# Patient Record
Sex: MALE | Race: WHITE | RURAL
[De-identification: names, ages, dates, MRNs, and addresses within clinical notes are randomized per-mention and may not be internally consistent; named-entity substitution may affect disease eponyms.]

---

## 2017-04-04 ENCOUNTER — HOSPITAL ENCOUNTER (OUTPATIENT)
Dept: OTHER | Age: 67
Discharge: HOME | End: 2017-04-04
Attending: NURSE PRACTITIONER | Admitting: NURSE PRACTITIONER

## 2017-04-04 LAB
A/G RATIO: 1.5 (ref 0.8–2)
ALBUMIN SERPL-MCNC: 4 G/DL (ref 3.4–4.8)
ALP BLD-CCNC: 75 U/L (ref 25–100)
ALT SERPL-CCNC: 21 U/L (ref 4–36)
ANION GAP SERPL CALCULATED.3IONS-SCNC: 13 MMOL/L (ref 3–16)
AST SERPL-CCNC: 23 U/L (ref 8–33)
BASOPHILS ABSOLUTE: 0 K/UL (ref 0–0.1)
BASOPHILS RELATIVE PERCENT: 0.1 %
BILIRUB SERPL-MCNC: 0.5 MG/DL (ref 0.3–1.2)
BUN BLDV-MCNC: 19 MG/DL (ref 6–20)
CALCIUM SERPL-MCNC: 9.3 MG/DL (ref 8.5–10.5)
CHLORIDE BLD-SCNC: 102 MMOL/L (ref 98–107)
CHOLESTEROL, TOTAL: 124 MG/DL (ref 0–200)
CO2: 26 MMOL/L (ref 20–30)
CREAT SERPL-MCNC: 1 MG/DL (ref 0.4–1.2)
EOSINOPHILS ABSOLUTE: 0.1 K/UL (ref 0–0.4)
EOSINOPHILS RELATIVE PERCENT: 1 %
FOLATE: 16.2 NG/ML
GFR AFRICAN AMERICAN: >59
GFR NON-AFRICAN AMERICAN: >59
GLOBULIN: 2.7 G/DL
GLUCOSE BLD-MCNC: 114 MG/DL (ref 74–106)
HBA1C MFR BLD: 8.5 %
HCT VFR BLD CALC: 40.6 % (ref 40–54)
HDLC SERPL-MCNC: 42 MG/DL (ref 40–60)
HEMOGLOBIN: 14 G/DL (ref 13–18)
LDL CHOLESTEROL CALCULATED: 72 MG/DL
LYMPHOCYTES ABSOLUTE: 1.7 K/UL (ref 1.5–4)
LYMPHOCYTES RELATIVE PERCENT: 21 % (ref 20–40)
MCH RBC QN AUTO: 31.8 PG (ref 27–32)
MCHC RBC AUTO-ENTMCNC: 34.5 G/DL (ref 31–35)
MCV RBC AUTO: 92.3 FL (ref 80–100)
MONOCYTES ABSOLUTE: 0.6 K/UL (ref 0.2–0.8)
MONOCYTES RELATIVE PERCENT: 7.4 % (ref 3–10)
NEUTROPHILS ABSOLUTE: 5.7 K/UL (ref 2–7.5)
NEUTROPHILS RELATIVE PERCENT: 70.5 %
PDW BLD-RTO: 13 % (ref 11–16)
PLATELET # BLD: 210 K/UL (ref 150–400)
PMV BLD AUTO: 10.4 FL (ref 6–10)
POTASSIUM SERPL-SCNC: 4.5 MMOL/L (ref 3.4–5.1)
PROSTATE SPECIFIC ANTIGEN: 9.89 NG/ML (ref 0–4)
RBC # BLD: 4.4 M/UL (ref 4.5–6)
SODIUM BLD-SCNC: 141 MMOL/L (ref 136–145)
TOTAL PROTEIN: 6.7 G/DL (ref 6.4–8.3)
TRIGL SERPL-MCNC: 51 MG/DL (ref 0–249)
TSH SERPL DL<=0.05 MIU/L-ACNC: 1 UIU/ML (ref 0.35–5.5)
VITAMIN B-12: 370 PG/ML (ref 211–911)
VLDLC SERPL CALC-MCNC: 10 MG/DL
WBC # BLD: 8.1 K/UL (ref 4–11)

## 2018-10-11 ENCOUNTER — HOSPITAL ENCOUNTER (OUTPATIENT)
Facility: HOSPITAL | Age: 68
Discharge: HOME | End: 2018-10-11
Payer: MEDICARE

## 2018-10-11 PROCEDURE — G0103 PSA SCREENING: HCPCS

## 2018-10-11 PROCEDURE — 83036 HEMOGLOBIN GLYCOSYLATED A1C: CPT

## 2018-10-11 PROCEDURE — 80053 COMPREHEN METABOLIC PANEL: CPT

## 2018-10-11 PROCEDURE — 82043 UR ALBUMIN QUANTITATIVE: CPT

## 2018-10-11 PROCEDURE — 80061 LIPID PANEL: CPT

## 2018-10-11 PROCEDURE — 36415 COLL VENOUS BLD VENIPUNCTURE: CPT

## 2018-10-11 PROCEDURE — 84443 ASSAY THYROID STIM HORMONE: CPT

## 2018-10-11 PROCEDURE — 85025 COMPLETE CBC W/AUTO DIFF WBC: CPT

## 2018-10-11 PROCEDURE — 82746 ASSAY OF FOLIC ACID SERUM: CPT

## 2018-10-12 LAB
A/G RATIO: 1.9 (ref 0.8–2)
ALBUMIN SERPL-MCNC: 4.4 G/DL (ref 3.4–4.8)
ALP BLD-CCNC: 65 U/L (ref 25–100)
ALT SERPL-CCNC: 14 U/L (ref 4–36)
ANION GAP SERPL CALCULATED.3IONS-SCNC: 12 MMOL/L (ref 3–16)
AST SERPL-CCNC: 20 U/L (ref 8–33)
BASOPHILS ABSOLUTE: 0 K/UL (ref 0–0.1)
BASOPHILS RELATIVE PERCENT: 0.6 %
BILIRUB SERPL-MCNC: 0.5 MG/DL (ref 0.3–1.2)
BUN BLDV-MCNC: 15 MG/DL (ref 6–20)
CALCIUM SERPL-MCNC: 9.4 MG/DL (ref 8.5–10.5)
CHLORIDE BLD-SCNC: 104 MMOL/L (ref 98–107)
CHOLESTEROL, TOTAL: 153 MG/DL (ref 0–200)
CO2: 24 MMOL/L (ref 20–30)
CREAT SERPL-MCNC: 1 MG/DL (ref 0.4–1.2)
EOSINOPHILS ABSOLUTE: 0.2 K/UL (ref 0–0.4)
EOSINOPHILS RELATIVE PERCENT: 3 %
FOLATE: 14.07 NG/ML
GFR AFRICAN AMERICAN: >59
GFR NON-AFRICAN AMERICAN: >59
GLOBULIN: 2.3 G/DL
GLUCOSE BLD-MCNC: 121 MG/DL (ref 74–106)
HBA1C MFR BLD: 6.8 %
HCT VFR BLD CALC: 42.9 % (ref 40–54)
HDLC SERPL-MCNC: 50 MG/DL (ref 40–60)
HEMOGLOBIN: 14.1 G/DL (ref 13–18)
IMMATURE GRANULOCYTES #: 0 K/UL
IMMATURE GRANULOCYTES %: 0.2 % (ref 0–5)
LDL CHOLESTEROL CALCULATED: 93 MG/DL
LYMPHOCYTES ABSOLUTE: 1.8 K/UL (ref 1.5–4)
LYMPHOCYTES RELATIVE PERCENT: 27.9 %
MCH RBC QN AUTO: 31.6 PG (ref 27–32)
MCHC RBC AUTO-ENTMCNC: 32.9 G/DL (ref 31–35)
MCV RBC AUTO: 96.2 FL (ref 80–100)
MICROALBUMIN UR-MCNC: 16.4 MG/DL (ref 0–22)
MONOCYTES ABSOLUTE: 0.5 K/UL (ref 0.2–0.8)
MONOCYTES RELATIVE PERCENT: 8.6 %
NEUTROPHILS ABSOLUTE: 3.7 K/UL (ref 2–7.5)
NEUTROPHILS RELATIVE PERCENT: 59.7 %
PDW BLD-RTO: 12.5 % (ref 11–16)
PLATELET # BLD: 195 K/UL (ref 150–400)
PMV BLD AUTO: 11.3 FL (ref 6–10)
POTASSIUM SERPL-SCNC: 4.2 MMOL/L (ref 3.4–5.1)
PROSTATE SPECIFIC ANTIGEN: 1.02 NG/ML (ref 0–4)
RBC # BLD: 4.46 M/UL (ref 4.5–6)
SODIUM BLD-SCNC: 140 MMOL/L (ref 136–145)
TOTAL PROTEIN: 6.7 G/DL (ref 6.4–8.3)
TRIGL SERPL-MCNC: 50 MG/DL (ref 0–249)
TSH SERPL DL<=0.05 MIU/L-ACNC: 1.66 UIU/ML (ref 0.35–5.5)
VLDLC SERPL CALC-MCNC: 10 MG/DL
WBC # BLD: 6.3 K/UL (ref 4–11)

## 2019-06-28 ENCOUNTER — HOSPITAL ENCOUNTER (OUTPATIENT)
Facility: HOSPITAL | Age: 69
Discharge: HOME | End: 2019-06-28
Payer: MEDICARE

## 2019-06-28 LAB
A/G RATIO: 1.8 (ref 0.8–2)
ALBUMIN SERPL-MCNC: 4.8 G/DL (ref 3.4–4.8)
ALP BLD-CCNC: 67 U/L (ref 25–100)
ALT SERPL-CCNC: 15 U/L (ref 4–36)
ANION GAP SERPL CALCULATED.3IONS-SCNC: 14 MMOL/L (ref 3–16)
AST SERPL-CCNC: 18 U/L (ref 8–33)
BASOPHILS ABSOLUTE: 0 K/UL (ref 0–0.1)
BASOPHILS RELATIVE PERCENT: 0.6 %
BILIRUB SERPL-MCNC: 0.8 MG/DL (ref 0.3–1.2)
BUN BLDV-MCNC: 23 MG/DL (ref 6–20)
CALCIUM SERPL-MCNC: 9.7 MG/DL (ref 8.5–10.5)
CHLORIDE BLD-SCNC: 102 MMOL/L (ref 98–107)
CHOLESTEROL, TOTAL: 149 MG/DL (ref 0–200)
CO2: 23 MMOL/L (ref 20–30)
CREAT SERPL-MCNC: 1.1 MG/DL (ref 0.4–1.2)
EOSINOPHILS ABSOLUTE: 0.1 K/UL (ref 0–0.4)
EOSINOPHILS RELATIVE PERCENT: 1 %
FOLATE: 15.45 NG/ML
GFR AFRICAN AMERICAN: >59
GFR NON-AFRICAN AMERICAN: >59
GLOBULIN: 2.6 G/DL
GLUCOSE BLD-MCNC: 143 MG/DL (ref 74–106)
HBA1C MFR BLD: 9 %
HCT VFR BLD CALC: 45.5 % (ref 40–54)
HDLC SERPL-MCNC: 50 MG/DL (ref 40–60)
HEMOGLOBIN: 15.4 G/DL (ref 13–18)
IMMATURE GRANULOCYTES #: 0 K/UL
IMMATURE GRANULOCYTES %: 0.3 % (ref 0–5)
LDL CHOLESTEROL CALCULATED: 86 MG/DL
LYMPHOCYTES ABSOLUTE: 1.8 K/UL (ref 1.5–4)
LYMPHOCYTES RELATIVE PERCENT: 24.3 %
MCH RBC QN AUTO: 32.2 PG (ref 27–32)
MCHC RBC AUTO-ENTMCNC: 33.8 G/DL (ref 31–35)
MCV RBC AUTO: 95.2 FL (ref 80–100)
MONOCYTES ABSOLUTE: 0.6 K/UL (ref 0.2–0.8)
MONOCYTES RELATIVE PERCENT: 7.6 %
NEUTROPHILS ABSOLUTE: 4.8 K/UL (ref 2–7.5)
NEUTROPHILS RELATIVE PERCENT: 66.2 %
PDW BLD-RTO: 12.3 % (ref 11–16)
PLATELET # BLD: 190 K/UL (ref 150–400)
PMV BLD AUTO: 10.8 FL (ref 6–10)
POTASSIUM SERPL-SCNC: 4.1 MMOL/L (ref 3.4–5.1)
RBC # BLD: 4.78 M/UL (ref 4.5–6)
SODIUM BLD-SCNC: 139 MMOL/L (ref 136–145)
TOTAL PROTEIN: 7.4 G/DL (ref 6.4–8.3)
TRIGL SERPL-MCNC: 67 MG/DL (ref 0–249)
TSH SERPL DL<=0.05 MIU/L-ACNC: 2.78 UIU/ML (ref 0.35–5.5)
VITAMIN B-12: 306 PG/ML (ref 211–911)
VLDLC SERPL CALC-MCNC: 13 MG/DL
WBC # BLD: 7.2 K/UL (ref 4–11)

## 2019-06-28 PROCEDURE — 83036 HEMOGLOBIN GLYCOSYLATED A1C: CPT

## 2019-06-28 PROCEDURE — 36415 COLL VENOUS BLD VENIPUNCTURE: CPT

## 2019-06-28 PROCEDURE — 84443 ASSAY THYROID STIM HORMONE: CPT

## 2019-06-28 PROCEDURE — 80061 LIPID PANEL: CPT

## 2019-06-28 PROCEDURE — 82746 ASSAY OF FOLIC ACID SERUM: CPT

## 2019-06-28 PROCEDURE — 85025 COMPLETE CBC W/AUTO DIFF WBC: CPT

## 2019-06-28 PROCEDURE — 80053 COMPREHEN METABOLIC PANEL: CPT

## 2019-06-28 PROCEDURE — 82607 VITAMIN B-12: CPT

## 2020-06-03 ENCOUNTER — HOSPITAL ENCOUNTER (OUTPATIENT)
Facility: HOSPITAL | Age: 70
Discharge: HOME | End: 2020-06-03
Payer: MEDICARE

## 2020-06-03 LAB
A/G RATIO: 1.6 (ref 0.8–2)
ALBUMIN SERPL-MCNC: 4.1 G/DL (ref 3.4–4.8)
ALP BLD-CCNC: 65 U/L (ref 25–100)
ALT SERPL-CCNC: 20 U/L (ref 4–36)
ANION GAP SERPL CALCULATED.3IONS-SCNC: 11 MMOL/L (ref 3–16)
AST SERPL-CCNC: 22 U/L (ref 8–33)
BASOPHILS ABSOLUTE: 0 K/UL (ref 0–0.1)
BASOPHILS RELATIVE PERCENT: 0.6 %
BILIRUB SERPL-MCNC: 0.6 MG/DL (ref 0.3–1.2)
BUN BLDV-MCNC: 21 MG/DL (ref 6–20)
CALCIUM SERPL-MCNC: 9.6 MG/DL (ref 8.5–10.5)
CHLORIDE BLD-SCNC: 103 MMOL/L (ref 98–107)
CHOLESTEROL, TOTAL: 193 MG/DL (ref 0–200)
CO2: 24 MMOL/L (ref 20–30)
CREAT SERPL-MCNC: 1.1 MG/DL (ref 0.4–1.2)
EOSINOPHILS ABSOLUTE: 0.1 K/UL (ref 0–0.4)
EOSINOPHILS RELATIVE PERCENT: 1.7 %
FOLATE: 13.95 NG/ML
GFR AFRICAN AMERICAN: >59
GFR NON-AFRICAN AMERICAN: >59
GLOBULIN: 2.5 G/DL
GLUCOSE BLD-MCNC: 234 MG/DL (ref 74–106)
HBA1C MFR BLD: 11.6 %
HCT VFR BLD CALC: 43.6 % (ref 40–54)
HDLC SERPL-MCNC: 50 MG/DL (ref 40–60)
HEMOGLOBIN: 15.1 G/DL (ref 13–18)
IMMATURE GRANULOCYTES #: 0 K/UL
IMMATURE GRANULOCYTES %: 0.3 % (ref 0–5)
LDL CHOLESTEROL CALCULATED: 120 MG/DL
LYMPHOCYTES ABSOLUTE: 1.7 K/UL (ref 1.5–4)
LYMPHOCYTES RELATIVE PERCENT: 26 %
MCH RBC QN AUTO: 31.9 PG (ref 27–32)
MCHC RBC AUTO-ENTMCNC: 34.6 G/DL (ref 31–35)
MCV RBC AUTO: 92 FL (ref 80–100)
MONOCYTES ABSOLUTE: 0.4 K/UL (ref 0.2–0.8)
MONOCYTES RELATIVE PERCENT: 6.6 %
NEUTROPHILS ABSOLUTE: 4.2 K/UL (ref 2–7.5)
NEUTROPHILS RELATIVE PERCENT: 64.8 %
PDW BLD-RTO: 12.3 % (ref 11–16)
PLATELET # BLD: 163 K/UL (ref 150–400)
PMV BLD AUTO: 11.3 FL (ref 6–10)
POTASSIUM SERPL-SCNC: 4.8 MMOL/L (ref 3.4–5.1)
RBC # BLD: 4.74 M/UL (ref 4.5–6)
SODIUM BLD-SCNC: 138 MMOL/L (ref 136–145)
TOTAL PROTEIN: 6.6 G/DL (ref 6.4–8.3)
TRIGL SERPL-MCNC: 116 MG/DL (ref 0–249)
TSH SERPL DL<=0.05 MIU/L-ACNC: 2.24 UIU/ML (ref 0.35–5.5)
VITAMIN B-12: 358 PG/ML (ref 211–911)
VLDLC SERPL CALC-MCNC: 23 MG/DL
WBC # BLD: 6.5 K/UL (ref 4–11)

## 2020-06-03 PROCEDURE — 82746 ASSAY OF FOLIC ACID SERUM: CPT

## 2020-06-03 PROCEDURE — 84443 ASSAY THYROID STIM HORMONE: CPT

## 2020-06-03 PROCEDURE — 80061 LIPID PANEL: CPT

## 2020-06-03 PROCEDURE — 82607 VITAMIN B-12: CPT

## 2020-06-03 PROCEDURE — 85025 COMPLETE CBC W/AUTO DIFF WBC: CPT

## 2020-06-03 PROCEDURE — 83036 HEMOGLOBIN GLYCOSYLATED A1C: CPT

## 2020-06-03 PROCEDURE — 80053 COMPREHEN METABOLIC PANEL: CPT

## 2022-03-03 ENCOUNTER — HOSPITAL ENCOUNTER (OUTPATIENT)
Facility: HOSPITAL | Age: 72
Discharge: HOME | End: 2022-03-03
Payer: MEDICARE

## 2022-03-03 PROCEDURE — 80061 LIPID PANEL: CPT

## 2022-03-03 PROCEDURE — 84443 ASSAY THYROID STIM HORMONE: CPT

## 2022-03-03 PROCEDURE — 82746 ASSAY OF FOLIC ACID SERUM: CPT

## 2022-03-03 PROCEDURE — 80053 COMPREHEN METABOLIC PANEL: CPT

## 2022-03-03 PROCEDURE — 85025 COMPLETE CBC W/AUTO DIFF WBC: CPT

## 2022-03-03 PROCEDURE — 82607 VITAMIN B-12: CPT

## 2022-03-03 PROCEDURE — 83036 HEMOGLOBIN GLYCOSYLATED A1C: CPT

## 2022-03-03 PROCEDURE — 36415 COLL VENOUS BLD VENIPUNCTURE: CPT

## 2022-03-04 LAB
A/G RATIO: 1.7 (ref 0.8–2)
ALBUMIN SERPL-MCNC: 4.3 G/DL (ref 3.4–4.8)
ALP BLD-CCNC: 78 U/L (ref 25–100)
ALT SERPL-CCNC: 27 U/L (ref 4–36)
ANION GAP SERPL CALCULATED.3IONS-SCNC: 13 MMOL/L (ref 3–16)
AST SERPL-CCNC: 21 U/L (ref 8–33)
BASOPHILS ABSOLUTE: 0.1 K/UL (ref 0–0.1)
BASOPHILS RELATIVE PERCENT: 0.7 %
BILIRUB SERPL-MCNC: 0.7 MG/DL (ref 0.3–1.2)
BUN BLDV-MCNC: 25 MG/DL (ref 6–20)
CALCIUM SERPL-MCNC: 9.6 MG/DL (ref 8.5–10.5)
CHLORIDE BLD-SCNC: 101 MMOL/L (ref 98–107)
CHOLESTEROL, TOTAL: 187 MG/DL (ref 0–200)
CO2: 23 MMOL/L (ref 20–30)
CREAT SERPL-MCNC: 1 MG/DL (ref 0.4–1.2)
EOSINOPHILS ABSOLUTE: 0.2 K/UL (ref 0–0.4)
EOSINOPHILS RELATIVE PERCENT: 2.2 %
FOLATE: 12.83 NG/ML
GFR AFRICAN AMERICAN: >59
GFR NON-AFRICAN AMERICAN: >59
GLOBULIN: 2.6 G/DL
GLUCOSE BLD-MCNC: 364 MG/DL (ref 74–106)
HBA1C MFR BLD: 9.9 %
HCT VFR BLD CALC: 42.1 % (ref 40–54)
HDLC SERPL-MCNC: 42 MG/DL (ref 40–60)
HEMOGLOBIN: 13.9 G/DL (ref 13–18)
IMMATURE GRANULOCYTES #: 0 K/UL
IMMATURE GRANULOCYTES %: 0.4 % (ref 0–5)
LDL CHOLESTEROL CALCULATED: 111 MG/DL
LYMPHOCYTES ABSOLUTE: 1.5 K/UL (ref 1.5–4)
LYMPHOCYTES RELATIVE PERCENT: 19.9 %
MCH RBC QN AUTO: 31.6 PG (ref 27–32)
MCHC RBC AUTO-ENTMCNC: 33 G/DL (ref 31–35)
MCV RBC AUTO: 95.7 FL (ref 80–100)
MONOCYTES ABSOLUTE: 0.6 K/UL (ref 0.2–0.8)
MONOCYTES RELATIVE PERCENT: 7.8 %
NEUTROPHILS ABSOLUTE: 5.3 K/UL (ref 2–7.5)
NEUTROPHILS RELATIVE PERCENT: 69 %
PDW BLD-RTO: 13.1 % (ref 11–16)
PLATELET # BLD: 213 K/UL (ref 150–400)
PMV BLD AUTO: 12.3 FL (ref 6–10)
POTASSIUM SERPL-SCNC: 4.7 MMOL/L (ref 3.4–5.1)
RBC # BLD: 4.4 M/UL (ref 4.5–6)
SODIUM BLD-SCNC: 137 MMOL/L (ref 136–145)
TOTAL PROTEIN: 6.9 G/DL (ref 6.4–8.3)
TRIGL SERPL-MCNC: 168 MG/DL (ref 0–249)
TSH SERPL DL<=0.05 MIU/L-ACNC: 1.07 UIU/ML (ref 0.27–4.2)
VITAMIN B-12: 318 PG/ML (ref 211–911)
VLDLC SERPL CALC-MCNC: 34 MG/DL
WBC # BLD: 7.7 K/UL (ref 4–11)

## 2023-06-25 ENCOUNTER — APPOINTMENT (OUTPATIENT)
Dept: CT IMAGING | Facility: HOSPITAL | Age: 73
DRG: 065 | End: 2023-06-25
Payer: MEDICAID

## 2023-06-25 ENCOUNTER — APPOINTMENT (OUTPATIENT)
Dept: GENERAL RADIOLOGY | Facility: HOSPITAL | Age: 73
DRG: 065 | End: 2023-06-25
Payer: MEDICAID

## 2023-06-25 ENCOUNTER — HOSPITAL ENCOUNTER (INPATIENT)
Facility: HOSPITAL | Age: 73
LOS: 3 days | Discharge: ANOTHER HOSPITAL | DRG: 065 | End: 2023-06-28
Attending: HOSPITALIST | Admitting: INTERNAL MEDICINE
Payer: MEDICAID

## 2023-06-25 DIAGNOSIS — I10 ESSENTIAL HYPERTENSION: ICD-10-CM

## 2023-06-25 DIAGNOSIS — R42 DIZZINESS: ICD-10-CM

## 2023-06-25 DIAGNOSIS — N30.00 ACUTE CYSTITIS WITHOUT HEMATURIA: ICD-10-CM

## 2023-06-25 DIAGNOSIS — R55 VASOVAGAL EPISODE: Primary | ICD-10-CM

## 2023-06-25 DIAGNOSIS — Z91.199 MEDICALLY NONCOMPLIANT: ICD-10-CM

## 2023-06-25 PROBLEM — R53.1 GENERALIZED WEAKNESS: Status: ACTIVE | Noted: 2023-06-25

## 2023-06-25 LAB
ALBUMIN SERPL-MCNC: 4.5 G/DL (ref 3.4–4.8)
ALBUMIN/GLOB SERPL: 1.4 {RATIO} (ref 0.8–2)
ALP SERPL-CCNC: 79 U/L (ref 25–100)
ALT SERPL-CCNC: 15 U/L (ref 4–36)
AMPHET UR QL SCN: NORMAL
ANION GAP SERPL CALCULATED.3IONS-SCNC: 15 MMOL/L (ref 3–16)
AST SERPL-CCNC: 18 U/L (ref 8–33)
BACTERIA URNS QL MICRO: ABNORMAL /HPF
BARBITURATES UR QL SCN: NORMAL
BASE EXCESS BLDA CALC-SCNC: -2.5 MMOL/L (ref -3–3)
BASOPHILS # BLD: 0 K/UL (ref 0–0.1)
BASOPHILS NFR BLD: 0.3 %
BENZODIAZ UR QL SCN: NORMAL
BILIRUB SERPL-MCNC: 0.8 MG/DL (ref 0.3–1.2)
BILIRUB UR QL STRIP.AUTO: NEGATIVE
BUN SERPL-MCNC: 21 MG/DL (ref 6–20)
BUPRENORPHINE QUAL, URINE: NORMAL
CALCIUM SERPL-MCNC: 9.9 MG/DL (ref 8.5–10.5)
CANNABINOIDS UR QL SCN: NORMAL
CHLORIDE SERPL-SCNC: 98 MMOL/L (ref 98–107)
CHP ED QC CHECK: 235
CK SERPL-CCNC: 33 U/L (ref 26–174)
CLARITY UR: CLEAR
CO2 BLDA-SCNC: 22.2 MMOL/L (ref 24–30)
CO2 SERPL-SCNC: 22 MMOL/L (ref 20–30)
COCAINE UR QL SCN: NORMAL
COLOR UR: YELLOW
CREAT SERPL-MCNC: 1.1 MG/DL (ref 0.4–1.2)
DRUG SCREEN COMMENT UR-IMP: NORMAL
EOSINOPHIL # BLD: 0.1 K/UL (ref 0–0.4)
EOSINOPHIL NFR BLD: 0.4 %
ERYTHROCYTE [DISTWIDTH] IN BLOOD BY AUTOMATED COUNT: 12.6 % (ref 11–16)
GFR SERPLBLD CREATININE-BSD FMLA CKD-EPI: >60 ML/MIN/{1.73_M2}
GLOBULIN SER CALC-MCNC: 3.2 G/DL
GLUCOSE BLD-MCNC: 215 MG/DL (ref 74–106)
GLUCOSE SERPL-MCNC: 243 MG/DL (ref 74–106)
GLUCOSE UR STRIP.AUTO-MCNC: 500 MG/DL
HBA1C MFR BLD: 9.1 %
HCO3 BLDA-SCNC: 21.2 MMOL/L (ref 22–26)
HCT VFR BLD AUTO: 47.3 % (ref 40–54)
HGB BLD-MCNC: 16 G/DL (ref 13–18)
HGB UR QL STRIP.AUTO: ABNORMAL
HYALINE CASTS #/AREA URNS LPF: ABNORMAL /LPF (ref 0–2)
IMM GRANULOCYTES # BLD: 0.1 K/UL
IMM GRANULOCYTES NFR BLD: 0.5 % (ref 0–5)
INHALED O2 FLOW RATE: 0.21 %
KETONES UR STRIP.AUTO-MCNC: 40 MG/DL
LEUKOCYTE ESTERASE UR QL STRIP.AUTO: NEGATIVE
LYMPHOCYTES # BLD: 1 K/UL (ref 1.5–4)
LYMPHOCYTES NFR BLD: 8.8 %
MCH RBC QN AUTO: 31.6 PG (ref 27–32)
MCHC RBC AUTO-ENTMCNC: 33.8 G/DL (ref 31–35)
MCV RBC AUTO: 93.5 FL (ref 80–100)
METHADONE UR QL SCN: NORMAL
METHAMPHET UR QL SCN: NORMAL
MONOCYTES # BLD: 0.4 K/UL (ref 0.2–0.8)
MONOCYTES NFR BLD: 3 %
MUCOUS THREADS URNS QL MICRO: ABNORMAL /LPF
NEUTROPHILS # BLD: 10.1 K/UL (ref 2–7.5)
NEUTS SEG NFR BLD: 87 %
NITRITE UR QL STRIP.AUTO: NEGATIVE
O2 THERAPY: ABNORMAL
OPIATES UR QL SCN: NORMAL
OXYCODONE UR QL SCN: NORMAL
PCO2 BLDA: 33.5 MMHG (ref 35–45)
PCP UR QL SCN: NORMAL
PERFORMED ON: ABNORMAL
PH BLDA: 7.42 [PH] (ref 7.35–7.45)
PH UR STRIP.AUTO: 5 [PH] (ref 5–8)
PLATELET # BLD AUTO: 211 K/UL (ref 150–400)
PMV BLD AUTO: 10.7 FL (ref 6–10)
PO2 BLDA: 76.1 MMHG (ref 80–100)
POTASSIUM SERPL-SCNC: 4.8 MMOL/L (ref 3.4–5.1)
PROPOXYPH UR QL SCN: NORMAL
PROT SERPL-MCNC: 7.7 G/DL (ref 6.4–8.3)
PROT UR STRIP.AUTO-MCNC: >=300 MG/DL
RBC # BLD AUTO: 5.06 M/UL (ref 4.5–6)
RBC #/AREA URNS HPF: ABNORMAL /HPF (ref 0–4)
SAO2 % BLDA: 95.9 %
SARS-COV-2 RDRP RESP QL NAA+PROBE: NOT DETECTED
SODIUM SERPL-SCNC: 135 MMOL/L (ref 136–145)
SP GR UR STRIP.AUTO: >=1.03 (ref 1–1.03)
TRICYCLICS UR QL SCN: NORMAL
TSH SERPL-MCNC: 1.67 UIU/ML (ref 0.27–4.2)
UA COMPLETE W REFLEX CULTURE PNL UR: YES
UA DIPSTICK W REFLEX MICRO PNL UR: YES
URN SPEC COLLECT METH UR: ABNORMAL
UROBILINOGEN UR STRIP-ACNC: 0.2 E.U./DL
WBC # BLD AUTO: 11.6 K/UL (ref 4–11)
WBC #/AREA URNS HPF: ABNORMAL /HPF (ref 0–5)

## 2023-06-25 PROCEDURE — 80307 DRUG TEST PRSMV CHEM ANLYZR: CPT

## 2023-06-25 PROCEDURE — 84443 ASSAY THYROID STIM HORMONE: CPT

## 2023-06-25 PROCEDURE — 96360 HYDRATION IV INFUSION INIT: CPT

## 2023-06-25 PROCEDURE — 36600 WITHDRAWAL OF ARTERIAL BLOOD: CPT

## 2023-06-25 PROCEDURE — 81001 URINALYSIS AUTO W/SCOPE: CPT

## 2023-06-25 PROCEDURE — 70450 CT HEAD/BRAIN W/O DYE: CPT

## 2023-06-25 PROCEDURE — 36415 COLL VENOUS BLD VENIPUNCTURE: CPT

## 2023-06-25 PROCEDURE — 6360000004 HC RX CONTRAST MEDICATION: Performed by: INTERNAL MEDICINE

## 2023-06-25 PROCEDURE — 87635 SARS-COV-2 COVID-19 AMP PRB: CPT

## 2023-06-25 PROCEDURE — 71045 X-RAY EXAM CHEST 1 VIEW: CPT

## 2023-06-25 PROCEDURE — 70496 CT ANGIOGRAPHY HEAD: CPT

## 2023-06-25 PROCEDURE — 2580000003 HC RX 258: Performed by: PHYSICIAN ASSISTANT

## 2023-06-25 PROCEDURE — 6360000002 HC RX W HCPCS: Performed by: PHYSICIAN ASSISTANT

## 2023-06-25 PROCEDURE — 87086 URINE CULTURE/COLONY COUNT: CPT

## 2023-06-25 PROCEDURE — 83036 HEMOGLOBIN GLYCOSYLATED A1C: CPT

## 2023-06-25 PROCEDURE — 85025 COMPLETE CBC W/AUTO DIFF WBC: CPT

## 2023-06-25 PROCEDURE — 82550 ASSAY OF CK (CPK): CPT

## 2023-06-25 PROCEDURE — 99285 EMERGENCY DEPT VISIT HI MDM: CPT

## 2023-06-25 PROCEDURE — 1200000000 HC SEMI PRIVATE

## 2023-06-25 PROCEDURE — 6370000000 HC RX 637 (ALT 250 FOR IP): Performed by: HOSPITALIST

## 2023-06-25 PROCEDURE — 80053 COMPREHEN METABOLIC PANEL: CPT

## 2023-06-25 PROCEDURE — 2580000003 HC RX 258: Performed by: HOSPITALIST

## 2023-06-25 PROCEDURE — 6370000000 HC RX 637 (ALT 250 FOR IP): Performed by: PHYSICIAN ASSISTANT

## 2023-06-25 PROCEDURE — 70498 CT ANGIOGRAPHY NECK: CPT

## 2023-06-25 PROCEDURE — 82306 VITAMIN D 25 HYDROXY: CPT

## 2023-06-25 PROCEDURE — 82803 BLOOD GASES ANY COMBINATION: CPT

## 2023-06-25 PROCEDURE — 93005 ELECTROCARDIOGRAM TRACING: CPT

## 2023-06-25 RX ORDER — INSULIN LISPRO 100 [IU]/ML
0-4 INJECTION, SOLUTION INTRAVENOUS; SUBCUTANEOUS
Status: DISCONTINUED | OUTPATIENT
Start: 2023-06-25 | End: 2023-06-28 | Stop reason: HOSPADM

## 2023-06-25 RX ORDER — NITROFURANTOIN 25; 75 MG/1; MG/1
100 CAPSULE ORAL 2 TIMES DAILY
Qty: 20 CAPSULE | Refills: 0 | Status: SHIPPED | OUTPATIENT
Start: 2023-06-25 | End: 2023-07-05

## 2023-06-25 RX ORDER — SODIUM CHLORIDE 9 MG/ML
INJECTION, SOLUTION INTRAVENOUS CONTINUOUS
Status: DISCONTINUED | OUTPATIENT
Start: 2023-06-25 | End: 2023-06-26

## 2023-06-25 RX ORDER — 0.9 % SODIUM CHLORIDE 0.9 %
500 INTRAVENOUS SOLUTION INTRAVENOUS ONCE
Status: COMPLETED | OUTPATIENT
Start: 2023-06-25 | End: 2023-06-25

## 2023-06-25 RX ORDER — ACETAMINOPHEN 650 MG/1
650 SUPPOSITORY RECTAL EVERY 6 HOURS PRN
Status: DISCONTINUED | OUTPATIENT
Start: 2023-06-25 | End: 2023-06-28 | Stop reason: HOSPADM

## 2023-06-25 RX ORDER — ACETAMINOPHEN 325 MG/1
650 TABLET ORAL EVERY 6 HOURS PRN
Status: DISCONTINUED | OUTPATIENT
Start: 2023-06-25 | End: 2023-06-28 | Stop reason: HOSPADM

## 2023-06-25 RX ORDER — ONDANSETRON 2 MG/ML
4 INJECTION INTRAMUSCULAR; INTRAVENOUS EVERY 6 HOURS PRN
Status: DISCONTINUED | OUTPATIENT
Start: 2023-06-25 | End: 2023-06-28 | Stop reason: HOSPADM

## 2023-06-25 RX ORDER — ASPIRIN 81 MG/1
81 TABLET, CHEWABLE ORAL DAILY
Status: DISCONTINUED | OUTPATIENT
Start: 2023-06-25 | End: 2023-06-26

## 2023-06-25 RX ORDER — FLUTICASONE PROPIONATE 50 MCG
1 SPRAY, SUSPENSION (ML) NASAL DAILY
Status: DISCONTINUED | OUTPATIENT
Start: 2023-06-25 | End: 2023-06-28 | Stop reason: HOSPADM

## 2023-06-25 RX ORDER — ONDANSETRON 4 MG/1
4 TABLET, ORALLY DISINTEGRATING ORAL EVERY 8 HOURS PRN
Status: DISCONTINUED | OUTPATIENT
Start: 2023-06-25 | End: 2023-06-28 | Stop reason: HOSPADM

## 2023-06-25 RX ORDER — CLONIDINE HYDROCHLORIDE 0.1 MG/1
0.1 TABLET ORAL ONCE
Status: COMPLETED | OUTPATIENT
Start: 2023-06-25 | End: 2023-06-25

## 2023-06-25 RX ORDER — ENOXAPARIN SODIUM 100 MG/ML
30 INJECTION SUBCUTANEOUS 2 TIMES DAILY
Status: DISCONTINUED | OUTPATIENT
Start: 2023-06-25 | End: 2023-06-26

## 2023-06-25 RX ORDER — NITROFURANTOIN 25; 75 MG/1; MG/1
100 CAPSULE ORAL ONCE
Status: COMPLETED | OUTPATIENT
Start: 2023-06-25 | End: 2023-06-25

## 2023-06-25 RX ORDER — LISINOPRIL 10 MG/1
10 TABLET ORAL DAILY
COMMUNITY

## 2023-06-25 RX ORDER — DEXTROSE MONOHYDRATE 100 MG/ML
INJECTION, SOLUTION INTRAVENOUS CONTINUOUS PRN
Status: DISCONTINUED | OUTPATIENT
Start: 2023-06-25 | End: 2023-06-28 | Stop reason: HOSPADM

## 2023-06-25 RX ORDER — INSULIN LISPRO 100 [IU]/ML
0-4 INJECTION, SOLUTION INTRAVENOUS; SUBCUTANEOUS NIGHTLY
Status: DISCONTINUED | OUTPATIENT
Start: 2023-06-25 | End: 2023-06-28 | Stop reason: HOSPADM

## 2023-06-25 RX ORDER — ALOGLIPTIN 12.5 MG/1
12.5 TABLET, FILM COATED ORAL DAILY
Status: DISCONTINUED | OUTPATIENT
Start: 2023-06-25 | End: 2023-06-26

## 2023-06-25 RX ORDER — FAMOTIDINE 20 MG/1
20 TABLET, FILM COATED ORAL 2 TIMES DAILY
Status: DISCONTINUED | OUTPATIENT
Start: 2023-06-25 | End: 2023-06-28

## 2023-06-25 RX ORDER — HYDRALAZINE HYDROCHLORIDE 25 MG/1
25 TABLET, FILM COATED ORAL 3 TIMES DAILY PRN
Status: DISCONTINUED | OUTPATIENT
Start: 2023-06-25 | End: 2023-06-28 | Stop reason: HOSPADM

## 2023-06-25 RX ORDER — ATORVASTATIN CALCIUM 40 MG/1
40 TABLET, FILM COATED ORAL NIGHTLY
Status: DISCONTINUED | OUTPATIENT
Start: 2023-06-26 | End: 2023-06-28 | Stop reason: HOSPADM

## 2023-06-25 RX ORDER — POLYETHYLENE GLYCOL 3350 17 G/17G
17 POWDER, FOR SOLUTION ORAL DAILY PRN
Status: DISCONTINUED | OUTPATIENT
Start: 2023-06-25 | End: 2023-06-28 | Stop reason: HOSPADM

## 2023-06-25 RX ADMIN — ALOGLIPTIN 12.5 MG: 12.5 TABLET, FILM COATED ORAL at 22:21

## 2023-06-25 RX ADMIN — IOPAMIDOL 100 ML: 755 INJECTION, SOLUTION INTRAVENOUS at 19:30

## 2023-06-25 RX ADMIN — ENOXAPARIN SODIUM 30 MG: 100 INJECTION SUBCUTANEOUS at 21:56

## 2023-06-25 RX ADMIN — CEFTRIAXONE 1000 MG: 1 INJECTION, POWDER, FOR SOLUTION INTRAMUSCULAR; INTRAVENOUS at 21:55

## 2023-06-25 RX ADMIN — NITROFURANTOIN (MONOHYDRATE/MACROCRYSTALS) 100 MG: 75; 25 CAPSULE ORAL at 17:08

## 2023-06-25 RX ADMIN — ASPIRIN 81 MG 81 MG: 81 TABLET ORAL at 21:56

## 2023-06-25 RX ADMIN — SODIUM CHLORIDE 500 ML: 9 INJECTION, SOLUTION INTRAVENOUS at 17:38

## 2023-06-25 RX ADMIN — SODIUM CHLORIDE: 9 INJECTION, SOLUTION INTRAVENOUS at 21:52

## 2023-06-25 RX ADMIN — CLONIDINE HYDROCHLORIDE 0.1 MG: 0.1 TABLET ORAL at 15:45

## 2023-06-25 RX ADMIN — FAMOTIDINE 20 MG: 20 TABLET ORAL at 21:56

## 2023-06-25 ASSESSMENT — PAIN SCALES - GENERAL: PAINLEVEL_OUTOF10: 5

## 2023-06-25 ASSESSMENT — LIFESTYLE VARIABLES
HOW MANY STANDARD DRINKS CONTAINING ALCOHOL DO YOU HAVE ON A TYPICAL DAY: PATIENT DOES NOT DRINK
HOW OFTEN DO YOU HAVE A DRINK CONTAINING ALCOHOL: NEVER

## 2023-06-25 ASSESSMENT — PAIN DESCRIPTION - ORIENTATION: ORIENTATION: RIGHT;LEFT

## 2023-06-25 ASSESSMENT — PAIN DESCRIPTION - LOCATION: LOCATION: ARM;LEG

## 2023-06-26 ENCOUNTER — APPOINTMENT (OUTPATIENT)
Dept: MRI IMAGING | Facility: HOSPITAL | Age: 73
DRG: 065 | End: 2023-06-26
Payer: MEDICAID

## 2023-06-26 PROBLEM — E11.9 TYPE 2 DIABETES MELLITUS (HCC): Status: ACTIVE | Noted: 2023-06-26

## 2023-06-26 PROBLEM — N39.0 UTI (URINARY TRACT INFECTION): Status: ACTIVE | Noted: 2023-06-26

## 2023-06-26 PROBLEM — R29.898 RUE WEAKNESS: Status: ACTIVE | Noted: 2023-06-26

## 2023-06-26 PROBLEM — E55.9 VITAMIN D DEFICIENCY: Status: ACTIVE | Noted: 2023-06-26

## 2023-06-26 PROBLEM — I10 HYPERTENSION: Status: ACTIVE | Noted: 2023-06-26

## 2023-06-26 PROBLEM — E83.42 HYPOMAGNESEMIA: Status: ACTIVE | Noted: 2023-06-26

## 2023-06-26 LAB
25(OH)D3 SERPL-MCNC: 9.2 NG/ML (ref 32–100)
ALBUMIN SERPL-MCNC: 3.5 G/DL (ref 3.4–4.8)
ALBUMIN/GLOB SERPL: 1.3 {RATIO} (ref 0.8–2)
ALP SERPL-CCNC: 67 U/L (ref 25–100)
ALT SERPL-CCNC: 11 U/L (ref 4–36)
AMMONIA PLAS-SCNC: 21 MCG/DL (ref 27–102)
ANION GAP SERPL CALCULATED.3IONS-SCNC: 10 MMOL/L (ref 3–16)
AST SERPL-CCNC: 19 U/L (ref 8–33)
BASOPHILS # BLD: 0 K/UL (ref 0–0.1)
BASOPHILS NFR BLD: 0.5 %
BILIRUB SERPL-MCNC: 0.7 MG/DL (ref 0.3–1.2)
BUN SERPL-MCNC: 18 MG/DL (ref 6–20)
CALCIUM SERPL-MCNC: 9 MG/DL (ref 8.5–10.5)
CHLORIDE SERPL-SCNC: 100 MMOL/L (ref 98–107)
CHOLEST SERPL-MCNC: 159 MG/DL (ref 0–200)
CO2 SERPL-SCNC: 25 MMOL/L (ref 20–30)
CREAT SERPL-MCNC: 1 MG/DL (ref 0.4–1.2)
EOSINOPHIL # BLD: 0.1 K/UL (ref 0–0.4)
EOSINOPHIL NFR BLD: 1.6 %
ERYTHROCYTE [DISTWIDTH] IN BLOOD BY AUTOMATED COUNT: 12.4 % (ref 11–16)
FOLATE SERPL-MCNC: 12.1 NG/ML
GFR SERPLBLD CREATININE-BSD FMLA CKD-EPI: >60 ML/MIN/{1.73_M2}
GLOBULIN SER CALC-MCNC: 2.6 G/DL
GLUCOSE BLD-MCNC: 139 MG/DL (ref 74–106)
GLUCOSE BLD-MCNC: 196 MG/DL (ref 74–106)
GLUCOSE BLD-MCNC: 279 MG/DL (ref 74–106)
GLUCOSE BLD-MCNC: 293 MG/DL (ref 74–106)
GLUCOSE SERPL-MCNC: 126 MG/DL (ref 74–106)
HCT VFR BLD AUTO: 40.6 % (ref 40–54)
HDLC SERPL-MCNC: 36 MG/DL (ref 40–60)
HGB BLD-MCNC: 13.4 G/DL (ref 13–18)
IMM GRANULOCYTES # BLD: 0 K/UL
IMM GRANULOCYTES NFR BLD: 0.2 % (ref 0–5)
LDLC SERPL CALC-MCNC: 101 MG/DL
LYMPHOCYTES # BLD: 2.5 K/UL (ref 1.5–4)
LYMPHOCYTES NFR BLD: 28.1 %
MAGNESIUM SERPL-MCNC: 1.6 MG/DL (ref 1.7–2.4)
MCH RBC QN AUTO: 31.2 PG (ref 27–32)
MCHC RBC AUTO-ENTMCNC: 33 G/DL (ref 31–35)
MCV RBC AUTO: 94.6 FL (ref 80–100)
MONOCYTES # BLD: 0.8 K/UL (ref 0.2–0.8)
MONOCYTES NFR BLD: 8.7 %
NEUTROPHILS # BLD: 5.4 K/UL (ref 2–7.5)
NEUTS SEG NFR BLD: 60.9 %
PERFORMED ON: ABNORMAL
PLATELET # BLD AUTO: 170 K/UL (ref 150–400)
PMV BLD AUTO: 10.7 FL (ref 6–10)
POTASSIUM SERPL-SCNC: 4.3 MMOL/L (ref 3.4–5.1)
PROT SERPL-MCNC: 6.1 G/DL (ref 6.4–8.3)
RBC # BLD AUTO: 4.29 M/UL (ref 4.5–6)
SODIUM SERPL-SCNC: 135 MMOL/L (ref 136–145)
TRIGL SERPL-MCNC: 112 MG/DL (ref 0–249)
VIT B12 SERPL-MCNC: 447 PG/ML (ref 211–911)
VLDLC SERPL CALC-MCNC: 22 MG/DL
WBC # BLD AUTO: 8.8 K/UL (ref 4–11)

## 2023-06-26 PROCEDURE — 6370000000 HC RX 637 (ALT 250 FOR IP): Performed by: PHYSICIAN ASSISTANT

## 2023-06-26 PROCEDURE — 85025 COMPLETE CBC W/AUTO DIFF WBC: CPT

## 2023-06-26 PROCEDURE — 82607 VITAMIN B-12: CPT

## 2023-06-26 PROCEDURE — 82140 ASSAY OF AMMONIA: CPT

## 2023-06-26 PROCEDURE — 97166 OT EVAL MOD COMPLEX 45 MIN: CPT

## 2023-06-26 PROCEDURE — 6360000002 HC RX W HCPCS: Performed by: PHYSICIAN ASSISTANT

## 2023-06-26 PROCEDURE — 2580000003 HC RX 258: Performed by: PHYSICIAN ASSISTANT

## 2023-06-26 PROCEDURE — 72141 MRI NECK SPINE W/O DYE: CPT

## 2023-06-26 PROCEDURE — 6370000000 HC RX 637 (ALT 250 FOR IP): Performed by: INTERNAL MEDICINE

## 2023-06-26 PROCEDURE — 92610 EVALUATE SWALLOWING FUNCTION: CPT

## 2023-06-26 PROCEDURE — 93306 TTE W/DOPPLER COMPLETE: CPT

## 2023-06-26 PROCEDURE — 80053 COMPREHEN METABOLIC PANEL: CPT

## 2023-06-26 PROCEDURE — 70551 MRI BRAIN STEM W/O DYE: CPT

## 2023-06-26 PROCEDURE — 99223 1ST HOSP IP/OBS HIGH 75: CPT | Performed by: INTERNAL MEDICINE

## 2023-06-26 PROCEDURE — 80061 LIPID PANEL: CPT

## 2023-06-26 PROCEDURE — 92523 SPEECH SOUND LANG COMPREHEN: CPT

## 2023-06-26 PROCEDURE — 97535 SELF CARE MNGMENT TRAINING: CPT

## 2023-06-26 PROCEDURE — 36415 COLL VENOUS BLD VENIPUNCTURE: CPT

## 2023-06-26 PROCEDURE — 97530 THERAPEUTIC ACTIVITIES: CPT

## 2023-06-26 PROCEDURE — 83735 ASSAY OF MAGNESIUM: CPT

## 2023-06-26 PROCEDURE — 97162 PT EVAL MOD COMPLEX 30 MIN: CPT

## 2023-06-26 PROCEDURE — 1200000000 HC SEMI PRIVATE

## 2023-06-26 PROCEDURE — 82746 ASSAY OF FOLIC ACID SERUM: CPT

## 2023-06-26 RX ORDER — ALOGLIPTIN 12.5 MG/1
25 TABLET, FILM COATED ORAL DAILY
Status: DISCONTINUED | OUTPATIENT
Start: 2023-06-27 | End: 2023-06-28 | Stop reason: HOSPADM

## 2023-06-26 RX ORDER — ASPIRIN 325 MG
325 TABLET, DELAYED RELEASE (ENTERIC COATED) ORAL DAILY
Status: DISCONTINUED | OUTPATIENT
Start: 2023-06-26 | End: 2023-06-28 | Stop reason: HOSPADM

## 2023-06-26 RX ORDER — ERGOCALCIFEROL 1.25 MG/1
50000 CAPSULE ORAL WEEKLY
Status: DISCONTINUED | OUTPATIENT
Start: 2023-06-26 | End: 2023-06-28 | Stop reason: HOSPADM

## 2023-06-26 RX ORDER — MAGNESIUM SULFATE 1 G/100ML
1000 INJECTION INTRAVENOUS ONCE
Status: COMPLETED | OUTPATIENT
Start: 2023-06-26 | End: 2023-06-26

## 2023-06-26 RX ORDER — ENOXAPARIN SODIUM 100 MG/ML
40 INJECTION SUBCUTANEOUS DAILY
Status: DISCONTINUED | OUTPATIENT
Start: 2023-06-27 | End: 2023-06-28 | Stop reason: HOSPADM

## 2023-06-26 RX ADMIN — CEFTRIAXONE 1000 MG: 1 INJECTION, POWDER, FOR SOLUTION INTRAMUSCULAR; INTRAVENOUS at 18:45

## 2023-06-26 RX ADMIN — FAMOTIDINE 20 MG: 20 TABLET ORAL at 08:45

## 2023-06-26 RX ADMIN — INSULIN LISPRO 2 UNITS: 100 INJECTION, SOLUTION INTRAVENOUS; SUBCUTANEOUS at 17:28

## 2023-06-26 RX ADMIN — ASPIRIN 81 MG 81 MG: 81 TABLET ORAL at 08:46

## 2023-06-26 RX ADMIN — ALOGLIPTIN 12.5 MG: 12.5 TABLET, FILM COATED ORAL at 08:45

## 2023-06-26 RX ADMIN — FAMOTIDINE 20 MG: 20 TABLET ORAL at 20:54

## 2023-06-26 RX ADMIN — MAGNESIUM SULFATE HEPTAHYDRATE 1000 MG: 1 INJECTION, SOLUTION INTRAVENOUS at 14:07

## 2023-06-26 RX ADMIN — ENOXAPARIN SODIUM 30 MG: 100 INJECTION SUBCUTANEOUS at 08:45

## 2023-06-26 RX ADMIN — INSULIN LISPRO 2 UNITS: 100 INJECTION, SOLUTION INTRAVENOUS; SUBCUTANEOUS at 11:23

## 2023-06-26 RX ADMIN — ERGOCALCIFEROL 50000 UNITS: 1.25 CAPSULE ORAL at 17:32

## 2023-06-26 RX ADMIN — ASPIRIN 325 MG: 325 TABLET, COATED ORAL at 22:31

## 2023-06-26 RX ADMIN — ATORVASTATIN CALCIUM 40 MG: 40 TABLET, FILM COATED ORAL at 20:54

## 2023-06-27 PROBLEM — I63.9 CVA (CEREBRAL VASCULAR ACCIDENT) (HCC): Status: ACTIVE | Noted: 2023-06-27

## 2023-06-27 LAB
ALBUMIN SERPL-MCNC: 3.8 G/DL (ref 3.4–4.8)
ALBUMIN/GLOB SERPL: 1.3 {RATIO} (ref 0.8–2)
ALP SERPL-CCNC: 69 U/L (ref 25–100)
ALT SERPL-CCNC: 14 U/L (ref 4–36)
ANION GAP SERPL CALCULATED.3IONS-SCNC: 12 MMOL/L (ref 3–16)
AST SERPL-CCNC: 20 U/L (ref 8–33)
BACTERIA UR CULT: NORMAL
BILIRUB SERPL-MCNC: 0.7 MG/DL (ref 0.3–1.2)
BUN SERPL-MCNC: 18 MG/DL (ref 6–20)
CALCIUM SERPL-MCNC: 9.1 MG/DL (ref 8.5–10.5)
CHLORIDE SERPL-SCNC: 97 MMOL/L (ref 98–107)
CO2 SERPL-SCNC: 23 MMOL/L (ref 20–30)
CREAT SERPL-MCNC: 1.1 MG/DL (ref 0.4–1.2)
ERYTHROCYTE [DISTWIDTH] IN BLOOD BY AUTOMATED COUNT: 12.6 % (ref 11–16)
GFR SERPLBLD CREATININE-BSD FMLA CKD-EPI: >60 ML/MIN/{1.73_M2}
GLOBULIN SER CALC-MCNC: 2.9 G/DL
GLUCOSE BLD-MCNC: 160 MG/DL (ref 74–106)
GLUCOSE BLD-MCNC: 192 MG/DL (ref 74–106)
GLUCOSE BLD-MCNC: 202 MG/DL (ref 74–106)
GLUCOSE BLD-MCNC: 536 MG/DL (ref 74–106)
GLUCOSE SERPL-MCNC: 179 MG/DL (ref 74–106)
HCT VFR BLD AUTO: 44.2 % (ref 40–54)
HGB BLD-MCNC: 14.7 G/DL (ref 13–18)
MCH RBC QN AUTO: 31 PG (ref 27–32)
MCHC RBC AUTO-ENTMCNC: 33.3 G/DL (ref 31–35)
MCV RBC AUTO: 93.2 FL (ref 80–100)
PERFORMED ON: ABNORMAL
PLATELET # BLD AUTO: 187 K/UL (ref 150–400)
PMV BLD AUTO: 10.4 FL (ref 6–10)
POTASSIUM SERPL-SCNC: 3.8 MMOL/L (ref 3.4–5.1)
PROT SERPL-MCNC: 6.7 G/DL (ref 6.4–8.3)
RBC # BLD AUTO: 4.74 M/UL (ref 4.5–6)
SODIUM SERPL-SCNC: 132 MMOL/L (ref 136–145)
WBC # BLD AUTO: 9.1 K/UL (ref 4–11)

## 2023-06-27 PROCEDURE — 4A10X4Z MONITORING OF CENTRAL NERVOUS ELECTRICAL ACTIVITY, EXTERNAL APPROACH: ICD-10-PCS | Performed by: PSYCHIATRY & NEUROLOGY

## 2023-06-27 PROCEDURE — 6370000000 HC RX 637 (ALT 250 FOR IP): Performed by: PHYSICIAN ASSISTANT

## 2023-06-27 PROCEDURE — 85027 COMPLETE CBC AUTOMATED: CPT

## 2023-06-27 PROCEDURE — 95819 EEG AWAKE AND ASLEEP: CPT

## 2023-06-27 PROCEDURE — 95819 EEG AWAKE AND ASLEEP: CPT | Performed by: PSYCHIATRY & NEUROLOGY

## 2023-06-27 PROCEDURE — 97530 THERAPEUTIC ACTIVITIES: CPT

## 2023-06-27 PROCEDURE — 92507 TX SP LANG VOICE COMM INDIV: CPT

## 2023-06-27 PROCEDURE — 97535 SELF CARE MNGMENT TRAINING: CPT

## 2023-06-27 PROCEDURE — 6360000002 HC RX W HCPCS: Performed by: PHYSICIAN ASSISTANT

## 2023-06-27 PROCEDURE — 2580000003 HC RX 258: Performed by: PHYSICIAN ASSISTANT

## 2023-06-27 PROCEDURE — 1200000000 HC SEMI PRIVATE

## 2023-06-27 PROCEDURE — 36415 COLL VENOUS BLD VENIPUNCTURE: CPT

## 2023-06-27 PROCEDURE — 99232 SBSQ HOSP IP/OBS MODERATE 35: CPT | Performed by: INTERNAL MEDICINE

## 2023-06-27 PROCEDURE — 80053 COMPREHEN METABOLIC PANEL: CPT

## 2023-06-27 PROCEDURE — 6370000000 HC RX 637 (ALT 250 FOR IP): Performed by: INTERNAL MEDICINE

## 2023-06-27 RX ORDER — ASPIRIN 325 MG
325 TABLET, DELAYED RELEASE (ENTERIC COATED) ORAL DAILY
Qty: 30 TABLET | Refills: 3 | Status: SHIPPED | OUTPATIENT
Start: 2023-06-28

## 2023-06-27 RX ORDER — ATORVASTATIN CALCIUM 40 MG/1
40 TABLET, FILM COATED ORAL NIGHTLY
Qty: 30 TABLET | Refills: 3 | Status: SHIPPED | OUTPATIENT
Start: 2023-06-27

## 2023-06-27 RX ORDER — OXYCODONE HYDROCHLORIDE 5 MG/1
5 TABLET ORAL EVERY 6 HOURS PRN
Status: DISCONTINUED | OUTPATIENT
Start: 2023-06-27 | End: 2023-06-28 | Stop reason: HOSPADM

## 2023-06-27 RX ADMIN — FAMOTIDINE 20 MG: 20 TABLET ORAL at 08:07

## 2023-06-27 RX ADMIN — ACETAMINOPHEN 650 MG: 325 TABLET, FILM COATED ORAL at 08:07

## 2023-06-27 RX ADMIN — INSULIN LISPRO 1 UNITS: 100 INJECTION, SOLUTION INTRAVENOUS; SUBCUTANEOUS at 10:45

## 2023-06-27 RX ADMIN — ALOGLIPTIN 25 MG: 12.5 TABLET, FILM COATED ORAL at 08:07

## 2023-06-27 RX ADMIN — ATORVASTATIN CALCIUM 40 MG: 40 TABLET, FILM COATED ORAL at 21:07

## 2023-06-27 RX ADMIN — EMPAGLIFLOZIN 10 MG: 10 TABLET, FILM COATED ORAL at 08:10

## 2023-06-27 RX ADMIN — CEFTRIAXONE 1000 MG: 1 INJECTION, POWDER, FOR SOLUTION INTRAMUSCULAR; INTRAVENOUS at 18:12

## 2023-06-27 RX ADMIN — ENOXAPARIN SODIUM 40 MG: 100 INJECTION SUBCUTANEOUS at 08:07

## 2023-06-27 RX ADMIN — FAMOTIDINE 20 MG: 20 TABLET ORAL at 21:07

## 2023-06-27 RX ADMIN — ASPIRIN 325 MG: 325 TABLET, COATED ORAL at 08:07

## 2023-06-27 RX ADMIN — FLUTICASONE PROPIONATE 1 SPRAY: 50 SPRAY, METERED NASAL at 08:07

## 2023-06-27 RX ADMIN — INSULIN LISPRO 4 UNITS: 100 INJECTION, SOLUTION INTRAVENOUS; SUBCUTANEOUS at 21:08

## 2023-06-27 ASSESSMENT — PAIN DESCRIPTION - DESCRIPTORS: DESCRIPTORS: ACHING

## 2023-06-27 ASSESSMENT — PAIN DESCRIPTION - ORIENTATION: ORIENTATION: OTHER (COMMENT)

## 2023-06-27 ASSESSMENT — PAIN DESCRIPTION - LOCATION: LOCATION: HEAD;NECK

## 2023-06-28 VITALS
BODY MASS INDEX: 28.55 KG/M2 | WEIGHT: 161.13 LBS | DIASTOLIC BLOOD PRESSURE: 54 MMHG | HEIGHT: 63 IN | SYSTOLIC BLOOD PRESSURE: 124 MMHG | RESPIRATION RATE: 16 BRPM | OXYGEN SATURATION: 97 % | HEART RATE: 57 BPM | TEMPERATURE: 97.8 F

## 2023-06-28 PROBLEM — I63.9 ACUTE CVA (CEREBROVASCULAR ACCIDENT): Status: ACTIVE | Noted: 2023-06-28

## 2023-06-28 PROBLEM — E11.9 TYPE 2 DIABETES MELLITUS, WITHOUT LONG-TERM CURRENT USE OF INSULIN: Status: ACTIVE | Noted: 2023-06-28

## 2023-06-28 PROBLEM — I10 ESSENTIAL HYPERTENSION: Status: ACTIVE | Noted: 2023-06-28

## 2023-06-28 LAB
ALBUMIN SERPL-MCNC: 3.5 G/DL (ref 3.4–4.8)
ALBUMIN/GLOB SERPL: 1.2 {RATIO} (ref 0.8–2)
ALP SERPL-CCNC: 65 U/L (ref 25–100)
ALT SERPL-CCNC: 14 U/L (ref 4–36)
ANION GAP SERPL CALCULATED.3IONS-SCNC: 13 MMOL/L (ref 3–16)
AST SERPL-CCNC: 19 U/L (ref 8–33)
BILIRUB SERPL-MCNC: 0.6 MG/DL (ref 0.3–1.2)
BUN SERPL-MCNC: 20 MG/DL (ref 6–20)
CALCIUM SERPL-MCNC: 9.1 MG/DL (ref 8.5–10.5)
CHLORIDE SERPL-SCNC: 102 MMOL/L (ref 98–107)
CO2 SERPL-SCNC: 23 MMOL/L (ref 20–30)
CREAT SERPL-MCNC: 1.2 MG/DL (ref 0.4–1.2)
ERYTHROCYTE [DISTWIDTH] IN BLOOD BY AUTOMATED COUNT: 12.4 % (ref 11–16)
GFR SERPLBLD CREATININE-BSD FMLA CKD-EPI: >60 ML/MIN/{1.73_M2}
GLOBULIN SER CALC-MCNC: 3 G/DL
GLUCOSE BLD-MCNC: 115 MG/DL (ref 74–106)
GLUCOSE BLD-MCNC: 169 MG/DL (ref 74–106)
GLUCOSE BLD-MCNC: 197 MG/DL (ref 74–106)
GLUCOSE SERPL-MCNC: 136 MG/DL (ref 74–106)
HCT VFR BLD AUTO: 44 % (ref 40–54)
HGB BLD-MCNC: 14.8 G/DL (ref 13–18)
MCH RBC QN AUTO: 31.4 PG (ref 27–32)
MCHC RBC AUTO-ENTMCNC: 33.6 G/DL (ref 31–35)
MCV RBC AUTO: 93.4 FL (ref 80–100)
PERFORMED ON: ABNORMAL
PLATELET # BLD AUTO: 170 K/UL (ref 150–400)
PMV BLD AUTO: 10.6 FL (ref 6–10)
POTASSIUM SERPL-SCNC: 3.8 MMOL/L (ref 3.4–5.1)
PROT SERPL-MCNC: 6.5 G/DL (ref 6.4–8.3)
RBC # BLD AUTO: 4.71 M/UL (ref 4.5–6)
SODIUM SERPL-SCNC: 138 MMOL/L (ref 136–145)
WBC # BLD AUTO: 7.1 K/UL (ref 4–11)

## 2023-06-28 PROCEDURE — 97530 THERAPEUTIC ACTIVITIES: CPT

## 2023-06-28 PROCEDURE — 92507 TX SP LANG VOICE COMM INDIV: CPT

## 2023-06-28 PROCEDURE — 6370000000 HC RX 637 (ALT 250 FOR IP): Performed by: PHYSICIAN ASSISTANT

## 2023-06-28 PROCEDURE — 99238 HOSP IP/OBS DSCHRG MGMT 30/<: CPT | Performed by: INTERNAL MEDICINE

## 2023-06-28 PROCEDURE — 97535 SELF CARE MNGMENT TRAINING: CPT

## 2023-06-28 PROCEDURE — 6360000002 HC RX W HCPCS: Performed by: PHYSICIAN ASSISTANT

## 2023-06-28 PROCEDURE — 80053 COMPREHEN METABOLIC PANEL: CPT

## 2023-06-28 PROCEDURE — 97116 GAIT TRAINING THERAPY: CPT

## 2023-06-28 PROCEDURE — 85027 COMPLETE CBC AUTOMATED: CPT

## 2023-06-28 PROCEDURE — 36415 COLL VENOUS BLD VENIPUNCTURE: CPT

## 2023-06-28 PROCEDURE — 6370000000 HC RX 637 (ALT 250 FOR IP): Performed by: INTERNAL MEDICINE

## 2023-06-28 RX ORDER — FAMOTIDINE 20 MG/1
20 TABLET, FILM COATED ORAL DAILY
Status: DISCONTINUED | OUTPATIENT
Start: 2023-06-29 | End: 2023-06-28 | Stop reason: HOSPADM

## 2023-06-28 RX ADMIN — FAMOTIDINE 20 MG: 20 TABLET ORAL at 08:09

## 2023-06-28 RX ADMIN — ALOGLIPTIN 25 MG: 12.5 TABLET, FILM COATED ORAL at 08:09

## 2023-06-28 RX ADMIN — FLUTICASONE PROPIONATE 1 SPRAY: 50 SPRAY, METERED NASAL at 08:09

## 2023-06-28 RX ADMIN — ACETAMINOPHEN 650 MG: 325 TABLET, FILM COATED ORAL at 03:45

## 2023-06-28 RX ADMIN — EMPAGLIFLOZIN 10 MG: 10 TABLET, FILM COATED ORAL at 08:09

## 2023-06-28 RX ADMIN — ASPIRIN 325 MG: 325 TABLET, COATED ORAL at 08:09

## 2023-06-28 RX ADMIN — ENOXAPARIN SODIUM 40 MG: 100 INJECTION SUBCUTANEOUS at 08:09

## 2023-07-26 PROBLEM — N39.0 UTI (URINARY TRACT INFECTION): Status: RESOLVED | Noted: 2023-06-26 | Resolved: 2023-07-26

## 2023-07-31 ENCOUNTER — OFFICE VISIT (OUTPATIENT)
Dept: CARDIOLOGY | Facility: CLINIC | Age: 73
End: 2023-07-31
Payer: MEDICARE

## 2023-07-31 VITALS
SYSTOLIC BLOOD PRESSURE: 120 MMHG | DIASTOLIC BLOOD PRESSURE: 70 MMHG | OXYGEN SATURATION: 98 % | WEIGHT: 146 LBS | BODY MASS INDEX: 26.87 KG/M2 | HEIGHT: 62 IN | HEART RATE: 65 BPM

## 2023-07-31 DIAGNOSIS — E11.00 TYPE 2 DIABETES MELLITUS WITH HYPEROSMOLARITY WITHOUT COMA, WITHOUT LONG-TERM CURRENT USE OF INSULIN: ICD-10-CM

## 2023-07-31 DIAGNOSIS — I48.0 PAF (PAROXYSMAL ATRIAL FIBRILLATION): Primary | ICD-10-CM

## 2023-07-31 DIAGNOSIS — I10 ESSENTIAL HYPERTENSION: ICD-10-CM

## 2023-07-31 DIAGNOSIS — Z86.73 HISTORY OF CVA (CEREBROVASCULAR ACCIDENT): ICD-10-CM

## 2023-07-31 PROCEDURE — 3078F DIAST BP <80 MM HG: CPT | Performed by: INTERNAL MEDICINE

## 2023-07-31 PROCEDURE — 3074F SYST BP LT 130 MM HG: CPT | Performed by: INTERNAL MEDICINE

## 2023-07-31 PROCEDURE — 99204 OFFICE O/P NEW MOD 45 MIN: CPT | Performed by: INTERNAL MEDICINE

## 2023-08-28 ENCOUNTER — HOSPITAL ENCOUNTER (OUTPATIENT)
Facility: HOSPITAL | Age: 73
Discharge: HOME | End: 2023-08-28
Payer: MEDICARE

## 2023-08-28 LAB
ALBUMIN SERPL-MCNC: 5 G/DL (ref 3.4–4.8)
ALBUMIN/GLOB SERPL: 2.6 {RATIO} (ref 0.8–2)
ALP SERPL-CCNC: 92 U/L (ref 25–100)
ALT SERPL-CCNC: 46 U/L (ref 4–36)
ANION GAP SERPL CALCULATED.3IONS-SCNC: 14 MMOL/L (ref 3–16)
AST SERPL-CCNC: 37 U/L (ref 8–33)
BASOPHILS # BLD: 0.1 K/UL (ref 0–0.1)
BASOPHILS NFR BLD: 0.6 %
BILIRUB SERPL-MCNC: 0.9 MG/DL (ref 0.3–1.2)
BUN SERPL-MCNC: 21 MG/DL (ref 6–20)
CALCIUM SERPL-MCNC: 10.1 MG/DL (ref 8.5–10.5)
CHLORIDE SERPL-SCNC: 103 MMOL/L (ref 98–107)
CHOLEST SERPL-MCNC: 112 MG/DL (ref 0–200)
CO2 SERPL-SCNC: 25 MMOL/L (ref 20–30)
CREAT SERPL-MCNC: 1.1 MG/DL (ref 0.4–1.2)
EOSINOPHIL # BLD: 0.4 K/UL (ref 0–0.4)
EOSINOPHIL NFR BLD: 5.1 %
ERYTHROCYTE [DISTWIDTH] IN BLOOD BY AUTOMATED COUNT: 13.5 % (ref 11–16)
FOLATE SERPL-MCNC: 9.7 NG/ML
GFR SERPLBLD CREATININE-BSD FMLA CKD-EPI: >60 ML/MIN/{1.73_M2}
GLOBULIN SER CALC-MCNC: 1.9 G/DL
GLUCOSE SERPL-MCNC: 89 MG/DL (ref 74–106)
HBA1C MFR BLD: 7.9 %
HCT VFR BLD AUTO: 42.6 % (ref 40–54)
HDLC SERPL-MCNC: 38 MG/DL (ref 40–60)
HGB BLD-MCNC: 14.3 G/DL (ref 13–18)
IMM GRANULOCYTES # BLD: 0 K/UL
IMM GRANULOCYTES NFR BLD: 0.1 % (ref 0–5)
LDLC SERPL CALC-MCNC: 55 MG/DL
LYMPHOCYTES # BLD: 1.7 K/UL (ref 1.5–4)
LYMPHOCYTES NFR BLD: 22.3 %
MCH RBC QN AUTO: 31.4 PG (ref 27–32)
MCHC RBC AUTO-ENTMCNC: 33.6 G/DL (ref 31–35)
MCV RBC AUTO: 93.4 FL (ref 80–100)
MONOCYTES # BLD: 0.6 K/UL (ref 0.2–0.8)
MONOCYTES NFR BLD: 7.5 %
NEUTROPHILS # BLD: 5 K/UL (ref 2–7.5)
NEUTS SEG NFR BLD: 64.4 %
PLATELET # BLD AUTO: 180 K/UL (ref 150–400)
PMV BLD AUTO: 11.2 FL (ref 6–10)
POTASSIUM SERPL-SCNC: 5 MMOL/L (ref 3.4–5.1)
PROT SERPL-MCNC: 6.9 G/DL (ref 6.4–8.3)
RBC # BLD AUTO: 4.56 M/UL (ref 4.5–6)
SODIUM SERPL-SCNC: 142 MMOL/L (ref 136–145)
TRIGL SERPL-MCNC: 96 MG/DL (ref 0–249)
TSH SERPL DL<=0.005 MIU/L-ACNC: 1.37 UIU/ML (ref 0.27–4.2)
VIT B12 SERPL-MCNC: 651 PG/ML (ref 211–911)
VLDLC SERPL CALC-MCNC: 19 MG/DL
WBC # BLD AUTO: 7.7 K/UL (ref 4–11)

## 2023-08-28 PROCEDURE — 83036 HEMOGLOBIN GLYCOSYLATED A1C: CPT

## 2023-08-28 PROCEDURE — 36415 COLL VENOUS BLD VENIPUNCTURE: CPT

## 2023-08-28 PROCEDURE — 84443 ASSAY THYROID STIM HORMONE: CPT

## 2023-08-28 PROCEDURE — 80061 LIPID PANEL: CPT

## 2023-08-28 PROCEDURE — 80053 COMPREHEN METABOLIC PANEL: CPT

## 2023-08-28 PROCEDURE — 82607 VITAMIN B-12: CPT

## 2023-08-28 PROCEDURE — 82746 ASSAY OF FOLIC ACID SERUM: CPT

## 2023-08-28 PROCEDURE — 85025 COMPLETE CBC W/AUTO DIFF WBC: CPT

## 2023-09-18 RX ORDER — EMPAGLIFLOZIN 10 MG/1
10 TABLET, FILM COATED ORAL DAILY
Qty: 30 TABLET | Refills: 3 | OUTPATIENT
Start: 2023-09-18

## 2023-09-18 RX ORDER — ATORVASTATIN CALCIUM 40 MG/1
40 TABLET, FILM COATED ORAL NIGHTLY
Qty: 30 TABLET | Refills: 3 | OUTPATIENT
Start: 2023-09-18

## 2023-09-29 ENCOUNTER — HOSPITAL ENCOUNTER (EMERGENCY)
Facility: HOSPITAL | Age: 73
Discharge: ANOTHER HOSPITAL | End: 2023-09-30
Attending: STUDENT IN AN ORGANIZED HEALTH CARE EDUCATION/TRAINING PROGRAM
Payer: MEDICARE

## 2023-09-29 ENCOUNTER — APPOINTMENT (OUTPATIENT)
Dept: GENERAL RADIOLOGY | Facility: HOSPITAL | Age: 73
End: 2023-09-29
Payer: MEDICARE

## 2023-09-29 DIAGNOSIS — I21.4 NSTEMI (NON-ST ELEVATED MYOCARDIAL INFARCTION) (HCC): Primary | ICD-10-CM

## 2023-09-29 LAB
ALBUMIN SERPL-MCNC: 4.1 G/DL (ref 3.4–4.8)
ALBUMIN/GLOB SERPL: 1.5 {RATIO} (ref 0.8–2)
ALP SERPL-CCNC: 76 U/L (ref 25–100)
ALT SERPL-CCNC: 14 U/L (ref 4–36)
AMPHET UR QL SCN: NORMAL
ANION GAP SERPL CALCULATED.3IONS-SCNC: 17 MMOL/L (ref 3–16)
APTT BLD: 29.5 SEC (ref 22.1–34.9)
AST SERPL-CCNC: 18 U/L (ref 8–33)
BARBITURATES UR QL SCN: NORMAL
BASOPHILS # BLD: 0.1 K/UL (ref 0–0.1)
BASOPHILS NFR BLD: 0.5 %
BENZODIAZ UR QL SCN: NORMAL
BILIRUB SERPL-MCNC: 0.6 MG/DL (ref 0.3–1.2)
BUN SERPL-MCNC: 22 MG/DL (ref 6–20)
BUPRENORPHINE QUAL, URINE: NORMAL
CALCIUM SERPL-MCNC: 9.8 MG/DL (ref 8.5–10.5)
CANNABINOIDS UR QL SCN: NORMAL
CHLORIDE SERPL-SCNC: 104 MMOL/L (ref 98–107)
CO2 SERPL-SCNC: 21 MMOL/L (ref 20–30)
COCAINE UR QL SCN: NORMAL
CREAT SERPL-MCNC: 1.2 MG/DL (ref 0.4–1.2)
DRUG SCREEN COMMENT UR-IMP: NORMAL
EOSINOPHIL # BLD: 0.2 K/UL (ref 0–0.4)
EOSINOPHIL NFR BLD: 1.9 %
ERYTHROCYTE [DISTWIDTH] IN BLOOD BY AUTOMATED COUNT: 14.5 % (ref 11–16)
GFR SERPLBLD CREATININE-BSD FMLA CKD-EPI: >60 ML/MIN/{1.73_M2}
GLOBULIN SER CALC-MCNC: 2.8 G/DL
GLUCOSE SERPL-MCNC: 203 MG/DL (ref 74–106)
HCT VFR BLD AUTO: 43.4 % (ref 40–54)
HGB BLD-MCNC: 14.2 G/DL (ref 13–18)
IMM GRANULOCYTES # BLD: 0 K/UL
IMM GRANULOCYTES NFR BLD: 0.3 % (ref 0–5)
INR PPP: 1.08 (ref 0.87–1.11)
LACTATE BLDV-SCNC: 3.3 MMOL/L (ref 0.4–2)
LYMPHOCYTES # BLD: 1.4 K/UL (ref 1.5–4)
LYMPHOCYTES NFR BLD: 14.6 %
MAGNESIUM SERPL-MCNC: 1.5 MG/DL (ref 1.7–2.4)
MCH RBC QN AUTO: 31.2 PG (ref 27–32)
MCHC RBC AUTO-ENTMCNC: 32.7 G/DL (ref 31–35)
MCV RBC AUTO: 95.4 FL (ref 80–100)
METHADONE UR QL SCN: NORMAL
METHAMPHET UR QL SCN: NORMAL
MONOCYTES # BLD: 0.5 K/UL (ref 0.2–0.8)
MONOCYTES NFR BLD: 5.3 %
NEUTROPHILS # BLD: 7.5 K/UL (ref 2–7.5)
NEUTS SEG NFR BLD: 77.4 %
OPIATES UR QL SCN: NORMAL
OXYCODONE UR QL SCN: NORMAL
PCP UR QL SCN: NORMAL
PLATELET # BLD AUTO: 218 K/UL (ref 150–400)
PMV BLD AUTO: 10.1 FL (ref 6–10)
POTASSIUM SERPL-SCNC: 4.3 MMOL/L (ref 3.4–5.1)
PROPOXYPH UR QL SCN: NORMAL
PROT SERPL-MCNC: 6.9 G/DL (ref 6.4–8.3)
PROTHROMBIN TIME: 13.8 SEC (ref 11.8–14.2)
RBC # BLD AUTO: 4.55 M/UL (ref 4.5–6)
SARS-COV-2 RDRP RESP QL NAA+PROBE: NOT DETECTED
SODIUM SERPL-SCNC: 142 MMOL/L (ref 136–145)
TRICYCLICS UR QL SCN: NORMAL
TROPONIN, HIGH SENSITIVITY: 107 NG/L (ref 0–22)
TROPONIN, HIGH SENSITIVITY: 64 NG/L (ref 0–22)
WBC # BLD AUTO: 9.7 K/UL (ref 4–11)

## 2023-09-29 PROCEDURE — 36415 COLL VENOUS BLD VENIPUNCTURE: CPT

## 2023-09-29 PROCEDURE — 96365 THER/PROPH/DIAG IV INF INIT: CPT

## 2023-09-29 PROCEDURE — 83605 ASSAY OF LACTIC ACID: CPT

## 2023-09-29 PROCEDURE — 85730 THROMBOPLASTIN TIME PARTIAL: CPT

## 2023-09-29 PROCEDURE — 87635 SARS-COV-2 COVID-19 AMP PRB: CPT

## 2023-09-29 PROCEDURE — 80307 DRUG TEST PRSMV CHEM ANLYZR: CPT

## 2023-09-29 PROCEDURE — 99285 EMERGENCY DEPT VISIT HI MDM: CPT

## 2023-09-29 PROCEDURE — 85025 COMPLETE CBC W/AUTO DIFF WBC: CPT

## 2023-09-29 PROCEDURE — 6370000000 HC RX 637 (ALT 250 FOR IP)

## 2023-09-29 PROCEDURE — 71045 X-RAY EXAM CHEST 1 VIEW: CPT

## 2023-09-29 PROCEDURE — 6360000002 HC RX W HCPCS: Performed by: STUDENT IN AN ORGANIZED HEALTH CARE EDUCATION/TRAINING PROGRAM

## 2023-09-29 PROCEDURE — 85610 PROTHROMBIN TIME: CPT

## 2023-09-29 PROCEDURE — 80053 COMPREHEN METABOLIC PANEL: CPT

## 2023-09-29 PROCEDURE — 83735 ASSAY OF MAGNESIUM: CPT

## 2023-09-29 PROCEDURE — 6360000002 HC RX W HCPCS

## 2023-09-29 PROCEDURE — 96375 TX/PRO/DX INJ NEW DRUG ADDON: CPT

## 2023-09-29 PROCEDURE — 6370000000 HC RX 637 (ALT 250 FOR IP): Performed by: STUDENT IN AN ORGANIZED HEALTH CARE EDUCATION/TRAINING PROGRAM

## 2023-09-29 PROCEDURE — 84484 ASSAY OF TROPONIN QUANT: CPT

## 2023-09-29 RX ORDER — CLOPIDOGREL BISULFATE 75 MG/1
300 TABLET ORAL ONCE
Status: COMPLETED | OUTPATIENT
Start: 2023-09-29 | End: 2023-09-29

## 2023-09-29 RX ORDER — HEPARIN SODIUM 1000 [USP'U]/ML
2000 INJECTION, SOLUTION INTRAVENOUS; SUBCUTANEOUS PRN
Status: DISCONTINUED | OUTPATIENT
Start: 2023-09-29 | End: 2023-09-30 | Stop reason: HOSPADM

## 2023-09-29 RX ORDER — ASPIRIN 325 MG
325 TABLET ORAL ONCE
Status: COMPLETED | OUTPATIENT
Start: 2023-09-29 | End: 2023-09-29

## 2023-09-29 RX ORDER — EZETIMIBE 10 MG/1
10 TABLET ORAL DAILY
COMMUNITY

## 2023-09-29 RX ORDER — CLOPIDOGREL BISULFATE 75 MG/1
TABLET ORAL
Status: COMPLETED
Start: 2023-09-29 | End: 2023-09-29

## 2023-09-29 RX ORDER — LORAZEPAM 0.5 MG/1
TABLET ORAL
Status: COMPLETED
Start: 2023-09-29 | End: 2023-09-29

## 2023-09-29 RX ORDER — HEPARIN SODIUM 1000 [USP'U]/ML
4000 INJECTION, SOLUTION INTRAVENOUS; SUBCUTANEOUS PRN
Status: DISCONTINUED | OUTPATIENT
Start: 2023-09-29 | End: 2023-09-30 | Stop reason: HOSPADM

## 2023-09-29 RX ORDER — HEPARIN SODIUM 1000 [USP'U]/ML
60 INJECTION, SOLUTION INTRAVENOUS; SUBCUTANEOUS ONCE
Status: COMPLETED | OUTPATIENT
Start: 2023-09-29 | End: 2023-09-29

## 2023-09-29 RX ORDER — OMEPRAZOLE 20 MG/1
20 CAPSULE, DELAYED RELEASE ORAL DAILY
COMMUNITY

## 2023-09-29 RX ORDER — HEPARIN SODIUM 10000 [USP'U]/100ML
INJECTION, SOLUTION INTRAVENOUS
Status: COMPLETED
Start: 2023-09-29 | End: 2023-09-29

## 2023-09-29 RX ORDER — HEPARIN SODIUM 10000 [USP'U]/100ML
5-30 INJECTION, SOLUTION INTRAVENOUS CONTINUOUS
Status: DISCONTINUED | OUTPATIENT
Start: 2023-09-29 | End: 2023-09-30 | Stop reason: HOSPADM

## 2023-09-29 RX ORDER — LORAZEPAM 0.5 MG/1
0.5 TABLET ORAL ONCE
Status: COMPLETED | OUTPATIENT
Start: 2023-09-29 | End: 2023-09-29

## 2023-09-29 RX ADMIN — HEPARIN SODIUM 12 UNITS/KG/HR: 10000 INJECTION, SOLUTION INTRAVENOUS at 23:24

## 2023-09-29 RX ADMIN — ASPIRIN 325 MG: 325 TABLET ORAL at 22:55

## 2023-09-29 RX ADMIN — LORAZEPAM 0.5 MG: 0.5 TABLET ORAL at 23:42

## 2023-09-29 RX ADMIN — CLOPIDOGREL BISULFATE 300 MG: 75 TABLET ORAL at 23:36

## 2023-09-29 RX ADMIN — HEPARIN SODIUM 4440 UNITS: 1000 INJECTION, SOLUTION INTRAVENOUS; SUBCUTANEOUS at 22:55

## 2023-09-29 RX ADMIN — CLOPIDOGREL BISULFATE 300 MG: 75 TABLET ORAL at 23:35

## 2023-09-29 ASSESSMENT — PAIN DESCRIPTION - LOCATION: LOCATION: ABDOMEN;HEAD

## 2023-09-29 ASSESSMENT — PAIN DESCRIPTION - DESCRIPTORS: DESCRIPTORS: BURNING

## 2023-09-29 ASSESSMENT — PAIN - FUNCTIONAL ASSESSMENT: PAIN_FUNCTIONAL_ASSESSMENT: 0-10

## 2023-09-29 ASSESSMENT — PAIN SCALES - GENERAL: PAINLEVEL_OUTOF10: 7

## 2023-09-30 ENCOUNTER — HOSPITAL ENCOUNTER (INPATIENT)
Facility: HOSPITAL | Age: 73
LOS: 1 days | Discharge: ANOTHER HOSPITAL | DRG: 282 | End: 2023-10-01
Attending: FAMILY MEDICINE | Admitting: FAMILY MEDICINE
Payer: MEDICARE

## 2023-09-30 VITALS
RESPIRATION RATE: 16 BRPM | HEART RATE: 89 BPM | OXYGEN SATURATION: 98 % | TEMPERATURE: 97.9 F | HEIGHT: 63 IN | DIASTOLIC BLOOD PRESSURE: 74 MMHG | BODY MASS INDEX: 25.2 KG/M2 | WEIGHT: 142.2 LBS | SYSTOLIC BLOOD PRESSURE: 115 MMHG

## 2023-09-30 VITALS
SYSTOLIC BLOOD PRESSURE: 141 MMHG | RESPIRATION RATE: 24 BRPM | TEMPERATURE: 98.7 F | DIASTOLIC BLOOD PRESSURE: 98 MMHG | BODY MASS INDEX: 29.23 KG/M2 | HEART RATE: 103 BPM | WEIGHT: 165 LBS | OXYGEN SATURATION: 98 %

## 2023-09-30 DIAGNOSIS — I21.4 NSTEMI (NON-ST ELEVATED MYOCARDIAL INFARCTION): Primary | ICD-10-CM

## 2023-09-30 LAB
ANION GAP SERPL CALCULATED.3IONS-SCNC: 14.6 MMOL/L (ref 5–15)
APTT PPP: 152 SECONDS (ref 70–100)
APTT PPP: 50.2 SECONDS (ref 70–100)
APTT PPP: 70.3 SECONDS (ref 70–100)
BASOPHILS # BLD AUTO: 0.08 10*3/MM3 (ref 0–0.2)
BASOPHILS NFR BLD AUTO: 0.8 % (ref 0–1.5)
BUN SERPL-MCNC: 24 MG/DL (ref 8–23)
BUN/CREAT SERPL: 21.2 (ref 7–25)
CALCIUM SPEC-SCNC: 9.4 MG/DL (ref 8.6–10.5)
CHLORIDE SERPL-SCNC: 106 MMOL/L (ref 98–107)
CHOLEST SERPL-MCNC: 149 MG/DL (ref 0–200)
CO2 SERPL-SCNC: 22.4 MMOL/L (ref 22–29)
CREAT SERPL-MCNC: 1.13 MG/DL (ref 0.76–1.27)
DEPRECATED RDW RBC AUTO: 50 FL (ref 37–54)
EGFRCR SERPLBLD CKD-EPI 2021: 69.9 ML/MIN/1.73
EOSINOPHIL # BLD AUTO: 0.14 10*3/MM3 (ref 0–0.4)
EOSINOPHIL NFR BLD AUTO: 1.4 % (ref 0.3–6.2)
ERYTHROCYTE [DISTWIDTH] IN BLOOD BY AUTOMATED COUNT: 14.4 % (ref 12.3–15.4)
GEN 5 2HR TROPONIN T REFLEX: 785 NG/L
GLUCOSE BLDC GLUCOMTR-MCNC: 137 MG/DL (ref 70–130)
GLUCOSE BLDC GLUCOMTR-MCNC: 153 MG/DL (ref 70–130)
GLUCOSE BLDC GLUCOMTR-MCNC: 154 MG/DL (ref 70–130)
GLUCOSE BLDC GLUCOMTR-MCNC: 161 MG/DL (ref 70–130)
GLUCOSE BLDC GLUCOMTR-MCNC: 229 MG/DL (ref 70–130)
GLUCOSE SERPL-MCNC: 154 MG/DL (ref 65–99)
HBA1C MFR BLD: 7.1 % (ref 4.8–5.6)
HCT VFR BLD AUTO: 37.6 % (ref 37.5–51)
HDLC SERPL-MCNC: 39 MG/DL (ref 40–60)
HGB BLD-MCNC: 12.4 G/DL (ref 13–17.7)
IMM GRANULOCYTES # BLD AUTO: 0.02 10*3/MM3 (ref 0–0.05)
IMM GRANULOCYTES NFR BLD AUTO: 0.2 % (ref 0–0.5)
INR PPP: 1.18 (ref 0.9–1.1)
LDLC SERPL CALC-MCNC: 77 MG/DL (ref 0–100)
LDLC/HDLC SERPL: 1.83 {RATIO}
LYMPHOCYTES # BLD AUTO: 1.99 10*3/MM3 (ref 0.7–3.1)
LYMPHOCYTES NFR BLD AUTO: 19.6 % (ref 19.6–45.3)
MCH RBC QN AUTO: 31.4 PG (ref 26.6–33)
MCHC RBC AUTO-ENTMCNC: 33 G/DL (ref 31.5–35.7)
MCV RBC AUTO: 95.2 FL (ref 79–97)
MONOCYTES # BLD AUTO: 0.72 10*3/MM3 (ref 0.1–0.9)
MONOCYTES NFR BLD AUTO: 7.1 % (ref 5–12)
NEUTROPHILS NFR BLD AUTO: 7.2 10*3/MM3 (ref 1.7–7)
NEUTROPHILS NFR BLD AUTO: 70.9 % (ref 42.7–76)
NRBC BLD AUTO-RTO: 0 /100 WBC (ref 0–0.2)
PLATELET # BLD AUTO: 205 10*3/MM3 (ref 140–450)
PMV BLD AUTO: 10.3 FL (ref 6–12)
POTASSIUM SERPL-SCNC: 4.1 MMOL/L (ref 3.5–5.2)
PROTHROMBIN TIME: 15.6 SECONDS (ref 12.3–15.1)
RBC # BLD AUTO: 3.95 10*6/MM3 (ref 4.14–5.8)
SODIUM SERPL-SCNC: 143 MMOL/L (ref 136–145)
TRIGL SERPL-MCNC: 194 MG/DL (ref 0–150)
TROPONIN T DELTA: 143 NG/L
TROPONIN T SERPL HS-MCNC: 642 NG/L
TROPONIN, HIGH SENSITIVITY: 169 NG/L (ref 0–22)
TSH SERPL DL<=0.05 MIU/L-ACNC: 2.06 UIU/ML (ref 0.27–4.2)
UFH PPP CHRO-ACNC: >1.1 IU/ML (ref 0.3–0.7)
UFH PPP CHRO-ACNC: >1.1 IU/ML (ref 0.3–0.7)
VLDLC SERPL-MCNC: 33 MG/DL (ref 5–40)
WBC NRBC COR # BLD: 10.15 10*3/MM3 (ref 3.4–10.8)

## 2023-09-30 PROCEDURE — C1769 GUIDE WIRE: HCPCS | Performed by: INTERNAL MEDICINE

## 2023-09-30 PROCEDURE — 85520 HEPARIN ASSAY: CPT | Performed by: FAMILY MEDICINE

## 2023-09-30 PROCEDURE — 84443 ASSAY THYROID STIM HORMONE: CPT | Performed by: FAMILY MEDICINE

## 2023-09-30 PROCEDURE — 25010000002 HEPARIN (PORCINE) PER 1000 UNITS: Performed by: FAMILY MEDICINE

## 2023-09-30 PROCEDURE — 84484 ASSAY OF TROPONIN QUANT: CPT | Performed by: FAMILY MEDICINE

## 2023-09-30 PROCEDURE — 25510000001 IOPAMIDOL PER 1 ML: Performed by: INTERNAL MEDICINE

## 2023-09-30 PROCEDURE — 63710000001 INSULIN REGULAR HUMAN PER 5 UNITS: Performed by: INTERNAL MEDICINE

## 2023-09-30 PROCEDURE — 80048 BASIC METABOLIC PNL TOTAL CA: CPT | Performed by: FAMILY MEDICINE

## 2023-09-30 PROCEDURE — 85730 THROMBOPLASTIN TIME PARTIAL: CPT | Performed by: FAMILY MEDICINE

## 2023-09-30 PROCEDURE — B215YZZ FLUOROSCOPY OF LEFT HEART USING OTHER CONTRAST: ICD-10-PCS | Performed by: INTERNAL MEDICINE

## 2023-09-30 PROCEDURE — 25010000002 MIDAZOLAM PER 1MG: Performed by: INTERNAL MEDICINE

## 2023-09-30 PROCEDURE — 25010000002 HEPARIN (PORCINE) PER 1000 UNITS: Performed by: INTERNAL MEDICINE

## 2023-09-30 PROCEDURE — 99152 MOD SED SAME PHYS/QHP 5/>YRS: CPT | Performed by: INTERNAL MEDICINE

## 2023-09-30 PROCEDURE — 99222 1ST HOSP IP/OBS MODERATE 55: CPT | Performed by: INTERNAL MEDICINE

## 2023-09-30 PROCEDURE — C1894 INTRO/SHEATH, NON-LASER: HCPCS | Performed by: INTERNAL MEDICINE

## 2023-09-30 PROCEDURE — 83036 HEMOGLOBIN GLYCOSYLATED A1C: CPT | Performed by: FAMILY MEDICINE

## 2023-09-30 PROCEDURE — 82948 REAGENT STRIP/BLOOD GLUCOSE: CPT

## 2023-09-30 PROCEDURE — 4A023N7 MEASUREMENT OF CARDIAC SAMPLING AND PRESSURE, LEFT HEART, PERCUTANEOUS APPROACH: ICD-10-PCS | Performed by: INTERNAL MEDICINE

## 2023-09-30 PROCEDURE — 0 LIDOCAINE 1 % SOLUTION: Performed by: INTERNAL MEDICINE

## 2023-09-30 PROCEDURE — 84484 ASSAY OF TROPONIN QUANT: CPT

## 2023-09-30 PROCEDURE — 36415 COLL VENOUS BLD VENIPUNCTURE: CPT

## 2023-09-30 PROCEDURE — 93454 CORONARY ARTERY ANGIO S&I: CPT | Performed by: INTERNAL MEDICINE

## 2023-09-30 PROCEDURE — B2111ZZ FLUOROSCOPY OF MULTIPLE CORONARY ARTERIES USING LOW OSMOLAR CONTRAST: ICD-10-PCS | Performed by: INTERNAL MEDICINE

## 2023-09-30 PROCEDURE — B2131ZZ FLUOROSCOPY OF MULTIPLE CORONARY ARTERY BYPASS GRAFTS USING LOW OSMOLAR CONTRAST: ICD-10-PCS | Performed by: INTERNAL MEDICINE

## 2023-09-30 PROCEDURE — 80061 LIPID PANEL: CPT | Performed by: FAMILY MEDICINE

## 2023-09-30 PROCEDURE — 85025 COMPLETE CBC W/AUTO DIFF WBC: CPT | Performed by: FAMILY MEDICINE

## 2023-09-30 PROCEDURE — 25010000002 FENTANYL CITRATE (PF) 50 MCG/ML SOLUTION: Performed by: INTERNAL MEDICINE

## 2023-09-30 PROCEDURE — 85610 PROTHROMBIN TIME: CPT | Performed by: FAMILY MEDICINE

## 2023-09-30 PROCEDURE — 93005 ELECTROCARDIOGRAM TRACING: CPT | Performed by: FAMILY MEDICINE

## 2023-09-30 RX ORDER — FENTANYL CITRATE 50 UG/ML
INJECTION, SOLUTION INTRAMUSCULAR; INTRAVENOUS
Status: DISCONTINUED | OUTPATIENT
Start: 2023-09-30 | End: 2023-09-30 | Stop reason: HOSPADM

## 2023-09-30 RX ORDER — ALPRAZOLAM 0.25 MG/1
0.25 TABLET ORAL 3 TIMES DAILY PRN
Status: DISCONTINUED | OUTPATIENT
Start: 2023-09-30 | End: 2023-10-01 | Stop reason: HOSPADM

## 2023-09-30 RX ORDER — ACETAMINOPHEN 160 MG/5ML
650 SOLUTION ORAL EVERY 4 HOURS PRN
Status: DISCONTINUED | OUTPATIENT
Start: 2023-09-30 | End: 2023-10-01 | Stop reason: HOSPADM

## 2023-09-30 RX ORDER — HEPARIN SODIUM 10000 [USP'U]/100ML
12 INJECTION, SOLUTION INTRAVENOUS
Status: DISCONTINUED | OUTPATIENT
Start: 2023-09-30 | End: 2023-09-30

## 2023-09-30 RX ORDER — DIPHENHYDRAMINE HYDROCHLORIDE 50 MG/ML
25 INJECTION INTRAMUSCULAR; INTRAVENOUS EVERY 6 HOURS PRN
Status: DISCONTINUED | OUTPATIENT
Start: 2023-09-30 | End: 2023-10-01 | Stop reason: HOSPADM

## 2023-09-30 RX ORDER — IPRATROPIUM BROMIDE AND ALBUTEROL SULFATE 2.5; .5 MG/3ML; MG/3ML
3 SOLUTION RESPIRATORY (INHALATION) EVERY 4 HOURS PRN
Status: DISCONTINUED | OUTPATIENT
Start: 2023-09-30 | End: 2023-10-01 | Stop reason: HOSPADM

## 2023-09-30 RX ORDER — SODIUM CHLORIDE 9 MG/ML
40 INJECTION, SOLUTION INTRAVENOUS AS NEEDED
Status: DISCONTINUED | OUTPATIENT
Start: 2023-09-30 | End: 2023-10-01 | Stop reason: HOSPADM

## 2023-09-30 RX ORDER — TEMAZEPAM 15 MG/1
15 CAPSULE ORAL NIGHTLY PRN
Status: DISCONTINUED | OUTPATIENT
Start: 2023-09-30 | End: 2023-10-01 | Stop reason: HOSPADM

## 2023-09-30 RX ORDER — ONDANSETRON 2 MG/ML
4 INJECTION INTRAMUSCULAR; INTRAVENOUS EVERY 6 HOURS PRN
Status: DISCONTINUED | OUTPATIENT
Start: 2023-09-30 | End: 2023-10-01 | Stop reason: HOSPADM

## 2023-09-30 RX ORDER — ATORVASTATIN CALCIUM 80 MG/1
80 TABLET, FILM COATED ORAL NIGHTLY
Status: DISCONTINUED | OUTPATIENT
Start: 2023-09-30 | End: 2023-10-01 | Stop reason: HOSPADM

## 2023-09-30 RX ORDER — MIDAZOLAM HYDROCHLORIDE 2 MG/2ML
INJECTION, SOLUTION INTRAMUSCULAR; INTRAVENOUS
Status: DISCONTINUED | OUTPATIENT
Start: 2023-09-30 | End: 2023-09-30 | Stop reason: HOSPADM

## 2023-09-30 RX ORDER — ASPIRIN 81 MG/1
81 TABLET ORAL DAILY
Status: DISCONTINUED | OUTPATIENT
Start: 2023-09-30 | End: 2023-10-01 | Stop reason: HOSPADM

## 2023-09-30 RX ORDER — SODIUM CHLORIDE 0.9 % (FLUSH) 0.9 %
10 SYRINGE (ML) INJECTION EVERY 12 HOURS SCHEDULED
Status: DISCONTINUED | OUTPATIENT
Start: 2023-09-30 | End: 2023-10-01 | Stop reason: HOSPADM

## 2023-09-30 RX ORDER — ALUMINA, MAGNESIA, AND SIMETHICONE 2400; 2400; 240 MG/30ML; MG/30ML; MG/30ML
15 SUSPENSION ORAL EVERY 6 HOURS PRN
Status: DISCONTINUED | OUTPATIENT
Start: 2023-09-30 | End: 2023-10-01 | Stop reason: HOSPADM

## 2023-09-30 RX ORDER — NICOTINE POLACRILEX 4 MG
15 LOZENGE BUCCAL
Status: DISCONTINUED | OUTPATIENT
Start: 2023-09-30 | End: 2023-10-01 | Stop reason: HOSPADM

## 2023-09-30 RX ORDER — NALOXONE HCL 0.4 MG/ML
0.4 VIAL (ML) INJECTION
Status: DISCONTINUED | OUTPATIENT
Start: 2023-09-30 | End: 2023-10-01 | Stop reason: HOSPADM

## 2023-09-30 RX ORDER — HEPARIN SODIUM 1000 [USP'U]/ML
25 INJECTION, SOLUTION INTRAVENOUS; SUBCUTANEOUS AS NEEDED
Status: DISCONTINUED | OUTPATIENT
Start: 2023-09-30 | End: 2023-09-30 | Stop reason: ALTCHOICE

## 2023-09-30 RX ORDER — SODIUM CHLORIDE 9 MG/ML
100 INJECTION, SOLUTION INTRAVENOUS CONTINUOUS
Status: ACTIVE | OUTPATIENT
Start: 2023-09-30 | End: 2023-09-30

## 2023-09-30 RX ORDER — LISINOPRIL 10 MG/1
10 TABLET ORAL DAILY
Status: DISCONTINUED | OUTPATIENT
Start: 2023-09-30 | End: 2023-10-01 | Stop reason: HOSPADM

## 2023-09-30 RX ORDER — ACETAMINOPHEN 325 MG/1
650 TABLET ORAL EVERY 4 HOURS PRN
Status: DISCONTINUED | OUTPATIENT
Start: 2023-09-30 | End: 2023-10-01 | Stop reason: HOSPADM

## 2023-09-30 RX ORDER — NITROGLYCERIN 0.4 MG/1
0.4 TABLET SUBLINGUAL
Status: DISCONTINUED | OUTPATIENT
Start: 2023-09-30 | End: 2023-10-01 | Stop reason: HOSPADM

## 2023-09-30 RX ORDER — ONDANSETRON 4 MG/1
4 TABLET, FILM COATED ORAL EVERY 6 HOURS PRN
Status: DISCONTINUED | OUTPATIENT
Start: 2023-09-30 | End: 2023-10-01 | Stop reason: HOSPADM

## 2023-09-30 RX ORDER — LIDOCAINE HYDROCHLORIDE 10 MG/ML
INJECTION, SOLUTION INFILTRATION; PERINEURAL
Status: DISCONTINUED | OUTPATIENT
Start: 2023-09-30 | End: 2023-09-30 | Stop reason: HOSPADM

## 2023-09-30 RX ORDER — HYDROCODONE BITARTRATE AND ACETAMINOPHEN 5; 325 MG/1; MG/1
1 TABLET ORAL EVERY 4 HOURS PRN
Status: DISCONTINUED | OUTPATIENT
Start: 2023-09-30 | End: 2023-10-01 | Stop reason: HOSPADM

## 2023-09-30 RX ORDER — ATORVASTATIN CALCIUM 40 MG/1
40 TABLET, FILM COATED ORAL NIGHTLY
Status: DISCONTINUED | OUTPATIENT
Start: 2023-09-30 | End: 2023-09-30 | Stop reason: DRUGHIGH

## 2023-09-30 RX ORDER — METOPROLOL SUCCINATE 25 MG/1
25 TABLET, EXTENDED RELEASE ORAL
Status: DISCONTINUED | OUTPATIENT
Start: 2023-09-30 | End: 2023-10-01 | Stop reason: HOSPADM

## 2023-09-30 RX ORDER — HEPARIN SODIUM 1000 [USP'U]/ML
INJECTION, SOLUTION INTRAVENOUS; SUBCUTANEOUS
Status: DISCONTINUED | OUTPATIENT
Start: 2023-09-30 | End: 2023-09-30 | Stop reason: HOSPADM

## 2023-09-30 RX ORDER — HEPARIN SODIUM 1000 [USP'U]/ML
50 INJECTION, SOLUTION INTRAVENOUS; SUBCUTANEOUS AS NEEDED
Status: DISCONTINUED | OUTPATIENT
Start: 2023-09-30 | End: 2023-09-30 | Stop reason: ALTCHOICE

## 2023-09-30 RX ORDER — DEXTROSE MONOHYDRATE 25 G/50ML
25 INJECTION, SOLUTION INTRAVENOUS
Status: DISCONTINUED | OUTPATIENT
Start: 2023-09-30 | End: 2023-10-01 | Stop reason: HOSPADM

## 2023-09-30 RX ORDER — VERAPAMIL HYDROCHLORIDE 2.5 MG/ML
INJECTION, SOLUTION INTRAVENOUS
Status: DISCONTINUED | OUTPATIENT
Start: 2023-09-30 | End: 2023-09-30 | Stop reason: HOSPADM

## 2023-09-30 RX ORDER — ACETAMINOPHEN 650 MG/1
650 SUPPOSITORY RECTAL EVERY 4 HOURS PRN
Status: DISCONTINUED | OUTPATIENT
Start: 2023-09-30 | End: 2023-10-01 | Stop reason: HOSPADM

## 2023-09-30 RX ORDER — SODIUM CHLORIDE 0.9 % (FLUSH) 0.9 %
10 SYRINGE (ML) INJECTION AS NEEDED
Status: DISCONTINUED | OUTPATIENT
Start: 2023-09-30 | End: 2023-10-01 | Stop reason: HOSPADM

## 2023-09-30 RX ADMIN — Medication 10 ML: at 20:00

## 2023-09-30 RX ADMIN — SODIUM CHLORIDE 100 ML/HR: 9 INJECTION, SOLUTION INTRAVENOUS at 15:28

## 2023-09-30 RX ADMIN — LISINOPRIL 10 MG: 10 TABLET ORAL at 08:11

## 2023-09-30 RX ADMIN — HUMAN INSULIN 3 UNITS: 100 INJECTION, SOLUTION SUBCUTANEOUS at 17:17

## 2023-09-30 RX ADMIN — Medication 10 ML: at 08:13

## 2023-09-30 RX ADMIN — METOPROLOL SUCCINATE 25 MG: 25 TABLET, EXTENDED RELEASE ORAL at 08:11

## 2023-09-30 RX ADMIN — ASPIRIN 81 MG: 81 TABLET, COATED ORAL at 15:28

## 2023-09-30 RX ADMIN — ATORVASTATIN CALCIUM 80 MG: 80 TABLET, FILM COATED ORAL at 20:00

## 2023-09-30 RX ADMIN — HEPARIN SODIUM 12 UNITS/KG/HR: 10000 INJECTION, SOLUTION INTRAVENOUS at 03:01

## 2023-09-30 ASSESSMENT — PAIN - FUNCTIONAL ASSESSMENT: PAIN_FUNCTIONAL_ASSESSMENT: NONE - DENIES PAIN

## 2023-09-30 NOTE — ED TRIAGE NOTES
Pt has multiple complaints that have been ongoing for about a month, pt p/w/d alert and oriented.  Pt is poor medical historian and does not know medications or dx

## 2023-09-30 NOTE — Clinical Note
A 6 fr sheath was  inserted with ultrasound guidance into the right radial artery. Sheath insertion not delayed.
Allergies reviewed.  H&P note has been confirmed for the patient. Procedural consent has been signed.  Blood consent hs been signed. Staff has reviewed the patient's labs.  Labs have been reviewed and show abnormalities. Physician is aware of labs. The patient has denied she is pregnant.
Calculated contrast threshold is 166.48 mL.
Inserted under fluoro.
No family available at this time, Dr. Redding discussed findings with the patient.
No in lab complications
Patient was given Post Procedure instruction by the staff.
Physician notified by staff.
Prepped: right groin and Right Wrist. Prepped with: ChloraPrep. The site was clipped. The patient was draped in a sterile fashion.
The left coronary artery was selectively engaged, injected and visualized.
The right coronary artery was selectively engaged, injected and visualized.
catheter removed  over the wire.
catheter removed.
inserted over wire.
inserted over wire.
162.56

## 2023-09-30 NOTE — CASE MANAGEMENT/SOCIAL WORK
Case Management/Social Work    Patient Name:  Dann Metz  YOB: 1950  MRN: [id number]  Admit Date:  9/30/2023        SW attempted to meet with pt at bedside for discharge planning. Pt was being taken by staff down to Cath Lab. SW will attempt again at a later time.       Electronically signed by:  CARLOS Stanley  09/30/23 12:23 EDT

## 2023-09-30 NOTE — PLAN OF CARE
Problem: Adult Inpatient Plan of Care  Goal: Plan of Care Review  Outcome: Ongoing, Progressing  Goal: Patient-Specific Goal (Individualized)  Outcome: Ongoing, Progressing  Goal: Absence of Hospital-Acquired Illness or Injury  Outcome: Ongoing, Progressing  Intervention: Identify and Manage Fall Risk  Recent Flowsheet Documentation  Taken 9/30/2023 1610 by Naomy Coombs RN  Safety Promotion/Fall Prevention:   activity supervised   assistive device/personal items within reach   clutter free environment maintained   fall prevention program maintained   lighting adjusted   nonskid shoes/slippers when out of bed   safety round/check completed  Taken 9/30/2023 1429 by Naomy Coombs RN  Safety Promotion/Fall Prevention:   activity supervised   assistive device/personal items within reach   clutter free environment maintained   fall prevention program maintained   lighting adjusted   nonskid shoes/slippers when out of bed   room organization consistent   safety round/check completed  Taken 9/30/2023 1007 by Naomy Coombs RN  Safety Promotion/Fall Prevention:   activity supervised   assistive device/personal items within reach   clutter free environment maintained   fall prevention program maintained   lighting adjusted   nonskid shoes/slippers when out of bed   safety round/check completed   room organization consistent  Taken 9/30/2023 0857 by Naomy Coombs RN  Safety Promotion/Fall Prevention:   activity supervised   assistive device/personal items within reach   clutter free environment maintained   fall prevention program maintained   lighting adjusted   nonskid shoes/slippers when out of bed   room organization consistent   safety round/check completed  Intervention: Prevent Skin Injury  Recent Flowsheet Documentation  Taken 9/30/2023 1610 by Naomy Coombs RN  Body Position:   side-lying   sitting up in bed  Taken 9/30/2023 1429 by Naomy Coombs RN  Body Position: sitting up in bed  Taken 9/30/2023 1007 by  Naomy Coombs RN  Body Position: sitting up in bed  Taken 9/30/2023 0857 by Naomy Coombs RN  Body Position: sitting up in bed  Intervention: Prevent and Manage VTE (Venous Thromboembolism) Risk  Recent Flowsheet Documentation  Taken 9/30/2023 1610 by Naomy Coombs RN  Activity Management: activity encouraged  Taken 9/30/2023 1429 by Naomy Coombs RN  Activity Management: activity encouraged  Taken 9/30/2023 1007 by Naomy Coombs RN  Activity Management: activity encouraged  Taken 9/30/2023 0857 by Naomy Coombs RN  Activity Management: activity encouraged  VTE Prevention/Management: (see mar) other (see comments)  Intervention: Prevent Infection  Recent Flowsheet Documentation  Taken 9/30/2023 1610 by Naomy Coombs RN  Infection Prevention:   rest/sleep promoted   single patient room provided  Taken 9/30/2023 1429 by Naomy Coombs RN  Infection Prevention:   rest/sleep promoted   single patient room provided  Taken 9/30/2023 1007 by Naomy Coombs RN  Infection Prevention:   single patient room provided   rest/sleep promoted  Taken 9/30/2023 0857 by Naomy Coombs RN  Infection Prevention:   rest/sleep promoted   single patient room provided  Goal: Optimal Comfort and Wellbeing  Outcome: Ongoing, Progressing  Intervention: Provide Person-Centered Care  Recent Flowsheet Documentation  Taken 9/30/2023 0857 by Naomy Coombs RN  Trust Relationship/Rapport:   choices provided   care explained   emotional support provided   empathic listening provided   questions encouraged   reassurance provided   thoughts/feelings acknowledged   questions answered  Goal: Readiness for Transition of Care  Outcome: Ongoing, Progressing     Problem: Diabetes Comorbidity  Goal: Blood Glucose Level Within Targeted Range  Outcome: Ongoing, Progressing     Problem: Fall Injury Risk  Goal: Absence of Fall and Fall-Related Injury  Outcome: Ongoing, Progressing  Intervention: Identify and Manage Contributors  Recent Flowsheet  Documentation  Taken 9/30/2023 1610 by Naomy Coombs RN  Medication Review/Management: medications reviewed  Taken 9/30/2023 1429 by Naomy Coombs RN  Medication Review/Management: medications reviewed  Taken 9/30/2023 1007 by Naomy Coombs RN  Medication Review/Management: medications reviewed  Taken 9/30/2023 0857 by Naomy Coombs RN  Medication Review/Management: medications reviewed  Intervention: Promote Injury-Free Environment  Recent Flowsheet Documentation  Taken 9/30/2023 1610 by Naomy Coombs RN  Safety Promotion/Fall Prevention:   activity supervised   assistive device/personal items within reach   clutter free environment maintained   fall prevention program maintained   lighting adjusted   nonskid shoes/slippers when out of bed   safety round/check completed  Taken 9/30/2023 1429 by Naomy Coombs RN  Safety Promotion/Fall Prevention:   activity supervised   assistive device/personal items within reach   clutter free environment maintained   fall prevention program maintained   lighting adjusted   nonskid shoes/slippers when out of bed   room organization consistent   safety round/check completed  Taken 9/30/2023 1007 by Naomy Coombs RN  Safety Promotion/Fall Prevention:   activity supervised   assistive device/personal items within reach   clutter free environment maintained   fall prevention program maintained   lighting adjusted   nonskid shoes/slippers when out of bed   safety round/check completed   room organization consistent  Taken 9/30/2023 0857 by Naomy Coombs RN  Safety Promotion/Fall Prevention:   activity supervised   assistive device/personal items within reach   clutter free environment maintained   fall prevention program maintained   lighting adjusted   nonskid shoes/slippers when out of bed   room organization consistent   safety round/check completed   Goal Outcome Evaluation:         Tr band to be removed at 525 from right radial. Pt denies any pain at this time. VSS, room  air. Telemtry NSR

## 2023-09-30 NOTE — ED NOTES
on phone with Tamica Vinson at [facility]. She will get in touch with cardiology and call back.      Harley Sosa RN  09/29/23 [id number]

## 2023-09-30 NOTE — PAYOR COMM NOTE
"To:  [facility]  From: Lexi Norton RN  Phone: [phone]  Fax: [phone]  NPI: [id number]  TIN: [id number]  Member ID: [id number]   MRN: [id number]    Jc Streeter (72 y.o. Male)       Date of Birth   1950    Social Security Number       Address   [address] [place] [address] [address] [address]    Home Phone       MRN   [id number]       Yazidi   None    Marital Status                               Admission Date   9/30/23    Admission Type   Urgent    Admitting Provider   Sierra Pryor DO    Attending Provider   Sierra Pryor DO    [facility]       Discharge Date       Discharge Disposition       Discharge Destination                                 Attending Provider: Sierra Pryor DO    Allergies: Penicillins    Isolation: None   Infection: None   Code Status: CPR    Ht: 160 cm (63\")   Wt: 64.5 kg (142 lb 3.2 oz)    Admission Cmt: None   Principal Problem: NSTEMI (non-ST elevated myocardial infarction) [I21.4]                   Active Insurance as of 9/30/2023       Primary Coverage       Payor Plan Insurance Group Employer/Plan Group    MEDICARE MEDICARE B ONLY        Payor Plan Address Payor Plan Phone Number Payor Plan Fax Number Effective Dates    [address] [address] [phone]  11/1/2017 - None Entered    [facility] [id number]         Subscriber Name Subscriber Birth Date Member ID       JC STREETER 1950 [id number]               Secondary Coverage       Payor Plan Insurance Group Employer/Plan Group    [employer] [place] [employer] [place]        Payor Plan Address Payor Plan Phone Number Payor Plan Fax Number Effective Dates    [address] [address] [phone]  6/28/2023 - None Entered    [facility] [id number]         Subscriber Name Subscriber Birth Date Member ID       JC STREETER 1950 [id number]                     Emergency Contacts        (Rel.) Home Phone Work Phone Mobile Phone    " JC METZ (Son) [phone] -- --    SACHA METZ (Daughter) [phone] -- --                 History & Physical        Sierra Pryor DO at 23 0216            [facility]   HISTORY AND PHYSICAL      Name:  Jc Metz   Age:  72 y.o.  Sex:  male  :  1950  MRN:  [id number]   Visit Number:  [id number]  Admission Date:  2023  Date Of Service:  23  Primary Care Physician:  Ariana Lucas APRN    Chief Complaint:     Shortness of breath, weakness, fatigue    History Of Presenting Illness:      The patient is a 72-year-old gentleman with a history of type 2 diabetes, hypertension, recent CVA, atrial fibrillation, who had presented from outlying emergency room due to complaints of shortness of breath and fatigue.  History obtained from patient, ER record and son at bedside.  Patient states that he had been doing fairly well after his recent stroke, but had noted increasing shortness of breath.  His son noted patient appeared to be fairly fatigued on Monday when he was talking to him.  Patient notes over the last couple of days he was feeling very winded, kind of sweaty at times.  He thinks he may have felt palpitations but denies any overt chest pain.  He does take his medications as directed including anticoagulants.  He does not smoke or drink alcohol.  He is retired.  He denies any recent falls or trauma.    In the ER at [facility], his vital signs were stable other than mild hypertension.  He had elevated troponins that had trended up at the 2-hour tiny.  CMP and CBC were unremarkable.  Chest x-ray was showing interstitial changes.  He had multiple EKGs performed at that facility, with 1 showing apparent ST segment depression and A-fib.  Patient is currently in sinus rhythm at time of exam.  ER provider had sent images to cardiology, did not meet STEMI criteria, we were asked to accept in transfer thereafter.    Review Of Systems:    All  "systems were reviewed and negative except as mentioned in history of presenting illness, assessment and plan.    Past Medical History: Patient  has a past medical history of Diabetes mellitus, Hypertension, and Stroke.    Past Surgical History: Patient  has no past surgical history on file.    Social History: Patient  reports that he has never smoked. He has never used smokeless tobacco. He reports that he does not drink alcohol and does not use drugs.    Family History:  Patient's family history has been reviewed and found to be noncontributory.     Allergies:      Penicillins    Home Medications:    Prior to Admission Medications       Prescriptions Last Dose Informant Patient Reported? Taking?    aspirin (Aspirin Childrens) 81 MG chewable tablet 9/29/2023  No Yes    Chew 1 tablet Daily.    apixaban (ELIQUIS) 5 MG tablet tablet Unknown  No No    Take 1 tablet by mouth 2 (Two) Times a Day.    atorvastatin (LIPITOR) 40 MG tablet Unknown  Yes No    Take 1 tablet by mouth Every Night.    Blood Glucose Monitoring Suppl (True Metrix Meter) w/Device kit Unknown  Yes No    empagliflozin (JARDIANCE) 10 MG tablet tablet Unknown  Yes No    Take  by mouth Daily.    lisinopril (PRINIVIL,ZESTRIL) 10 MG tablet Unknown  Yes No    Take 1 tablet by mouth Daily.    metFORMIN (GLUCOPHAGE) 500 MG tablet Unknown  Yes No    Take 2 tablets by mouth 2 (Two) Times a Day With Meals.    True Metrix Blood Glucose Test test strip Unknown  Yes No    TRUEplus Lancets 33G misc Unknown  Yes No          ED Medications:    Medications - No data to display  Vital Signs:  Temp:  [98.3 °F (36.8 °C)] 98.3 °F (36.8 °C)  Heart Rate:  [109] 109  Resp:  [20] 20  BP: (139)/(99) 139/99        09/30/23  0100   Weight: 64.5 kg (142 lb 3.2 oz)     Body mass index is 25.19 kg/m².    Physical Exam:     Most recent vital Signs: /99 (BP Location: Left arm, Patient Position: Lying)   Pulse 109   Temp 98.3 °F (36.8 °C) (Oral)   Resp 20   Ht 160 cm (63\")   " Wt 64.5 kg (142 lb 3.2 oz)   SpO2 96%   BMI 25.19 kg/m²     Physical Exam  Constitutional:       General: He is not in acute distress.     Appearance: He is normal weight. He is not toxic-appearing.   HENT:      Ears:      Comments: Hearing loss  Eyes:      Extraocular Movements: Extraocular movements intact.      Pupils: Pupils are equal, round, and reactive to light.   Cardiovascular:      Rate and Rhythm: Normal rate and regular rhythm.      Pulses: Normal pulses.      Heart sounds: No murmur heard.  Pulmonary:      Effort: Pulmonary effort is normal. No respiratory distress.      Breath sounds: No wheezing.   Abdominal:      General: Abdomen is flat. Bowel sounds are normal. There is no distension.      Tenderness: There is no abdominal tenderness.   Musculoskeletal:         General: Normal range of motion.      Right lower leg: No edema.      Left lower leg: No edema.   Skin:     General: Skin is warm.      Capillary Refill: Capillary refill takes less than 2 seconds.      Findings: No bruising or lesion.   Neurological:      General: No focal deficit present.      Mental Status: He is alert and oriented to person, place, and time. Mental status is at baseline.      Motor: Weakness present.   Psychiatric:         Mood and Affect: Mood normal.         Thought Content: Thought content normal.       Laboratory data:    I have reviewed the labs done in the emergency room.          Invalid input(s): LABALBU, PROT  Results from last 7 days   Lab Units 09/29/23  2053   WBC K/uL 9.7   HEMOGLOBIN g/dL 14.2   HEMATOCRIT % 43.4   PLATELETS K/uL 218     Results from last 7 days   Lab Units 09/29/23  2205   INR  1.08                               Invalid input(s): USDES,  BLOODU, NITRITITE, BACT, EP    Pain Management Panel           No data to display                EKG:      Performed at outlying facility, reportedly 1 EKG showing A-fib and ST segment depression, other showing sinus rhythm.    Radiology:    XR CHEST  PORTABLE    Result Date: 9/29/2023  EXAM: PORTABLE AP CHEST X-RAY INDICATION: dyspnea, COMPARISON: 6/25/2023 FINDINGS: Bilateral interstitial opacities are unchanged. There is no focal consolidation, pleural effusion, or pneumothorax. The cardiomediastinal silhouette is normal. The visible bony thorax is intact.    Unchanged diffuse bilateral interstitial opacities representing pulmonary edema versus chronic interstitial fibrosis.     Assessment:    Concern for NSTEMI, POA  Atrial fibrillation, paroxysmal, POA  Dyspnea, POA  Recent CVA, POA  Hypertension  Diabetes    Plan:    Admit for observation    Questionable NSTEMI:  Patient with no overt chest pain at this time.  Perhaps fatigue and shortness of breath anginal equivalent, with elevated troponins at outlying facility and EKG changes we will have cardiology consultation here.  We will keep on heparin for now.  Already received aspirin.  We will continue with statin therapy and beta-blocker.  Will repeat EKG and troponin here.  Monitor on telemetry.    A-fib:  Had been in sinus rhythm per report.  Apparently had EKG at outlying facility where he was in A-fib.  Some of his symptoms may be related to paroxysmal A-fib and may need to consider rhythm control if recurrence.  He has been anticoagulated with Eliquis since stroke.  Follows with Dr. Rushing.    Dyspnea:  Possibly an anginal equivalent, however does appear he may have some interstitial lung issue based off recent chest x-rays.  He is a lifetime non-smoker, no obvious exposure risk from work.  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    Hypertension/diabetes:  Complicates all aspects of care.    The patient otherwise meets observation level of care and anticipate less than 2 midnight stay.  Further recommendations are predicated upon further testing and cardiology consultation.  Care discussed with the patient and his  son at bedside.    Risk Assessment: High  DVT Prophylaxis: Heparin drip  Code Status: Full  Diet: N.p.o.    Advance Care Planning   ACP discussion was held with the patient during this visit. Patient does not have an advance directive, declines further assistance.           Sierra Pryor DO  09/30/23  02:17 EDT    Dictated utilizing Dragon dictation.    Electronically signed by Sierra Pryor DO at 09/30/23 0230       Current Facility-Administered Medications   Medication Dose Route Frequency Provider Last Rate Last Admin    [MAR Hold] acetaminophen (TYLENOL) tablet 650 mg  650 mg Oral Q4H PRN Sierra Pryor DO        Or    [MAR Hold] acetaminophen (TYLENOL) 160 MG/5ML oral solution 650 mg  650 mg Oral Q4H PRN Sierra Pryor DO        Or    [MAR Hold] acetaminophen (TYLENOL) suppository 650 mg  650 mg Rectal Q4H PRN Sierra Pryor DO        [MAR Hold] aluminum-magnesium hydroxide-simethicone (MAALOX MAX) 400-400-40 MG/5ML suspension 15 mL  15 mL Oral Q6H PRN Sierra Pryor DO        [MAR Hold] atorvastatin (LIPITOR) tablet 40 mg  40 mg Oral Nightly Sierra Pryor DO        [MAR Hold] dextrose (D50W) (25 g/50 mL) IV injection 25 g  25 g Intravenous Q15 Min PRN Sierra Pryor DO        [MAR Hold] dextrose (GLUTOSE) oral gel 15 g  15 g Oral Q15 Min PRN Sierra Pryor DO        [MAR Hold] glucagon (GLUCAGEN) injection 1 mg  1 mg Intramuscular Q15 Min PRN Sierra Pryor DO        heparin 07021 units/250 ml (100 units/ml) in D5W  12 Units/kg/hr Intravenous Titrated Sierra Pryor DO   Stopped at 09/30/23 1320    [MAR Hold] insulin regular (humuLIN R,novoLIN R) injection 2-7 Units  2-7 Units Subcutaneous Q6H Sierra Pryor DO        [MAR Hold] ipratropium-albuterol (DUO-NEB) nebulizer solution 3 mL  3 mL Nebulization Q4H PRN Sierra Pryor DO        lisinopril (PRINIVIL,ZESTRIL) tablet 10 mg  10 mg Oral Daily  Sierra Pryor, DO   10 mg at 09/30/23 0811    metoprolol succinate XL (TOPROL-XL) 24 hr tablet 25 mg  25 mg Oral Q24H Sierra Pryor, DO   25 mg at 09/30/23 0811    [MAR Hold] nitroglycerin (NITROSTAT) SL tablet 0.4 mg  0.4 mg Sublingual Q5 Min PRN Sierra Pryor, DO        [MAR Hold] ondansetron (ZOFRAN) tablet 4 mg  4 mg Oral Q6H PRN Sierra Pryor, DO        Or    [MAR Hold] ondansetron (ZOFRAN) injection 4 mg  4 mg Intravenous Q6H PRN Sierra Pryor, DO        Pharmacy to Dose Heparin   Does not apply Continuous PRN Sierra Pryor, DO        [MAR Hold] sodium chloride 0.9 % flush 10 mL  10 mL Intravenous Q12H Sierra Pryor,    10 mL at 09/30/23 0813    [MAR Hold] sodium chloride 0.9 % flush 10 mL  10 mL Intravenous PRN Sierra Pryor, DO        [MAR Hold] sodium chloride 0.9 % infusion 40 mL  40 mL Intravenous PRN Sierra Pryor, DO         Lab Results (last 24 hours)       Procedure Component Value Units Date/Time    POC Glucose Once [[id number]]  (Abnormal) Collected: 09/30/23 1132    Specimen: Blood Updated: 09/30/23 1140     Glucose 153 mg/dL      Comment: Serial Number: [id number]:  [id number]       High Sensitivity Troponin T 2Hr [[id number]]  (Abnormal) Collected: 09/30/23 0929    Specimen: Blood Updated: 09/30/23 1043     HS Troponin T 785 ng/L      Troponin T Delta 143 ng/L     Narrative:      High Sensitive Troponin T Reference Range:  <10.0 ng/L- Negative Female for AMI  <15.0 ng/L- Negative Male for AMI  >=10 - Abnormal Female indicating possible myocardial injury.  >=15 - Abnormal Male indicating possible myocardial injury.   Clinicians would have to utilize clinical acumen, EKG, Troponin, and serial changes to determine if it is an Acute Myocardial Infarction or myocardial injury due to an underlying chronic condition.         POC Glucose Once [[id number]]  (Abnormal) Collected: 09/30/23 0758    Specimen: Blood Updated:  09/30/23 0829     Glucose 161 mg/dL      Comment: Serial Number: [id number]:  [id number]       High Sensitivity Troponin T [[id number]]  (Abnormal) Collected: 09/30/23 0745    Specimen: Blood Updated: 09/30/23 0818     HS Troponin T 642 ng/L     Narrative:      High Sensitive Troponin T Reference Range:  <10.0 ng/L- Negative Female for AMI  <15.0 ng/L- Negative Male for AMI  >=10 - Abnormal Female indicating possible myocardial injury.  >=15 - Abnormal Male indicating possible myocardial injury.   Clinicians would have to utilize clinical acumen, EKG, Troponin, and serial changes to determine if it is an Acute Myocardial Infarction or myocardial injury due to an underlying chronic condition.         Heparin Anti-Xa [[id number]]  (Abnormal) Collected: 09/30/23 0745    Specimen: Blood Updated: 09/30/23 0811     Heparin Anti-Xa (UFH) >1.10 IU/ml     aPTT [[id number]]  (Normal) Collected: 09/30/23 0745    Specimen: Blood Updated: 09/30/23 0801     PTT 70.3 seconds     TSH [[id number]]  (Normal) Collected: 09/30/23 0538    Specimen: Blood Updated: 09/30/23 0707     TSH 2.060 uIU/mL     Lipid Panel [[id number]]  (Abnormal) Collected: 09/30/23 0538    Specimen: Blood Updated: 09/30/23 0702     Total Cholesterol 149 mg/dL      Triglycerides 194 mg/dL      HDL Cholesterol 39 mg/dL      LDL Cholesterol  77 mg/dL      VLDL Cholesterol 33 mg/dL      LDL/HDL Ratio 1.83    Narrative:      Cholesterol Reference Ranges  (U.S. Department of Health and Human Services ATP III Classifications)    Desirable          <200 mg/dL  Borderline High    200-239 mg/dL  High Risk          >240 mg/dL      Triglyceride Reference Ranges  (U.S. Department of Health and Human Services ATP III Classifications)    Normal           <150 mg/dL  Borderline High  150-199 mg/dL  High             200-499 mg/dL  Very High        >500 mg/dL    HDL Reference Ranges  (U.S. Department of Health and Human Services ATP III Classifications)    Low     <40 mg/dl  (major risk factor for CHD)  High    >60 mg/dl ('negative' risk factor for CHD)        LDL Reference Ranges  (U.S. Department of Health and Human Services ATP III Classifications)    Optimal          <100 mg/dL  Near Optimal     100-129 mg/dL  Borderline High  130-159 mg/dL  High             160-189 mg/dL  Very High        >189 mg/dL    Basic Metabolic Panel [[id number]]  (Abnormal) Collected: 09/30/23 0538    Specimen: Blood Updated: 09/30/23 0702     Glucose 154 mg/dL      BUN 24 mg/dL      Creatinine 1.13 mg/dL      Sodium 143 mmol/L      Potassium 4.1 mmol/L      Chloride 106 mmol/L      CO2 22.4 mmol/L      Calcium 9.4 mg/dL      BUN/Creatinine Ratio 21.2     Anion Gap 14.6 mmol/L      eGFR 69.9 mL/min/1.73     Narrative:      GFR Normal >60  Chronic Kidney Disease <60  Kidney Failure <15      Hemoglobin A1c [[id number]]  (Abnormal) Collected: 09/30/23 0538    Specimen: Blood Updated: 09/30/23 0655     Hemoglobin A1C 7.10 %     Narrative:      Hemoglobin A1C Ranges:    Increased Risk for Diabetes  5.7% to 6.4%  Diabetes                     >= 6.5%  Diabetic Goal                < 7.0%    POC Glucose Once [[id number]]  (Abnormal) Collected: 09/30/23 0554    Specimen: Blood Updated: 09/30/23 0558     Glucose 137 mg/dL      Comment: Serial Number: [id number]:  [id number]       Heparin Anti-Xa [[id number]]  (Abnormal) Collected: 09/30/23 0257    Specimen: Blood Updated: 09/30/23 0332     Heparin Anti-Xa (UFH) >1.10 IU/ml     Protime-INR [[id number]]  (Abnormal) Collected: 09/30/23 0257    Specimen: Blood Updated: 09/30/23 0322     Protime 15.6 Seconds      INR 1.18    Narrative:      Suggested INR therapeutic range for stable oral anticoagulant therapy:    Low Intensity therapy:   1.5-2.0  Moderate Intensity therapy:   2.0-3.0  High Intensity therapy:   2.5-4.0    aPTT [[id number]]  (Abnormal) Collected: 09/30/23 0257    Specimen: Blood Updated: 09/30/23 0322     PTT 50.2 seconds     CBC & Differential [[id number]]   (Abnormal) Collected: 09/30/23 0257    Specimen: Blood Updated: 09/30/23 0306    Narrative:      The following orders were created for panel order CBC & Differential.  Procedure                               Abnormality         Status                     ---------                               -----------         ------                     CBC Auto Differential[[id number]]        Abnormal            Final result                 Please view results for these tests on the individual orders.    CBC Auto Differential [[id number]]  (Abnormal) Collected: 09/30/23 0257    Specimen: Blood Updated: 09/30/23 0306     WBC 10.15 10*3/mm3      RBC 3.95 10*6/mm3      Hemoglobin 12.4 g/dL      Hematocrit 37.6 %      MCV 95.2 fL      MCH 31.4 pg      MCHC 33.0 g/dL      RDW 14.4 %      RDW-SD 50.0 fl      MPV 10.3 fL      Platelets 205 10*3/mm3      Neutrophil % 70.9 %      Lymphocyte % 19.6 %      Monocyte % 7.1 %      Eosinophil % 1.4 %      Basophil % 0.8 %      Immature Grans % 0.2 %      Neutrophils, Absolute 7.20 10*3/mm3      Lymphocytes, Absolute 1.99 10*3/mm3      Monocytes, Absolute 0.72 10*3/mm3      Eosinophils, Absolute 0.14 10*3/mm3      Basophils, Absolute 0.08 10*3/mm3      Immature Grans, Absolute 0.02 10*3/mm3      nRBC 0.0 /100 WBC           Imaging Results (Last 24 Hours)       ** No results found for the last 24 hours. **          Orders (last 24 hrs)        Start     Ordered    10/01/23 0600  CBC & Differential  Every Third Day      Comments: Discontinue After Heparin Stopped      09/30/23 0221 09/30/23 2100  [MAR Hold]  atorvastatin (LIPITOR) tablet 40 mg  Nightly        (MAR Hold since Sat 9/30/2023 at 1201.Hold Reason: Unreviewed Transfer Orders)    09/30/23 0221 09/30/23 1336  aPTT  Once         09/30/23 1335    09/30/23 1335  Heparin Anti-Xa  Once,   Status:  Canceled         09/30/23 1334    09/30/23 1247  iopamidol (ISOVUE-370) 76 % injection  Code / Trauma / Sedation Medication,   Status:   Discontinued         09/30/23 1248    09/30/23 1237  nitroglycerin 100 mcg/mL in D5W syringe  Code / Trauma / Sedation Medication,   Status:  Discontinued         09/30/23 1237    09/30/23 1237  heparin (porcine) injection  Code / Trauma / Sedation Medication,   Status:  Discontinued         09/30/23 1237    09/30/23 1237  verapamil (ISOPTIN) injection  Code / Trauma / Sedation Medication,   Status:  Discontinued         09/30/23 1237    09/30/23 1235  lidocaine (XYLOCAINE) 1 % injection  Code / Trauma / Sedation Medication,   Status:  Discontinued         09/30/23 1235    09/30/23 1235  Midazolam HCl (PF) (VERSED) injection  Code / Trauma / Sedation Medication,   Status:  Discontinued         09/30/23 1235    09/30/23 1234  fentaNYL citrate (PF) (SUBLIMAZE) injection  Code / Trauma / Sedation Medication,   Status:  Discontinued         09/30/23 1234    09/30/23 1141  POC Glucose Once  PROCEDURE ONCE        Comments: Complete no more than 45 minutes prior to patient eating      09/30/23 1132    09/30/23 1129  Cardiac Catheterization/Vascular Study  Once         09/30/23 1128    09/30/23 1043  Obtain Informed Consent  Once         09/30/23 1042    09/30/23 1042  Case Request Cath Lab: Coronary angiography  Once         09/30/23 1042    09/30/23 0945  High Sensitivity Troponin T 2Hr  PROCEDURE ONCE         09/30/23 0818    09/30/23 0900  lisinopril (PRINIVIL,ZESTRIL) tablet 10 mg  Daily         09/30/23 0221 09/30/23 0900  [MAR Hold]  sodium chloride 0.9 % flush 10 mL  Every 12 Hours Scheduled        (MAR Hold since Sat 9/30/2023 at 1201.Hold Reason: Unreviewed Transfer Orders)    09/30/23 0221 09/30/23 0900  metoprolol succinate XL (TOPROL-XL) 24 hr tablet 25 mg  Every 24 Hours Scheduled         09/30/23 0221 09/30/23 0900  aPTT  Timed         09/30/23 0336    09/30/23 0900  Heparin Anti-Xa  Timed         09/30/23 0336    09/30/23 0830  POC Glucose Once  PROCEDURE ONCE        Comments: Complete no more than  45 minutes prior to patient eating      09/30/23 0758    09/30/23 0800  High Sensitivity Troponin T  Once         09/30/23 0221    09/30/23 0702  Inpatient Cardiology Consult  IN AM,   Status:  Canceled        Specialty:  Cardiology  Provider:  Vince Redding MD    09/30/23 0221 09/30/23 0702  Inpatient Cardiology Consult  IN AM        Comments: House talked to dr. Redding   Specialty:  Cardiology  Provider:  Vince Redding MD    09/30/23 0244    09/30/23 0700  POC Glucose 4x Daily Before Meals & at Bedtime  4 Times Daily Before Meals & at Bedtime      Comments: Complete no more than 45 minutes prior to patient eating      09/30/23 0221    09/30/23 0600  ECG 12 Lead Chest Pain  Tomorrow AM         09/30/23 0221 09/30/23 0600  [MAR Hold]  insulin regular (humuLIN R,novoLIN R) injection 2-7 Units  Every 6 Hours Scheduled        (MAR Hold since Sat 9/30/2023 at 1201.Hold Reason: Unreviewed Transfer Orders)    09/30/23 0221 09/30/23 0600  Basic Metabolic Panel  Morning Draw         09/30/23 0221 09/30/23 0600  CBC (No Diff)  Morning Draw,   Status:  Canceled         09/30/23 0221 09/30/23 0600  TSH  Morning Draw         09/30/23 0221 09/30/23 0600  Hemoglobin A1c  Morning Draw         09/30/23 0221    09/30/23 0600  Lipid Panel  Morning Draw         09/30/23 0221 09/30/23 0559  POC Glucose Once  PROCEDURE ONCE        Comments: Complete no more than 45 minutes prior to patient eating      09/30/23 0554    09/30/23 0315  heparin 60803 units/250 ml (100 units/ml) in D5W  Titrated         09/30/23 0221 09/30/23 0222  [MAR Hold]  ipratropium-albuterol (DUO-NEB) nebulizer solution 3 mL  Every 4 Hours PRN        (MAR Hold since Sat 9/30/2023 at 1201.Hold Reason: Unreviewed Transfer Orders)    09/30/23 0222 09/30/23 0222  Initiate & Follow Heparin Protocol  Continuous         09/30/23 0221 09/30/23 0222  Notify Provider Platelet Count Less Than 75382  Until Discontinued         09/30/23  0221 09/30/23 0222  Stop Infusion & Notify Provider if Bleeding Occurs  Until Discontinued         09/30/23 0221 09/30/23 0222  CBC & Differential  STAT        Comments: Prior to Initial Heparin Bolus      09/30/23 0221 09/30/23 0222  Heparin Anti-Xa  STAT        Comments: Prior to Initial Heparin Bolus      09/30/23 0221 09/30/23 0222  Protime-INR  STAT        Comments: Prior to Initial Heparin Bolus      09/30/23 0221 09/30/23 0222  aPTT  STAT        Comments: Prior to Initial Heparin Bolus      09/30/23 0221 09/30/23 0222  CBC Auto Differential  PROCEDURE ONCE        Comments: Prior to Initial Heparin Bolus      09/30/23 0221 09/30/23 0221  heparin (porcine) injection 3,230 Units  As Needed,   Status:  Discontinued         09/30/23 0221 09/30/23 0221  heparin (porcine) injection 1,610 Units  As Needed,   Status:  Discontinued         09/30/23 0221 09/30/23 0221  Pharmacy to Dose Heparin  Continuous PRN         09/30/23 0221 09/30/23 0221  [MAR Hold]  aluminum-magnesium hydroxide-simethicone (MAALOX MAX) 400-400-40 MG/5ML suspension 15 mL  Every 6 Hours PRN        (MAR Hold since Sat 9/30/2023 at 1201.Hold Reason: Unreviewed Transfer Orders)    09/30/23 0221 09/30/23 0221  [MAR Hold]  ondansetron (ZOFRAN) tablet 4 mg  Every 6 Hours PRN        (MAR Hold since Sat 9/30/2023 at 1201.Hold Reason: Unreviewed Transfer Orders)   See Hyperspace for full Linked Orders Report.    09/30/23 0221 09/30/23 0221  [MAR Hold]  ondansetron (ZOFRAN) injection 4 mg  Every 6 Hours PRN        (MAR Hold since Sat 9/30/2023 at 1201.Hold Reason: Unreviewed Transfer Orders)   See Hyperspace for full Linked Orders Report.    09/30/23 0221 09/30/23 0221  Reason For Not Ordering ACEI or ARB On Admission  Once         09/30/23 0221 09/30/23 0221  Reason For Not Ordering Statin on Admission  Once         09/30/23 0221 09/30/23 0221  Bowel Regimen Not Indicated  Once         09/30/23 0221    09/30/23  0220  [MAR Hold]  acetaminophen (TYLENOL) tablet 650 mg  Every 4 Hours PRN        (MAR Hold since Sat 9/30/2023 at 1201.Hold Reason: Unreviewed Transfer Orders)   See Hyperspace for full Linked Orders Report.    09/30/23 0221 09/30/23 0220  [MAR Hold]  acetaminophen (TYLENOL) 160 MG/5ML oral solution 650 mg  Every 4 Hours PRN        (MAR Hold since Sat 9/30/2023 at 1201.Hold Reason: Unreviewed Transfer Orders)   See Hyperspace for full Linked Orders Report.    09/30/23 0221 09/30/23 0220  [MAR Hold]  acetaminophen (TYLENOL) suppository 650 mg  Every 4 Hours PRN        (MAR Hold since Sat 9/30/2023 at 1201.Hold Reason: Unreviewed Transfer Orders)   See Hyperspace for full Linked Orders Report.    09/30/23 0221 09/30/23 0220  Reason For Not Ordering Aspirin on Admission  Once         09/30/23 0221 09/30/23 0220  Reason For Not Ordering Antithrombotic On Admission  Once         09/30/23 0221 09/30/23 0219  Code Status and Medical Interventions:  Continuous         09/30/23 0221 09/30/23 0219  Place Sequential Compression Device  Once         09/30/23 0221 09/30/23 0219  Maintain Sequential Compression Device  Continuous         09/30/23 0221 09/30/23 0219  NPO Diet NPO Type: Sips with Meds  Diet Effective Now         09/30/23 0221 09/30/23 0218  Vital Signs Every 15 Minutes Until Stable, Then Every 4 Hours  Continuous         09/30/23 0221 09/30/23 0218  Continuous Cardiac Monitoring  Continuous        Comments: Follow Standing Orders As Outlined in Process Instructions (Open Order Report to View Full Instructions)    09/30/23 0221 09/30/23 0218  Telemetry - Maintain IV Access  Continuous,   Status:  Canceled         09/30/23 0221 09/30/23 0218  Telemetry - Place Orders & Notify Provider of Results When Patient Experiences Acute Chest Pain, Dysrhythmia or Respiratory Distress  Until Discontinued         09/30/23 0221 09/30/23 0218  May Be Off Telemetry for Tests  Continuous          09/30/23 0221 09/30/23 0218  Notify Physician For Unrelieved Chest Pain  Until Discontinued         09/30/23 0221 09/30/23 0218  Intake & Output  Every Shift       09/30/23 0221 09/30/23 0218  Weigh Patient  Once         09/30/23 0221 09/30/23 0218  Insert Peripheral IV  Once         09/30/23 0221 09/30/23 0218  Saline Lock & Maintain IV Access  Continuous         09/30/23 0221 09/30/23 0218  Initiate Observation Status  Once         09/30/23 0221 09/30/23 0217  [MAR Hold]  dextrose (GLUTOSE) oral gel 15 g  Every 15 Minutes PRN        (MAR Hold since Sat 9/30/2023 at 1201.Hold Reason: Unreviewed Transfer Orders)    09/30/23 0221 09/30/23 0217  [MAR Hold]  dextrose (D50W) (25 g/50 mL) IV injection 25 g  Every 15 Minutes PRN        (MAR Hold since Sat 9/30/2023 at 1201.Hold Reason: Unreviewed Transfer Orders)    09/30/23 0221 09/30/23 0217  [MAR Hold]  glucagon (GLUCAGEN) injection 1 mg  Every 15 Minutes PRN        (MAR Hold since Sat 9/30/2023 at 1201.Hold Reason: Unreviewed Transfer Orders)    09/30/23 0221 09/30/23 0217  [MAR Hold]  sodium chloride 0.9 % flush 10 mL  As Needed        (MAR Hold since Sat 9/30/2023 at 1201.Hold Reason: Unreviewed Transfer Orders)    09/30/23 0221 09/30/23 0217  [MAR Hold]  sodium chloride 0.9 % infusion 40 mL  As Needed        (MAR Hold since Sat 9/30/2023 at 1201.Hold Reason: Unreviewed Transfer Orders)    09/30/23 0221 09/30/23 0217  [MAR Hold]  nitroglycerin (NITROSTAT) SL tablet 0.4 mg  Every 5 Minutes PRN        (MAR Hold since Sat 9/30/2023 at 1201.Hold Reason: Unreviewed Transfer Orders)    09/30/23 0221 09/30/23 0121  Inpatient Diabetes Educator Consult  Once        Provider:  (Not yet assigned)    09/30/23 0121    Unscheduled  ECG 12 Lead Chest Pain  As Needed      Comments: Nurse to Release For Unrelieved or New Chest Pain    09/30/23 0221    Unscheduled  Follow Hypoglycemia Standing Orders For Blood Glucose <70 & Notify Provider of  Treatment  As Needed      Comments: Follow Hypoglycemia Orders As Outlined in Process Instructions (Open Order Report to View Full Instructions)  Notify Provider Any Time Hypoglycemia Treatment is Administered    09/30/23 0221    Signed and Held  Vital Signs  As Needed         Signed and Held    Signed and Held  Continuous Cardiac Monitoring  Continuous        Comments: Follow Standing Orders As Outlined in Process Instructions (Open Order Report to View Full Instructions)    Signed and Held    Signed and Held  Telemetry - Maintain IV Access  Continuous         Signed and Held    Signed and Held  Telemetry - Place Orders & Notify Provider of Results When Patient Experiences Acute Chest Pain, Dysrhythmia or Respiratory Distress  Until Discontinued         Signed and Held    Signed and Held  May Be Off Telemetry for Tests  Continuous         Signed and Held    Signed and Held  Change site dressing  As Needed         Signed and Held    Signed and Held  Encourage fluids  Until Discontinued         Signed and Held    Signed and Held  Strict intake and output  Every 4 Hours       Signed and Held    Signed and Held  Oxygen Therapy- Nasal Cannula; Titrate 1-6 LPM Per SpO2; 90 - 95%  Continuous         Signed and Held    Signed and Held  Continuous Pulse Oximetry  Continuous         Signed and Held    Signed and Held  Incentive Spirometry  Every 2 Hours While Awake       Signed and Held    Signed and Held  Advance Diet As Tolerated -  Until Discontinued         Signed and Held    Signed and Held  Notify MD if platelet count is less than 100,000, is less than 1/2 baseline, or if Hgb drops by more than 3mg/dl.  Until Discontinued         Signed and Held    Signed and Held  Notify MD of hypotension (SBP less than 95), bleeding, or dysrythmia and follow Sheath Removal Policy if needed.  Until Discontinued         Signed and Held    Signed and Held  Cardiac Rehab Evaluation and Enrollment  Once        Provider:  (Not yet  assigned)    Signed and Held    Signed and Held  sodium chloride 0.9 % infusion  Continuous         Signed and Held    Signed and Held  atorvastatin (LIPITOR) tablet 80 mg  Nightly         Signed and Held    Signed and Held  Verify Discontinuation of enoxaparin (LOVENOX) and / or heparin  Once         Signed and Held    Signed and Held  acetaminophen (TYLENOL) tablet 650 mg  Every 4 Hours PRN         Signed and Held    Signed and Held  HYDROcodone-acetaminophen (NORCO) 5-325 MG per tablet 1 tablet  Every 4 Hours PRN         Signed and Held    Signed and Held  Morphine sulfate (PF) injection 4 mg  Every 1 Hour PRN        See Hyperspace for full Linked Orders Report.    Signed and Held    Signed and Held  naloxone (NARCAN) injection 0.4 mg  Every 5 Minutes PRN        See Hyperspace for full Linked Orders Report.    Signed and Held    Signed and Held  ALPRAZolam (XANAX) tablet 0.25 mg  3 Times Daily PRN         Signed and Held    Signed and Held  temazepam (RESTORIL) capsule 15 mg  Nightly PRN         Signed and Held    Signed and Held  ondansetron (ZOFRAN) tablet 4 mg  Every 6 Hours PRN        See Hyperspace for full Linked Orders Report.    Signed and Held    Signed and Held  ondansetron (ZOFRAN) injection 4 mg  Every 6 Hours PRN        See Hyperspace for full Linked Orders Report.    Signed and Held    Signed and Held  diphenhydrAMINE (BENADRYL) injection 25 mg  Every 6 Hours PRN         Signed and Held    Signed and Held  Discontinue Radial TR Band Per Removal Protocol  Per Order Details        Comments: If Bleeding Occurs Re-Inflate 2 mL & Then Continue With 2 mL Every 15 Minute Deflations. Gently Roll Band Off Insertion Site After Completely Deflated.    Signed and Held    Signed and Held  Vital Signs & Reverse James's Test (While Radial Compression Device in Place)  Per Order Details        Comments: Every 15 Minutes x4, Every 30 Minutes x4, & Every 1 Hour x2    Signed and Held    Signed and Held  Keep Affected  "Arm Straight & Elevated  Continuous         Signed and Held    Signed and Held  Activity As Tolerated  Until Discontinued         Signed and Held    Signed and Held  Diet: Cardiac Diets, Diabetic Diets; Healthy Heart (2-3 Na+); Consistent Carbohydrate; Texture: Regular Texture (IDDSI 7); Fluid Consistency: Thin (IDDSI 0)  Diet Effective Now         Signed and Held    Signed and Held  aspirin EC tablet 81 mg  Daily         Signed and Held                  Physician Progress Notes (last 24 hours)  Notes from 09/29/23 1350 through 09/30/23 1350   No notes of this type exist for this encounter.          Consult Notes (last 24 hours)        Vince Redding MD at 09/30/23 1112        Consult Orders    1. Inpatient Cardiology Consult [[id number]] ordered by Vince Redding MD at 09/30/23 0244                 [facility]-Cardiology Consult Note    Referring Provider: Konstantin  Reason for Consultation: NSTEMI    Patient Care Team:  Ariana Lucas APRN as PCP - General (Family Medicine)    Chief complaint : Malaise    Subjective:    History of present illness: This is a 72-year-old male patient who presents to outside hospital emergency room with a 3-month history of fatigue malaise and dyspnea.  He indicates his symptoms began after having a stroke.  In the emergency room his twelve-lead electrocardiogram showed extensive inferior and precardial ST segment depression with a \"Hand-to God\" sign.  These changes were intermittent.  He denied having chest discomfort.  Cardiac troponins were elevated and trended upward.  He has no prior history of myocardial infarction or coronary revascularization.  He has a history of hypertension and diabetes.  He is a non-smoker.    Review of Systems   Review of Systems   Constitutional: Positive for malaise/fatigue. Negative for chills, diaphoresis, fever, weight gain and weight loss.   HENT:  Negative for ear discharge, hearing loss, hoarse voice and nosebleeds.    Eyes:  Negative for " discharge, double vision, pain and photophobia.   Cardiovascular:  Positive for dyspnea on exertion. Negative for chest pain, claudication, cyanosis, irregular heartbeat, leg swelling, near-syncope, orthopnea, palpitations, paroxysmal nocturnal dyspnea and syncope.   Respiratory:  Positive for shortness of breath. Negative for cough, hemoptysis, sputum production and wheezing.    Endocrine: Negative for cold intolerance, heat intolerance, polydipsia, polyphagia and polyuria.   Hematologic/Lymphatic: Negative for adenopathy and bleeding problem. Does not bruise/bleed easily.   Skin:  Negative for color change, flushing, itching and rash.   Musculoskeletal:  Negative for muscle cramps, muscle weakness, myalgias and stiffness.   Gastrointestinal:  Negative for abdominal pain, diarrhea, hematemesis, hematochezia, nausea and vomiting.   Genitourinary:  Negative for dysuria, frequency and nocturia.   Neurological:  Negative for focal weakness, loss of balance, numbness, paresthesias and seizures.   Psychiatric/Behavioral:  Negative for altered mental status, hallucinations and suicidal ideas.    Allergic/Immunologic: Negative for HIV exposure, hives and persistent infections.     History  Past Medical History:   Diagnosis Date    Diabetes mellitus     Hypertension     Stroke    , History reviewed. No pertinent surgical history.,   Family History   Problem Relation Age of Onset    Heart disease Mother     Stroke Father     Stroke Sister     Stroke Sister     Stroke Brother     Stroke Brother     Stroke Brother     Stroke Brother     Stroke Brother    ,   Social History     Tobacco Use    Smoking status: Never    Smokeless tobacco: Never   Vaping Use    Vaping Use: Never used   Substance Use Topics    Alcohol use: Never    Drug use: Never   ,   Medications Prior to Admission   Medication Sig Dispense Refill Last Dose    aspirin (Aspirin Childrens) 81 MG chewable tablet Chew 1 tablet Daily. 30 tablet 11 9/29/2023    apixaban  "(ELIQUIS) 5 MG tablet tablet Take 1 tablet by mouth 2 (Two) Times a Day. 60 tablet 11 Unknown    atorvastatin (LIPITOR) 40 MG tablet Take 1 tablet by mouth Every Night.   Unknown    Blood Glucose Monitoring Suppl (True Metrix Meter) w/Device kit    Unknown    empagliflozin (JARDIANCE) 10 MG tablet tablet Take  by mouth Daily.   Unknown    lisinopril (PRINIVIL,ZESTRIL) 10 MG tablet Take 1 tablet by mouth Daily.   Unknown    metFORMIN (GLUCOPHAGE) 500 MG tablet Take 2 tablets by mouth 2 (Two) Times a Day With Meals.   Unknown    True Metrix Blood Glucose Test test strip    Unknown    TRUEplus Lancets 33G misc    Unknown    and Allergies:  Penicillins    Objective:    Vital Sign Min/Max for last 24 hours  Temp  Min: 97.6 °F (36.4 °C)  Max: 98.3 °F (36.8 °C)   BP  Min: 126/87  Max: 139/99   Pulse  Min: 91  Max: 109   Resp  Min: 20  Max: 20   SpO2  Min: 91 %  Max: 96 %   Flow (L/min)  Min: 2  Max: 2   Weight  Min: 64.5 kg (142 lb 3.2 oz)  Max: 64.5 kg (142 lb 3.2 oz)     Flowsheet Rows      Flowsheet Row First Filed Value   Admission Height 160 cm (63\") Documented at 09/30/2023 0100   Admission Weight 64.5 kg (142 lb 3.2 oz) Documented at 09/30/2023 0100                 Physical Exam:   Vitals and nursing note reviewed.   Constitutional:       Appearance: Healthy appearance. Not in distress.   Neck:      Vascular: No JVR. JVD normal.   Pulmonary:      Effort: Pulmonary effort is normal.      Breath sounds: Normal breath sounds. No wheezing. No rhonchi. No rales.   Chest:      Chest wall: Not tender to palpatation.   Cardiovascular:      PMI at left midclavicular line. Normal rate. Regular rhythm. Normal S1. Normal S2.       Murmurs: There is no murmur.      No gallop.  No click. No rub.   Pulses:     Intact distal pulses.   Edema:     Peripheral edema absent.   Abdominal:      General: Bowel sounds are normal.      Palpations: Abdomen is soft.      Tenderness: There is no abdominal tenderness.   Musculoskeletal: Normal " range of motion.         General: No tenderness. Skin:     General: Skin is warm and dry.   Neurological:      General: No focal deficit present.      Mental Status: Alert and oriented to person, place and time.       Results Review:   I reviewed the patient's new clinical results.  Results from last 7 days   Lab Units 09/30/23  0257 09/29/23  2053   WBC 10*3/mm3 10.15 9.7   HEMOGLOBIN g/dL 12.4* 14.2   HEMATOCRIT % 37.6 43.4   PLATELETS 10*3/mm3 205 218     Results from last 7 days   Lab Units 09/30/23  0538   SODIUM mmol/L 143   POTASSIUM mmol/L 4.1   CHLORIDE mmol/L 106   CO2 mmol/L 22.4   BUN mg/dL 24*   CREATININE mg/dL 1.13   GLUCOSE mg/dL 154*   CALCIUM mg/dL 9.4     Lab Results   Lab Value Date/Time    TROPONINT 785 (C) 09/30/2023 0929    TROPONINT 642 (C) 09/30/2023 0745     Results from last 7 days   Lab Units 09/30/23  0538   CHOLESTEROL mg/dL 149   TRIGLYCERIDES mg/dL 194*   HDL CHOL mg/dL 39*   LDL CHOL mg/dL 77         Assessment/Plan:      NSTEMI (non-ST elevated myocardial infarction)    Essential hypertension    Type 2 diabetes mellitus, without long-term current use of insulin      I have recommended coronary angiography with interventional standby.  Mr. Metz has been engaged in a patient level discussion explaining the rationale for proceeding with invasive testing.  The procedure of coronary angiography with catheter-based coronary revascularization has been explained in detail using layman's terminology, including risks, benefits and alternatives.  He expresses understanding and desire to proceed.  Further recommendations will be predicated on the results of his catheterization findings.    I discussed the patient's findings and my recommendations with patient    Vince Redding MD  09/30/23  11:12 EDT            Electronically signed by Vince Redding MD at 09/30/23 3050       [facility] CATH LAB  [address] [id number] [phone]            Dann  [facility]  Cardiac Catheterization/Vascular Study  Order# [id number]  Reading physician: Vince Redding MD Ordering physician: Vince Redding MD Study date: 23      Procedures    Coronary angiography        Patient Information    Patient Name  Dann Metz MRN  [id number] Legal Sex  Male  (Age)  1950 (72 y.o.)     Race Ethnicity Encounter Category   White or  Not  or  Urgent        [facility] PACS Images     Show images for Cardiac Catheterization/Vascular Study       Printable Vessel Diagram    Printable Vessel Diagram       Physicians    Panel Physicians Referring Physician Case Authorizing Physician   Vince Redding MD (Primary) Karthikeyan Jimenez MD Breeding, Larry T, MD       Indications    NSTEMI (non-ST elevated myocardial infarction) [I21.4 (ICD-10-CM)]             Conclusion    Severe three-vessel coronary artery disease  CABG           Recommendations         Optimize medical therapy.        High intensity statin therapy.        Aspirin therapy indefinitely.        Surgical consult for revascularization.       Procedure Narrative    Recommendations:  CABG    Impression:  Severe three-vessel coronary artery disease    Indication:  NSTEMI    ----------------------------------------------------------------------------------------------------------------------    Clinical History: This is a 72-year-old male patient who presents to outside hospital emergency room with nonspecific complaints and ischemic EKG changes.  Cardiac troponins were elevated and trended upward.    Procedures performed:  Bilateral selective coronary angiography     Access:   5\6 Irish Slender arterial hemostasis sheath placed via right radial artery; arterial sheath removed in the cardiac catheterization laboratory with application of a transradial band for patent hemostasis.      Procedure narrative:  The procedure was explained in detail to the patient, including risks benefits and  alternatives.  The patient expressed understanding and a desire to proceed. James's testing demonstrated excellent collateral circulation to both hands.  The patient was brought to the cardiac catheterization laboratory where the right wrist was prepped and draped in usual sterile fashion.  One percent lidocaine was infiltrated into the skin overlying the right radial artery.  Access was obtained from the right radial artery utilizing the micropuncture technique and a 5\6 [place] Island[place] Slender hemostasis sheath was placed without difficulty.  The standard antispasm cocktail as well as 4000 units of unfractionated heparin was administered.  Retrograde catheterization was performed using a 6 [place] Island[place] JR4 diagnostic catheter to engage  the right coronary artery and a 6 [place] Island[place] Tiger diagnostic catheter to engage the left main coronary artery.  Hand injected angiograms was performed.  Contrast ventriculogram was not performed.     Estimated Blood Loss:  Negligible  Total Contrast:  50 mL  Fluoro Time:  1.9-minute  Radiation Dose:  256 mGy (air kerma)    Angiographic Findings:  Coronary circulation is left dominant  Left main: The left main coronary artery divides into the left anterior descending and circumflex coronary arteries.  The left main coronary artery has no significant disease.  LAD: The left anterior descending gives rise to the diagonal branches as well as multiple septal perforators before terminating as an apical recurrent branch.  The proximal LAD has an 80-90 % stenosis.  The distal LAD has an 80-90% stenosis.  The first diagonal branch of the left anterior descending is subtotally occluded.  LCX: The circumflex coronary artery is a dominant vessel giving rise to the obtuse marginal branches, posterolateral left ventricular branches and posterior descending artery.  The mid circumflex is subtotally occluded.  The largest obtuse marginal branch is subtotally occluded.  The distal AV groove vessel is occluded.  RCA:  The right coronary artery is a small caliber nondominant vessel.  The proximal RCA is chronically occluded.    Complications: None apparent    Risks, benefits and alternatives were discussed with the patient and/or family. Plan is for minimal sedation. Under my direct supervision, intravenous minimal sedation sedation was administered during the course of this procedure with continuous monitoring of hemodynamic parameters and level of consciousness by an independent trained observer. Less than 20 mL of estimated blood loss during the case. No specimen was collected during the case.       Time Under Physician Observation    Name Panel Role Time Period   Vince Redding MD Panel 1 Primary 9/30/2023 1231      Total Sedation Time    Total estimated time of moderate sedation was 13 minutes 28 seconds                     Vessel Access    A 6 fr sheath was  inserted with ultrasound guidance into the right radial artery. Sheath insertion not delayed.           Sheath Disposition    Events were not documented in the procedure log.

## 2023-09-30 NOTE — CONSULTS
"[facility]iology Consult Note    Referring Provider: Konstantin  Reason for Consultation: NSTEMI    Patient Care Team:  Ariana Lucas APRN as PCP - General (Family Medicine)    Chief complaint : Malaise    Subjective:    History of present illness: This is a 72-year-old male patient who presents to outside hospital emergency room with a 3-month history of fatigue malaise and dyspnea.  He indicates his symptoms began after having a stroke.  In the emergency room his twelve-lead electrocardiogram showed extensive inferior and precardial ST segment depression with a \"Hand-to God\" sign.  These changes were intermittent.  He denied having chest discomfort.  Cardiac troponins were elevated and trended upward.  He has no prior history of myocardial infarction or coronary revascularization.  He has a history of hypertension and diabetes.  He is a non-smoker.    Review of Systems   Review of Systems   Constitutional: Positive for malaise/fatigue. Negative for chills, diaphoresis, fever, weight gain and weight loss.   HENT:  Negative for ear discharge, hearing loss, hoarse voice and nosebleeds.    Eyes:  Negative for discharge, double vision, pain and photophobia.   Cardiovascular:  Positive for dyspnea on exertion. Negative for chest pain, claudication, cyanosis, irregular heartbeat, leg swelling, near-syncope, orthopnea, palpitations, paroxysmal nocturnal dyspnea and syncope.   Respiratory:  Positive for shortness of breath. Negative for cough, hemoptysis, sputum production and wheezing.    Endocrine: Negative for cold intolerance, heat intolerance, polydipsia, polyphagia and polyuria.   Hematologic/Lymphatic: Negative for adenopathy and bleeding problem. Does not bruise/bleed easily.   Skin:  Negative for color change, flushing, itching and rash.   Musculoskeletal:  Negative for muscle cramps, muscle weakness, myalgias and stiffness.   Gastrointestinal:  Negative for abdominal pain, diarrhea, hematemesis, hematochezia, " nausea and vomiting.   Genitourinary:  Negative for dysuria, frequency and nocturia.   Neurological:  Negative for focal weakness, loss of balance, numbness, paresthesias and seizures.   Psychiatric/Behavioral:  Negative for altered mental status, hallucinations and suicidal ideas.    Allergic/Immunologic: Negative for HIV exposure, hives and persistent infections.     History  Past Medical History:   Diagnosis Date    Diabetes mellitus     Hypertension     Stroke    , History reviewed. No pertinent surgical history.,   Family History   Problem Relation Age of Onset    Heart disease Mother     Stroke Father     Stroke Sister     Stroke Sister     Stroke Brother     Stroke Brother     Stroke Brother     Stroke Brother     Stroke Brother    ,   Social History     Tobacco Use    Smoking status: Never    Smokeless tobacco: Never   Vaping Use    Vaping Use: Never used   Substance Use Topics    Alcohol use: Never    Drug use: Never   ,   Medications Prior to Admission   Medication Sig Dispense Refill Last Dose    aspirin (Aspirin Childrens) 81 MG chewable tablet Chew 1 tablet Daily. 30 tablet 11 9/29/2023    apixaban (ELIQUIS) 5 MG tablet tablet Take 1 tablet by mouth 2 (Two) Times a Day. 60 tablet 11 Unknown    atorvastatin (LIPITOR) 40 MG tablet Take 1 tablet by mouth Every Night.   Unknown    Blood Glucose Monitoring Suppl (True Metrix Meter) w/Device kit    Unknown    empagliflozin (JARDIANCE) 10 MG tablet tablet Take  by mouth Daily.   Unknown    lisinopril (PRINIVIL,ZESTRIL) 10 MG tablet Take 1 tablet by mouth Daily.   Unknown    metFORMIN (GLUCOPHAGE) 500 MG tablet Take 2 tablets by mouth 2 (Two) Times a Day With Meals.   Unknown    True Metrix Blood Glucose Test test strip    Unknown    TRUEplus Lancets 33G misc    Unknown    and Allergies:  Penicillins    Objective:    Vital Sign Min/Max for last 24 hours  Temp  Min: 97.6 °F (36.4 °C)  Max: 98.3 °F (36.8 °C)   BP  Min: 126/87  Max: 139/99   Pulse  Min: 91  Max:  "109   Resp  Min: 20  Max: 20   SpO2  Min: 91 %  Max: 96 %   Flow (L/min)  Min: 2  Max: 2   Weight  Min: 64.5 kg (142 lb 3.2 oz)  Max: 64.5 kg (142 lb 3.2 oz)     Flowsheet Rows      Flowsheet Row First Filed Value   Admission Height 160 cm (63\") Documented at 09/30/2023 0100   Admission Weight 64.5 kg (142 lb 3.2 oz) Documented at 09/30/2023 0100                 Physical Exam:   Vitals and nursing note reviewed.   Constitutional:       Appearance: Healthy appearance. Not in distress.   Neck:      Vascular: No JVR. JVD normal.   Pulmonary:      Effort: Pulmonary effort is normal.      Breath sounds: Normal breath sounds. No wheezing. No rhonchi. No rales.   Chest:      Chest wall: Not tender to palpatation.   Cardiovascular:      PMI at left midclavicular line. Normal rate. Regular rhythm. Normal S1. Normal S2.       Murmurs: There is no murmur.      No gallop.  No click. No rub.   Pulses:     Intact distal pulses.   Edema:     Peripheral edema absent.   Abdominal:      General: Bowel sounds are normal.      Palpations: Abdomen is soft.      Tenderness: There is no abdominal tenderness.   Musculoskeletal: Normal range of motion.         General: No tenderness. Skin:     General: Skin is warm and dry.   Neurological:      General: No focal deficit present.      Mental Status: Alert and oriented to person, place and time.       Results Review:   I reviewed the patient's new clinical results.  Results from last 7 days   Lab Units 09/30/23  0257 09/29/23 2053   WBC 10*3/mm3 10.15 9.7   HEMOGLOBIN g/dL 12.4* 14.2   HEMATOCRIT % 37.6 43.4   PLATELETS 10*3/mm3 205 218     Results from last 7 days   Lab Units 09/30/23  0538   SODIUM mmol/L 143   POTASSIUM mmol/L 4.1   CHLORIDE mmol/L 106   CO2 mmol/L 22.4   BUN mg/dL 24*   CREATININE mg/dL 1.13   GLUCOSE mg/dL 154*   CALCIUM mg/dL 9.4     Lab Results   Lab Value Date/Time    TROPONINT 785 (C) 09/30/2023 0929    TROPONINT 642 (C) 09/30/2023 0745     Results from last 7 days "   Lab Units 09/30/23  0538   CHOLESTEROL mg/dL 149   TRIGLYCERIDES mg/dL 194*   HDL CHOL mg/dL 39*   LDL CHOL mg/dL 77         Assessment/Plan:      NSTEMI (non-ST elevated myocardial infarction)    Essential hypertension    Type 2 diabetes mellitus, without long-term current use of insulin      I have recommended coronary angiography with interventional standby.  Mr. Metz has been engaged in a patient level discussion explaining the rationale for proceeding with invasive testing.  The procedure of coronary angiography with catheter-based coronary revascularization has been explained in detail using layman's terminology, including risks, benefits and alternatives.  He expresses understanding and desire to proceed.  Further recommendations will be predicated on the results of his catheterization findings.    I discussed the patient's findings and my recommendations with patient    Vince Redding MD  09/30/23  11:12 EDT

## 2023-09-30 NOTE — ED NOTES
Dr Juan J Oliveira on phone talking with dr blackmon r/t stemi alert      Jessica Dave, RN  09/29/23 2047

## 2023-09-30 NOTE — PROGRESS NOTES
[facility]    Name:  Dann Metz   Age:  72 y.o.  Sex:  male  :  1950  MRN:  [id number]   Visit Number:  [id number]  Admission Date:  2023  Date Of Service:  23  Primary Care Physician:  Ariana Lucas APRN    Patient was seen and examined. Pertinent laboratory and radiology data were reviewed.  Patient seen and examined with nursing students at bedside.  Denies any current chest pain or prior pain.  States shortness of air with slight cough for the past couple of days.  Did recently have a CVA with residual slight right-sided weakness.  Currently lives alone with family nearby.  Updated cardiac markers uptrending and would likely have heart cath today per cardiologist Dr. Redding.    Vital signs:    Vital Signs (last 24 hours)          0700   0659  0700   1433   Most Recent      Temp (°F) 97.6 -  98.3      98.2     98.2 (36.8)  1115    Heart Rate 100 -  109    75 -  91     75  1330    Resp   20    8 -  20     8  1248    /87 -  139/99    112/80 -  136/91     112/80  1330    SpO2 (%) 94 -  96    85 -  94     93  1330            Physical Examination:     General Appearance:  Alert and cooperative. Pleasant.    Head:  Atraumatic and normocephalic.   Eyes: Conjunctivae and sclerae normal, no icterus. No pallor.   Throat: No oral lesions, no thrush, oral mucosa moist.   Neck: Supple, trachea midline, no thyromegaly.   Lungs:   Breath sounds heard bilaterally equally.  No wheezing or crackles. No Pleural rub or bronchial breathing.  On room air unlabored.   Heart:  Normal S1 and S2, no murmur, no gallop, no rub. No JVD.   Abdomen:   Normal bowel sounds, no masses, no organomegaly. Soft, nontender, nondistended, no rebound tenderness.   Extremities: Supple, no edema, no cyanosis, no clubbing.    Skin: No bleeding or rash.   Neurologic: Alert and oriented x 3. No facial asymmetry. Moves all four limbs.  No tremors.          Assessment    Concern for NSTEMI, POA  Atrial fibrillation, paroxysmal, POA  Dyspnea, POA  Recent CVA with right-sided residual, POA  Essential hypertension  Diabetes mellitus type II    Plan    Concern for STEMI/dyspnea  -Uptrending troponin noted.  Dr. Redding cardiologist consulted with planned heart cath today.  -No current chest pain.  -Patient placed on heparin drip.  -Patient did receive aspirin/statin/beta-blocker.    Atrial fibrillation on Eliquis  Diabetes mellitus type 2  Essential hypertension  -Continue home medications as appropriate.  -ACHS blood sugars with sliding scale    -Addendum 1430-patient taken for coronary angiography and noted to have severe three-vessel coronary artery disease with recommended CABG.  Patient continued on aspirin and high intensity statin therapy. Planned transfer to [facility] with [facility] accepted pending bed availability.      Yamel Hernandez, APRN  09/30/23  14:33 EDT    Dictated utilizing Dragon dictation.

## 2023-09-30 NOTE — PROGRESS NOTES
Report called to [facility], going to room 308. Third trop resulted 169, MD informed.   EMS here to transport ALS to [facility]

## 2023-09-30 NOTE — H&P
[facility]   HISTORY AND PHYSICAL      Name:  Dann Metz   Age:  72 y.o.  Sex:  male  :  1950  MRN:  [id number]   Visit Number:  [id number]  Admission Date:  2023  Date Of Service:  23  Primary Care Physician:  Ariana Lucas APRN    Chief Complaint:     Shortness of breath, weakness, fatigue    History Of Presenting Illness:      The patient is a 72-year-old gentleman with a history of type 2 diabetes, hypertension, recent CVA, atrial fibrillation, who had presented from outlying emergency room due to complaints of shortness of breath and fatigue.  History obtained from patient, ER record and son at bedside.  Patient states that he had been doing fairly well after his recent stroke, but had noted increasing shortness of breath.  His son noted patient appeared to be fairly fatigued on Monday when he was talking to him.  Patient notes over the last couple of days he was feeling very winded, kind of sweaty at times.  He thinks he may have felt palpitations but denies any overt chest pain.  He does take his medications as directed including anticoagulants.  He does not smoke or drink alcohol.  He is retired.  He denies any recent falls or trauma.    In the ER at [facility], his vital signs were stable other than mild hypertension.  He had elevated troponins that had trended up at the 2-hour tiny.  CMP and CBC were unremarkable.  Chest x-ray was showing interstitial changes.  He had multiple EKGs performed at that facility, with 1 showing apparent ST segment depression and A-fib.  Patient is currently in sinus rhythm at time of exam.  ER provider had sent images to cardiology, did not meet STEMI criteria, we were asked to accept in transfer thereafter.    Review Of Systems:    All systems were reviewed and negative except as mentioned in history of presenting illness, assessment and plan.    Past Medical History: Patient  has a past medical history of  "Diabetes mellitus, Hypertension, and Stroke.    Past Surgical History: Patient  has no past surgical history on file.    Social History: Patient  reports that he has never smoked. He has never used smokeless tobacco. He reports that he does not drink alcohol and does not use drugs.    Family History:  Patient's family history has been reviewed and found to be noncontributory.     Allergies:      Penicillins    Home Medications:    Prior to Admission Medications       Prescriptions Last Dose Informant Patient Reported? Taking?    aspirin (Aspirin Childrens) 81 MG chewable tablet 9/29/2023  No Yes    Chew 1 tablet Daily.    apixaban (ELIQUIS) 5 MG tablet tablet Unknown  No No    Take 1 tablet by mouth 2 (Two) Times a Day.    atorvastatin (LIPITOR) 40 MG tablet Unknown  Yes No    Take 1 tablet by mouth Every Night.    Blood Glucose Monitoring Suppl (True Metrix Meter) w/Device kit Unknown  Yes No    empagliflozin (JARDIANCE) 10 MG tablet tablet Unknown  Yes No    Take  by mouth Daily.    lisinopril (PRINIVIL,ZESTRIL) 10 MG tablet Unknown  Yes No    Take 1 tablet by mouth Daily.    metFORMIN (GLUCOPHAGE) 500 MG tablet Unknown  Yes No    Take 2 tablets by mouth 2 (Two) Times a Day With Meals.    True Metrix Blood Glucose Test test strip Unknown  Yes No    TRUEplus Lancets 33G misc Unknown  Yes No          ED Medications:    Medications - No data to display  Vital Signs:  Temp:  [98.3 °F (36.8 °C)] 98.3 °F (36.8 °C)  Heart Rate:  [109] 109  Resp:  [20] 20  BP: (139)/(99) 139/99        09/30/23  0100   Weight: 64.5 kg (142 lb 3.2 oz)     Body mass index is 25.19 kg/m².    Physical Exam:     Most recent vital Signs: /99 (BP Location: Left arm, Patient Position: Lying)   Pulse 109   Temp 98.3 °F (36.8 °C) (Oral)   Resp 20   Ht 160 cm (63\")   Wt 64.5 kg (142 lb 3.2 oz)   SpO2 96%   BMI 25.19 kg/m²     Physical Exam  Constitutional:       General: He is not in acute distress.     Appearance: He is normal weight. " He is not toxic-appearing.   HENT:      Ears:      Comments: Hearing loss  Eyes:      Extraocular Movements: Extraocular movements intact.      Pupils: Pupils are equal, round, and reactive to light.   Cardiovascular:      Rate and Rhythm: Normal rate and regular rhythm.      Pulses: Normal pulses.      Heart sounds: No murmur heard.  Pulmonary:      Effort: Pulmonary effort is normal. No respiratory distress.      Breath sounds: No wheezing.   Abdominal:      General: Abdomen is flat. Bowel sounds are normal. There is no distension.      Tenderness: There is no abdominal tenderness.   Musculoskeletal:         General: Normal range of motion.      Right lower leg: No edema.      Left lower leg: No edema.   Skin:     General: Skin is warm.      Capillary Refill: Capillary refill takes less than 2 seconds.      Findings: No bruising or lesion.   Neurological:      General: No focal deficit present.      Mental Status: He is alert and oriented to person, place, and time. Mental status is at baseline.      Motor: Weakness present.   Psychiatric:         Mood and Affect: Mood normal.         Thought Content: Thought content normal.       Laboratory data:    I have reviewed the labs done in the emergency room.          Invalid input(s): LABALBU, PROT  Results from last 7 days   Lab Units 09/29/23 2053   WBC K/uL 9.7   HEMOGLOBIN g/dL 14.2   HEMATOCRIT % 43.4   PLATELETS K/uL 218     Results from last 7 days   Lab Units 09/29/23  2205   INR  1.08                               Invalid input(s): USDES,  BLOODU, NITRITITE, BACT, EP    Pain Management Panel           No data to display                EKG:      Performed at outlying facility, reportedly 1 EKG showing A-fib and ST segment depression, other showing sinus rhythm.    Radiology:    XR CHEST PORTABLE    Result Date: 9/29/2023  EXAM: PORTABLE AP CHEST X-RAY INDICATION: dyspnea, COMPARISON: 6/25/2023 FINDINGS: Bilateral interstitial opacities are unchanged. There is  no focal consolidation, pleural effusion, or pneumothorax. The cardiomediastinal silhouette is normal. The visible bony thorax is intact.    Unchanged diffuse bilateral interstitial opacities representing pulmonary edema versus chronic interstitial fibrosis.     Assessment:    Concern for NSTEMI, POA  Atrial fibrillation, paroxysmal, POA  Dyspnea, POA  Recent CVA, POA  Hypertension  Diabetes    Plan:    Admit for observation    Questionable NSTEMI:  Patient with no overt chest pain at this time.  Perhaps fatigue and shortness of breath anginal equivalent, with elevated troponins at outlying facility and EKG changes we will have cardiology consultation here.  We will keep on heparin for now.  Already received aspirin.  We will continue with statin therapy and beta-blocker.  Will repeat EKG and troponin here.  Monitor on telemetry.    A-fib:  Had been in sinus rhythm per report.  Apparently had EKG at outlying facility where he was in A-fib.  Some of his symptoms may be related to paroxysmal A-fib and may need to consider rhythm control if recurrence.  He has been anticoagulated with Eliquis since stroke.  Follows with Dr. Rushing.    Dyspnea:  Possibly an anginal equivalent, however does appear he may have some interstitial lung issue based off recent chest x-rays.  He is a lifetime non-smoker, no obvious exposure risk from work.  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    Hypertension/diabetes:  Complicates all aspects of care.    The patient otherwise meets observation level of care and anticipate less than 2 midnight stay.  Further recommendations are predicated upon further testing and cardiology consultation.  Care discussed with the patient and his son at bedside.    Risk Assessment: High  DVT Prophylaxis: Heparin drip  Code Status: Full  Diet: N.p.o.    Advance Care Planning   ACP discussion was held with the patient  during this visit. Patient does not have an advance directive, declines further assistance.           Sierra Pryor DO  09/30/23  02:17 EDT    Dictated utilizing Dragon dictation.

## 2023-09-30 NOTE — PROGRESS NOTES
Called [facility] for transfer. Patient has been medicated with heparin and Asprin.

## 2023-10-01 ENCOUNTER — HOSPITAL ENCOUNTER (INPATIENT)
Facility: HOSPITAL | Age: 73
LOS: 10 days | Discharge: HOME | DRG: 233 | End: 2023-10-11
Attending: PEDIATRICS
Payer: MEDICARE

## 2023-10-01 DIAGNOSIS — R29.898 RUE WEAKNESS: ICD-10-CM

## 2023-10-01 DIAGNOSIS — I21.9 TYPE 1 MYOCARDIAL INFARCTION: ICD-10-CM

## 2023-10-01 DIAGNOSIS — Z95.1 S/P CABG (CORONARY ARTERY BYPASS GRAFT): ICD-10-CM

## 2023-10-01 DIAGNOSIS — I21.4 NSTEMI (NON-ST ELEVATED MYOCARDIAL INFARCTION): ICD-10-CM

## 2023-10-01 DIAGNOSIS — I25.110 CORONARY ARTERY DISEASE INVOLVING NATIVE CORONARY ARTERY OF NATIVE HEART WITH UNSTABLE ANGINA PECTORIS: Primary | ICD-10-CM

## 2023-10-01 PROBLEM — E55.9 VITAMIN D DEFICIENCY: Status: ACTIVE | Noted: 2023-06-26

## 2023-10-01 PROBLEM — I25.10 CORONARY ARTERY DISEASE: Status: ACTIVE | Noted: 2023-10-01

## 2023-10-01 LAB
ANION GAP SERPL CALCULATED.3IONS-SCNC: 14 MMOL/L (ref 5–15)
APTT PPP: 29 SECONDS (ref 60–90)
BASOPHILS # BLD AUTO: 0.07 10*3/MM3 (ref 0–0.2)
BASOPHILS NFR BLD AUTO: 0.7 % (ref 0–1.5)
BUN SERPL-MCNC: 17 MG/DL (ref 8–23)
BUN/CREAT SERPL: 15.3 (ref 7–25)
CALCIUM SPEC-SCNC: 8.9 MG/DL (ref 8.6–10.5)
CHLORIDE SERPL-SCNC: 103 MMOL/L (ref 98–107)
CO2 SERPL-SCNC: 24 MMOL/L (ref 22–29)
CREAT SERPL-MCNC: 1.11 MG/DL (ref 0.76–1.27)
DEPRECATED RDW RBC AUTO: 51.1 FL (ref 37–54)
EGFRCR SERPLBLD CKD-EPI 2021: 71.4 ML/MIN/1.73
EOSINOPHIL # BLD AUTO: 0.33 10*3/MM3 (ref 0–0.4)
EOSINOPHIL NFR BLD AUTO: 3.1 % (ref 0.3–6.2)
ERYTHROCYTE [DISTWIDTH] IN BLOOD BY AUTOMATED COUNT: 14.5 % (ref 12.3–15.4)
GLUCOSE BLDC GLUCOMTR-MCNC: 109 MG/DL (ref 70–130)
GLUCOSE BLDC GLUCOMTR-MCNC: 117 MG/DL (ref 70–130)
GLUCOSE BLDC GLUCOMTR-MCNC: 205 MG/DL (ref 70–130)
GLUCOSE BLDC GLUCOMTR-MCNC: 208 MG/DL (ref 70–130)
GLUCOSE SERPL-MCNC: 125 MG/DL (ref 65–99)
HCT VFR BLD AUTO: 40.1 % (ref 37.5–51)
HGB BLD-MCNC: 13.2 G/DL (ref 13–17.7)
IMM GRANULOCYTES # BLD AUTO: 0.04 10*3/MM3 (ref 0–0.05)
IMM GRANULOCYTES NFR BLD AUTO: 0.4 % (ref 0–0.5)
INR PPP: 1.1 (ref 0.89–1.12)
LYMPHOCYTES # BLD AUTO: 1.88 10*3/MM3 (ref 0.7–3.1)
LYMPHOCYTES NFR BLD AUTO: 17.9 % (ref 19.6–45.3)
MAGNESIUM SERPL-MCNC: 1.5 MG/DL (ref 1.6–2.4)
MAGNESIUM SERPL-MCNC: 2.8 MG/DL (ref 1.6–2.4)
MCH RBC QN AUTO: 31.9 PG (ref 26.6–33)
MCHC RBC AUTO-ENTMCNC: 32.9 G/DL (ref 31.5–35.7)
MCV RBC AUTO: 96.9 FL (ref 79–97)
MONOCYTES # BLD AUTO: 0.9 10*3/MM3 (ref 0.1–0.9)
MONOCYTES NFR BLD AUTO: 8.6 % (ref 5–12)
NEUTROPHILS NFR BLD AUTO: 69.3 % (ref 42.7–76)
NEUTROPHILS NFR BLD AUTO: 7.3 10*3/MM3 (ref 1.7–7)
NRBC BLD AUTO-RTO: 0 /100 WBC (ref 0–0.2)
PA ADP PRP-ACNC: 169 PRU
PLATELET # BLD AUTO: 220 10*3/MM3 (ref 140–450)
PMV BLD AUTO: 10.3 FL (ref 6–12)
POTASSIUM SERPL-SCNC: 4.2 MMOL/L (ref 3.5–5.2)
PROTHROMBIN TIME: 14.4 SECONDS (ref 12.2–14.5)
RBC # BLD AUTO: 4.14 10*6/MM3 (ref 4.14–5.8)
SODIUM SERPL-SCNC: 141 MMOL/L (ref 136–145)
UFH PPP CHRO-ACNC: 0.92 IU/ML (ref 0.3–0.7)
UFH PPP CHRO-ACNC: 0.98 IU/ML (ref 0.3–0.7)
UFH PPP CHRO-ACNC: 1 IU/ML (ref 0.3–0.7)
WBC NRBC COR # BLD: 10.52 10*3/MM3 (ref 3.4–10.8)

## 2023-10-01 PROCEDURE — 25010000002 HEPARIN (PORCINE) 25000-0.45 UT/250ML-% SOLUTION

## 2023-10-01 PROCEDURE — 63710000001 INSULIN REGULAR HUMAN PER 5 UNITS: Performed by: STUDENT IN AN ORGANIZED HEALTH CARE EDUCATION/TRAINING PROGRAM

## 2023-10-01 PROCEDURE — 85520 HEPARIN ASSAY: CPT

## 2023-10-01 PROCEDURE — 99223 1ST HOSP IP/OBS HIGH 75: CPT | Performed by: THORACIC SURGERY (CARDIOTHORACIC VASCULAR SURGERY)

## 2023-10-01 PROCEDURE — 85730 THROMBOPLASTIN TIME PARTIAL: CPT | Performed by: STUDENT IN AN ORGANIZED HEALTH CARE EDUCATION/TRAINING PROGRAM

## 2023-10-01 PROCEDURE — 85610 PROTHROMBIN TIME: CPT | Performed by: STUDENT IN AN ORGANIZED HEALTH CARE EDUCATION/TRAINING PROGRAM

## 2023-10-01 PROCEDURE — 80048 BASIC METABOLIC PNL TOTAL CA: CPT | Performed by: STUDENT IN AN ORGANIZED HEALTH CARE EDUCATION/TRAINING PROGRAM

## 2023-10-01 PROCEDURE — 99222 1ST HOSP IP/OBS MODERATE 55: CPT | Performed by: INTERNAL MEDICINE

## 2023-10-01 PROCEDURE — 85520 HEPARIN ASSAY: CPT | Performed by: STUDENT IN AN ORGANIZED HEALTH CARE EDUCATION/TRAINING PROGRAM

## 2023-10-01 PROCEDURE — 85025 COMPLETE CBC W/AUTO DIFF WBC: CPT | Performed by: STUDENT IN AN ORGANIZED HEALTH CARE EDUCATION/TRAINING PROGRAM

## 2023-10-01 PROCEDURE — 82948 REAGENT STRIP/BLOOD GLUCOSE: CPT

## 2023-10-01 PROCEDURE — 25010000002 HEPARIN (PORCINE) 25000-0.45 UT/250ML-% SOLUTION: Performed by: STUDENT IN AN ORGANIZED HEALTH CARE EDUCATION/TRAINING PROGRAM

## 2023-10-01 PROCEDURE — 25010000002 HEPARIN (PORCINE) 25000-0.45 UT/250ML-% SOLUTION: Performed by: INTERNAL MEDICINE

## 2023-10-01 PROCEDURE — 25010000002 MAGNESIUM SULFATE 2 GM/50ML SOLUTION: Performed by: STUDENT IN AN ORGANIZED HEALTH CARE EDUCATION/TRAINING PROGRAM

## 2023-10-01 PROCEDURE — 83735 ASSAY OF MAGNESIUM: CPT | Performed by: STUDENT IN AN ORGANIZED HEALTH CARE EDUCATION/TRAINING PROGRAM

## 2023-10-01 PROCEDURE — 85576 BLOOD PLATELET AGGREGATION: CPT | Performed by: PHYSICIAN ASSISTANT

## 2023-10-01 RX ORDER — MAGNESIUM SULFATE HEPTAHYDRATE 40 MG/ML
2 INJECTION, SOLUTION INTRAVENOUS
Status: COMPLETED | OUTPATIENT
Start: 2023-10-01 | End: 2023-10-01

## 2023-10-01 RX ORDER — NITROGLYCERIN 0.4 MG/1
0.4 TABLET SUBLINGUAL
Status: DISCONTINUED | OUTPATIENT
Start: 2023-10-01 | End: 2023-10-06 | Stop reason: SDUPTHER

## 2023-10-01 RX ORDER — SODIUM CHLORIDE 0.9 % (FLUSH) 0.9 %
10 SYRINGE (ML) INJECTION AS NEEDED
Status: DISCONTINUED | OUTPATIENT
Start: 2023-10-01 | End: 2023-10-11 | Stop reason: HOSPADM

## 2023-10-01 RX ORDER — NALOXONE HCL 0.4 MG/ML
0.4 VIAL (ML) INJECTION
Status: DISCONTINUED | OUTPATIENT
Start: 2023-10-01 | End: 2023-10-11 | Stop reason: HOSPADM

## 2023-10-01 RX ORDER — ACETAMINOPHEN 325 MG/1
650 TABLET ORAL EVERY 4 HOURS PRN
Status: DISCONTINUED | OUTPATIENT
Start: 2023-10-01 | End: 2023-10-11 | Stop reason: HOSPADM

## 2023-10-01 RX ORDER — HEPARIN SODIUM 1000 [USP'U]/ML
25 INJECTION, SOLUTION INTRAVENOUS; SUBCUTANEOUS AS NEEDED
Status: DISCONTINUED | OUTPATIENT
Start: 2023-10-01 | End: 2023-10-01 | Stop reason: SDUPTHER

## 2023-10-01 RX ORDER — ATORVASTATIN CALCIUM 40 MG/1
80 TABLET, FILM COATED ORAL NIGHTLY
Status: DISCONTINUED | OUTPATIENT
Start: 2023-10-01 | End: 2023-10-11 | Stop reason: HOSPADM

## 2023-10-01 RX ORDER — HEPARIN SODIUM 10000 [USP'U]/100ML
9 INJECTION, SOLUTION INTRAVENOUS
Status: DISCONTINUED | OUTPATIENT
Start: 2023-10-01 | End: 2023-10-01

## 2023-10-01 RX ORDER — HEPARIN SODIUM 1000 [USP'U]/ML
50 INJECTION, SOLUTION INTRAVENOUS; SUBCUTANEOUS AS NEEDED
Status: DISCONTINUED | OUTPATIENT
Start: 2023-10-01 | End: 2023-10-01 | Stop reason: SDUPTHER

## 2023-10-01 RX ORDER — POLYETHYLENE GLYCOL 3350 17 G/17G
17 POWDER, FOR SOLUTION ORAL DAILY PRN
Status: DISCONTINUED | OUTPATIENT
Start: 2023-10-01 | End: 2023-10-11 | Stop reason: HOSPADM

## 2023-10-01 RX ORDER — ONDANSETRON 4 MG/1
4 TABLET, FILM COATED ORAL EVERY 6 HOURS PRN
Status: DISCONTINUED | OUTPATIENT
Start: 2023-10-01 | End: 2023-10-11 | Stop reason: HOSPADM

## 2023-10-01 RX ORDER — OMEPRAZOLE 20 MG/1
1 CAPSULE, DELAYED RELEASE ORAL DAILY PRN
COMMUNITY

## 2023-10-01 RX ORDER — BISACODYL 10 MG
10 SUPPOSITORY, RECTAL RECTAL DAILY PRN
Status: DISCONTINUED | OUTPATIENT
Start: 2023-10-01 | End: 2023-10-11 | Stop reason: HOSPADM

## 2023-10-01 RX ORDER — MORPHINE SULFATE 4 MG/ML
4 INJECTION, SOLUTION INTRAMUSCULAR; INTRAVENOUS
Status: DISCONTINUED | OUTPATIENT
Start: 2023-10-01 | End: 2023-10-11 | Stop reason: HOSPADM

## 2023-10-01 RX ORDER — NICOTINE POLACRILEX 4 MG
15 LOZENGE BUCCAL
Status: DISCONTINUED | OUTPATIENT
Start: 2023-10-01 | End: 2023-10-02

## 2023-10-01 RX ORDER — HEPARIN SODIUM 10000 [USP'U]/100ML
12 INJECTION, SOLUTION INTRAVENOUS
Status: DISCONTINUED | OUTPATIENT
Start: 2023-10-01 | End: 2023-10-01

## 2023-10-01 RX ORDER — HEPARIN SODIUM 10000 [USP'U]/100ML
13 INJECTION, SOLUTION INTRAVENOUS
Status: DISCONTINUED | OUTPATIENT
Start: 2023-10-01 | End: 2023-10-06

## 2023-10-01 RX ORDER — IBUPROFEN 600 MG/1
1 TABLET ORAL
Status: DISCONTINUED | OUTPATIENT
Start: 2023-10-01 | End: 2023-10-02

## 2023-10-01 RX ORDER — ACETAMINOPHEN 160 MG/5ML
650 SOLUTION ORAL EVERY 4 HOURS PRN
Status: DISCONTINUED | OUTPATIENT
Start: 2023-10-01 | End: 2023-10-11 | Stop reason: HOSPADM

## 2023-10-01 RX ORDER — EZETIMIBE 10 MG/1
1 TABLET ORAL DAILY
COMMUNITY

## 2023-10-01 RX ORDER — ACETAMINOPHEN 650 MG/1
650 SUPPOSITORY RECTAL EVERY 4 HOURS PRN
Status: DISCONTINUED | OUTPATIENT
Start: 2023-10-01 | End: 2023-10-11 | Stop reason: HOSPADM

## 2023-10-01 RX ORDER — ONDANSETRON 2 MG/ML
4 INJECTION INTRAMUSCULAR; INTRAVENOUS EVERY 6 HOURS PRN
Status: DISCONTINUED | OUTPATIENT
Start: 2023-10-01 | End: 2023-10-06 | Stop reason: SDUPTHER

## 2023-10-01 RX ORDER — AMOXICILLIN 250 MG
2 CAPSULE ORAL 2 TIMES DAILY
Status: DISCONTINUED | OUTPATIENT
Start: 2023-10-01 | End: 2023-10-06 | Stop reason: SDUPTHER

## 2023-10-01 RX ORDER — SODIUM CHLORIDE 0.9 % (FLUSH) 0.9 %
10 SYRINGE (ML) INJECTION EVERY 12 HOURS SCHEDULED
Status: DISCONTINUED | OUTPATIENT
Start: 2023-10-01 | End: 2023-10-11 | Stop reason: HOSPADM

## 2023-10-01 RX ORDER — ASPIRIN 81 MG/1
81 TABLET ORAL DAILY
Status: DISCONTINUED | OUTPATIENT
Start: 2023-10-01 | End: 2023-10-07

## 2023-10-01 RX ORDER — ALUMINA, MAGNESIA, AND SIMETHICONE 2400; 2400; 240 MG/30ML; MG/30ML; MG/30ML
15 SUSPENSION ORAL EVERY 6 HOURS PRN
Status: DISCONTINUED | OUTPATIENT
Start: 2023-10-01 | End: 2023-10-11 | Stop reason: HOSPADM

## 2023-10-01 RX ORDER — DEXTROSE MONOHYDRATE 25 G/50ML
25 INJECTION, SOLUTION INTRAVENOUS
Status: DISCONTINUED | OUTPATIENT
Start: 2023-10-01 | End: 2023-10-02

## 2023-10-01 RX ORDER — IPRATROPIUM BROMIDE AND ALBUTEROL SULFATE 2.5; .5 MG/3ML; MG/3ML
3 SOLUTION RESPIRATORY (INHALATION) EVERY 4 HOURS PRN
Status: DISCONTINUED | OUTPATIENT
Start: 2023-10-01 | End: 2023-10-05 | Stop reason: SDUPTHER

## 2023-10-01 RX ORDER — LISINOPRIL 10 MG/1
10 TABLET ORAL DAILY
Status: DISCONTINUED | OUTPATIENT
Start: 2023-10-01 | End: 2023-10-03

## 2023-10-01 RX ORDER — HYDROCODONE BITARTRATE AND ACETAMINOPHEN 5; 325 MG/1; MG/1
1 TABLET ORAL EVERY 4 HOURS PRN
Status: DISPENSED | OUTPATIENT
Start: 2023-10-01 | End: 2023-10-10

## 2023-10-01 RX ORDER — BISACODYL 5 MG/1
5 TABLET, DELAYED RELEASE ORAL DAILY PRN
Status: DISCONTINUED | OUTPATIENT
Start: 2023-10-01 | End: 2023-10-11 | Stop reason: HOSPADM

## 2023-10-01 RX ORDER — SODIUM CHLORIDE 9 MG/ML
40 INJECTION, SOLUTION INTRAVENOUS AS NEEDED
Status: DISCONTINUED | OUTPATIENT
Start: 2023-10-01 | End: 2023-10-11 | Stop reason: HOSPADM

## 2023-10-01 RX ORDER — METOPROLOL SUCCINATE 25 MG/1
25 TABLET, EXTENDED RELEASE ORAL
Status: DISCONTINUED | OUTPATIENT
Start: 2023-10-01 | End: 2023-10-07

## 2023-10-01 RX ORDER — ALPRAZOLAM 0.25 MG/1
0.25 TABLET ORAL 3 TIMES DAILY PRN
Status: DISPENSED | OUTPATIENT
Start: 2023-10-01 | End: 2023-10-10

## 2023-10-01 RX ADMIN — Medication 10 ML: at 21:37

## 2023-10-01 RX ADMIN — HEPARIN SODIUM 6 UNITS/KG/HR: 10000 INJECTION, SOLUTION INTRAVENOUS at 21:37

## 2023-10-01 RX ADMIN — LISINOPRIL 10 MG: 10 TABLET ORAL at 08:55

## 2023-10-01 RX ADMIN — SENNOSIDES AND DOCUSATE SODIUM 2 TABLET: 50; 8.6 TABLET ORAL at 21:36

## 2023-10-01 RX ADMIN — ALPRAZOLAM 0.25 MG: 0.25 TABLET ORAL at 21:36

## 2023-10-01 RX ADMIN — MAGNESIUM SULFATE HEPTAHYDRATE 2 G: 40 INJECTION, SOLUTION INTRAVENOUS at 08:54

## 2023-10-01 RX ADMIN — METOPROLOL SUCCINATE 25 MG: 25 TABLET, EXTENDED RELEASE ORAL at 08:55

## 2023-10-01 RX ADMIN — HYDROCODONE BITARTRATE AND ACETAMINOPHEN 1 TABLET: 5; 325 TABLET ORAL at 21:36

## 2023-10-01 RX ADMIN — Medication 10 ML: at 08:56

## 2023-10-01 RX ADMIN — ASPIRIN 81 MG: 81 TABLET, COATED ORAL at 08:55

## 2023-10-01 RX ADMIN — SENNOSIDES AND DOCUSATE SODIUM 2 TABLET: 50; 8.6 TABLET ORAL at 08:55

## 2023-10-01 RX ADMIN — HEPARIN SODIUM 9 UNITS/KG/HR: 10000 INJECTION, SOLUTION INTRAVENOUS at 13:08

## 2023-10-01 RX ADMIN — ATORVASTATIN CALCIUM 80 MG: 40 TABLET, FILM COATED ORAL at 21:36

## 2023-10-01 RX ADMIN — INSULIN HUMAN 3 UNITS: 100 INJECTION, SOLUTION PARENTERAL at 18:47

## 2023-10-01 RX ADMIN — MAGNESIUM SULFATE HEPTAHYDRATE 2 G: 40 INJECTION, SOLUTION INTRAVENOUS at 11:08

## 2023-10-01 RX ADMIN — MAGNESIUM SULFATE HEPTAHYDRATE 2 G: 40 INJECTION, SOLUTION INTRAVENOUS at 06:33

## 2023-10-01 RX ADMIN — HEPARIN SODIUM 12 UNITS/KG/HR: 10000 INJECTION, SOLUTION INTRAVENOUS at 04:26

## 2023-10-01 NOTE — CONSULTS
Dann Metz  [id number]  1950   LOS: 0 days   Patient Care Team:  Ariana Lucas APRN as PCP - General (Family Medicine)  CARDIOLOGIST: Ravin Rushing MD    Mr. 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    Chief Complaint: CAD, PAF    Problem List:  Coronary artery disease  Echocardiogram 6/29/2023: LVEF 50-55%, mild concentric LVH, no significant valvular abnormalities, negative bubble study  [facility] 25-hour admission for NSTEMI 9/30/2023  Left heart catheterization 9/30/2023 showing severe three-vessel CAD (90% LAD stenosis, occlusion of circumflex, occlusion of RCA), recommendations for CABG, transferred to [facility]  Anticipated CABG with timing per CTA  Paroxysmal atrial fibrillation  Diagnosed in setting of CVA June 2023, asymptomatic  CHADsVasc 6, on Eliquis  Hypertension  Hyperlipidemia; on statin therapy  Type 2 diabetes mellitus; hemoglobin A1c 9.3% June 2023, 7.1% September 2023  Carotid artery disease with carotid duplex June 2023 showing elevated velocities in the bilateral carotid systems with abnormal waveforms. Findings are concerning for cardiac pathology/aortoiliac pathology.   CVA June 2023  GERD    Allergies   Allergen Reactions    Penicillins Rash     Medications Prior to Admission   Medication Sig Dispense Refill Last Dose    apixaban (ELIQUIS) 5 MG tablet tablet Take 1 tablet by mouth 2 (Two) Times a Day.       Blood Glucose Monitoring Suppl (True Metrix Meter) w/Device kit        ezetimibe (ZETIA) 10 MG tablet Take 1 tablet by mouth Daily.       omeprazole (priLOSEC) 20 MG capsule Take 1 capsule by mouth Daily.       TRUEplus Lancets 33G misc         Scheduled Meds:aspirin, 81 mg, Oral, Daily  atorvastatin, 80 mg, Oral, Nightly  insulin regular, 2-7 Units, Subcutaneous, Q6H  lisinopril, 10 mg, Oral, Daily  magnesium sulfate, 2 g, Intravenous, Q2H  metoprolol succinate XL, 25 mg, Oral, Q24H  [START ON 10/2/2023] pharmacy  consult - MTM, , Does not apply, Daily  senna-docusate sodium, 2 tablet, Oral, BID  sodium chloride, 10 mL, Intravenous, Q12H      Continuous Infusions:heparin, 12 Units/kg/hr, Last Rate: 12 Units/kg/hr (10/01/23 0426)  Pharmacy to Dose Heparin,            History of Present Illness:   Patient presented to [facility] 9/30/2023 for shortness of breath and weakness and had NSTEMI and heart catheterization showing severe three-vessel CAD, recommendations for CABG.  He was transferred to [facility] 10/1/2023.  The patient had noticed more shortness of breath and fatigue/weakness over the past few days, occasionally he would have palpitations and occasional diaphoresis.  He is not a smoker.  He had a stroke in June 2023 and was diagnosed with PAF at that time and started on Eliquis.  He has family history of CAD with his mother, CVA with his brothers x5, father, sister x2.  Troponins were 642, 785.  Patient was started on a heparin GTT.  Eliquis held on admission.  The patient is resting comfortably in bed without oxygen therapy.  He denies any chest pain, shortness of breath, palpitations, dizziness, or edema.  Occasionally he will have some right arm pain and says that this was the side that was affected by his prior stroke.  He says he is a little nervous for his upcoming surgery.    Cardiac risk factors: advanced age (older than 55 for men, 65 for women), diabetes mellitus, dyslipidemia, hypertension, male gender, and sedentary lifestyle.    Social History     Socioeconomic History    Marital status:    Tobacco Use    Smoking status: Never    Smokeless tobacco: Never   Vaping Use    Vaping Use: Never used   Substance and Sexual Activity    Alcohol use: Never    Drug use: Never    Sexual activity: Defer     Family History   Problem Relation Age of Onset    Heart disease Mother     Stroke Father     Stroke Sister     Stroke Sister     Stroke Brother     Stroke Brother     Stroke Brother     Stroke Brother     Stroke Brother         Review of Systems  10 point review of systems was completed, positives outlined in the HPI, and otherwise all other systems are negative.      Objective:       Physical Exam  /79 (BP Location: Right arm, Patient Position: Lying)   Pulse 89   Temp 99 øF (37.2 øC) (Oral)   Resp 18   Wt 63.3 kg (139 lb 9.6 oz)   SpO2 97%   BMI 24.73 kg/mý       10/01/23  0405   Weight: 63.3 kg (139 lb 9.6 oz)     Body mass index is 24.73 kg/mý.    Intake/Output Summary (Last 24 hours) at 10/1/2023 1012  Last data filed at 10/1/2023 0854  Gross per 24 hour   Intake 20 ml   Output 200 ml   Net -180 ml       General Appearance:  Alert, cooperative, no distress, appears stated age   Head:  Normocephalic, without obvious abnormality, atraumatic   Neck: Supple, symmetrical, trachea midline, no adenopathy, thyroid: not enlarged, symmetric, no tenderness/mass/nodules, no carotid bruit or JVD   Lungs:   Clear to auscultation bilaterally, respirations unlabored   Heart:  Regular rate and rhythm, S1, S2 normal, no murmur, rub or gallop   Abdomen:   Soft, nontender, no masses, no organomegaly, bowel sounds audible x4   Extremities: No edema, normal range of motion   Pulses: 2+ and symmetric   Skin: Skin color, texture, turgor normal, no rashes or lesions   Neurologic: Normal       Cardiographics  EKG 9/30/2023:  Normal sinus rhythm  Anteroseptal infarct (cited on or before 28-JUN-2023)  ST & T wave abnormality, consider inferolateral ischemia  Abnormal ECG  When compared with ECG of 28-JUN-2023 16:48,  Questionable change in initial forces of Anteroseptal leads  Non-specific change in ST segment in Anterior leads  Inverted T waves have replaced nonspecific T wave abnormality in Inferior leads    6/29/23 - Transthoracic Echo Complete W/ Cont if Necessary Per Protocol  1.  Normal left ventricular size with low normal LV systolic function, LVEF 50-55%.  2.  Mild concentric LVH.  3.  Indeterminate LV diastolic filling pattern.  4.   Normal right ventricular size and systolic function.  5.  Normal left atrial index.  6.  No significant valvular abnormalities.  7.  Negative bubble study with no evidence of intracardiac or intrapulmonary shunt.     Imaging    6/29/23 - US carotid  Right carotid system:  CCA: 160 cm/s  ICA: -128 cm/s  ECA: 98.5 cm/s  Vertebral artery: Antegrade  ICA/CCA ratio: 1.02  Mild plaque is identified at the bifurcation.     Left carotid system:  CCA: 151 cm/s  ICA: -252 cm/s  ECA: -110 cm/s  Vertebral artery: Antegrade  ICA/CCA ratio: 1.67  Mild plaque is identified at the bifurcation.     IMPRESSION:  Elevated velocities in the bilateral carotid systems with abnormal  waveforms. Findings are concerning for cardiac pathology/aortoiliac  pathology. Recommend correlation clinically. Consider echocardiography.      6/25/23 - CTA Head and Neck  ANTERIOR CIRCULATION: There is some mild calcific plaque in the right cavernous   carotid artery. No flow significant stenosis seen. The intracranial internal   carotid arteries, anterior cerebral arteries, and middle cerebral arteries   demonstrate no occlusion or stenosis. No evidence for aneurysm or arteriovenous   malformation.     POSTERIOR CIRCULATION: The bilateral vertebral arteries, basilar artery and   posterior cerebral arteries demonstrate no occlusion or stenosis. No evidence   for aneurysm or arteriovenous malformation.     AORTIC ARCH: Standard three-vessel aortic arch anatomy is present. Origins of   the great vessels are patent. There is some minor plaque in the aortic arch.     INNOMINATE ARTERY: Patent, without focal stenosis.     RIGHT SUBCLAVIAN ARTERY: Patent, without focal stenosis.     RIGHT VERTEBRAL ARTERY: Patent in the neck.     RIGHT CAROTID ARTERY: Common carotid artery and carotid bifurcation are widely   patent. ICAs patent in the neck.     LEFT SUBCLAVIAN ARTERY: Patent, without significant focal stenosis.     LEFT VERTEBRAL ARTERY: Patent in the neck.      LEFT CAROTID ARTERY: Common carotid artery and carotid bifurcation are patent.   There is some minimal plaque posteriorly at the bifurcation, without flow   significant stenosis.     Lab Review   Results from last 7 days   Lab Units 10/01/23  0412 09/30/23  0538   SODIUM mmol/L 141 143   POTASSIUM mmol/L 4.2 4.1   CHLORIDE mmol/L 103 106   CO2 mmol/L 24.0 22.4   BUN mg/dL 17 24*   CREATININE mg/dL 1.11 1.13   GLUCOSE mg/dL 125* 154*   CALCIUM mg/dL 8.9 9.4     Results from last 7 days   Lab Units 10/01/23  0412 09/30/23  0257 09/29/23  2053   WBC 10*3/mm3 10.52 10.15 9.7   HEMOGLOBIN g/dL 13.2 12.4* 14.2   HEMATOCRIT % 40.1 37.6 43.4   PLATELETS 10*3/mm3 220 205 218     Results from last 7 days   Lab Units 09/30/23  0538   CHOLESTEROL mg/dL 149   TRIGLYCERIDES mg/dL 194*   HDL CHOL mg/dL 39*   LDL CHOL mg/dL 77     Results from last 7 days   Lab Units 09/30/23  0538   HEMOGLOBIN A1C % 7.10*     Results from last 7 days   Lab Units 09/30/23  0929 09/30/23  0745   HSTROP T ng/L 785* 642*   Heparin GTT at 12 units/kg/h      Assessment:   Patient with multivessel CAD and PAF.  Eliquis was held, continue heparin GTT.  Plans for upcoming CABG per CTS schedule.          Plan:   Continue heparin GTT  CABG per CTS schedule  Continue atorvastatin 80 mg nightly, aspirin 81 mg daily, lisinopril 10 mg daily, metoprolol succinate 25 mg daily.  Cardiac diet today     Scribed for NIKKO Del Rio MD by Ruthann Swartz, APRN. 10/1/2023  10:42 EDT     I personally performed the services described in this documentation as scribed by the above named individual in my presence, and it is both accurate and complete.    PRANEETH Del Rio MD  10/01/23  12:25 EDT

## 2023-10-01 NOTE — PLAN OF CARE
Goal Outcome Evaluation:  Plan of Care Reviewed With: patient        Progress: improving  Outcome Evaluation: pleasant patient with no complaint of pain at this time-medications given per hospitalist's and Dr. Redding's orders-right radial cath site-clean, dry, intact-gauze with site-right intact-minitor labs and continue to monitor patient-waiting on transfer to [facility] for higher level of care per Dr. Paiz ([facility] cardiologist per Dr. Miladis SOLIMAN saw patient today

## 2023-10-01 NOTE — DISCHARGE SUMMARY
[facility] HOSPITALIST   DISCHARGE SUMMARY      Name:  Dann Metz   Age:  72 y.o.  Sex:  male  :  1950  MRN:  [id number]   Visit Number:  [id number]    Admission Date:  2023  Date of Discharge:  10/1/2023  Primary Care Physician:  Ariana Lucas APRN    Important issues to note:    Transfer to [facility] for CT surgery evaluation  Needs follow-up with primary care provider after completion of hospitalization    Discharge Diagnoses:     Type I NSTEMI  Severe coronary artery disease  Atrial fibrillation  Type 2 diabetes  Hypertension    Problem List:     Active Hospital Problems    Diagnosis  POA    **NSTEMI (non-ST elevated myocardial infarction) [I21.4]  Yes    Type 1 myocardial infarction [I21.9]  Yes    Essential hypertension [I10]  Yes    Type 2 diabetes mellitus, without long-term current use of insulin [E11.9]  Yes      Resolved Hospital Problems   No resolved problems to display.     Presenting Problem:    No chief complaint on file.     Consults:     Consulting Physician(s)       Provider Relationship Vince Gutierrez MD Consulting Physician Cardiology          Procedures Performed:    Procedure(s):  Coronary angiography    History of presenting illness/Hospital Course:    The patient is a 72-year-old gentleman with a history of type 2 diabetes, hypertension, recent CVA, atrial fibrillation, who had presented from outlying emergency room due to complaints of shortness of breath and fatigue.  History obtained from patient, ER record and son at bedside.  Patient stated that he had been doing fairly well after his recent stroke, but had noted increasing shortness of breath.  His son noted patient appeared to be fairly fatigued on Monday when he was talking to him.  Patient noted over the last couple of days he was feeling very winded, kind of sweaty at times.  He thinks he may have felt palpitations but denies any overt chest pain.  He does take  his medications as directed including anticoagulants.  He does not smoke or drink alcohol.  He is retired.  He denied any recent falls or trauma.     In the ER at [facility], his vital signs were stable other than mild hypertension.  He had elevated troponins that had trended up at the 2-hour tiny.  CMP and CBC were unremarkable.  Chest x-ray was showing interstitial changes.  He had multiple EKGs performed at that facility, with 1 showing apparent ST segment depression and A-fib.  Patient is currently in sinus rhythm at time of exam.  ER provider had sent images to cardiology, did not meet STEMI criteria, we were asked to accept in transfer thereafter.    The patient was admitted to telemetry and kept n.p.o. for cardiology evaluation and started on a heparin drip per ACS protocol.  He was seen by Dr. Redding and underwent coronary angiography on 9/30/2023.  He had evidence of severe three-vessel coronary artery disease.  Dr. Redding discussed the case with CT surgery at [facility], patient was accepted for transfer for evaluation for bypass surgery pending bed availability.    This morning, was notified by RN that [facility] has arranged bed availability.  The patient remains hemodynamically stable and currently chest pain-free.  He is agreeable to the transfer and his son has been notified as well.  Will otherwise be transferred in stable condition for CT surgery evaluation.  He needs follow-up with primary care provider after completion of hospitalization and likely referral to cardiac rehab.    Vital Signs:    Temp:  [97.6 °F (36.4 °C)-98.2 °F (36.8 °C)] 97.9 °F (36.6 °C)  Heart Rate:  [] 89  Resp:  [8-20] 16  BP: (100-136)/(71-96) 115/74    Physical Exam:    General Appearance:  Alert and cooperative.  NAD   Head:  Atraumatic and normocephalic.   Eyes: Conjunctivae and sclerae normal, no icterus. No pallor.   Ears:  Ears with no abnormalities noted.   Throat: No oral lesions, no  thrush, oral mucosa moist.   Neck: Supple, trachea midline, no thyromegaly.   Back:   No kyphoscoliosis present. No tenderness to palpation.   Lungs:   Breath sounds heard bilaterally equally.  No crackles or wheezing. No Pleural rub or bronchial breathing.   Heart:  Normal S1 and S2, no murmur, no gallop, no rub. No JVD.   Abdomen:   Normal bowel sounds, no masses, no organomegaly. Soft, nontender, nondistended, no rebound tenderness.   Extremities: Supple, no edema, no cyanosis, no clubbing.   Pulses: Pulses palpable bilaterally.   Skin: No bleeding or rash.   Neurologic: Alert and oriented x 3. No facial asymmetry. Moves all four limbs. No tremors.     Pertinent Lab Results:     Results from last 7 days   Lab Units 09/30/23  0538   SODIUM mmol/L 143   POTASSIUM mmol/L 4.1   CHLORIDE mmol/L 106   CO2 mmol/L 22.4   BUN mg/dL 24*   CREATININE mg/dL 1.13   CALCIUM mg/dL 9.4   GLUCOSE mg/dL 154*     Results from last 7 days   Lab Units 09/30/23  0257 09/29/23  2053   WBC 10*3/mm3 10.15 9.7   HEMOGLOBIN g/dL 12.4* 14.2   HEMATOCRIT % 37.6 43.4   PLATELETS 10*3/mm3 205 218     Results from last 7 days   Lab Units 09/30/23  0257 09/29/23  2205   INR  1.18* 1.08     Results from last 7 days   Lab Units 09/30/23  0929 09/30/23  0745   HSTROP T ng/L 785* 642*                           Pertinent Radiology Results:    Imaging Results (All)       None            Echo:    Results for orders placed during the hospital encounter of 06/28/23    Adult Transthoracic Echo Complete W/ Cont if Necessary Per Protocol    Interpretation Summary  1.  Normal left ventricular size with low normal LV systolic function, LVEF 50-55%.  2.  Mild concentric LVH.  3.  Indeterminate LV diastolic filling pattern.  4.  Normal right ventricular size and systolic function.  5.  Normal left atrial index.  6.  No significant valvular abnormalities.  7.  Negative bubble study with no evidence of intracardiac or intrapulmonary shunt.    Condition on  Discharge:      Stable.    Code status during the hospital stay:    Code Status and Medical Interventions:   Ordered at: 09/30/23 0221     Code Status (Patient has no pulse and is not breathing):    CPR (Attempt to Resuscitate)     Medical Interventions (Patient has pulse or is breathing):    Full Support     Discharge Disposition:    Short Term Hospital (DC - External)    Discharge Medications:       Discharge Medications        Continue These Medications        Instructions Start Date   True Metrix Meter w/Device kit       TRUEplus Lancets 33G misc              Stop These Medications      apixaban 5 MG tablet tablet  Commonly known as: ELIQUIS     aspirin 81 MG chewable tablet  Commonly known as: Aspirin Childrens     atorvastatin 40 MG tablet  Commonly known as: LIPITOR     empagliflozin 10 MG tablet tablet  Commonly known as: JARDIANCE     lisinopril 10 MG tablet  Commonly known as: PRINIVIL,ZESTRIL     metFORMIN 500 MG tablet  Commonly known as: GLUCOPHAGE     True Metrix Blood Glucose Test test strip  Generic drug: glucose blood            Discharge Diet:   Cardiac/diabetic    Activity at Discharge:   As tolerated    Follow-up Appointments:     Follow-up Information       Ariana Lucas APRN .    Specialty: Family Medicine  Contact information:  [address] [place] [id number] [phone]                           Future Appointments   Date Time Provider Department Center   1/18/2024  2:30 PM Maricel Washington APRN [facility]   7/31/2024  1:00 PM Kwesi Rushing MD [facility]     Test Results Pending at Discharge:           Sierra Pryor DO  10/01/23  02:00 EDT    Time: I spent 24 minutes on this discharge activity which included: face-to-face encounter with the patient, reviewing the data in the system, coordination of the care with the nursing staff as well as consultants, documentation, and entering orders.     Dictated utilizing Dragon dictation.

## 2023-10-01 NOTE — H&P
[facility] Medicine Services  HISTORY AND PHYSICAL    Patient Name: Dann Metz  : 1950  MRN: [id number]  Primary Care Physician: Ariana Lucas APRN  Date of admission: 10/1/2023      Subjective   Subjective     Chief Complaint:  Shortness of breath and weakness    HPI:  Dann Metz is a 72 y.o. male history of atrial fibrillation on Eliquis, type 2 diabetes, hypertension, CVA in  of this year who presented to [facility] emergency department with shortness of breath fatigue and weakness over several days.  Associated with palpitations and occasional diaphoresis.  He was found to have elevated troponin and inferiolateral T wave changes on EKG therefore was transferred to [facility].  There he underwent left heart cath which showed severe three-vessel disease, therefore he was transferred to [facility].  Currently patient reports feeling well without chest pain or shortness of breath, he does have some anxiety about the upcoming procedure.  He does not smoke.      Personal History     Past Medical History:   Diagnosis Date    Coronary artery disease     Diabetes mellitus     Hypertension     Stroke              Past Surgical History:   Procedure Laterality Date    CARDIAC CATHETERIZATION         Family History: family history includes Heart disease in his mother; Stroke in his brother, brother, brother, brother, brother, father, sister, and sister.     Social History:  reports that he has never smoked. He has never used smokeless tobacco. He reports that he does not drink alcohol and does not use drugs.  Social History     Social History Narrative    Not on file       Medications:  Available home medication information reviewed.  Medications Prior to Admission   Medication Sig Dispense Refill Last Dose    apixaban (ELIQUIS) 5 MG tablet tablet Take 1 tablet by mouth 2 (Two) Times a Day.       Blood Glucose Monitoring Suppl (True Metrix Meter) w/Device  kit        ezetimibe (ZETIA) 10 MG tablet Take 1 tablet by mouth Daily.       omeprazole (priLOSEC) 20 MG capsule Take 1 capsule by mouth Daily.       TRUEplus Lancets 33G misc           Allergies   Allergen Reactions    Penicillins Rash       Objective   Objective     Vital Signs:   Temp:  [97.9 øF (36.6 øC)-98.2 øF (36.8 øC)] 97.9 øF (36.6 øC)  Heart Rate:  [70-96] 89  Resp:  [8-20] 16  BP: (100-136)/(71-96) 115/74  Flow (L/min):  [2] 2       Physical Exam   Awake alert and oriented  Comfortable in bed  No acute distress  Heart regular rate and rhythm  Lungs clear to auscultation bilaterally  No abdominal tenderness on palpation  No lower extremity edema  Capillary refill less than 2 seconds    Result Review:  I have personally reviewed the results from the time of this admission to 10/1/2023 05:01 EDT and agree with these findings:  [x]  Laboratory list / accordion  []  Microbiology  [x]  Radiology  [x]  EKG/Telemetry   [x]  Cardiology/Vascular   []  Pathology  [x]  Old records  []  Other:  Most notable findings include: See assessment and plan        LAB RESULTS:      Lab 10/01/23  0412 09/30/23  1346 09/30/23  0745 09/30/23  0257 09/29/23 2205 09/29/23 2053   WBC 10.52  --   --  10.15  --  9.7   HEMOGLOBIN 13.2  --   --  12.4*  --  14.2   HEMATOCRIT 40.1  --   --  37.6  --  43.4   PLATELETS 220  --   --  205  --  218   NEUTROS ABS 7.30*  --   --  7.20*  --  7.5   IMMATURE GRANS (ABS) 0.04  --   --  0.02  --  0.0   LYMPHS ABS 1.88  --   --  1.99  --  1.4*   MONOS ABS 0.90  --   --  0.72  --  0.5   EOS ABS 0.33  --   --  0.14  --  0.2   MCV 96.9  --   --  95.2  --  95.4   PROTIME  --   --   --  15.6* 13.8  --    INR  --   --   --  1.18* 1.08  --    APTT  --  152.0* 70.3 50.2*  --   --    HEPARIN ANTI-XA 1.00*  --  >1.10* >1.10*  --   --          Lab 09/30/23  0538   SODIUM 143   POTASSIUM 4.1   CHLORIDE 106   CO2 22.4   ANION GAP 14.6   BUN 24*   CREATININE 1.13   EGFR 69.9   GLUCOSE 154*   CALCIUM 9.4    HEMOGLOBIN A1C 7.10*   TSH 2.060             Lab 09/30/23  0929 09/30/23  0745   HSTROP T 785* 642*         Lab 09/30/23  0538   CHOLESTEROL 149   LDL CHOL 77   HDL CHOL 39*   TRIGLYCERIDES 194*             UA          6/25/2023    16:03   Urinalysis   Specific Gravity, UA >=1.030       Blood, UA SMALL       Leukocytes, UA Negative       Nitrite, UA Negative          Details          This result is from an external source.               Microbiology Results (last 10 days)       ** No results found for the last 240 hours. **            Cardiac Catheterization/Vascular Study    Result Date: 9/30/2023  Severe three-vessel coronary artery disease CABG     XR CHEST PORTABLE    Result Date: 9/29/2023  EXAM: PORTABLE AP CHEST X-RAY INDICATION: dyspnea, COMPARISON: 6/25/2023 FINDINGS: Bilateral interstitial opacities are unchanged. There is no focal consolidation, pleural effusion, or pneumothorax. The cardiomediastinal silhouette is normal. The visible bony thorax is intact.    Impression: Unchanged diffuse bilateral interstitial opacities representing pulmonary edema versus chronic interstitial fibrosis.     Results for orders placed during the hospital encounter of 06/28/23    Adult Transthoracic Echo Complete W/ Cont if Necessary Per Protocol    Interpretation Summary  1.  Normal left ventricular size with low normal LV systolic function, LVEF 50-55%.  2.  Mild concentric LVH.  3.  Indeterminate LV diastolic filling pattern.  4.  Normal right ventricular size and systolic function.  5.  Normal left atrial index.  6.  No significant valvular abnormalities.  7.  Negative bubble study with no evidence of intracardiac or intrapulmonary shunt.      Assessment & Plan   Assessment & Plan     Active Hospital Problems    Diagnosis  POA    **Coronary artery disease [I25.10]  Yes    NSTEMI (non-ST elevated myocardial infarction) [I21.4]  Yes    Essential hypertension [I10]  Yes    Type 2 diabetes mellitus, without long-term  current use of insulin [E11.9]  Yes       NSTEMI  -At [facility] troponins were 642 then 785.  EKG with inverted T waves anteriorly and nonspecific inferolateral ST changes.  Left heart cath showed severe three-vessel disease.  Cardiology recommended transfer to [facility].    -Patient currently without chest pain  -Continue heparin drip  -Continue aspirin and high intensity statin  -Continue telemetry  -Cardiology and cardiothoracic surgery consult in the morning    Paroxysmal atrial fibrillation  -Diagnosed in June of this year during stroke admission.  He was started on Eliquis at that time  -Currently rate controlled  -Currently on heparin drip  -Continue metoprolol    Type 2 diabetes  -A1c 7.1  -On metformin and Jardiance at home  -Corrective insulin here  -Consider addition of GLP-1 antagonist upon discharge    Hypertension  -Continue lisinopril 10 mg    History of ischemic stroke with residual right upper extremity weakness  -ASA and statin    DVT prophylaxis: On heparin drip      CODE STATUS:    Code Status and Medical Interventions:   Ordered at: 10/01/23 0404     Code Status (Patient has no pulse and is not breathing):    CPR (Attempt to Resuscitate)     Medical Interventions (Patient has pulse or is breathing):    Full Support       Expected Discharge (click hyperlink to enter date then refresh the note)  Expected Discharge Date: 10/5/2023; Expected Discharge Time:      Ameya Zamarripa MD  10/01/23

## 2023-10-01 NOTE — CONSULTS
Consult Note  Dann Metz  [id number]  1950    Referring Provider:  Reason for Consultation: Coronary artery disease    Patient Care Team:  Ariana Lucas APRN as PCP - General (Family Medicine)    Chief complaint: Non-ST elevated MI      History of present illness: 72-year-old male transferred from [facility] left undergoing cardiac catheterization found to have multivessel coronary artery disease transferred here for further treatment of care with coronary artery bypass grafting and coronary vascularization.  He has recent history of a CVA, atrial fibs, diabetes type 2, and hypertension.  He was found to have elevated at bedtime troponin levels EKG shows inferolateral ischemia with some nonspecific ST-T wave abnormalities and T waves changes inferiorly  Does not smoke and does not drink alcohol.  No previous surgical history  Review of Systems    The following systems were reviewed and negative;  Neuro: No CVA or TIA symptoms or seizure disorder.  Constitutional: Weakness hematologic:  No bleeding disorders or DVT.  Endocrine:   No tremor, fatigue, weight loss or weight gain.  GI: No indigestion or difficulty swallowing; normal bowel movements.  : No hematuria or frequent urination.  Pulmonary:No shortness of breath, hemoptysis or chronic cough.  Cardiovascular: See HPI  HEENT: No blurred vision, glaucoma, hearing loss or nosebleed.  Musculoskeletal: No joint pain or back pain.    All other review of systems is negative    History  Past Medical History:   Diagnosis Date    Coronary artery disease     Diabetes mellitus     Hypertension     Stroke    ,   Past Surgical History:   Procedure Laterality Date    CARDIAC CATHETERIZATION     ,   Family History   Problem Relation Age of Onset    Heart disease Mother     Stroke Father     Stroke Sister     Stroke Sister     Stroke Brother     Stroke Brother     Stroke Brother     Stroke Brother     Stroke Brother    ,   Social History     Tobacco Use     Smoking status: Never    Smokeless tobacco: Never   Vaping Use    Vaping Use: Never used   Substance Use Topics    Alcohol use: Never    Drug use: Never   ,   Medications Prior to Admission   Medication Sig Dispense Refill Last Dose    apixaban (ELIQUIS) 5 MG tablet tablet Take 1 tablet by mouth 2 (Two) Times a Day.       Blood Glucose Monitoring Suppl (True Metrix Meter) w/Device kit        ezetimibe (ZETIA) 10 MG tablet Take 1 tablet by mouth Daily.       omeprazole (priLOSEC) 20 MG capsule Take 1 capsule by mouth Daily.       TRUEplus Lancets 33G misc          Scheduled Meds:  aspirin, 81 mg, Oral, Daily  atorvastatin, 80 mg, Oral, Nightly  insulin regular, 2-7 Units, Subcutaneous, Q6H  lisinopril, 10 mg, Oral, Daily  magnesium sulfate, 2 g, Intravenous, Q2H  metoprolol succinate XL, 25 mg, Oral, Q24H  [START ON 10/2/2023] pharmacy consult - [facility], , Does not apply, Daily  senna-docusate sodium, 2 tablet, Oral, BID  sodium chloride, 10 mL, Intravenous, Q12H       Continuous Infusions:  heparin, 12 Units/kg/hr, Last Rate: 12 Units/kg/hr (10/01/23 0426)  Pharmacy to Dose Heparin,        PRN Meds:    acetaminophen **OR** acetaminophen **OR** acetaminophen    ALPRAZolam    aluminum-magnesium hydroxide-simethicone    senna-docusate sodium **AND** polyethylene glycol **AND** bisacodyl **AND** bisacodyl    Calcium Replacement - Follow Nurse / BPA Driven Protocol    dextrose    dextrose    glucagon (human recombinant)    HYDROcodone-acetaminophen    ipratropium-albuterol    Magnesium Standard Dose Replacement - Follow Nurse / BPA Driven Protocol    Morphine **AND** naloxone    nitroglycerin    ondansetron **OR** ondansetron    Pharmacy to Dose Heparin    Phosphorus Replacement - Follow Nurse / BPA Driven Protocol    Potassium Replacement - Follow Nurse / BPA Driven Protocol    sodium chloride    sodium chloride and Allergies:  Penicillins    Objective     Vital Sign Min/Max for last 24 hours  Temp  Min: 97.8 øF (36.6 øC)   Max: 98.2 øF (36.8 øC)   BP  Min: 100/71  Max: 136/91   Pulse  Min: 70  Max: 97   Resp  Min: 8  Max: 20   SpO2  Min: 85 %  Max: 98 %   No data recorded   Weight  Min: 63.3 kg (139 lb 9.6 oz)  Max: 63.3 kg (139 lb 9.6 oz)     Flowsheet Rows      Flowsheet Row First Filed Value   Admission Height --   Admission Weight 63.3 kg (139 lb 9.6 oz) Documented at 10/01/2023 0405            Physical Exam:     General Appearance:    Alert, cooperative, in no acute distress   Head:    Normocephalic, without obvious abnormality, atraumatic   Eyes:            Lids and lashes normal, conjunctivae and sclerae normal,   no icterus, no pallor, corneas clear, PERRLA   Ears:    Ears appear intact with no abnormalities noted   Throat:   No oral lesions, no thrush, oral mucosa moist   Neck:   No adenopathy, supple, trachea midline, no thyromegaly, no carotid bruit, no JVD   Back:   No kyphosis present, no scoliosis present, no skin lesions, erythema or scars, no tenderness to percussion or                palpation, range of motion normal   Lungs:     Clear to auscultation,respirations regular, even and                unlabored    Heart:    Regular rhythm and normal rate, normal S1 and S2, no         murmur, no gallop, no rub, no click   Chest Wall:    No abnormalities observed   Abdomen:     Normal bowel sounds, no masses, no organomegaly, soft     nontender, nondistended, no guarding, no rebound               tenderness   Rectal:     Deferred   Extremities:   Moves all extremities well, no edema, no cyanosis, no           redness   Pulses:   Pulses palpable and equal bilaterally   Skin:   No bleeding, bruising or rash   Lymph nodes:   No palpable adenopathy   Neurologic:   Cranial nerves 2 - 12 grossly intact, sensation intact, DTR     present and equal bilaterally             Assessment & Plan       Coronary artery disease    Essential hypertension    Type 2 diabetes mellitus, without long-term current use of insulin    NSTEMI (non-ST  elevated myocardial infarction)      P2 Y12 pending  Carotid duplex complete  Narrative & Impression   PROCEDURE: US CAROTID BILATERAL-     HISTORY: stroke protocol, diabetic, hypertensive, dizziness     Technique: Real-time imaging was performed of the extracranial carotid  arteries in transverse and longitudinal planes, with color duplex  evaluation of blood flow velocity. Spectral analysis was performed. The  cervicovertebral arteries were also examined. Stenosis evaluation is  based on validated velocity criteria.     Right carotid system:  CCA: 160 cm/s  ICA: -128 cm/s  ECA: 98.5 cm/s  Vertebral artery: Antegrade  ICA/CCA ratio: 1.02  Mild plaque is identified at the bifurcation.     Left carotid system:  CCA: 151 cm/s  ICA: -252 cm/s  ECA: -110 cm/s  Vertebral artery: Antegrade  ICA/CCA ratio: 1.67  Mild plaque is identified at the bifurcation.        IMPRESSION:  Elevated velocities in the bilateral carotid systems with abnormal  waveforms. Findings are concerning for cardiac pathology/aortoiliac  pathology. Recommend correlation clinically. Consider echocardiography.  Dr. Paiz assessment.  I have examined the patient.  I have taken a history from the patient.  I concur with the above documentation.  I discussed the case in detail with Dr. Clarke breeding cardiologist at [facility].  He performed the cardiac catheterization.  I have obtained and reviewed the cardiac catheterization films.  He has a 90% LAD stenosis.  He has occlusion of his circumflex.  He has an occlusion of his right coronary artery.  I do believe that coronary bypass surgery is his best option.  I discussed that in detail with him.  I discussed the operation, risk, and alternatives.  He appears to be fully informed desires to proceed.  Like thank you for this consultation.       I discussed the patient's findings and my recommendations with patient  Coronary artery disease of the native vessels with a history of a CVA and ongoing diabetes  mellitus type 2  May need further work-up with arteriogram on the carotids    Please note that portions of this note were completed with a voice recognition program. Efforts were made to edit the dictations, but words may be mistranscribed    Raheem Jones PA-C  10/01/23  07:28 EDT

## 2023-10-01 NOTE — PAYOR COMM NOTE
"To:  [employer]  From: Lexi Norton RN  Phone: [phone]  Fax: [phone]  NPI: [id number]  TIN: [id number]  Member ID: [id number]   MRN: [id number]    Jc Streeter (72 y.o. Male)       Date of Birth   1950    Social Security Number       Address   [address] [place] [address] [address] [address]    Home Phone       MRN   [id number]       Muslim   None    Marital Status                               Admission Date   9/30/23    Admission Type   Urgent    Admitting Provider   Sierra Pryor DO    Attending Provider       [facility]       Discharge Date   10/1/2023    Discharge Disposition   Short Term Hospital (DC - External)    Discharge Destination                                 Attending Provider: (none)   Allergies: Penicillins    Isolation: None   Infection: None   Code Status: CPR    Ht: 160 cm (63\")   Wt: 64.5 kg (142 lb 3.2 oz)    Admission Cmt: None   Principal Problem: NSTEMI (non-ST elevated myocardial infarction) [I21.4]                   Active Insurance as of 9/30/2023       Primary Coverage       Payor Plan Insurance Group Employer/Plan Group    MEDICARE MEDICARE B ONLY        Payor Plan Address Payor Plan Phone Number Payor Plan Fax Number Effective Dates    [address] [address] [phone]  11/1/2017 - None Entered    [facility] [id number]         Subscriber Name Subscriber Birth Date Member ID       JC STRETEER 1950 [id number]               Secondary Coverage       Payor Plan Insurance Group Employer/Plan Group    [employer] [place] [employer] [place]        Payor Plan Address Payor Plan Phone Number Payor Plan Fax Number Effective Dates    [address] [address] [phone]  6/28/2023 - None Entered    [facility] [id number]         Subscriber Name Subscriber Birth Date Member ID       JC STREETER 1950 [id number]                     Emergency Contacts        (Rel.) Home Phone Work Phone Mobile Phone    " JC STREETER (Son) [phone] -- --    SACHA STREETER (Daughter) [phone] -- --                 Discharge Summary        Sierra Pryor DO at 10/01/23 0200              [facility]      Name:  Jc Streeter   Age:  72 y.o.  Sex:  male  :  1950  MRN:  [id number]   Visit Number:  [id number]    Admission Date:  2023  Date of Discharge:  10/1/2023  Primary Care Physician:  Ariana Lucas APRN    Important issues to note:    Transfer to [facility] for CT surgery evaluation  Needs follow-up with primary care provider after completion of hospitalization    Discharge Diagnoses:     Type I NSTEMI  Severe coronary artery disease  Atrial fibrillation  Type 2 diabetes  Hypertension    Problem List:     Active Hospital Problems    Diagnosis  POA    **NSTEMI (non-ST elevated myocardial infarction) [I21.4]  Yes    Type 1 myocardial infarction [I21.9]  Yes    Essential hypertension [I10]  Yes    Type 2 diabetes mellitus, without long-term current use of insulin [E11.9]  Yes      Resolved Hospital Problems   No resolved problems to display.     Presenting Problem:    No chief complaint on file.     Consults:     Consulting Physician(s)       Provider Relationship Specialty    Vince Redding MD Consulting Physician Cardiology          Procedures Performed:    Procedure(s):  Coronary angiography    History of presenting illness/Hospital Course:    The patient is a 72-year-old gentleman with a history of type 2 diabetes, hypertension, recent CVA, atrial fibrillation, who had presented from outlying emergency room due to complaints of shortness of breath and fatigue.  History obtained from patient, ER record and son at bedside.  Patient stated that he had been doing fairly well after his recent stroke, but had noted increasing shortness of breath.  His son noted patient appeared to be fairly fatigued on Monday when he was talking to him.   Patient noted over the last couple of days he was feeling very winded, kind of sweaty at times.  He thinks he may have felt palpitations but denies any overt chest pain.  He does take his medications as directed including anticoagulants.  He does not smoke or drink alcohol.  He is retired.  He denied any recent falls or trauma.     In the ER at [facility], his vital signs were stable other than mild hypertension.  He had elevated troponins that had trended up at the 2-hour tiny.  CMP and CBC were unremarkable.  Chest x-ray was showing interstitial changes.  He had multiple EKGs performed at that facility, with 1 showing apparent ST segment depression and A-fib.  Patient is currently in sinus rhythm at time of exam.  ER provider had sent images to cardiology, did not meet STEMI criteria, we were asked to accept in transfer thereafter.    The patient was admitted to telemetry and kept n.p.o. for cardiology evaluation and started on a heparin drip per ACS protocol.  He was seen by Dr. Redding and underwent coronary angiography on 9/30/2023.  He had evidence of severe three-vessel coronary artery disease.  Dr. Redding discussed the case with CT surgery at [facility] in [place], patient was accepted for transfer for evaluation for bypass surgery pending bed availability.    This morning, was notified by RN that [facility] has arranged bed availability.  The patient remains hemodynamically stable and currently chest pain-free.  He is agreeable to the transfer and his son has been notified as well.  Will otherwise be transferred in stable condition for CT surgery evaluation.  He needs follow-up with primary care provider after completion of hospitalization and likely referral to cardiac rehab.    Vital Signs:    Temp:  [97.6 °F (36.4 °C)-98.2 °F (36.8 °C)] 97.9 °F (36.6 °C)  Heart Rate:  [] 89  Resp:  [8-20] 16  BP: (100-136)/(71-96) 115/74    Physical Exam:    General Appearance:  Alert and cooperative.   NAD   Head:  Atraumatic and normocephalic.   Eyes: Conjunctivae and sclerae normal, no icterus. No pallor.   Ears:  Ears with no abnormalities noted.   Throat: No oral lesions, no thrush, oral mucosa moist.   Neck: Supple, trachea midline, no thyromegaly.   Back:   No kyphoscoliosis present. No tenderness to palpation.   Lungs:   Breath sounds heard bilaterally equally.  No crackles or wheezing. No Pleural rub or bronchial breathing.   Heart:  Normal S1 and S2, no murmur, no gallop, no rub. No JVD.   Abdomen:   Normal bowel sounds, no masses, no organomegaly. Soft, nontender, nondistended, no rebound tenderness.   Extremities: Supple, no edema, no cyanosis, no clubbing.   Pulses: Pulses palpable bilaterally.   Skin: No bleeding or rash.   Neurologic: Alert and oriented x 3. No facial asymmetry. Moves all four limbs. No tremors.     Pertinent Lab Results:     Results from last 7 days   Lab Units 09/30/23  0538   SODIUM mmol/L 143   POTASSIUM mmol/L 4.1   CHLORIDE mmol/L 106   CO2 mmol/L 22.4   BUN mg/dL 24*   CREATININE mg/dL 1.13   CALCIUM mg/dL 9.4   GLUCOSE mg/dL 154*     Results from last 7 days   Lab Units 09/30/23  0257 09/29/23  2053   WBC 10*3/mm3 10.15 9.7   HEMOGLOBIN g/dL 12.4* 14.2   HEMATOCRIT % 37.6 43.4   PLATELETS 10*3/mm3 205 218     Results from last 7 days   Lab Units 09/30/23  0257 09/29/23  2205   INR  1.18* 1.08     Results from last 7 days   Lab Units 09/30/23  0929 09/30/23  0745   HSTROP T ng/L 785* 642*                           Pertinent Radiology Results:    Imaging Results (All)       None            Echo:    Results for orders placed during the hospital encounter of 06/28/23    Adult Transthoracic Echo Complete W/ Cont if Necessary Per Protocol    Interpretation Summary  1.  Normal left ventricular size with low normal LV systolic function, LVEF 50-55%.  2.  Mild concentric LVH.  3.  Indeterminate LV diastolic filling pattern.  4.  Normal right ventricular size and systolic function.  5.   Normal left atrial index.  6.  No significant valvular abnormalities.  7.  Negative bubble study with no evidence of intracardiac or intrapulmonary shunt.    Condition on Discharge:      Stable.    Code status during the hospital stay:    Code Status and Medical Interventions:   Ordered at: 09/30/23 0221     Code Status (Patient has no pulse and is not breathing):    CPR (Attempt to Resuscitate)     Medical Interventions (Patient has pulse or is breathing):    Full Support     Discharge Disposition:    Short Term Hospital (DC - External)    Discharge Medications:       Discharge Medications        Continue These Medications        Instructions Start Date   True Metrix Meter w/Device kit       TRUEplus Lancets 33G misc              Stop These Medications      apixaban 5 MG tablet tablet  Commonly known as: ELIQUIS     aspirin 81 MG chewable tablet  Commonly known as: Aspirin Childrens     atorvastatin 40 MG tablet  Commonly known as: LIPITOR     empagliflozin 10 MG tablet tablet  Commonly known as: JARDIANCE     lisinopril 10 MG tablet  Commonly known as: PRINIVIL,ZESTRIL     metFORMIN 500 MG tablet  Commonly known as: GLUCOPHAGE     True Metrix Blood Glucose Test test strip  Generic drug: glucose blood            Discharge Diet:   Cardiac/diabetic    Activity at Discharge:   As tolerated    Follow-up Appointments:     Follow-up Information       Ariana Lucas APRN .    Specialty: Family Medicine  Contact information:  [address] [place] [id number] [phone]                           Future Appointments   Date Time Provider Department Center   1/18/2024  2:30 PM Maricel Washington APRN [facility]   7/31/2024  1:00 PM Kwesi Rushing MD [facility]     Test Results Pending at Discharge:           Sierra Pryor DO  10/01/23  02:00 EDT    Time: I spent 24 minutes on this discharge activity which included: face-to-face encounter with the patient, reviewing the data in the system,  coordination of the care with the nursing staff as well as consultants, documentation, and entering orders.     Dictated utilizing Dragon dictation.      Electronically signed by Sierra Pryor DO at 10/01/23 0206

## 2023-10-01 NOTE — CASE MANAGEMENT/SOCIAL WORK
Case Management Discharge Note                Selected Continued Care - Discharged on 10/1/2023 Admission date: 9/30/2023 - Discharge disposition: Short Term Hospital (DC - External)      Destination    No services have been selected for the patient.                Durable Medical Equipment    No services have been selected for the patient.                Dialysis/Infusion    No services have been selected for the patient.                Home Medical Care    No services have been selected for the patient.                Therapy    No services have been selected for the patient.                Community Resources    No services have been selected for the patient.                Community & DME    No services have been selected for the patient.                    Transportation Services  Ambulance: [facility]    Final Discharge Disposition Code: 02 - [facility]

## 2023-10-01 NOTE — PROGRESS NOTES
HEPARIN INFUSION  Dann Metz is a  72 y.o. male receiving heparin infusion.     Therapy for (VTE/Cardiac):   cardiac  Patient Weight: 64.5 kg  Initial Bolus (Y/N):   n   Any Bolus (Y/N):   y        Signs or Symptoms of Bleeding: none noted    Cardiac or Other (Not VTE)   Initial Bolus: 60 units/kg (Max 4,000 units)  Initial rate: 12 units/kg/hr (Max 1,000 units/hr)   Anti Xa Rebolus Infusion Hold time Change infusion Dose (Units/kg/hr) Next Anti Xa or aPTT Level Due   < 0.11 50 Units/kg  (4000 Units Max) None Increase by  3 Units/kg/hr 6 hours   0.11- 0.19 25 Units/kg  (2000 Units Max) None Increase by  2 Units/kg/hr 6 hours   0.2 - 0.29 0 None Increase by  1 Units/kg/hr 6 hours   0.3 - 0.5 0 None No Change 6 hours (after 2 consecutive levels in range check qAM)   0.51 - 0.6 0 None Decrease by  1 Units/kg/hr 6 hours   0.61 - 0.8 0 30 Minutes Decrease by  2 Units/kg/hr 6 hours   0.81 - 1 0 60 Minutes Decrease by  3 Units/kg/hr 6 hours   >1 0 Hold  After Anti Xa less than 0.5 decrease previous rate by  4 Units/kg/hr  Every 2 hours until Anti Xa  less than 0.5 then when infusion restarts in 6 hours     Recommend Xa every 6 hours.     Results from last 7 days   Lab Units 10/01/23  0412 09/30/23  0257 09/29/23  2205 09/29/23 2053   INR  1.10 1.18* 1.08  --    HEMOGLOBIN g/dL 13.2 12.4*  --  14.2   HEMATOCRIT % 40.1 37.6  --  43.4   PLATELETS 10*3/mm3 220 205  --  218          Date   Time   Anti-Xa Current Rate (Unit/kg/hr) Bolus   (Units) Rate Change   (Unit/kg/hr) New Rate (Unit/kg/hr) Next   Anti-Xa Comments  Pump Check Daily   10/1 0400 1.0 new -- 12 12 1000 D/w RN, was on heparin drip at [facility] but has been off since 9/30 afternoon   10/1 1048 0.98 12 -- -3 9 1800 Hold 1 hr. DW RN. Pump verified.                                                                                                                                                                                                                      Ricky  JACKI Warner, PharmD  10/1/2023  11:09 EDT

## 2023-10-01 NOTE — PROGRESS NOTES
[facility] Medicine Services  PROGRESS NOTE    Patient Name: Dann Metz  : 1950  MRN: [id number]    Date of Admission: 10/1/2023  Primary Care Physician: Ariana Lucas APRN    Subjective   Subjective     CC:  CAD, transfer for CABG eval    HPI:  States that chest still feels tight.  Otherwise doing ok.       Objective   Objective     Vital Signs:   Temp:  [97.8 øF (36.6 øC)-99 øF (37.2 øC)] 99 øF (37.2 øC)  Heart Rate:  [70-97] 89  Resp:  [16-18] 18  BP: (100-134)/(71-96) 120/79     Physical Exam:  Constitutional: No acute distress, awake, alert; on heparin drip  HENT: NCAT, mucous membranes moist  Respiratory: Clear to auscultation bilaterally, respiratory effort normal   Cardiovascular: RRR, no murmurs, rubs, or gallops  Gastrointestinal: Positive bowel sounds, soft, nontender, nondistended  Musculoskeletal: No bilateral ankle edema  Psychiatric: Appropriate affect, cooperative  Neurologic: Oriented x 3, strength symmetric in all extremities, Cranial Nerves grossly intact to confrontation, speech clear  Skin: No rashes      Results Reviewed:  LAB RESULTS:      Lab 10/01/23  1048 10/01/23  0412 23  1346 23  0745 23  0257 23   WBC  --  10.52  --   --  10.15  --  9.7   HEMOGLOBIN  --  13.2  --   --  12.4*  --  14.2   HEMATOCRIT  --  40.1  --   --  37.6  --  43.4   PLATELETS  --  220  --   --  205  --  218   NEUTROS ABS  --  7.30*  --   --  7.20*  --  7.5   IMMATURE GRANS (ABS)  --  0.04  --   --  0.02  --  0.0   LYMPHS ABS  --  1.88  --   --  1.99  --  1.4*   MONOS ABS  --  0.90  --   --  0.72  --  0.5   EOS ABS  --  0.33  --   --  0.14  --  0.2   MCV  --  96.9  --   --  95.2  --  95.4   PROTIME  --  14.4  --   --  15.6* 13.8  --    APTT  --  29.0* 152.0* 70.3 50.2*  --   --    HEPARIN ANTI-XA 0.98* 1.00*  --  >1.10* >1.10*  --   --          Lab 10/01/23  0412 23  0538   SODIUM 141 143   POTASSIUM 4.2 4.1   CHLORIDE 103 106    CO2 24.0 22.4   ANION GAP 14.0 14.6   BUN 17 24*   CREATININE 1.11 1.13   EGFR 71.4 69.9   GLUCOSE 125* 154*   CALCIUM 8.9 9.4   MAGNESIUM 1.5*  --    HEMOGLOBIN A1C  --  7.10*   TSH  --  2.060             Lab 10/01/23  0412 09/30/23  0929 09/30/23  0745 09/30/23  0257 09/29/23  2205   HSTROP T  --  785* 642*  --   --    PROTIME 14.4  --   --  15.6* 13.8   INR 1.10  --   --  1.18* 1.08         Lab 09/30/23  0538   CHOLESTEROL 149   LDL CHOL 77   HDL CHOL 39*   TRIGLYCERIDES 194*             Brief Urine Lab Results  (Last result in the past 365 days)        Color   Clarity   Blood   Leuk Est   Nitrite   Protein   CREAT   Urine HCG        06/25/23 1603 Yellow   Clear   SMALL   Negative   Negative                     Microbiology Results Abnormal       None            Cardiac Catheterization/Vascular Study    Result Date: 9/30/2023  Severe three-vessel coronary artery disease CABG     XR CHEST PORTABLE    Result Date: 9/29/2023  EXAM: PORTABLE AP CHEST X-RAY INDICATION: dyspnea, COMPARISON: 6/25/2023 FINDINGS: Bilateral interstitial opacities are unchanged. There is no focal consolidation, pleural effusion, or pneumothorax. The cardiomediastinal silhouette is normal. The visible bony thorax is intact.    Impression: Unchanged diffuse bilateral interstitial opacities representing pulmonary edema versus chronic interstitial fibrosis.     Results for orders placed during the hospital encounter of 06/28/23    Adult Transthoracic Echo Complete W/ Cont if Necessary Per Protocol    Interpretation Summary  1.  Normal left ventricular size with low normal LV systolic function, LVEF 50-55%.  2.  Mild concentric LVH.  3.  Indeterminate LV diastolic filling pattern.  4.  Normal right ventricular size and systolic function.  5.  Normal left atrial index.  6.  No significant valvular abnormalities.  7.  Negative bubble study with no evidence of intracardiac or intrapulmonary shunt.      Current medications:  Scheduled  Meds:aspirin, 81 mg, Oral, Daily  atorvastatin, 80 mg, Oral, Nightly  insulin regular, 2-7 Units, Subcutaneous, Q6H  lisinopril, 10 mg, Oral, Daily  magnesium sulfate, 2 g, Intravenous, Q2H  metoprolol succinate XL, 25 mg, Oral, Q24H  [START ON 10/2/2023] pharmacy consult - [facility], , Does not apply, Daily  senna-docusate sodium, 2 tablet, Oral, BID  sodium chloride, 10 mL, Intravenous, Q12H      Continuous Infusions:heparin, 9 Units/kg/hr, Last Rate: Stopped (10/01/23 1149)  Pharmacy to Dose Heparin,       PRN Meds:.  acetaminophen **OR** acetaminophen **OR** acetaminophen    ALPRAZolam    aluminum-magnesium hydroxide-simethicone    senna-docusate sodium **AND** polyethylene glycol **AND** bisacodyl **AND** bisacodyl    Calcium Replacement - Follow Nurse / BPA Driven Protocol    dextrose    dextrose    glucagon (human recombinant)    HYDROcodone-acetaminophen    ipratropium-albuterol    Magnesium Standard Dose Replacement - Follow Nurse / BPA Driven Protocol    Morphine **AND** naloxone    nitroglycerin    ondansetron **OR** ondansetron    Pharmacy to Dose Heparin    Phosphorus Replacement - Follow Nurse / BPA Driven Protocol    Potassium Replacement - Follow Nurse / BPA Driven Protocol    sodium chloride    sodium chloride    Assessment & Plan   Assessment & Plan     Active Hospital Problems    Diagnosis  POA    **Coronary artery disease [I25.10]  Yes    NSTEMI (non-ST elevated myocardial infarction) [I21.4]  Yes    Essential hypertension [I10]  Yes    Type 2 diabetes mellitus, without long-term current use of insulin [E11.9]  Yes      Resolved Hospital Problems   No resolved problems to display.        Brief Hospital Course to date:  Dann Metz is a 72 y.o. male     NSTEMI  -At [facility] troponins were 642 then 785.  EKG with inverted T waves anteriorly and nonspecific inferolateral ST changes.  Left heart cath showed severe three-vessel disease.  Cardiology recommended transfer to [facility].     -Patient currently without chest pain  -Continue heparin drip  -Continue aspirin and high intensity statin  -Continue telemetry  -Cardiology and cardiothoracic surgery following for ?CABG     Paroxysmal atrial fibrillation  -Diagnosed in June of this year during stroke admission.  He was started on Eliquis at that time  -Currently rate controlled  -Currently on heparin drip  -Continue metoprolol     Type 2 diabetes  -A1c 7.1  -On metformin and Jardiance at home  -Corrective insulin here  -Consider addition of GLP-1 antagonist upon discharge     Hypertension  -Continue lisinopril 10 mg     History of ischemic stroke with residual right upper extremity weakness  -ASA and statin     DVT prophylaxis: On heparin drip    Expected Discharge Location and Transportation:   Expected Discharge   Expected Discharge Date: 10/5/2023; Expected Discharge Time:      DVT prophylaxis:  Medical DVT prophylaxis orders are present.          CODE STATUS:   Code Status and Medical Interventions:   Ordered at: 10/01/23 0404     Code Status (Patient has no pulse and is not breathing):    CPR (Attempt to Resuscitate)     Medical Interventions (Patient has pulse or is breathing):    Full Support       Franklin Quesada MD  10/01/23

## 2023-10-02 ENCOUNTER — APPOINTMENT (OUTPATIENT)
Dept: CARDIOLOGY | Facility: HOSPITAL | Age: 73
DRG: 233 | End: 2023-10-02
Payer: MEDICARE

## 2023-10-02 ENCOUNTER — APPOINTMENT (OUTPATIENT)
Dept: PULMONOLOGY | Facility: HOSPITAL | Age: 73
DRG: 233 | End: 2023-10-02
Payer: MEDICARE

## 2023-10-02 LAB
ANION GAP SERPL CALCULATED.3IONS-SCNC: 11 MMOL/L (ref 5–15)
BASOPHILS # BLD AUTO: 0.06 10*3/MM3 (ref 0–0.2)
BASOPHILS NFR BLD AUTO: 0.7 % (ref 0–1.5)
BH CV ECHO MEAS - AO MAX PG: 4.9 MMHG
BH CV ECHO MEAS - AO MEAN PG: 3 MMHG
BH CV ECHO MEAS - AO ROOT DIAM: 3.8 CM
BH CV ECHO MEAS - AO V2 MAX: 111 CM/SEC
BH CV ECHO MEAS - AO V2 VTI: 21.1 CM
BH CV ECHO MEAS - AVA(I,D): 2.46 CM2
BH CV ECHO MEAS - EDV(CUBED): 117.6 ML
BH CV ECHO MEAS - EDV(MOD-SP2): 69 ML
BH CV ECHO MEAS - EDV(MOD-SP4): 90.2 ML
BH CV ECHO MEAS - EF(MOD-BP): 50.7 %
BH CV ECHO MEAS - EF(MOD-SP2): 53 %
BH CV ECHO MEAS - EF(MOD-SP4): 52.5 %
BH CV ECHO MEAS - ESV(CUBED): 42.9 ML
BH CV ECHO MEAS - ESV(MOD-SP2): 32.4 ML
BH CV ECHO MEAS - ESV(MOD-SP4): 42.8 ML
BH CV ECHO MEAS - FS: 28.6 %
BH CV ECHO MEAS - IVS/LVPW: 1 CM
BH CV ECHO MEAS - IVSD: 1.1 CM
BH CV ECHO MEAS - LA DIMENSION: 4.1 CM
BH CV ECHO MEAS - LAT PEAK E' VEL: 7.7 CM/SEC
BH CV ECHO MEAS - LV MASS(C)D: 200.5 GRAMS
BH CV ECHO MEAS - LV MAX PG: 3.3 MMHG
BH CV ECHO MEAS - LV MEAN PG: 2 MMHG
BH CV ECHO MEAS - LV V1 MAX: 90.7 CM/SEC
BH CV ECHO MEAS - LV V1 VTI: 16.5 CM
BH CV ECHO MEAS - LVIDD: 4.9 CM
BH CV ECHO MEAS - LVIDS: 3.5 CM
BH CV ECHO MEAS - LVOT AREA: 3.1 CM2
BH CV ECHO MEAS - LVOT DIAM: 2 CM
BH CV ECHO MEAS - LVPWD: 1.1 CM
BH CV ECHO MEAS - MED PEAK E' VEL: 5.2 CM/SEC
BH CV ECHO MEAS - MV A MAX VEL: 92.8 CM/SEC
BH CV ECHO MEAS - MV DEC TIME: 0.19 SEC
BH CV ECHO MEAS - MV E MAX VEL: 64.5 CM/SEC
BH CV ECHO MEAS - MV E/A: 0.7
BH CV ECHO MEAS - PA V2 MAX: 91.4 CM/SEC
BH CV ECHO MEAS - RAP SYSTOLE: 3 MMHG
BH CV ECHO MEAS - RVSP: 43 MMHG
BH CV ECHO MEAS - SV(LVOT): 51.8 ML
BH CV ECHO MEAS - SV(MOD-SP2): 36.6 ML
BH CV ECHO MEAS - SV(MOD-SP4): 47.4 ML
BH CV ECHO MEAS - TAPSE (>1.6): 1.78 CM
BH CV ECHO MEAS - TR MAX PG: 40 MMHG
BH CV ECHO MEAS - TR MAX VEL: 315.5 CM/SEC
BH CV ECHO MEASUREMENTS AVERAGE E/E' RATIO: 10
BH CV VAS BP LEFT ARM: NORMAL MMHG
BH CV XLRA - TDI S': 13.1 CM/SEC
BH CV XLRA MEAS LEFT DIST CCA EDV: 18.4 CM/SEC
BH CV XLRA MEAS LEFT DIST CCA PSV: 84 CM/SEC
BH CV XLRA MEAS LEFT DIST ICA EDV: 18.5 CM/SEC
BH CV XLRA MEAS LEFT DIST ICA PSV: 48.5 CM/SEC
BH CV XLRA MEAS LEFT ICA/CCA RATIO: 1.21
BH CV XLRA MEAS LEFT MID CCA EDV: 19.5 CM/SEC
BH CV XLRA MEAS LEFT MID CCA PSV: 100.1 CM/SEC
BH CV XLRA MEAS LEFT MID ICA EDV: 25.5 CM/SEC
BH CV XLRA MEAS LEFT MID ICA PSV: 74.2 CM/SEC
BH CV XLRA MEAS LEFT PROX CCA EDV: 17.6 CM/SEC
BH CV XLRA MEAS LEFT PROX CCA PSV: 81.4 CM/SEC
BH CV XLRA MEAS LEFT PROX ECA EDV: 9.5 CM/SEC
BH CV XLRA MEAS LEFT PROX ECA PSV: 109.5 CM/SEC
BH CV XLRA MEAS LEFT PROX ICA EDV: 25.9 CM/SEC
BH CV XLRA MEAS LEFT PROX ICA PSV: 121.4 CM/SEC
BH CV XLRA MEAS LEFT PROX SCLA EDV: 0 CM/SEC
BH CV XLRA MEAS LEFT PROX SCLA PSV: 134.8 CM/SEC
BH CV XLRA MEAS LEFT VERTEBRAL A EDV: 11 CM/SEC
BH CV XLRA MEAS LEFT VERTEBRAL A PSV: 36.8 CM/SEC
BH CV XLRA MEAS RIGHT DIST CCA EDV: 21.8 CM/SEC
BH CV XLRA MEAS RIGHT DIST CCA PSV: 99.2 CM/SEC
BH CV XLRA MEAS RIGHT DIST ICA EDV: 19.5 CM/SEC
BH CV XLRA MEAS RIGHT DIST ICA PSV: 42 CM/SEC
BH CV XLRA MEAS RIGHT ICA/CCA RATIO: 1.08
BH CV XLRA MEAS RIGHT MID CCA EDV: 20.1 CM/SEC
BH CV XLRA MEAS RIGHT MID CCA PSV: 90.6 CM/SEC
BH CV XLRA MEAS RIGHT MID ICA EDV: 19.1 CM/SEC
BH CV XLRA MEAS RIGHT MID ICA PSV: 51.9 CM/SEC
BH CV XLRA MEAS RIGHT PROX CCA EDV: 17.4 CM/SEC
BH CV XLRA MEAS RIGHT PROX CCA PSV: 77.5 CM/SEC
BH CV XLRA MEAS RIGHT PROX ECA EDV: 10.4 CM/SEC
BH CV XLRA MEAS RIGHT PROX ECA PSV: 77.9 CM/SEC
BH CV XLRA MEAS RIGHT PROX ICA EDV: 20.5 CM/SEC
BH CV XLRA MEAS RIGHT PROX ICA PSV: 97.8 CM/SEC
BH CV XLRA MEAS RIGHT PROX SCLA EDV: 0 CM/SEC
BH CV XLRA MEAS RIGHT PROX SCLA PSV: 123.2 CM/SEC
BH CV XLRA MEAS RIGHT VERTEBRAL A EDV: 17.2 CM/SEC
BH CV XLRA MEAS RIGHT VERTEBRAL A PSV: 45.4 CM/SEC
BUN SERPL-MCNC: 20 MG/DL (ref 8–23)
BUN/CREAT SERPL: 20 (ref 7–25)
CALCIUM SPEC-SCNC: 8.4 MG/DL (ref 8.6–10.5)
CHLORIDE SERPL-SCNC: 104 MMOL/L (ref 98–107)
CO2 SERPL-SCNC: 25 MMOL/L (ref 22–29)
CREAT SERPL-MCNC: 1 MG/DL (ref 0.76–1.27)
DEPRECATED RDW RBC AUTO: 51.8 FL (ref 37–54)
EGFRCR SERPLBLD CKD-EPI 2021: 81 ML/MIN/1.73
EOSINOPHIL # BLD AUTO: 0.47 10*3/MM3 (ref 0–0.4)
EOSINOPHIL NFR BLD AUTO: 5.8 % (ref 0.3–6.2)
ERYTHROCYTE [DISTWIDTH] IN BLOOD BY AUTOMATED COUNT: 14.3 % (ref 12.3–15.4)
GLUCOSE BLDC GLUCOMTR-MCNC: 135 MG/DL (ref 70–130)
GLUCOSE BLDC GLUCOMTR-MCNC: 143 MG/DL (ref 70–130)
GLUCOSE BLDC GLUCOMTR-MCNC: 228 MG/DL (ref 70–130)
GLUCOSE BLDC GLUCOMTR-MCNC: 97 MG/DL (ref 70–130)
GLUCOSE SERPL-MCNC: 101 MG/DL (ref 65–99)
HCT VFR BLD AUTO: 38.3 % (ref 37.5–51)
HGB BLD-MCNC: 12.6 G/DL (ref 13–17.7)
IMM GRANULOCYTES # BLD AUTO: 0.02 10*3/MM3 (ref 0–0.05)
IMM GRANULOCYTES NFR BLD AUTO: 0.2 % (ref 0–0.5)
LEFT ARM BP: NORMAL MMHG
LEFT ATRIUM VOLUME INDEX: 38.5 ML/M2
LYMPHOCYTES # BLD AUTO: 2.47 10*3/MM3 (ref 0.7–3.1)
LYMPHOCYTES NFR BLD AUTO: 30.5 % (ref 19.6–45.3)
MAGNESIUM SERPL-MCNC: 2.3 MG/DL (ref 1.6–2.4)
MCH RBC QN AUTO: 32.1 PG (ref 26.6–33)
MCHC RBC AUTO-ENTMCNC: 32.9 G/DL (ref 31.5–35.7)
MCV RBC AUTO: 97.7 FL (ref 79–97)
MONOCYTES # BLD AUTO: 0.73 10*3/MM3 (ref 0.1–0.9)
MONOCYTES NFR BLD AUTO: 9 % (ref 5–12)
NEUTROPHILS NFR BLD AUTO: 4.35 10*3/MM3 (ref 1.7–7)
NEUTROPHILS NFR BLD AUTO: 53.8 % (ref 42.7–76)
NRBC BLD AUTO-RTO: 0 /100 WBC (ref 0–0.2)
PA ADP PRP-ACNC: 160 PRU
PLATELET # BLD AUTO: 212 10*3/MM3 (ref 140–450)
PMV BLD AUTO: 10.4 FL (ref 6–12)
POTASSIUM SERPL-SCNC: 4 MMOL/L (ref 3.5–5.2)
QT INTERVAL: 364 MS
QTC INTERVAL: 440 MS
RBC # BLD AUTO: 3.92 10*6/MM3 (ref 4.14–5.8)
SODIUM SERPL-SCNC: 140 MMOL/L (ref 136–145)
UFH PPP CHRO-ACNC: 0.47 IU/ML (ref 0.3–0.7)
UFH PPP CHRO-ACNC: 0.47 IU/ML (ref 0.3–0.7)
UFH PPP CHRO-ACNC: 0.59 IU/ML (ref 0.3–0.7)
WBC NRBC COR # BLD: 8.1 10*3/MM3 (ref 3.4–10.8)

## 2023-10-02 PROCEDURE — 83735 ASSAY OF MAGNESIUM: CPT | Performed by: STUDENT IN AN ORGANIZED HEALTH CARE EDUCATION/TRAINING PROGRAM

## 2023-10-02 PROCEDURE — 93880 EXTRACRANIAL BILAT STUDY: CPT | Performed by: INTERNAL MEDICINE

## 2023-10-02 PROCEDURE — 85576 BLOOD PLATELET AGGREGATION: CPT | Performed by: PHYSICIAN ASSISTANT

## 2023-10-02 PROCEDURE — 94010 BREATHING CAPACITY TEST: CPT | Performed by: INTERNAL MEDICINE

## 2023-10-02 PROCEDURE — 94010 BREATHING CAPACITY TEST: CPT

## 2023-10-02 PROCEDURE — 85520 HEPARIN ASSAY: CPT

## 2023-10-02 PROCEDURE — 99232 SBSQ HOSP IP/OBS MODERATE 35: CPT | Performed by: STUDENT IN AN ORGANIZED HEALTH CARE EDUCATION/TRAINING PROGRAM

## 2023-10-02 PROCEDURE — 93306 TTE W/DOPPLER COMPLETE: CPT

## 2023-10-02 PROCEDURE — 82948 REAGENT STRIP/BLOOD GLUCOSE: CPT

## 2023-10-02 PROCEDURE — 63710000001 INSULIN LISPRO (HUMAN) PER 5 UNITS: Performed by: INTERNAL MEDICINE

## 2023-10-02 PROCEDURE — 99232 SBSQ HOSP IP/OBS MODERATE 35: CPT | Performed by: INTERNAL MEDICINE

## 2023-10-02 PROCEDURE — 85025 COMPLETE CBC W/AUTO DIFF WBC: CPT | Performed by: STUDENT IN AN ORGANIZED HEALTH CARE EDUCATION/TRAINING PROGRAM

## 2023-10-02 PROCEDURE — 93306 TTE W/DOPPLER COMPLETE: CPT | Performed by: INTERNAL MEDICINE

## 2023-10-02 PROCEDURE — 93880 EXTRACRANIAL BILAT STUDY: CPT

## 2023-10-02 PROCEDURE — 80048 BASIC METABOLIC PNL TOTAL CA: CPT | Performed by: STUDENT IN AN ORGANIZED HEALTH CARE EDUCATION/TRAINING PROGRAM

## 2023-10-02 RX ORDER — LISINOPRIL 10 MG/1
10 TABLET ORAL DAILY
COMMUNITY
End: 2023-10-11 | Stop reason: HOSPADM

## 2023-10-02 RX ORDER — ASPIRIN 81 MG/1
81 TABLET ORAL DAILY
Status: ON HOLD | COMMUNITY
End: 2023-10-11 | Stop reason: SDUPTHER

## 2023-10-02 RX ORDER — DEXTROSE MONOHYDRATE 25 G/50ML
25 INJECTION, SOLUTION INTRAVENOUS
Status: DISCONTINUED | OUTPATIENT
Start: 2023-10-02 | End: 2023-10-06 | Stop reason: SDUPTHER

## 2023-10-02 RX ORDER — IBUPROFEN 600 MG/1
1 TABLET ORAL
Status: DISCONTINUED | OUTPATIENT
Start: 2023-10-02 | End: 2023-10-06 | Stop reason: SDUPTHER

## 2023-10-02 RX ORDER — ATORVASTATIN CALCIUM 40 MG/1
40 TABLET, FILM COATED ORAL DAILY
COMMUNITY

## 2023-10-02 RX ORDER — NICOTINE POLACRILEX 4 MG
15 LOZENGE BUCCAL
Status: DISCONTINUED | OUTPATIENT
Start: 2023-10-02 | End: 2023-10-06 | Stop reason: SDUPTHER

## 2023-10-02 RX ORDER — INSULIN LISPRO 100 [IU]/ML
2-7 INJECTION, SOLUTION INTRAVENOUS; SUBCUTANEOUS
Status: DISCONTINUED | OUTPATIENT
Start: 2023-10-02 | End: 2023-10-06 | Stop reason: SDUPTHER

## 2023-10-02 RX ADMIN — HYDROCODONE BITARTRATE AND ACETAMINOPHEN 1 TABLET: 5; 325 TABLET ORAL at 23:16

## 2023-10-02 RX ADMIN — METOPROLOL SUCCINATE 25 MG: 25 TABLET, EXTENDED RELEASE ORAL at 10:12

## 2023-10-02 RX ADMIN — Medication 10 ML: at 10:12

## 2023-10-02 RX ADMIN — HYDROCODONE BITARTRATE AND ACETAMINOPHEN 1 TABLET: 5; 325 TABLET ORAL at 10:12

## 2023-10-02 RX ADMIN — ATORVASTATIN CALCIUM 80 MG: 40 TABLET, FILM COATED ORAL at 23:16

## 2023-10-02 RX ADMIN — ALPRAZOLAM 0.25 MG: 0.25 TABLET ORAL at 23:16

## 2023-10-02 RX ADMIN — SENNOSIDES AND DOCUSATE SODIUM 2 TABLET: 50; 8.6 TABLET ORAL at 23:16

## 2023-10-02 RX ADMIN — ASPIRIN 81 MG: 81 TABLET, COATED ORAL at 10:12

## 2023-10-02 RX ADMIN — SENNOSIDES AND DOCUSATE SODIUM 2 TABLET: 50; 8.6 TABLET ORAL at 10:12

## 2023-10-02 RX ADMIN — INSULIN LISPRO 3 UNITS: 100 INJECTION, SOLUTION INTRAVENOUS; SUBCUTANEOUS at 18:38

## 2023-10-02 RX ADMIN — Medication 10 ML: at 23:17

## 2023-10-02 RX ADMIN — LISINOPRIL 10 MG: 10 TABLET ORAL at 10:12

## 2023-10-02 RX ADMIN — HYDROCODONE BITARTRATE AND ACETAMINOPHEN 1 TABLET: 5; 325 TABLET ORAL at 18:38

## 2023-10-02 NOTE — PROGRESS NOTES
[facility] Cardiothoracic Surgery In-Patient Progress Note       LOS: 1 day     Chief Complaint: Coronary artery disease    Subjective  Denies chest pain or dyspnea.  On heparin drip.      Objective  Vital Signs  Temp:  [97.7 øF (36.5 øC)-98.7 øF (37.1 øC)] 97.9 øF (36.6 øC)  Heart Rate:  [60-97] 71  Resp:  [16-18] 16  BP: (106-133)/(77-95) 121/77        Physical Exam:   General Appearance: alert, appears stated age and cooperative   Lungs: clear to auscultation, respirations regular, respirations even, and respirations unlabored   Heart: regular rhythm & normal rate, normal S1, S2, no murmur, no gallop, no rub, and no click     Results     Results from last 7 days   Lab Units 10/02/23  0317   WBC 10*3/mm3 8.10   HEMOGLOBIN g/dL 12.6*   HEMATOCRIT % 38.3   PLATELETS 10*3/mm3 212     Results from last 7 days   Lab Units 10/02/23  0317   SODIUM mmol/L 140   POTASSIUM mmol/L 4.0   CHLORIDE mmol/L 104   CO2 mmol/L 25.0   BUN mg/dL 20   CREATININE mg/dL 1.00   GLUCOSE mg/dL 101*   CALCIUM mg/dL 8.4*     Assessment    Coronary artery disease    Essential hypertension    Type 2 diabetes mellitus, without long-term current use of insulin    NSTEMI (non-ST elevated myocardial infarction)      Plan   Continue with preoperative work-up and heparin drip  P2Y12 160 today  Mathew for CABG with EVH and DIANNA with Dr. Mora on Friday, 10/6      Lizzie Navarrete, APRN  10/02/23  08:11 EDT    --    I reviewed this documentation. I interviewed and examined this patient this afternoon.  Briefly this is a 72-year-old man with a history of hypertension, diabetes, who presents with NSTEMI in the setting of multivessel coronary artery disease.  The patient was referred to me by my partner Dr. Tirso Paiz for surgical revascularization.  I had a pleasant and thorough conversation with patient regarding the risks, benefits, and alternatives the planned procedure including risk of bleeding, infection, heart attack, stroke, permanent  disability, and even death.  The patient demonstrated good understanding and indicated he like to proceed with surgery.  All of his questions were answered satisfactorily.  We will plan for CABG on Friday, 10/6/2023. Plan as documented.     Guero Mora M.D., R.P.V.I.  Cardiothoracic and Vascular Surgeon  [facility]

## 2023-10-02 NOTE — PROGRESS NOTES
[facility]  PROGRESS NOTE    Patient Name: Dann Metz  : 1950  MRN: [id number]    Date of Admission: 10/1/2023  Primary Care Physician: Ariana Lucas APRN    Subjective   Subjective     CC:  CAD, transfer for CABG eval    HPI:  Pt is seen resting up in bed in NAD.  Awake and alert.  Pleasant and cooperative.  Still on heparin gtt.  No SOA.  C/o slight chest pressure that is stable for days.  No new issues.  Awaiting CABG likely Friday.       Objective   Objective     Vital Signs:   Temp:  [97.7 øF (36.5 øC)-98.7 øF (37.1 øC)] 97.9 øF (36.6 øC)  Heart Rate:  [56-92] 73  Resp:  [16-18] 16  BP: (117-133)/(77-95) 121/77     Physical Exam:  Constitutional: No acute distress, awake, alert; on heparin drip. Resting up in bed.   HENT: NCAT, mucous membranes moist  Respiratory: Clear to auscultation bilaterally, respiratory effort normal on RA.  Sats wnl.   Cardiovascular: RRR, no murmurs, rubs, or gallops  Gastrointestinal: Positive bowel sounds, soft, nontender, nondistended  Musculoskeletal: No bilateral ankle edema. BROWNLEE spont   Psychiatric: Appropriate affect, cooperative  Neurologic: Oriented x 3, strength symmetric in all extremities, Cranial Nerves grossly intact to confrontation, speech clear and appropriate.  Follows commands  Skin: No rashes      Results Reviewed:  LAB RESULTS:      Lab 10/02/23  1047 10/02/23  0317 10/01/23  1815 10/01/23  1048 10/01/23  0412 23  1346 23  0745 23  0257 23  0257 23   WBC  --  8.10  --   --  10.52  --   --   --  10.15  --  9.7   HEMOGLOBIN  --  12.6*  --   --  13.2  --   --   --  12.4*  --  14.2   HEMATOCRIT  --  38.3  --   --  40.1  --   --   --  37.6  --  43.4   PLATELETS  --  212  --   --  220  --   --   --  205  --  218   NEUTROS ABS  --  4.35  --   --  7.30*  --   --   --  7.20*  --  7.5   IMMATURE GRANS (ABS)  --  0.02  --   --  0.04  --   --   --  0.02  --  0.0    LYMPHS ABS  --  2.47  --   --  1.88  --   --   --  1.99  --  1.4*   MONOS ABS  --  0.73  --   --  0.90  --   --   --  0.72  --  0.5   EOS ABS  --  0.47*  --   --  0.33  --   --   --  0.14  --  0.2   MCV  --  97.7*  --   --  96.9  --   --   --  95.2  --  95.4   PROTIME  --   --   --   --  14.4  --   --   --  15.6* 13.8  --    APTT  --   --   --   --  29.0* 152.0* 70.3  --  50.2*  --   --    HEPARIN ANTI-XA 0.47 0.59 0.92* 0.98* 1.00*  --  >1.10*   < > >1.10*  --   --     < > = values in this interval not displayed.         Lab 10/02/23  0317 10/01/23  1815 10/01/23  0412 09/30/23  0538   SODIUM 140  --  141 143   POTASSIUM 4.0  --  4.2 4.1   CHLORIDE 104  --  103 106   CO2 25.0  --  24.0 22.4   ANION GAP 11.0  --  14.0 14.6   BUN 20  --  17 24*   CREATININE 1.00  --  1.11 1.13   EGFR 81.0  --  71.4 69.9   GLUCOSE 101*  --  125* 154*   CALCIUM 8.4*  --  8.9 9.4   MAGNESIUM 2.3 2.8* 1.5*  --    HEMOGLOBIN A1C  --   --   --  7.10*   TSH  --   --   --  2.060             Lab 10/01/23  0412 09/30/23  0929 09/30/23  0745 09/30/23  0257 09/29/23  2205   HSTROP T  --  785* 642*  --   --    PROTIME 14.4  --   --  15.6* 13.8   INR 1.10  --   --  1.18* 1.08         Lab 09/30/23  0538   CHOLESTEROL 149   LDL CHOL 77   HDL CHOL 39*   TRIGLYCERIDES 194*             Brief Urine Lab Results  (Last result in the past 365 days)        Color   Clarity   Blood   Leuk Est   Nitrite   Protein   CREAT   Urine HCG        06/25/23 1603 Yellow   Clear   SMALL   Negative   Negative                     Microbiology Results Abnormal       None            No radiology results from the last 24 hrs    Results for orders placed during the hospital encounter of 06/28/23    Adult Transthoracic Echo Complete W/ Cont if Necessary Per Protocol    Interpretation Summary  1.  Normal left ventricular size with low normal LV systolic function, LVEF 50-55%.  2.  Mild concentric LVH.  3.  Indeterminate LV diastolic filling pattern.  4.  Normal right  ventricular size and systolic function.  5.  Normal left atrial index.  6.  No significant valvular abnormalities.  7.  Negative bubble study with no evidence of intracardiac or intrapulmonary shunt.      Current medications:  Scheduled Meds:aspirin, 81 mg, Oral, Daily  atorvastatin, 80 mg, Oral, Nightly  insulin lispro, 2-7 Units, Subcutaneous, 4x Daily AC & at Bedtime  lisinopril, 10 mg, Oral, Daily  metoprolol succinate XL, 25 mg, Oral, Q24H  pharmacy consult - [place], , Does not apply, Daily  senna-docusate sodium, 2 tablet, Oral, BID  sodium chloride, 10 mL, Intravenous, Q12H      Continuous Infusions:heparin, 5 Units/kg/hr, Last Rate: 5 Units/kg/hr (10/02/23 0604)  Pharmacy to Dose Heparin,       PRN Meds:.  acetaminophen **OR** acetaminophen **OR** acetaminophen    ALPRAZolam    aluminum-magnesium hydroxide-simethicone    senna-docusate sodium **AND** polyethylene glycol **AND** bisacodyl **AND** bisacodyl    Calcium Replacement - Follow Nurse / BPA Driven Protocol    dextrose    dextrose    glucagon (human recombinant)    HYDROcodone-acetaminophen    ipratropium-albuterol    Magnesium Standard Dose Replacement - Follow Nurse / BPA Driven Protocol    Morphine **AND** naloxone    nitroglycerin    ondansetron **OR** ondansetron    Pharmacy to Dose Heparin    Phosphorus Replacement - Follow Nurse / BPA Driven Protocol    Potassium Replacement - Follow Nurse / BPA Driven Protocol    sodium chloride    sodium chloride    Assessment & Plan   Assessment & Plan     Active Hospital Problems    Diagnosis  POA    **Coronary artery disease [I25.10]  Yes    NSTEMI (non-ST elevated myocardial infarction) [I21.4]  Yes    Essential hypertension [I10]  Yes    Type 2 diabetes mellitus, without long-term current use of insulin [E11.9]  Yes      Resolved Hospital Problems   No resolved problems to display.        Brief Hospital Course to date:  Dann Metz is a 72 y.o. male     This patient's problems and plans were partially  entered by my partner and updated as appropriate by me 10/02/23.    Assessment/Plan:  Pt is new to me today     NSTEMI  -At [facility] troponins were 642 then 785.  EKG with inverted T waves anteriorly and nonspecific inferolateral ST changes.  Left heart cath showed severe three-vessel disease.  Cardiology recommended transfer to [facility].    -Patient currently with stable mild chest pressure for days.    -Continue heparin drip  -Continue aspirin and high intensity statin  -Continue telemetry  -Cardiology and cardiothoracic surgery following for ?CABG Friday      Paroxysmal atrial fibrillation  -Diagnosed in June of this year during stroke admission.  He was started on Eliquis at that time. Holding eliquis for now for planned CABG   -Currently rate controlled  -Currently on heparin drip  -Continue metoprolol     Type 2 diabetes  -A1c 7.1  -On metformin and Jardiance at home  -Corrective insulin here  -Consider addition of GLP-1 antagonist upon discharge  --glucoses generally stable.  no change for now.      Hypertension  -Continue lisinopril 10 mg, stable      History of ischemic stroke with residual right upper extremity weakness  -ASA and statin     DVT prophylaxis: On heparin drip    Expected Discharge Location and Transportation:   Expected Discharge   Expected Discharge Date: 10/10/2023; Expected Discharge Time:      DVT prophylaxis:  Medical DVT prophylaxis orders are present.     AM-PAC 6 Clicks Score (PT): 20 (10/02/23 1000)    CODE STATUS:   Code Status and Medical Interventions:   Ordered at: 10/01/23 0404     Code Status (Patient has no pulse and is not breathing):    CPR (Attempt to Resuscitate)     Medical Interventions (Patient has pulse or is breathing):    Full Support       Leigh Bajwa, APRN  10/02/23

## 2023-10-02 NOTE — PLAN OF CARE
Problem: Adult Inpatient Plan of Care  Goal: Plan of Care Review  Outcome: Ongoing, Progressing  Flowsheets (Taken 10/2/2023 0323)  Progress: improving  Plan of Care Reviewed With: patient  Goal: Patient-Specific Goal (Individualized)  Outcome: Ongoing, Progressing  Goal: Absence of Hospital-Acquired Illness or Injury  Outcome: Ongoing, Progressing  Intervention: Identify and Manage Fall Risk  Recent Flowsheet Documentation  Taken 10/2/2023 0200 by Yara Polanco RN  Safety Promotion/Fall Prevention: safety round/check completed  Taken 10/2/2023 0000 by Yara Polanco RN  Safety Promotion/Fall Prevention:   safety round/check completed   room organization consistent  Taken 10/1/2023 2200 by Yara Polanco RN  Safety Promotion/Fall Prevention:   safety round/check completed   room organization consistent  Taken 10/1/2023 2000 by Yara Polanco RN  Safety Promotion/Fall Prevention:   safety round/check completed   room organization consistent   nonskid shoes/slippers when out of bed   lighting adjusted   fall prevention program maintained   clutter free environment maintained   activity supervised   assistive device/personal items within reach  Intervention: Prevent Skin Injury  Recent Flowsheet Documentation  Taken 10/2/2023 0200 by Yara Polanco RN  Body Position: position changed independently  Taken 10/2/2023 0000 by Yara Polanco RN  Body Position: position changed independently  Taken 10/1/2023 2200 by Yara Polanco RN  Body Position: position changed independently  Taken 10/1/2023 2000 by Yara Polanco RN  Body Position: position changed independently  Skin Protection: adhesive use limited  Intervention: Prevent and Manage VTE (Venous Thromboembolism) Risk  Recent Flowsheet Documentation  Taken 10/2/2023 0200 by Yara Polanco RN  Activity Management: activity minimized  Taken 10/2/2023 0000 by Yara Polanco RN  Activity Management: activity  minimized  Taken 10/1/2023 2200 by Yara Polanco RN  Activity Management: activity minimized  Taken 10/1/2023 2000 by Yara Polanco RN  Activity Management: activity encouraged  VTE Prevention/Management: (heparin drip) other (see comments)  Range of Motion: active ROM (range of motion) encouraged  Intervention: Prevent Infection  Recent Flowsheet Documentation  Taken 10/2/2023 0200 by Yara Polanco RN  Infection Prevention:   single patient room provided   rest/sleep promoted  Taken 10/2/2023 0000 by Yara Polanco RN  Infection Prevention:   single patient room provided   rest/sleep promoted   environmental surveillance performed  Taken 10/1/2023 2200 by Yara Polanco RN  Infection Prevention:   single patient room provided   rest/sleep promoted  Goal: Optimal Comfort and Wellbeing  Outcome: Ongoing, Progressing  Intervention: Provide Person-Centered Care  Recent Flowsheet Documentation  Taken 10/1/2023 2000 by Yara Polanco RN  Trust Relationship/Rapport:   care explained   choices provided   emotional support provided   empathic listening provided   questions answered   questions encouraged   reassurance provided   thoughts/feelings acknowledged  Goal: Readiness for Transition of Care  Outcome: Ongoing, Progressing     Problem: Fall Injury Risk  Goal: Absence of Fall and Fall-Related Injury  Outcome: Ongoing, Progressing  Intervention: Promote Injury-Free Environment  Recent Flowsheet Documentation  Taken 10/2/2023 0200 by Yara Polanco RN  Safety Promotion/Fall Prevention: safety round/check completed  Taken 10/2/2023 0000 by Yara Polanco RN  Safety Promotion/Fall Prevention:   safety round/check completed   room organization consistent  Taken 10/1/2023 2200 by Yara Polanco RN  Safety Promotion/Fall Prevention:   safety round/check completed   room organization consistent  Taken 10/1/2023 2000 by Yara Polanco RN  Safety Promotion/Fall  "Prevention:   safety round/check completed   room organization consistent   nonskid shoes/slippers when out of bed   lighting adjusted   fall prevention program maintained   clutter free environment maintained   activity supervised   assistive device/personal items within reach     Problem: Diabetes Comorbidity  Goal: Blood Glucose Level Within Targeted Range  Outcome: Ongoing, Progressing  Intervention: Monitor and Manage Glycemia  Recent Flowsheet Documentation  Taken 10/1/2023 2000 by Yara Polanco RN  Glycemic Management: blood glucose monitored   Goal Outcome Evaluation:  Plan of Care Reviewed With: patient        Progress: improving     Pt presents with an extreme amount of anxiety.  He has voiced concern that he is scared to death about the upcoming procedure;  is he so nervous for a male staff to help him use the urinal - he has ask RN numerous times, if it would be okay for me to help him use the bathroom - when I acknowledged that I did not mind at all to help him, he then started voicing concern if David, PCT would have hurt feelings or be upset with him because he ask me to help him.  Pt has had to have positive reinforcement the entire shift - during each hourly round.  Pt is alert and oriented, but admits that he \"doesn't understand\" everything that is going on (he is referring to the heparin drip and IV access).  Pt does state that he knows that he \"has to have surgery on (his) heart\".  RN spent quite a long time with him, and talking to him about the upcoming procedure.          "

## 2023-10-02 NOTE — CASE MANAGEMENT/SOCIAL WORK
Discharge Planning Assessment  [facility]     Patient Name: Dann Metz  MRN: [id number]  Today's Date: 10/2/2023    Admit Date: 10/1/2023    Plan: TBD   Discharge Needs Assessment       Row Name 10/02/23 1659       Living Environment    People in Home significant other    Current Living Arrangements apartment    Potentially Unsafe Housing Conditions none    Primary Care Provided by self;friend    Provides Primary Care For no one    Family Caregiver if Needed child(nu), adult;friend(s)    Family Caregiver Names son Dann Bear, exgirlfriencarloz Mel    Living Arrangement Comments Lives in an apartment on second level but stays on first level apt w/exgirlfriend.       Transition Planning    Patient/Family Anticipates Transition to home    Patient/Family Anticipated Services at Transition     Transportation Anticipated family or friend will provide       Discharge Needs Assessment    Equipment Currently Used at Home none                   Discharge Plan       Row Name 10/02/23 0846       Plan    Patient/Family in Agreement with Plan yes    Plan Comments I spoke w pt in room. [employer] confirmed as Medicare B only, w/[employer] [place] Medicaid. [facility] scripts @ [facility] w/no issues. Not current w/HH. Denies DME to me. Pt awaiting CABG, stated planned @ this time for Friday. Per pt. son plans to stay Thursday night. D/C plan will be pending after OR. CM will continue to follow.      Row Name 10/02/23 7159       Plan    Plan TBD    Patient/Family in Agreement with Plan yes    Plan Comments I spoke w/pt in room. Insurance confirmed as Medicare B only, w/[employer] [place] Medicaid. Fills scripts    Final Discharge Disposition Code 30 - still a patient                  Continued Care and Services - Admitted Since 10/1/2023    Coordination has not been started for this encounter.       Selected Continued Care - Prior Encounters Includes continued care and service providers with selected services from  prior encounters from 7/3/2023 to 10/2/2023      Discharged on 10/1/2023 Admission date: 9/30/2023 - Discharge disposition: Short Term Hospital (DC - External)      Destination       Service Provider Selected Services Address Phone Fax Patient Preferred    [place] [phone] -- -- --                          Expected Discharge Date and Time       Expected Discharge Date Expected Discharge Time    Oct 10, 2023            Demographic Summary       Row Name 10/02/23 1653       General Information    Admission Type inpatient    Arrived From home    Referral Source hospital clinician/department    Preferred Language English    General Information Comments PCP is Ariana Lucas. No POA/LW paperwork on file.                   Functional Status       Row Name 10/02/23 1654       Functional Status    Usual Activity Tolerance good    Current Activity Tolerance fair       Functional Status, IADL    Medications independent    Meal Preparation assistive person    Housekeeping assistive person    Laundry assistive person    Shopping assistive person                   Psychosocial    No documentation.                  Abuse/Neglect    No documentation.                  Legal    No documentation.                  Substance Abuse    No documentation.                  Patient Forms    No documentation.                     Franklin Quiroz RN

## 2023-10-02 NOTE — PROGRESS NOTES
[facility]  Inpatient Progress Note      Chief Complaint/Reason for consult:  CAD, NSTEMI         Subjective  No complaints this morning, no chest pain or dyspnea       Vital Sign Min/Max for last 24 hours  Temp  Min: 97.7 øF (36.5 øC)  Max: 98.7 øF (37.1 øC)   BP  Min: 106/82  Max: 133/95   Pulse  Min: 56  Max: 97   Resp  Min: 16  Max: 18   SpO2  Min: 75 %  Max: 99 %   No data recorded      Intake/Output Summary (Last 24 hours) at 10/2/2023 1020  Last data filed at 10/2/2023 0900  Gross per 24 hour   Intake 320 ml   Output 1200 ml   Net -880 ml           Gen: well developed, lying in bed, comfortable appearing  HEENT: MMM, sclerae anicteric, conjunctivae normal  CV: regular rate, regular rhythm, no murmurs or rubs, normal S1, S2. 2+ radial and DP pulses  Pulm: RA, normal work of breathing, no wheezes, rales, rhonchi  Abd: soft, nontender, nondistended  Ext: normal bulk for age, normal tone, no dependent edema  Neuro: alert, oriented, face symmetrical, moving all extremities well  Psych: normal mood, appropriate affect    Tele:  SR this am, no sustained arrhythmia seen, alarms for afib seem to be frequent PACs    Results Review (reviewed the patient's recent labs in the electronic medical record):     EK23 - NSR, anteroseptal infarct, inferolateral ischemia     23 - LHC  Angiographic Findings:  Coronary circulation is left dominant  Left main: The left main coronary artery divides into the left anterior descending and circumflex coronary arteries. The left main coronary artery has no significant disease.  LAD: The left anterior descending gives rise to the diagonal branches as well as multiple septal perforators before terminating as an apical recurrent branch. The proximal LAD has an 80-90 % stenosis. The distal LAD has an 80-90% stenosis. The first diagonal branch of the left anterior descending is subtotally occluded.  LCX: The circumflex coronary artery is a dominant  vessel giving rise to the obtuse marginal branches, posterolateral left ventricular branches and posterior descending artery. The mid circumflex is subtotally occluded. The largest obtuse marginal branch is subtotally occluded. The distal AV groove vessel is occluded.  RCA: The right coronary artery is a small caliber nondominant vessel. The proximal RCA is chronically occluded.     Radiology: No radiology results for the last day      Lab Results   Component Value Date    WBC 8.10 10/02/2023    HGB 12.6 (L) 10/02/2023    HCT 38.3 10/02/2023    MCV 97.7 (H) 10/02/2023     10/02/2023     Lab Results   Component Value Date    GLUCOSE 101 (H) 10/02/2023    CALCIUM 8.4 (L) 10/02/2023     10/02/2023    K 4.0 10/02/2023    CO2 25.0 10/02/2023     10/02/2023    BUN 20 10/02/2023    CREATININE 1.00 10/02/2023    BCR 20.0 10/02/2023    ANIONGAP 11.0 10/02/2023     Lab Results   Component Value Date    TROPONINT 785 (C) 09/30/2023    TROPONINT 642 (C) 09/30/2023      Lab Results   Component Value Date    HGBA1C 7.10 (H) 09/30/2023      Lab Results   Component Value Date    CHOL 149 09/30/2023    CHLPL 112 08/28/2023    TRIG 194 (H) 09/30/2023    HDL 39 (L) 09/30/2023    LDL 77 09/30/2023      Assessment     CAD  NSTEMI   - CTS consulting   - continue IV heparin   - aspirin, statin, beta blocker   - TTE, carotids, PFTs pending    PAF   - SR currently   - holding apixaban, on IV heparin    Hx DM   - holding home metformin   - continue empagliflozin, lisinopril    PRANEETH Del Rio MD  10/2/2023  10:20 EDT

## 2023-10-02 NOTE — PAYOR COMM NOTE
"10/1/23 Transfer to [facility]  ID: [id number]  : 1950    Coronary artery disease [I25.10]     Ameya Zamarripa MD (NPI: [id number])     Utilization Review  Phone [phone]  Fax [phone]    [place] [address]           Jc Streeter (72 y.o. Male)       Date of Birth   1950    Social Security Number       Address   [address]  [address] [place] [address]    Home Phone       MRN   [id number]       Taoism   None    Marital Status                               Admission Date   10/1/23    Admission Type   Urgent    Admitting Provider   Kajal Martins DO    Attending Provider   Kajal Martins DO    Department, Room/Bed   [facility] 4H, S473/1       Discharge Date       Discharge Disposition       Discharge Destination                                 Attending Provider: Kajal Martins DO    Allergies: Penicillins    Isolation: None   Infection: None   Code Status: CPR    Ht: 160 cm (63\")   Wt: 63 kg (138 lb 12.8 oz)    Admission Cmt: None   Principal Problem: Coronary artery disease [I25.10]                   Active Insurance as of 10/1/2023       Primary Coverage       Payor Plan Insurance Group Employer/Plan Group    MEDICARE MEDICARE B ONLY        Payor Plan Address Payor Plan Phone Number Payor Plan Fax Number Effective Dates    [address] [address] [phone]  2017 - None Entered    [facility] [id number]         Subscriber Name Subscriber Birth Date Member ID       JC STREETER 1950 [id number]               Secondary Coverage       Payor Plan Insurance Group Employer/Plan Group    [employer]ICA[place]        Payor Plan Address Payor Plan Phone Number Payor Plan Fax Number Effective Dates    [address] [address] [phone]  2023 - None Entered    [facility] [id number]         Subscriber Name Subscriber Birth Date Member ID       JC STREETER 1950 [id number]               "       Emergency Contacts        (Rel.) Home Phone Work Phone Mobile Phone    JC METZ (Son) [phone] -- --    SACHA METZ (Daughter) [phone] -- --              [place] [id number] Tax ID [id number]  Insurance Information                  MEDICARE/MEDICARE B ONLY Phone: [phone]    Subscriber: Jc Metz Subscriber#: [id number]    Group#: -- Precert#: --        [facility] Phone: [phone]    Subscriber: Jc Metz Subscriber#: [id number]    Group#: -- Precert#: --             History & Physical        Ameya Zamarripa MD at 10/01/23 0407              [facility] Medicine Services  HISTORY AND PHYSICAL    Patient Name: Jc Metz  : 1950  MRN: [id number]  Primary Care Physician: Ariana Lucas APRN  Date of admission: 10/1/2023      Subjective   Subjective     Chief Complaint:  Shortness of breath and weakness    HPI:  Jc Metz is a 72 y.o. male history of atrial fibrillation on Eliquis, type 2 diabetes, hypertension, CVA in  of this year who presented to [facility] emergency department with shortness of breath fatigue and weakness over several days.  Associated with palpitations and occasional diaphoresis.  He was found to have elevated troponin and inferiolateral T wave changes on EKG therefore was transferred to [facility].  There he underwent left heart cath which showed severe three-vessel disease, therefore he was transferred to [facility].  Currently patient reports feeling well without chest pain or shortness of breath, he does have some anxiety about the upcoming procedure.  He does not smoke.      Personal History     Past Medical History:   Diagnosis Date    Coronary artery disease     Diabetes mellitus     Hypertension     Stroke              Past Surgical History:   Procedure Laterality Date    CARDIAC CATHETERIZATION         Family History: family history  includes Heart disease in his mother; Stroke in his brother, brother, brother, brother, brother, father, sister, and sister.     Social History:  reports that he has never smoked. He has never used smokeless tobacco. He reports that he does not drink alcohol and does not use drugs.  Social History     Social History Narrative    Not on file       Medications:  Available home medication information reviewed.  Medications Prior to Admission   Medication Sig Dispense Refill Last Dose    apixaban (ELIQUIS) 5 MG tablet tablet Take 1 tablet by mouth 2 (Two) Times a Day.       Blood Glucose Monitoring Suppl (True Metrix Meter) w/Device kit        ezetimibe (ZETIA) 10 MG tablet Take 1 tablet by mouth Daily.       omeprazole (priLOSEC) 20 MG capsule Take 1 capsule by mouth Daily.       TRUEplus Lancets 33G misc           Allergies   Allergen Reactions    Penicillins Rash       Objective   Objective     Vital Signs:   Temp:  [97.9 øF (36.6 øC)-98.2 øF (36.8 øC)] 97.9 øF (36.6 øC)  Heart Rate:  [70-96] 89  Resp:  [8-20] 16  BP: (100-136)/(71-96) 115/74  Flow (L/min):  [2] 2       Physical Exam   Awake alert and oriented  Comfortable in bed  No acute distress  Heart regular rate and rhythm  Lungs clear to auscultation bilaterally  No abdominal tenderness on palpation  No lower extremity edema  Capillary refill less than 2 seconds    Result Review:  I have personally reviewed the results from the time of this admission to 10/1/2023 05:01 EDT and agree with these findings:  [x]  Laboratory list / accordion  []  Microbiology  [x]  Radiology  [x]  EKG/Telemetry   [x]  Cardiology/Vascular   []  Pathology  [x]  Old records  []  Other:  Most notable findings include: See assessment and plan        LAB RESULTS:      Lab 10/01/23  0412 09/30/23  1346 09/30/23  0745 09/30/23  0257 09/29/23 2205 09/29/23 2053   WBC 10.52  --   --  10.15  --  9.7   HEMOGLOBIN 13.2  --   --  12.4*  --  14.2   HEMATOCRIT 40.1  --   --  37.6  --  43.4    PLATELETS 220  --   --  205  --  218   NEUTROS ABS 7.30*  --   --  7.20*  --  7.5   IMMATURE GRANS (ABS) 0.04  --   --  0.02  --  0.0   LYMPHS ABS 1.88  --   --  1.99  --  1.4*   MONOS ABS 0.90  --   --  0.72  --  0.5   EOS ABS 0.33  --   --  0.14  --  0.2   MCV 96.9  --   --  95.2  --  95.4   PROTIME  --   --   --  15.6* 13.8  --    INR  --   --   --  1.18* 1.08  --    APTT  --  152.0* 70.3 50.2*  --   --    HEPARIN ANTI-XA 1.00*  --  >1.10* >1.10*  --   --          Lab 09/30/23  0538   SODIUM 143   POTASSIUM 4.1   CHLORIDE 106   CO2 22.4   ANION GAP 14.6   BUN 24*   CREATININE 1.13   EGFR 69.9   GLUCOSE 154*   CALCIUM 9.4   HEMOGLOBIN A1C 7.10*   TSH 2.060             Lab 09/30/23  0929 09/30/23  0745   HSTROP T 785* 642*         Lab 09/30/23  0538   CHOLESTEROL 149   LDL CHOL 77   HDL CHOL 39*   TRIGLYCERIDES 194*             UA          6/25/2023    16:03   Urinalysis   Specific Gravity, UA >=1.030       Blood, UA SMALL       Leukocytes, UA Negative       Nitrite, UA Negative          Details          This result is from an external source.               Microbiology Results (last 10 days)       ** No results found for the last 240 hours. **            Cardiac Catheterization/Vascular Study    Result Date: 9/30/2023  Severe three-vessel coronary artery disease CABG     XR CHEST PORTABLE    Result Date: 9/29/2023  EXAM: PORTABLE AP CHEST X-RAY INDICATION: dyspnea, COMPARISON: 6/25/2023 FINDINGS: Bilateral interstitial opacities are unchanged. There is no focal consolidation, pleural effusion, or pneumothorax. The cardiomediastinal silhouette is normal. The visible bony thorax is intact.    Impression: Unchanged diffuse bilateral interstitial opacities representing pulmonary edema versus chronic interstitial fibrosis.     Results for orders placed during the hospital encounter of 06/28/23    Adult Transthoracic Echo Complete W/ Cont if Necessary Per Protocol    Interpretation Summary  1.  Normal left ventricular  size with low normal LV systolic function, LVEF 50-55%.  2.  Mild concentric LVH.  3.  Indeterminate LV diastolic filling pattern.  4.  Normal right ventricular size and systolic function.  5.  Normal left atrial index.  6.  No significant valvular abnormalities.  7.  Negative bubble study with no evidence of intracardiac or intrapulmonary shunt.      Assessment & Plan   Assessment & Plan     Active Hospital Problems    Diagnosis  POA    **Coronary artery disease [I25.10]  Yes    NSTEMI (non-ST elevated myocardial infarction) [I21.4]  Yes    Essential hypertension [I10]  Yes    Type 2 diabetes mellitus, without long-term current use of insulin [E11.9]  Yes       NSTEMI  -At [facility] troponins were 642 then 785.  EKG with inverted T waves anteriorly and nonspecific inferolateral ST changes.  Left heart cath showed severe three-vessel disease.  Cardiology recommended transfer to [facility].    -Patient currently without chest pain  -Continue heparin drip  -Continue aspirin and high intensity statin  -Continue telemetry  -Cardiology and cardiothoracic surgery consult in the morning    Paroxysmal atrial fibrillation  -Diagnosed in June of this year during stroke admission.  He was started on Eliquis at that time  -Currently rate controlled  -Currently on heparin drip  -Continue metoprolol    Type 2 diabetes  -A1c 7.1  -On metformin and Jardiance at home  -Corrective insulin here  -Consider addition of GLP-1 antagonist upon discharge    Hypertension  -Continue lisinopril 10 mg    History of ischemic stroke with residual right upper extremity weakness  -ASA and statin    DVT prophylaxis: On heparin drip      CODE STATUS:    Code Status and Medical Interventions:   Ordered at: 10/01/23 0404     Code Status (Patient has no pulse and is not breathing):    CPR (Attempt to Resuscitate)     Medical Interventions (Patient has pulse or is breathing):    Full Support       Expected Discharge (click hyperlink to enter  date then refresh the note)  Expected Discharge Date: 10/5/2023; Expected Discharge Time:      Ameya Zamarripa MD  10/01/23      Electronically signed by Ameya Zamarripa MD at 10/01/23 0501          Physician Progress Notes (last 48 hours)        Franklin Quesada MD at 10/01/23 1258              [facility] Medicine Services  PROGRESS NOTE    Patient Name: Dann Metz  : 1950  MRN: [id number]    Date of Admission: 10/1/2023  Primary Care Physician: Ariana Lucas APRN    Subjective   Subjective     CC:  CAD, transfer for CABG eval    HPI:  States that chest still feels tight.  Otherwise doing ok.       Objective   Objective     Vital Signs:   Temp:  [97.8 øF (36.6 øC)-99 øF (37.2 øC)] 99 øF (37.2 øC)  Heart Rate:  [70-97] 89  Resp:  [16-18] 18  BP: (100-134)/(71-96) 120/79     Physical Exam:  Constitutional: No acute distress, awake, alert; on heparin drip  HENT: NCAT, mucous membranes moist  Respiratory: Clear to auscultation bilaterally, respiratory effort normal   Cardiovascular: RRR, no murmurs, rubs, or gallops  Gastrointestinal: Positive bowel sounds, soft, nontender, nondistended  Musculoskeletal: No bilateral ankle edema  Psychiatric: Appropriate affect, cooperative  Neurologic: Oriented x 3, strength symmetric in all extremities, Cranial Nerves grossly intact to confrontation, speech clear  Skin: No rashes      Results Reviewed:  LAB RESULTS:      Lab 10/01/23  1048 10/01/23  0412 23  1346 23  0745 23  0257 23   WBC  --  10.52  --   --  10.15  --  9.7   HEMOGLOBIN  --  13.2  --   --  12.4*  --  14.2   HEMATOCRIT  --  40.1  --   --  37.6  --  43.4   PLATELETS  --  220  --   --  205  --  218   NEUTROS ABS  --  7.30*  --   --  7.20*  --  7.5   IMMATURE GRANS (ABS)  --  0.04  --   --  0.02  --  0.0   LYMPHS ABS  --  1.88  --   --  1.99  --  1.4*   MONOS ABS  --  0.90  --   --  0.72  --  0.5   EOS ABS  --  0.33  --   --  0.14  --   0.2   MCV  --  96.9  --   --  95.2  --  95.4   PROTIME  --  14.4  --   --  15.6* 13.8  --    APTT  --  29.0* 152.0* 70.3 50.2*  --   --    HEPARIN ANTI-XA 0.98* 1.00*  --  >1.10* >1.10*  --   --          Lab 10/01/23  0412 09/30/23  0538   SODIUM 141 143   POTASSIUM 4.2 4.1   CHLORIDE 103 106   CO2 24.0 22.4   ANION GAP 14.0 14.6   BUN 17 24*   CREATININE 1.11 1.13   EGFR 71.4 69.9   GLUCOSE 125* 154*   CALCIUM 8.9 9.4   MAGNESIUM 1.5*  --    HEMOGLOBIN A1C  --  7.10*   TSH  --  2.060             Lab 10/01/23  0412 09/30/23  0929 09/30/23  0745 09/30/23  0257 09/29/23  2205   HSTROP T  --  785* 642*  --   --    PROTIME 14.4  --   --  15.6* 13.8   INR 1.10  --   --  1.18* 1.08         Lab 09/30/23  0538   CHOLESTEROL 149   LDL CHOL 77   HDL CHOL 39*   TRIGLYCERIDES 194*             Brief Urine Lab Results  (Last result in the past 365 days)        Color   Clarity   Blood   Leuk Est   Nitrite   Protein   CREAT   Urine HCG        06/25/23 1603 Yellow   Clear   SMALL   Negative   Negative                     Microbiology Results Abnormal       None            Cardiac Catheterization/Vascular Study    Result Date: 9/30/2023  Severe three-vessel coronary artery disease CABG     XR CHEST PORTABLE    Result Date: 9/29/2023  EXAM: PORTABLE AP CHEST X-RAY INDICATION: dyspnea, COMPARISON: 6/25/2023 FINDINGS: Bilateral interstitial opacities are unchanged. There is no focal consolidation, pleural effusion, or pneumothorax. The cardiomediastinal silhouette is normal. The visible bony thorax is intact.    Impression: Unchanged diffuse bilateral interstitial opacities representing pulmonary edema versus chronic interstitial fibrosis.     Results for orders placed during the hospital encounter of 06/28/23    Adult Transthoracic Echo Complete W/ Cont if Necessary Per Protocol    Interpretation Summary  1.  Normal left ventricular size with low normal LV systolic function, LVEF 50-55%.  2.  Mild concentric LVH.  3.  Indeterminate LV  diastolic filling pattern.  4.  Normal right ventricular size and systolic function.  5.  Normal left atrial index.  6.  No significant valvular abnormalities.  7.  Negative bubble study with no evidence of intracardiac or intrapulmonary shunt.      Current medications:  Scheduled Meds:aspirin, 81 mg, Oral, Daily  atorvastatin, 80 mg, Oral, Nightly  insulin regular, 2-7 Units, Subcutaneous, Q6H  lisinopril, 10 mg, Oral, Daily  magnesium sulfate, 2 g, Intravenous, Q2H  metoprolol succinate XL, 25 mg, Oral, Q24H  [START ON 10/2/2023] pharmacy consult - [place], , Does not apply, Daily  senna-docusate sodium, 2 tablet, Oral, BID  sodium chloride, 10 mL, Intravenous, Q12H      Continuous Infusions:heparin, 9 Units/kg/hr, Last Rate: Stopped (10/01/23 1149)  Pharmacy to Dose Heparin,       PRN Meds:.  acetaminophen **OR** acetaminophen **OR** acetaminophen    ALPRAZolam    aluminum-magnesium hydroxide-simethicone    senna-docusate sodium **AND** polyethylene glycol **AND** bisacodyl **AND** bisacodyl    Calcium Replacement - Follow Nurse / BPA Driven Protocol    dextrose    dextrose    glucagon (human recombinant)    HYDROcodone-acetaminophen    ipratropium-albuterol    Magnesium Standard Dose Replacement - Follow Nurse / BPA Driven Protocol    Morphine **AND** naloxone    nitroglycerin    ondansetron **OR** ondansetron    Pharmacy to Dose Heparin    Phosphorus Replacement - Follow Nurse / BPA Driven Protocol    Potassium Replacement - Follow Nurse / BPA Driven Protocol    sodium chloride    sodium chloride    Assessment & Plan   Assessment & Plan     Active Hospital Problems    Diagnosis  POA    **Coronary artery disease [I25.10]  Yes    NSTEMI (non-ST elevated myocardial infarction) [I21.4]  Yes    Essential hypertension [I10]  Yes    Type 2 diabetes mellitus, without long-term current use of insulin [E11.9]  Yes      Resolved Hospital Problems   No resolved problems to display.        Brief Hospital Course to date:  Dann  Isaías is a 72 y.o. male     NSTEMI  -At [facility] troponins were 642 then 785.  EKG with inverted T waves anteriorly and nonspecific inferolateral ST changes.  Left heart cath showed severe three-vessel disease.  Cardiology recommended transfer to [facility].    -Patient currently without chest pain  -Continue heparin drip  -Continue aspirin and high intensity statin  -Continue telemetry  -Cardiology and cardiothoracic surgery following for ?CABG     Paroxysmal atrial fibrillation  -Diagnosed in June of this year during stroke admission.  He was started on Eliquis at that time  -Currently rate controlled  -Currently on heparin drip  -Continue metoprolol     Type 2 diabetes  -A1c 7.1  -On metformin and Jardiance at home  -Corrective insulin here  -Consider addition of GLP-1 antagonist upon discharge     Hypertension  -Continue lisinopril 10 mg     History of ischemic stroke with residual right upper extremity weakness  -ASA and statin     DVT prophylaxis: On heparin drip    Expected Discharge Location and Transportation:   Expected Discharge   Expected Discharge Date: 10/5/2023; Expected Discharge Time:      DVT prophylaxis:  Medical DVT prophylaxis orders are present.          CODE STATUS:   Code Status and Medical Interventions:   Ordered at: 10/01/23 0404     Code Status (Patient has no pulse and is not breathing):    CPR (Attempt to Resuscitate)     Medical Interventions (Patient has pulse or is breathing):    Full Support       Franklin Quesada MD  10/01/23        Electronically signed by Franklin Quesada MD at 10/01/23 1300          Consult Notes (last 48 hours)        Ameya Del Rio MD at 10/01/23 1012        Consult Orders    1. Inpatient Cardiology Consult [[id number]] ordered by Ameya Zamarripa MD at 10/01/23 0406                   Dann Metz  [id number]  1950   LOS: 0 days   Patient Care Team:  Ariana Lucas APRN as PCP - General (Family Medicine)  CARDIOLOGIST: Ravin Rushing  MD    Mr. 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    Chief Complaint: CAD, PAF    Problem List:  Coronary artery disease  Echocardiogram 6/29/2023: LVEF 50-55%, mild concentric LVH, no significant valvular abnormalities, negative bubble study  [facility] 25-hour admission for NSTEMI 9/30/2023  Left heart catheterization 9/30/2023 showing severe three-vessel CAD (90% LAD stenosis, occlusion of circumflex, occlusion of RCA), recommendations for CABG, transferred to [facility]  Anticipated CABG with timing per CTA  Paroxysmal atrial fibrillation  Diagnosed in setting of CVA June 2023, asymptomatic  CHADsVasc 6, on Eliquis  Hypertension  Hyperlipidemia; on statin therapy  Type 2 diabetes mellitus; hemoglobin A1c 9.3% June 2023, 7.1% September 2023  Carotid artery disease with carotid duplex June 2023 showing elevated velocities in the bilateral carotid systems with abnormal waveforms. Findings are concerning for cardiac pathology/aortoiliac pathology.   CVA June 2023  GERD    Allergies   Allergen Reactions    Penicillins Rash     Medications Prior to Admission   Medication Sig Dispense Refill Last Dose    apixaban (ELIQUIS) 5 MG tablet tablet Take 1 tablet by mouth 2 (Two) Times a Day.       Blood Glucose Monitoring Suppl (True Metrix Meter) w/Device kit        ezetimibe (ZETIA) 10 MG tablet Take 1 tablet by mouth Daily.       omeprazole (priLOSEC) 20 MG capsule Take 1 capsule by mouth Daily.       TRUEplus Lancets 33G misc         Scheduled Meds:aspirin, 81 mg, Oral, Daily  atorvastatin, 80 mg, Oral, Nightly  insulin regular, 2-7 Units, Subcutaneous, Q6H  lisinopril, 10 mg, Oral, Daily  magnesium sulfate, 2 g, Intravenous, Q2H  metoprolol succinate XL, 25 mg, Oral, Q24H  [START ON 10/2/2023] pharmacy consult - [facility], , Does not apply, Daily  senna-docusate sodium, 2 tablet, Oral, BID  sodium chloride, 10 mL, Intravenous, Q12H      Continuous Infusions:heparin, 12  Units/kg/hr, Last Rate: 12 Units/kg/hr (10/01/23 0426)  Pharmacy to Dose Heparin,            History of Present Illness:   Patient presented to [facility] 9/30/2023 for shortness of breath and weakness and had NSTEMI and heart catheterization showing severe three-vessel CAD, recommendations for CABG.  He was transferred to [facility] 10/1/2023.  The patient had noticed more shortness of breath and fatigue/weakness over the past few days, occasionally he would have palpitations and occasional diaphoresis.  He is not a smoker.  He had a stroke in June 2023 and was diagnosed with PAF at that time and started on Eliquis.  He has family history of CAD with his mother, CVA with his brothers x5, father, sister x2.  Troponins were 642, 785.  Patient was started on a heparin GTT.  Eliquis held on admission.  The patient is resting comfortably in bed without oxygen therapy.  He denies any chest pain, shortness of breath, palpitations, dizziness, or edema.  Occasionally he will have some right arm pain and says that this was the side that was affected by his prior stroke.  He says he is a little nervous for his upcoming surgery.    Cardiac risk factors: advanced age (older than 55 for men, 65 for women), diabetes mellitus, dyslipidemia, hypertension, male gender, and sedentary lifestyle.    Social History     Socioeconomic History    Marital status:    Tobacco Use    Smoking status: Never    Smokeless tobacco: Never   Vaping Use    Vaping Use: Never used   Substance and Sexual Activity    Alcohol use: Never    Drug use: Never    Sexual activity: Defer     Family History   Problem Relation Age of Onset    Heart disease Mother     Stroke Father     Stroke Sister     Stroke Sister     Stroke Brother     Stroke Brother     Stroke Brother     Stroke Brother     Stroke Brother        Review of Systems  10 point review of systems was completed, positives outlined in the HPI, and otherwise all other systems are negative.      Objective:       Objective Physical Exam  /79 (BP Location: Right arm, Patient Position: Lying)   Pulse 89   Temp 99 øF (37.2 øC) (Oral)   Resp 18   Wt 63.3 kg (139 lb 9.6 oz)   SpO2 97%   BMI 24.73 kg/mý       10/01/23  0405   Weight: 63.3 kg (139 lb 9.6 oz)     Body mass index is 24.73 kg/mý.    Intake/Output Summary (Last 24 hours) at 10/1/2023 1012  Last data filed at 10/1/2023 0854  Gross per 24 hour   Intake 20 ml   Output 200 ml   Net -180 ml       General Appearance:  Alert, cooperative, no distress, appears stated age   Head:  Normocephalic, without obvious abnormality, atraumatic   Neck: Supple, symmetrical, trachea midline, no adenopathy, thyroid: not enlarged, symmetric, no tenderness/mass/nodules, no carotid bruit or JVD   Lungs:   Clear to auscultation bilaterally, respirations unlabored   Heart:  Regular rate and rhythm, S1, S2 normal, no murmur, rub or gallop   Abdomen:   Soft, nontender, no masses, no organomegaly, bowel sounds audible x4   Extremities: No edema, normal range of motion   Pulses: 2+ and symmetric   Skin: Skin color, texture, turgor normal, no rashes or lesions   Neurologic: Normal       Cardiographics  EKG 9/30/2023:  Normal sinus rhythm  Anteroseptal infarct (cited on or before 28-JUN-2023)  ST & T wave abnormality, consider inferolateral ischemia  Abnormal ECG  When compared with ECG of 28-JUN-2023 16:48,  Questionable change in initial forces of Anteroseptal leads  Non-specific change in ST segment in Anterior leads  Inverted T waves have replaced nonspecific T wave abnormality in Inferior leads    6/29/23 - Transthoracic Echo Complete W/ Cont if Necessary Per Protocol  1.  Normal left ventricular size with low normal LV systolic function, LVEF 50-55%.  2.  Mild concentric LVH.  3.  Indeterminate LV diastolic filling pattern.  4.  Normal right ventricular size and systolic function.  5.  Normal left atrial index.  6.  No significant valvular abnormalities.  7.  Negative bubble study  with no evidence of intracardiac or intrapulmonary shunt.     Imaging    6/29/23 - US carotid  Right carotid system:  CCA: 160 cm/s  ICA: -128 cm/s  ECA: 98.5 cm/s  Vertebral artery: Antegrade  ICA/CCA ratio: 1.02  Mild plaque is identified at the bifurcation.     Left carotid system:  CCA: 151 cm/s  ICA: -252 cm/s  ECA: -110 cm/s  Vertebral artery: Antegrade  ICA/CCA ratio: 1.67  Mild plaque is identified at the bifurcation.     IMPRESSION:  Elevated velocities in the bilateral carotid systems with abnormal  waveforms. Findings are concerning for cardiac pathology/aortoiliac  pathology. Recommend correlation clinically. Consider echocardiography.      6/25/23 - [facility] Head and Neck  ANTERIOR CIRCULATION: There is some mild calcific plaque in the right cavernous   carotid artery. No flow significant stenosis seen. The intracranial internal   carotid arteries, anterior cerebral arteries, and middle cerebral arteries   demonstrate no occlusion or stenosis. No evidence for aneurysm or arteriovenous   malformation.     POSTERIOR CIRCULATION: The bilateral vertebral arteries, basilar artery and   posterior cerebral arteries demonstrate no occlusion or stenosis. No evidence   for aneurysm or arteriovenous malformation.     AORTIC ARCH: Standard three-vessel aortic arch anatomy is present. Origins of   the great vessels are patent. There is some minor plaque in the aortic arch.     INNOMINATE ARTERY: Patent, without focal stenosis.     RIGHT SUBCLAVIAN ARTERY: Patent, without focal stenosis.     RIGHT VERTEBRAL ARTERY: Patent in the neck.     RIGHT CAROTID ARTERY: Common carotid artery and carotid bifurcation are widely   patent. ICAs patent in the neck.     LEFT SUBCLAVIAN ARTERY: Patent, without significant focal stenosis.     LEFT VERTEBRAL ARTERY: Patent in the neck.     LEFT CAROTID ARTERY: Common carotid artery and carotid bifurcation are patent.   There is some minimal plaque posteriorly at the bifurcation, without  flow   significant stenosis.     Lab Review   Results from last 7 days   Lab Units 10/01/23  0412 09/30/23  0538   SODIUM mmol/L 141 143   POTASSIUM mmol/L 4.2 4.1   CHLORIDE mmol/L 103 106   CO2 mmol/L 24.0 22.4   BUN mg/dL 17 24*   CREATININE mg/dL 1.11 1.13   GLUCOSE mg/dL 125* 154*   CALCIUM mg/dL 8.9 9.4     Results from last 7 days   Lab Units 10/01/23  0412 09/30/23  0257 09/29/23  2053   WBC 10*3/mm3 10.52 10.15 9.7   HEMOGLOBIN g/dL 13.2 12.4* 14.2   HEMATOCRIT % 40.1 37.6 43.4   PLATELETS 10*3/mm3 220 205 218     Results from last 7 days   Lab Units 09/30/23  0538   CHOLESTEROL mg/dL 149   TRIGLYCERIDES mg/dL 194*   HDL CHOL mg/dL 39*   LDL CHOL mg/dL 77     Results from last 7 days   Lab Units 09/30/23  0538   HEMOGLOBIN A1C % 7.10*     Results from last 7 days   Lab Units 09/30/23  0929 09/30/23  0745   HSTROP T ng/L 785* 642*   Heparin GTT at 12 units/kg/h      Assessment:   Patient with multivessel CAD and PAF.  Eliquis was held, continue heparin GTT.  Plans for upcoming CABG per CTS schedule.  Assessment       Plan:   Continue heparin GTT  CABG per CTS schedule  Continue atorvastatin 80 mg nightly, aspirin 81 mg daily, lisinopril 10 mg daily, metoprolol succinate 25 mg daily.  Cardiac diet today     Scribed for NIKKO Del Rio MD by Ruthann Swartz, JANNY. 10/1/2023  10:42 EDT     I personally performed the services described in this documentation as scribed by the above named individual in my presence, and it is both accurate and complete.    PRANEETH Del Rio MD  10/01/23  12:25 EDT         Electronically signed by Ameya Del Rio MD at 10/01/23 1227       Tirso Paiz MD at 10/01/23 0728        Consult Orders    1. Inpatient Cardiothoracic Surgery Consult [[id number]] ordered by Ameya Zamarripa MD at 10/01/23 0406                 Consult Note  Dann Metz  [id number]  1950    Referring Provider:  Reason for Consultation: Coronary artery disease    Patient Care Team:  Ariana Lucas  APRN as PCP - General (Family Medicine)    Chief complaint: Non-ST elevated MI      History of present illness: 72-year-old male transferred from [facility] undergoing cardiac catheterization found to have multivessel coronary artery disease transferred here for further treatment of care with coronary artery bypass grafting and coronary vascularization.  He has recent history of a CVA, atrial fibs, diabetes type 2, and hypertension.  He was found to have elevated at bedtime troponin levels EKG shows inferolateral ischemia with some nonspecific ST-T wave abnormalities and T waves changes inferiorly  Does not smoke and does not drink alcohol.  No previous surgical history  Review of Systems    The following systems were reviewed and negative;  Neuro: No CVA or TIA symptoms or seizure disorder.  Constitutional: Weakness hematologic:  No bleeding disorders or DVT.  Endocrine:   No tremor, fatigue, weight loss or weight gain.  GI: No indigestion or difficulty swallowing; normal bowel movements.  : No hematuria or frequent urination.  Pulmonary:No shortness of breath, hemoptysis or chronic cough.  Cardiovascular: See HPI  HEENT: No blurred vision, glaucoma, hearing loss or nosebleed.  Musculoskeletal: No joint pain or back pain.    All other review of systems is negative    History  Past Medical History:   Diagnosis Date    Coronary artery disease     Diabetes mellitus     Hypertension     Stroke    ,   Past Surgical History:   Procedure Laterality Date    CARDIAC CATHETERIZATION     ,   Family History   Problem Relation Age of Onset    Heart disease Mother     Stroke Father     Stroke Sister     Stroke Sister     Stroke Brother     Stroke Brother     Stroke Brother     Stroke Brother     Stroke Brother    ,   Social History     Tobacco Use    Smoking status: Never    Smokeless tobacco: Never   Vaping Use    Vaping Use: Never used   Substance Use Topics    Alcohol use: Never    Drug use: Never   ,    Medications Prior to Admission   Medication Sig Dispense Refill Last Dose    apixaban (ELIQUIS) 5 MG tablet tablet Take 1 tablet by mouth 2 (Two) Times a Day.       Blood Glucose Monitoring Suppl (True Metrix Meter) w/Device kit        ezetimibe (ZETIA) 10 MG tablet Take 1 tablet by mouth Daily.       omeprazole (priLOSEC) 20 MG capsule Take 1 capsule by mouth Daily.       TRUEplus Lancets 33G misc          Scheduled Meds:  aspirin, 81 mg, Oral, Daily  atorvastatin, 80 mg, Oral, Nightly  insulin regular, 2-7 Units, Subcutaneous, Q6H  lisinopril, 10 mg, Oral, Daily  magnesium sulfate, 2 g, Intravenous, Q2H  metoprolol succinate XL, 25 mg, Oral, Q24H  [START ON 10/2/2023] pharmacy consult - [facility], , Does not apply, Daily  senna-docusate sodium, 2 tablet, Oral, BID  sodium chloride, 10 mL, Intravenous, Q12H       Continuous Infusions:  heparin, 12 Units/kg/hr, Last Rate: 12 Units/kg/hr (10/01/23 0426)  Pharmacy to Dose Heparin,        PRN Meds:    acetaminophen **OR** acetaminophen **OR** acetaminophen    ALPRAZolam    aluminum-magnesium hydroxide-simethicone    senna-docusate sodium **AND** polyethylene glycol **AND** bisacodyl **AND** bisacodyl    Calcium Replacement - Follow Nurse / BPA Driven Protocol    dextrose    dextrose    glucagon (human recombinant)    HYDROcodone-acetaminophen    ipratropium-albuterol    Magnesium Standard Dose Replacement - Follow Nurse / BPA Driven Protocol    Morphine **AND** naloxone    nitroglycerin    ondansetron **OR** ondansetron    Pharmacy to Dose Heparin    Phosphorus Replacement - Follow Nurse / BPA Driven Protocol    Potassium Replacement - Follow Nurse / BPA Driven Protocol    sodium chloride    sodium chloride and Allergies:  Penicillins    Objective     Vital Sign Min/Max for last 24 hours  Temp  Min: 97.8 øF (36.6 øC)  Max: 98.2 øF (36.8 øC)   BP  Min: 100/71  Max: 136/91   Pulse  Min: 70  Max: 97   Resp  Min: 8  Max: 20   SpO2  Min: 85 %  Max: 98 %   No data recorded    Weight  Min: 63.3 kg (139 lb 9.6 oz)  Max: 63.3 kg (139 lb 9.6 oz)     Flowsheet Rows      Flowsheet Row First Filed Value   Admission Height --   Admission Weight 63.3 kg (139 lb 9.6 oz) Documented at 10/01/2023 0405            Physical Exam:     General Appearance:    Alert, cooperative, in no acute distress   Head:    Normocephalic, without obvious abnormality, atraumatic   Eyes:            Lids and lashes normal, conjunctivae and sclerae normal,   no icterus, no pallor, corneas clear, PERRLA   Ears:    Ears appear intact with no abnormalities noted   Throat:   No oral lesions, no thrush, oral mucosa moist   Neck:   No adenopathy, supple, trachea midline, no thyromegaly, no carotid bruit, no JVD   Back:   No kyphosis present, no scoliosis present, no skin lesions, erythema or scars, no tenderness to percussion or                palpation, range of motion normal   Lungs:     Clear to auscultation,respirations regular, even and                unlabored    Heart:    Regular rhythm and normal rate, normal S1 and S2, no         murmur, no gallop, no rub, no click   Chest Wall:    No abnormalities observed   Abdomen:     Normal bowel sounds, no masses, no organomegaly, soft     nontender, nondistended, no guarding, no rebound               tenderness   Rectal:     Deferred   Extremities:   Moves all extremities well, no edema, no cyanosis, no           redness   Pulses:   Pulses palpable and equal bilaterally   Skin:   No bleeding, bruising or rash   Lymph nodes:   No palpable adenopathy   Neurologic:   Cranial nerves 2 - 12 grossly intact, sensation intact, DTR     present and equal bilaterally             Assessment & Plan       Coronary artery disease    Essential hypertension    Type 2 diabetes mellitus, without long-term current use of insulin    NSTEMI (non-ST elevated myocardial infarction)      P2 Y12 pending  Carotid duplex complete  Narrative & Impression   PROCEDURE: US CAROTID BILATERAL-     HISTORY:  stroke protocol, diabetic, hypertensive, dizziness     Technique: Real-time imaging was performed of the extracranial carotid  arteries in transverse and longitudinal planes, with color duplex  evaluation of blood flow velocity. Spectral analysis was performed. The  cervicovertebral arteries were also examined. Stenosis evaluation is  based on validated velocity criteria.     Right carotid system:  CCA: 160 cm/s  ICA: -128 cm/s  ECA: 98.5 cm/s  Vertebral artery: Antegrade  ICA/CCA ratio: 1.02  Mild plaque is identified at the bifurcation.     Left carotid system:  CCA: 151 cm/s  ICA: -252 cm/s  ECA: -110 cm/s  Vertebral artery: Antegrade  ICA/CCA ratio: 1.67  Mild plaque is identified at the bifurcation.        IMPRESSION:  Elevated velocities in the bilateral carotid systems with abnormal  waveforms. Findings are concerning for cardiac pathology/aortoiliac  pathology. Recommend correlation clinically. Consider echocardiography.  Dr. Paiz assessment.  I have examined the patient.  I have taken a history from the patient.  I concur with the above documentation.  I discussed the case in detail with Dr. Clarke breeding cardiologist at [facility].  He performed the cardiac catheterization.  I have obtained and reviewed the cardiac catheterization films.  He has a 90% LAD stenosis.  He has occlusion of his circumflex.  He has an occlusion of his right coronary artery.  I do believe that coronary bypass surgery is his best option.  I discussed that in detail with him.  I discussed the operation, risk, and alternatives.  He appears to be fully informed desires to proceed.  Like thank you for this consultation.       I discussed the patient's findings and my recommendations with patient  Coronary artery disease of the native vessels with a history of a CVA and ongoing diabetes mellitus type 2  May need further work-up with arteriogram on the carotids    Please note that portions of this note were completed with a voice  recognition program. Efforts were made to edit the dictations, but words may be mistranscribed    Raheem Jones PA-C  10/01/23  07:28 EDT                Electronically signed by Tirso Paiz MD at 10/01/23 7531

## 2023-10-02 NOTE — PROGRESS NOTES
HEPARIN INFUSION  Dann Metz is a  72 y.o. male receiving heparin infusion.     Therapy for (VTE/Cardiac): Cardiac  Patient Weight: 64.5 kg  Initial Bolus (Y/N): No  Any Bolus (Y/N):  Yes     Signs or Symptoms of Bleeding: none noted at present     Cardiac or Other (Not VTE)   Initial Bolus: 60 units/kg (Max 4,000 units)  Initial rate: 12 units/kg/hr (Max 1,000 units/hr)   Anti Xa Rebolus Infusion Hold time Change infusion Dose (Units/kg/hr) Next Anti Xa or aPTT Level Due   < 0.11 50 Units/kg  (4000 Units Max) None Increase by  3 Units/kg/hr 6 hours   0.11- 0.19 25 Units/kg  (2000 Units Max) None Increase by  2 Units/kg/hr 6 hours   0.2 - 0.29 0 None Increase by  1 Units/kg/hr 6 hours   0.3 - 0.5 0 None No Change 6 hours (after 2 consecutive levels in range check qAM)   0.51 - 0.6 0 None Decrease by  1 Units/kg/hr 6 hours   0.61 - 0.8 0 30 Minutes Decrease by  2 Units/kg/hr 6 hours   0.81 - 1 0 60 Minutes Decrease by  3 Units/kg/hr 6 hours   >1 0 Hold  After Anti Xa less than 0.5 decrease previous rate by  4 Units/kg/hr  Every 2 hours until Anti Xa  less than 0.5 then when infusion restarts in 6 hours     Recommend Xa every 6 hours.     Results from last 7 days   Lab Units 10/02/23  0317 10/01/23  0412 09/30/23  0257 09/29/23  2205   INR   --  1.10 1.18* 1.08   HEMOGLOBIN g/dL 12.6* 13.2 12.4*  --    HEMATOCRIT % 38.3 40.1 37.6  --    PLATELETS 10*3/mm3 212 220 205  --        Date   Time   Anti-Xa Current Rate (Unit/kg/hr) Bolus   (Units) Rate Change   (Unit/kg/hr) New Rate (Unit/kg/hr) Next   Anti-Xa Comments  Pump Check Daily   10/1 0400 1.0 new -- 12 12 1000 D/w RN, was on heparin drip at OSH but has been off since 9/30 afternoon   10/1 1048 0.98 12 -- -3 9 1800 Hold 1 hr. DW RN. Pump verified.   10/1 1815 0.92 9 -- Hold x 1 hr then -3 6 0300 D/w YANICK Becerra   10/2 0317 0.59 6 -- -1 5 1100 D/w RN   10/2 1047 0.47 5 -- -- 5 1700 D/w RN                                                                                                                                                                                      Jayde Lewis, PharmD  Pharmacy Resident  10/2/2023  11:25 EDT

## 2023-10-02 NOTE — CONSULTS
Diabetes Education    Patient Name:  Dann Metz  YOB: 1950  MRN: [id number]  Admit Date:  10/1/2023        Attempted to see pt for DM education x2 today.  Mr. Metz was on the phone with family and asked that I return later.  We will follow-up tomorrow for educational needs.  CABG planned for 10/6.      Electronically signed by:  Petra Stallworth RN  10/02/23 15:53 EDT

## 2023-10-03 LAB
ANION GAP SERPL CALCULATED.3IONS-SCNC: 14 MMOL/L (ref 5–15)
BUN SERPL-MCNC: 21 MG/DL (ref 8–23)
BUN/CREAT SERPL: 22.8 (ref 7–25)
CALCIUM SPEC-SCNC: 8.7 MG/DL (ref 8.6–10.5)
CHLORIDE SERPL-SCNC: 103 MMOL/L (ref 98–107)
CO2 SERPL-SCNC: 19 MMOL/L (ref 22–29)
CREAT SERPL-MCNC: 0.92 MG/DL (ref 0.76–1.27)
EGFRCR SERPLBLD CKD-EPI 2021: 89.5 ML/MIN/1.73
GLUCOSE BLDC GLUCOMTR-MCNC: 120 MG/DL (ref 70–130)
GLUCOSE BLDC GLUCOMTR-MCNC: 178 MG/DL (ref 70–130)
GLUCOSE BLDC GLUCOMTR-MCNC: 180 MG/DL (ref 70–130)
GLUCOSE BLDC GLUCOMTR-MCNC: 247 MG/DL (ref 70–130)
GLUCOSE SERPL-MCNC: 112 MG/DL (ref 65–99)
MAGNESIUM SERPL-MCNC: 2 MG/DL (ref 1.6–2.4)
PA ADP PRP-ACNC: 161 PRU
POTASSIUM SERPL-SCNC: 4.2 MMOL/L (ref 3.5–5.2)
SODIUM SERPL-SCNC: 136 MMOL/L (ref 136–145)
UFH PPP CHRO-ACNC: 0.4 IU/ML (ref 0.3–0.7)

## 2023-10-03 PROCEDURE — 85520 HEPARIN ASSAY: CPT

## 2023-10-03 PROCEDURE — 99232 SBSQ HOSP IP/OBS MODERATE 35: CPT

## 2023-10-03 PROCEDURE — 63710000001 INSULIN LISPRO (HUMAN) PER 5 UNITS: Performed by: INTERNAL MEDICINE

## 2023-10-03 PROCEDURE — 85576 BLOOD PLATELET AGGREGATION: CPT | Performed by: PHYSICIAN ASSISTANT

## 2023-10-03 PROCEDURE — 99232 SBSQ HOSP IP/OBS MODERATE 35: CPT | Performed by: INTERNAL MEDICINE

## 2023-10-03 PROCEDURE — 83735 ASSAY OF MAGNESIUM: CPT | Performed by: STUDENT IN AN ORGANIZED HEALTH CARE EDUCATION/TRAINING PROGRAM

## 2023-10-03 PROCEDURE — 97162 PT EVAL MOD COMPLEX 30 MIN: CPT

## 2023-10-03 PROCEDURE — 82948 REAGENT STRIP/BLOOD GLUCOSE: CPT

## 2023-10-03 PROCEDURE — 80048 BASIC METABOLIC PNL TOTAL CA: CPT | Performed by: STUDENT IN AN ORGANIZED HEALTH CARE EDUCATION/TRAINING PROGRAM

## 2023-10-03 RX ADMIN — LISINOPRIL 10 MG: 10 TABLET ORAL at 10:42

## 2023-10-03 RX ADMIN — SENNOSIDES AND DOCUSATE SODIUM 2 TABLET: 50; 8.6 TABLET ORAL at 10:42

## 2023-10-03 RX ADMIN — INSULIN LISPRO 2 UNITS: 100 INJECTION, SOLUTION INTRAVENOUS; SUBCUTANEOUS at 21:07

## 2023-10-03 RX ADMIN — Medication 10 ML: at 10:43

## 2023-10-03 RX ADMIN — INSULIN LISPRO 3 UNITS: 100 INJECTION, SOLUTION INTRAVENOUS; SUBCUTANEOUS at 17:54

## 2023-10-03 RX ADMIN — HYDROCODONE BITARTRATE AND ACETAMINOPHEN 1 TABLET: 5; 325 TABLET ORAL at 21:06

## 2023-10-03 RX ADMIN — ALPRAZOLAM 0.25 MG: 0.25 TABLET ORAL at 21:06

## 2023-10-03 RX ADMIN — METOPROLOL SUCCINATE 25 MG: 25 TABLET, EXTENDED RELEASE ORAL at 10:42

## 2023-10-03 RX ADMIN — ATORVASTATIN CALCIUM 80 MG: 40 TABLET, FILM COATED ORAL at 21:06

## 2023-10-03 RX ADMIN — HYDROCODONE BITARTRATE AND ACETAMINOPHEN 1 TABLET: 5; 325 TABLET ORAL at 10:42

## 2023-10-03 RX ADMIN — ASPIRIN 81 MG: 81 TABLET, COATED ORAL at 10:42

## 2023-10-03 NOTE — PROGRESS NOTES
[facility] Cardiothoracic Surgery In-Patient Progress Note       LOS: 2 days     Chief Complaint: Coronary artery disease    Subjective  Denies chest pain or shortness of breath.    Objective  Vital Signs  Temp:  [98 øF (36.7 øC)-98.2 øF (36.8 øC)] 98.2 øF (36.8 øC)  Heart Rate:  [58-85] 67  Resp:  [16-18] 18  BP: (126-136)/() 126/80        Physical Exam:   General Appearance: alert, appears stated age and cooperative   Lungs: clear to auscultation, respirations regular, respirations even, and respirations unlabored   Heart: regular rhythm & normal rate, normal S1, S2, no murmur, no gallop, no rub, and no click     Results     Results from last 7 days   Lab Units 10/02/23  0317   WBC 10*3/mm3 8.10   HEMOGLOBIN g/dL 12.6*   HEMATOCRIT % 38.3   PLATELETS 10*3/mm3 212       Results from last 7 days   Lab Units 10/02/23  0317   SODIUM mmol/L 140   POTASSIUM mmol/L 4.0   CHLORIDE mmol/L 104   CO2 mmol/L 25.0   BUN mg/dL 20   CREATININE mg/dL 1.00   GLUCOSE mg/dL 101*   CALCIUM mg/dL 8.4*       Assessment    Coronary artery disease    Essential hypertension    Type 2 diabetes mellitus, without long-term current use of insulin    NSTEMI (non-ST elevated myocardial infarction)      Plan   Continue with preoperative work-up and heparin drip  P2Y12 161 today  Tentatively on for CABG with EVH and DIANNA with Dr. Mora on Friday, 10/6      Lizzie Navarrete, APRN  10/03/23  07:53 EDT    --    I reviewed this documentation. I interviewed and examined this patient this morning. Plan as documented.     Guero Mora M.D., R.P.V.I.  Cardiothoracic and Vascular Surgeon  [facility]

## 2023-10-03 NOTE — CONSULTS
"Diabetes Education  Assessment/Teaching    Patient Name:  Dann Metz  YOB: 1950  MRN: [id number]  Admit Date:  10/1/2023      Assessment Date:  10/3/2023  Flowsheet Row Most Recent Value   General Information     Referral From: MD gilbert   Height 160 cm (63\")   Weight 62.6 kg (138 lb)   Weight Method Standing scale   Pregnancy Assessment    Diabetes History    What type of diabetes do you have? Type 2   Length of Diabetes Diagnosis 10 + years   Current DM knowledge poor   Have you had diabetes education/teaching in the past? no   Do you test your blood sugar at home? yes   Frequency of checks Maybe once per month   Who performs the test? girlfriend   Typical readings Unable to recall   Education Preferences    What areas of diabetes would you like to learn about? other (comment)  [Nothing at this time]   Nutrition Information    Assessment Topics    Healthy Eating - Assessment Needs education   Problem Solving - Assessment Needs education   Reducing Risk - Assessment Needs education   Healthy Coping - Assessment Needs education   DM Goals             Flowsheet Row Most Recent Value   DM Education Needs    Meter Has own   Frequency of Testing 2 times a day   Blood Glucose Target Range Ranges per ADA guidelines   Medication Oral, Actions, Side effects   Problem Solving Hypoglycemia, Hyperglycemia, Sick days, Signs, Symptoms, Treatment   Reducing Risks A1C testing   Physical Activity Walking   Physical Activity Frequency Occasionally   Healthy Coping Appropriate   Discharge Plan Home   Motivation Moderate   Teaching Method Explanation, Discussion, Handouts, Teach back   Patient Response Needs reinforcement          Mr. Metz gave permission for diabetes education. His current A1c is 7.1%. Discussed what is an A1c, as he states he didn't know, and how he is well controlled. He takes jardiance and metformin for glycemic control. He shares his girlfriend will check his BG \"maybe once a month\", " because he doesn't like needles and can't do it. Discussed that post op he will need to have it checked more often, suggested 2X daily. To contact his PCP if elevated >200 more than 2 times. Stressed importance of keeping his BG controlled to prevent infection and promote healing of incision. Discussed and taught about type 2 diabetes self-management, risk factors, and importance of blood glucose control to reduce complications. Target blood glucose readings and A1c goals per ADA were reviewed. Signs, symptoms and treatment of hyperglycemia and hypoglycemia were discussed. Handouts provided: [facility] What is Diabetes?, A1c goals, BG goals. Thank you for this referral.              Electronically signed by:  Anais North RN [facility]  10/03/23 13:10 EDT

## 2023-10-03 NOTE — PROGRESS NOTES
HEPARIN INFUSION  Dann Metz is a  72 y.o. male receiving heparin infusion.     Therapy for (VTE/Cardiac): Cardiac  Patient Weight: 64.5 kg  Initial Bolus (Y/N): No  Any Bolus (Y/N):  Yes     Signs or Symptoms of Bleeding: none noted at present     Cardiac or Other (Not VTE)   Initial Bolus: 60 units/kg (Max 4,000 units)  Initial rate: 12 units/kg/hr (Max 1,000 units/hr)   Anti Xa Rebolus Infusion Hold time Change infusion Dose (Units/kg/hr) Next Anti Xa or aPTT Level Due   < 0.11 50 Units/kg  (4000 Units Max) None Increase by  3 Units/kg/hr 6 hours   0.11- 0.19 25 Units/kg  (2000 Units Max) None Increase by  2 Units/kg/hr 6 hours   0.2 - 0.29 0 None Increase by  1 Units/kg/hr 6 hours   0.3 - 0.5 0 None No Change 6 hours (after 2 consecutive levels in range check qAM)   0.51 - 0.6 0 None Decrease by  1 Units/kg/hr 6 hours   0.61 - 0.8 0 30 Minutes Decrease by  2 Units/kg/hr 6 hours   0.81 - 1 0 60 Minutes Decrease by  3 Units/kg/hr 6 hours   >1 0 Hold  After Anti Xa less than 0.5 decrease previous rate by  4 Units/kg/hr  Every 2 hours until Anti Xa  less than 0.5 then when infusion restarts in 6 hours     Recommend Xa every 6 hours.     Results from last 7 days   Lab Units 10/02/23  0317 10/01/23  0412 09/30/23  0257 09/29/23  2205   INR   --  1.10 1.18* 1.08   HEMOGLOBIN g/dL 12.6* 13.2 12.4*  --    HEMATOCRIT % 38.3 40.1 37.6  --    PLATELETS 10*3/mm3 212 220 205  --        Date   Time   Anti-Xa Current Rate (Unit/kg/hr) Bolus   (Units) Rate Change   (Unit/kg/hr) New Rate (Unit/kg/hr) Next   Anti-Xa Comments  Pump Check Daily   10/1 0400 1.0 new -- 12 12 1000 D/w RN, was on heparin drip at OSH but has been off since 9/30 afternoon   10/1 1048 0.98 12 -- -3 9 1800 Hold 1 hr. DW RN. Pump verified.   10/1 1815 0.92 9 -- Hold x 1 hr then -3 6 0300 D/w YANICK Becerra   10/2 0317 0.59 6 -- -1 5 1100 D/w RN   10/2 1047 0.47 5 -- -- 5 1700 D/w RN   10/2 1812 0.47 5 -- -- 5 0600 DW RN   10/3 0843 0.40 5 -- -- 5 10/4 AM  D/w RN, pump verified                                                                                                                                                               Jayde Lewis, PharmD  Pharmacy Resident  10/3/2023  09:43 EDT

## 2023-10-03 NOTE — PROGRESS NOTES
[facility] Medicine Services  PROGRESS NOTE    Patient Name: Dann Metz  : 1950  MRN: [id number]    Date of Admission: 10/1/2023  Primary Care Physician: Ariana Lucas APRN    Subjective   Subjective     CC:  CAD, transfer for CABG eval    HPI:  Pt resting in bed. No issues overnight.     Objective   Objective     Vital Signs:   Temp:  [98 øF (36.7 øC)-98.2 øF (36.8 øC)] 98.2 øF (36.8 øC)  Heart Rate:  [58-85] 71  Resp:  [16-18] 16  BP: (108-136)/() 108/72     Physical Exam:  Constitutional: No acute distress, awake, alert  HENT: NCAT, mucous membranes moist  Respiratory: Clear to auscultation bilaterally, respiratory effort normal   Cardiovascular: RRR, no murmurs, rubs, or gallops  Gastrointestinal: soft, nontender, nondistended  Musculoskeletal: No bilateral ankle edema  Psychiatric: Appropriate affect, cooperative  Neurologic: Oriented x 3, speech clear, no focal deficits  Skin: No rashes        Results Reviewed:  LAB RESULTS:      Lab 10/03/23  0843 10/02/23  1812 10/02/23  1047 10/02/23  0317 10/01/23  1815 10/01/23  1048 10/01/23  0412 23  1346 23  0745 23  0257 23  0257 23  2205 23   WBC  --   --   --  8.10  --   --  10.52  --   --   --  10.15  --  9.7   HEMOGLOBIN  --   --   --  12.6*  --   --  13.2  --   --   --  12.4*  --  14.2   HEMATOCRIT  --   --   --  38.3  --   --  40.1  --   --   --  37.6  --  43.4   PLATELETS  --   --   --  212  --   --  220  --   --   --  205  --  218   NEUTROS ABS  --   --   --  4.35  --   --  7.30*  --   --   --  7.20*  --  7.5   IMMATURE GRANS (ABS)  --   --   --  0.02  --   --  0.04  --   --   --  0.02  --  0.0   LYMPHS ABS  --   --   --  2.47  --   --  1.88  --   --   --  1.99  --  1.4*   MONOS ABS  --   --   --  0.73  --   --  0.90  --   --   --  0.72  --  0.5   EOS ABS  --   --   --  0.47*  --   --  0.33  --   --   --  0.14  --  0.2   MCV  --   --   --  97.7*  --   --  96.9  --   --    --  95.2  --  95.4   PROTIME  --   --   --   --   --   --  14.4  --   --   --  15.6* 13.8  --    APTT  --   --   --   --   --   --  29.0* 152.0* 70.3  --  50.2*  --   --    HEPARIN ANTI-XA 0.40 0.47 0.47 0.59 0.92*   < > 1.00*  --  >1.10*   < > >1.10*  --   --     < > = values in this interval not displayed.         Lab 10/03/23  0644 10/02/23  0317 10/01/23  1815 10/01/23  0412 09/30/23  0538   SODIUM 136 140  --  141 143   POTASSIUM 4.2 4.0  --  4.2 4.1   CHLORIDE 103 104  --  103 106   CO2 19.0* 25.0  --  24.0 22.4   ANION GAP 14.0 11.0  --  14.0 14.6   BUN 21 20  --  17 24*   CREATININE 0.92 1.00  --  1.11 1.13   EGFR 89.5 81.0  --  71.4 69.9   GLUCOSE 112* 101*  --  125* 154*   CALCIUM 8.7 8.4*  --  8.9 9.4   MAGNESIUM 2.0 2.3 2.8* 1.5*  --    HEMOGLOBIN A1C  --   --   --   --  7.10*   TSH  --   --   --   --  2.060             Lab 10/01/23  0412 09/30/23  0929 09/30/23  0745 09/30/23  0257 09/29/23  2205   HSTROP T  --  785* 642*  --   --    PROTIME 14.4  --   --  15.6* 13.8   INR 1.10  --   --  1.18* 1.08         Lab 09/30/23  0538   CHOLESTEROL 149   LDL CHOL 77   HDL CHOL 39*   TRIGLYCERIDES 194*             Brief Urine Lab Results  (Last result in the past 365 days)        Color   Clarity   Blood   Leuk Est   Nitrite   Protein   CREAT   Urine HCG        06/25/23 1603 Yellow   Clear   SMALL   Negative   Negative                     Microbiology Results Abnormal       None            Adult Transthoracic Echo Complete W/ Cont if Necessary Per Protocol    Result Date: 10/2/2023    Left ventricular systolic function is low normal. Calculated left ventricular EF = 50.7% Normal left ventricular cavity size noted. Left ventricular wall thickness is consistent with mild concentric hypertrophy. Left ventricular diastolic function is consistent with (grade I) impaired relaxation.   Normal right ventricular cavity size, wall thickness, systolic function and septal motion noted.   Left atrial volume is mildly increased    The aortic valve appears trileaflet. Trace aortic valve regurgitation is present. No hemodynamically significant aortic valve stenosis is present.   Mitral annular calcification is present. Mild mitral valve regurgitation is present. No significant mitral valve stenosis is present.   The tricuspid valve is grossly normal in structure. Mild to moderate tricuspid valve regurgitation is present. Estimated right ventricular systolic pressure from tricuspid regurgitation is mildly elevated (35-45 mmHg).   Aortic root = 3.8 cm     Duplex Carotid Ultrasound CAR    Result Date: 10/2/2023    Right internal carotid artery demonstrates a less than 50% stenosis.   Left internal carotid artery demonstrates a less than 50% stenosis.   Antegrade flow in the vertebral arteries bilaterally.      Results for orders placed during the hospital encounter of 10/01/23    Adult Transthoracic Echo Complete W/ Cont if Necessary Per Protocol    Interpretation Summary    Left ventricular systolic function is low normal. Calculated left ventricular EF = 50.7% Normal left ventricular cavity size noted. Left ventricular wall thickness is consistent with mild concentric hypertrophy. Left ventricular diastolic function is consistent with (grade I) impaired relaxation.    Normal right ventricular cavity size, wall thickness, systolic function and septal motion noted.    Left atrial volume is mildly increased    The aortic valve appears trileaflet. Trace aortic valve regurgitation is present. No hemodynamically significant aortic valve stenosis is present.    Mitral annular calcification is present. Mild mitral valve regurgitation is present. No significant mitral valve stenosis is present.    The tricuspid valve is grossly normal in structure. Mild to moderate tricuspid valve regurgitation is present. Estimated right ventricular systolic pressure from tricuspid regurgitation is mildly elevated (35-45 mmHg).    Aortic root = 3.8 cm      Current  medications:  Scheduled Meds:aspirin, 81 mg, Oral, Daily  atorvastatin, 80 mg, Oral, Nightly  insulin lispro, 2-7 Units, Subcutaneous, 4x Daily AC & at Bedtime  lisinopril, 10 mg, Oral, Daily  metoprolol succinate XL, 25 mg, Oral, Q24H  pharmacy consult - [place], , Does not apply, Daily  senna-docusate sodium, 2 tablet, Oral, BID  sodium chloride, 10 mL, Intravenous, Q12H      Continuous Infusions:heparin, 5 Units/kg/hr, Last Rate: 5 Units/kg/hr (10/02/23 0604)  Pharmacy to Dose Heparin,       PRN Meds:.  acetaminophen **OR** acetaminophen **OR** acetaminophen    ALPRAZolam    aluminum-magnesium hydroxide-simethicone    senna-docusate sodium **AND** polyethylene glycol **AND** bisacodyl **AND** bisacodyl    Calcium Replacement - Follow Nurse / BPA Driven Protocol    dextrose    dextrose    glucagon (human recombinant)    HYDROcodone-acetaminophen    ipratropium-albuterol    Magnesium Standard Dose Replacement - Follow Nurse / BPA Driven Protocol    Morphine **AND** naloxone    nitroglycerin    ondansetron **OR** ondansetron    Pharmacy to Dose Heparin    Phosphorus Replacement - Follow Nurse / BPA Driven Protocol    Potassium Replacement - Follow Nurse / BPA Driven Protocol    sodium chloride    sodium chloride    Assessment & Plan   Assessment & Plan     Active Hospital Problems    Diagnosis  POA    **Coronary artery disease [I25.10]  Yes    NSTEMI (non-ST elevated myocardial infarction) [I21.4]  Yes    Essential hypertension [I10]  Yes    Type 2 diabetes mellitus, without long-term current use of insulin [E11.9]  Yes      Resolved Hospital Problems   No resolved problems to display.        Brief Hospital Course to date:  Dann Metz is a 72 y.o. male with CAD, DMII, HTN who presents to [facility] with chest pain.    Assessment/Plan:      NSTEMI  -At [facility] troponins were 642 then 785.  EKG with inverted T waves anteriorly and nonspecific inferolateral ST changes.  Left heart cath showed severe three-vessel  disease.  Cardiology recommended transfer to [facility].    -Patient currently with stable mild chest pressure for days.    -Continue heparin drip  -Continue aspirin and high intensity statin  -Continue telemetry  -Cardiology and cardiothoracic surgery following for CABG, Wednesday vs. Friday     Paroxysmal atrial fibrillation  -Diagnosed in June of this year during stroke admission.  He was started on Eliquis at that time. Holding eliquis for now for planned CABG   -Currently rate controlled  -Currently on heparin drip  -Continue metoprolol     Type 2 diabetes  -A1c 7.1  -On metformin and Jardiance at home  -Corrective insulin here  -Consider addition of GLP-1 antagonist upon discharge  --glucoses generally stable.  no change for now.      Hypertension  -Continue lisinopril 10 mg, stable      History of ischemic stroke with residual right upper extremity weakness  -ASA and statin     DVT prophylaxis: On heparin drip    Expected Discharge Location and Transportation:   Expected Discharge   Expected Discharge Date: 10/10/2023; Expected Discharge Time:      DVT prophylaxis:  Medical DVT prophylaxis orders are present.     AM-PAC 6 Clicks Score (PT): 20 (10/02/23 1000)    CODE STATUS:   Code Status and Medical Interventions:   Ordered at: 10/01/23 0404     Code Status (Patient has no pulse and is not breathing):    CPR (Attempt to Resuscitate)     Medical Interventions (Patient has pulse or is breathing):    Full Support       Kajal Martins,   10/03/23

## 2023-10-03 NOTE — THERAPY EVALUATION
Patient Name: Dann Metz  : 1950    MRN: [id number]                              Today's Date: 10/3/2023       Admit Date: 10/1/2023    Visit Dx:     ICD-10-CM ICD-9-CM   1. Coronary artery disease involving native coronary artery of native heart with unstable angina pectoris  I25.110 414.01     411.1     Patient Active Problem List   Diagnosis    Acute CVA (cerebrovascular accident)    Essential hypertension    Type 2 diabetes mellitus, without long-term current use of insulin    NSTEMI (non-ST elevated myocardial infarction)    Type 1 myocardial infarction    Coronary artery disease    Vitamin D deficiency    RUE weakness     Past Medical History:   Diagnosis Date    Coronary artery disease     Diabetes mellitus     Hypertension     Stroke      Past Surgical History:   Procedure Laterality Date    CARDIAC CATHETERIZATION      CARDIAC CATHETERIZATION N/A 2023    Procedure: Coronary angiography;  Surgeon: Vince Redding MD;  Location: [facility] CATH INVASIVE LOC[facility];  Service: Cardiology;  Laterality: N/A;      General Information       Row Name 10/03/23 1524          Physical Therapy Time and Intention    Document Type evaluation  -     Mode of Treatment physical therapy  -       Row Name 10/03/23 1524          General Information    Patient Profile Reviewed yes  -     Prior Level of Function independent:;all household mobility;community mobility;gait;transfer;bed mobility  no AD at baseline  -     Existing Precautions/Restrictions fall;cardiac;other (see comments)  remote CVA with RUE residual weakness  -     Barriers to Rehab medically complex  -       Row Name 10/03/23 1524          Living Environment    People in Home significant other  -       Row Name 10/03/23 1524          Home Main Entrance    Number of Stairs, Main Entrance none  -       Row Name 10/03/23 1524          Stairs Within Home, Primary    Stairs, Within Home, Primary pt will stay with SO on first level  apartment at d/c per pt  -HM     Number of Stairs, Within Home, Primary none  -HM       Row Name 10/03/23 1524          Cognition    Orientation Status (Cognition) oriented x 4  -HM       Row Name 10/03/23 1524          Safety Issues, Functional Mobility    Safety Issues Affecting Function (Mobility) safety precaution awareness;insight into deficits/self-awareness;sequencing abilities  -     Impairments Affecting Function (Mobility) balance;endurance/activity tolerance;strength  -HM               User Key  (r) = Recorded By, (t) = Taken By, (c) = Cosigned By      Initials Name Provider Type     Petra Astudillo PT Physical Therapist                   Mobility       Row Name 10/03/23 1525          Bed Mobility    Comment, (Bed Mobility) UIC  -HM       Row Name 10/03/23 1525          Sit-Stand Transfer    Sit-Stand [place] (Transfers) contact guard;1 person assist  -       Row Name 10/03/23 1525          Gait/Stairs (Locomotion)    [place] Level (Gait) contact guard;1 person assist  -     Assistive Device (Gait) other (see comments)  no AD  -HM     Distance in Feet (Gait) 300  -HM     Deviations/Abnormal Patterns (Gait) bilateral deviations;theo decreased;stride length decreased;gait speed decreased  -     Comment, (Gait/Stairs) Pt ambulated 300 feet with no AD with slight unsteadiness with no overt LOB, decreased gait speed, decreased stride length, and decreased theo. No symptom onset with mobility.  -               User Key  (r) = Recorded By, (t) = Taken By, (c) = Cosigned By      Initials Name Provider Type     Petra Astudillo PT Physical Therapist                   Obj/Interventions       Row Name 10/03/23 1535          Range of Motion Comprehensive    General Range of Motion bilateral lower extremity ROM WFL  -       Row Name 10/03/23 1535          Strength Comprehensive (MMT)    General Manual Muscle Testing (MMT) Assessment lower extremity strength deficits identified  -      Comment, General Manual Muscle Testing (MMT) Assessment BLE 4+/5  -HM       Row Name 10/03/23 1535          Balance    Balance Assessment sitting static balance;sitting dynamic balance;sit to stand dynamic balance;standing static balance;standing dynamic balance  -HM     Static Sitting Balance independent  -HM     Dynamic Sitting Balance independent  -HM     Position, Sitting Balance unsupported;sitting in chair  -     Sit to Stand Dynamic Balance contact guard  -HM     Static Standing Balance contact guard  -HM     Dynamic Standing Balance contact guard  -HM     Position/Device Used, Standing Balance unsupported  -HM     Balance Interventions standing;dynamic;occupation based/functional task  -     Comment, Balance mild unsteadiness no overt LOB  -       Row Name 10/03/23 1535          Sensory Assessment (Somatosensory)    Sensory Assessment (Somatosensory) LE sensation intact  -               User Key  (r) = Recorded By, (t) = Taken By, (c) = Cosigned By      Initials Name Provider Type     Petra Astudillo, PT Physical Therapist                   Goals/Plan       Row Name 10/03/23 1540          Bed Mobility Goal 1 (PT)    Activity/Assistive Device (Bed Mobility Goal 1, PT) bed mobility activities, all  -HM     [place] Level/Cues Needed (Bed Mobility Goal 1, PT) independent  -HM     Time Frame (Bed Mobility Goal 1, PT) long term goal (LTG);10 days  -HM     Progress/Outcomes (Bed Mobility Goal 1, PT) new goal  -       Row Name 10/03/23 1540          Transfer Goal 1 (PT)    Activity/Assistive Device (Transfer Goal 1, PT) sit-to-stand/stand-to-sit;bed-to-chair/chair-to-bed  -HM     [place] Level/Cues Needed (Transfer Goal 1, PT) independent  -HM     Time Frame (Transfer Goal 1, PT) long term goal (LTG);10 days  -HM     Progress/Outcome (Transfer Goal 1, PT) new goal  -       Row Name 10/03/23 1546          Gait Training Goal 1 (PT)    Activity/Assistive Device (Gait Training Goal 1, PT)  gait (walking locomotion)  -     [place] Level (Gait Training Goal 1, PT) standby assist  -     Distance (Gait Training Goal 1, PT) 500  -HM     Time Frame (Gait Training Goal 1, PT) long term goal (LTG);10 days  -     Progress/Outcome (Gait Training Goal 1, PT) new goal  -       Row Name 10/03/23 1548          Therapy Assessment/Plan (PT)    Planned Therapy Interventions (PT) balance training;bed mobility training;gait training;home exercise program;neuromuscular re-education;patient/family education;postural re-education;strengthening;ROM (range of motion);transfer training  -               User Key  (r) = Recorded By, (t) = Taken By, (c) = Cosigned By      Initials Name Provider Type     Petra Astudillo, PT Physical Therapist                   Clinical Impression       Row Name 10/03/23 1545          Pain    Pretreatment Pain Rating 0/10 - no pain  -     Posttreatment Pain Rating 0/10 - no pain  -     Pain Intervention(s) Repositioned;Ambulation/increased activity  -       Row Name 10/03/23 1548          Plan of Care Review    Plan of Care Reviewed With patient  -     Progress no change  -     Outcome Evaluation PT eval completed. Pt ambulated 300 feet CGA with no AD with mild unsteadiness, decreased gait speed, and decreased stride length. Pt demonstrated mobility below baseline function warranting IPPT POC with balance deficits, strength deficits, and decreased functional endurance warranting IPPT POC. d/c rec home with assist pending progress.  -       Row Name 10/03/23 1540          Therapy Assessment/Plan (PT)    Rehab Potential (PT) good, to achieve stated therapy goals  -     Criteria for Skilled Interventions Met (PT) yes;meets criteria;skilled treatment is necessary  -     Therapy Frequency (PT) daily  -       Row Name 10/03/23 1544          Vital Signs    Pre Systolic BP Rehab 95  -HM     Pre Treatment Diastolic BP 63  -     Pretreatment Heart Rate (beats/min) 71   -     Pre SpO2 (%) 97  -HM     O2 Delivery Pre Treatment room air  -HM     O2 Delivery Intra Treatment room air  -HM     O2 Delivery Post Treatment room air  -HM     Pre Patient Position Sitting  -     Intra Patient Position Standing  -HM     Post Patient Position Sitting  in bathroom  -       Row Name 10/03/23 1542          Positioning and Restraints    Pre-Treatment Position sitting in chair/recliner  -     Post Treatment Position bathroom  -     Bathroom sitting;notified nsg;encouraged to call for assist;call light within reach  notified RN and tech of pt being in bathroom  -               User Key  (r) = Recorded By, (t) = Taken By, (c) = Cosigned By      Initials Name Provider Type     Petra Astudillo PT Physical Therapist                   Outcome Measures       Row Name 10/03/23 1548 10/03/23 0800       How much help from another person do you currently need...    Turning from your back to your side while in flat bed without using bedrails? 3  - 4  -MO    Moving from lying on back to sitting on the side of a flat bed without bedrails? 3  - 4  -MO    Moving to and from a bed to a chair (including a wheelchair)? 3  - 3  -MO    Standing up from a chair using your arms (e.g., wheelchair, bedside chair)? 3  - 3  -MO    Climbing 3-5 steps with a railing? 3  - 3  -MO    To walk in hospital room? 3  - 3  -MO    AM-PAC 6 Clicks Score (PT) 18  - 20  -MO    Highest level of mobility 6 --> Walked 10 steps or more  - 6 --> Walked 10 steps or more  -MO      Row Name 10/03/23 1548          Functional Assessment    Outcome Measure Options AM-PAC 6 Clicks Basic Mobility (PT)  -               User Key  (r) = Recorded By, (t) = Taken By, (c) = Cosigned By      Initials Name Provider Type    Geneva Low, RN Registered Nurse     Petra Astudillo PT Physical Therapist                                 Physical Therapy Education       Title: PT OT SLP Therapies (In Progress)       Topic:  Physical Therapy (In Progress)       Point: Mobility training (Done)       Learning Progress Summary             Patient Acceptance, E,TB, VU,NR by  at 10/3/2023 1548                         Point: Home exercise program (Not Started)       Learner Progress:  Not documented in this visit.              Point: Body mechanics (Done)       Learning Progress Summary             Patient Acceptance, E,TB, VU,NR by  at 10/3/2023 1548                         Point: Precautions (Done)       Learning Progress Summary             Patient Acceptance, E,TB, VU,NR by  at 10/3/2023 1548                                         User Key       Initials Effective Dates Name Provider Type Discipline     09/22/22 -  Petra Astudillo, PT Physical Therapist PT                  PT Recommendation and Plan  Planned Therapy Interventions (PT): balance training, bed mobility training, gait training, home exercise program, neuromuscular re-education, patient/family education, postural re-education, strengthening, ROM (range of motion), transfer training  Plan of Care Reviewed With: patient  Progress: no change  Outcome Evaluation: PT eval completed. Pt ambulated 300 feet CGA with no AD with mild unsteadiness, decreased gait speed, and decreased stride length. Pt demonstrated mobility below baseline function warranting IPPT POC with balance deficits, strength deficits, and decreased functional endurance warranting IPPT POC. d/c rec home with assist pending progress.     Time Calculation:   PT Evaluation Complexity  History, PT Evaluation Complexity: 3 or more personal factors and/or comorbidities  Examination of Body Systems (PT Eval Complexity): total of 3 or more elements  Clinical Presentation (PT Evaluation Complexity): evolving  Clinical Decision Making (PT Evaluation Complexity): moderate complexity  Overall Complexity (PT Evaluation Complexity): moderate complexity     PT Charges       Row Name 10/03/23 3560             Time  Calculation    Start Time 1500  -HM      PT Received On 10/03/23  -HM      PT Goal Re-Cert Due Date 10/13/23  -HM         Untimed Charges    PT Eval/Re-eval Minutes 48  -HM         Total Minutes    Untimed Charges Total Minutes 48  -HM       Total Minutes 48  -HM                User Key  (r) = Recorded By, (t) = Taken By, (c) = Cosigned By      Initials Name Provider Type     Petra Astudillo, PT Physical Therapist                  Therapy Charges for Today       Code Description Service Date Service Provider Modifiers Qty    35153863608 HC PT EVAL MOD COMPLEXITY 4 10/3/2023 Petra Astudillo, PT GP 1            PT G-Codes  Outcome Measure Options: AM-PAC 6 Clicks Basic Mobility (PT)  AM-PAC 6 Clicks Score (PT): 18  PT Discharge Summary  Anticipated Discharge Disposition (PT): home with assist    Petra Astudillo, PT  10/3/2023

## 2023-10-03 NOTE — PLAN OF CARE
Goal Outcome Evaluation:  Plan of Care Reviewed With: patient        Progress: no change  Outcome Evaluation: PT eval completed. Pt ambulated 300 feet CGA with no AD with mild unsteadiness, decreased gait speed, and decreased stride length. Pt demonstrated mobility below baseline function warranting IPPT POC with balance deficits, strength deficits, and decreased functional endurance warranting IPPT POC. d/c rec home with assist pending progress.      Anticipated Discharge Disposition (PT): home with assist

## 2023-10-03 NOTE — PROGRESS NOTES
[facility] Cardiology  Inpatient Progress Note      Chief Complaint/Reason for consult:  CAD, NSTEMI         Subjective  Feeling well today, no chest pain or dyspnea       Vital Sign Min/Max for last 24 hours  Temp  Min: 98 øF (36.7 øC)  Max: 98.4 øF (36.9 øC)   BP  Min: 98/64  Max: 136/88   Pulse  Min: 58  Max: 85   Resp  Min: 16  Max: 18   SpO2  Min: 93 %  Max: 99 %   No data recorded      Intake/Output Summary (Last 24 hours) at 10/3/2023 1244  Last data filed at 10/3/2023 0434  Gross per 24 hour   Intake 480 ml   Output 925 ml   Net -445 ml           Gen: well developed, sitting up in chair, comfortable appearing  HEENT: MMM, sclerae anicteric, conjunctivae normal  CV: regular rate, regular rhythm, no murmurs or rubs, normal S1, S2. 2+ radial and DP pulses  Pulm: RA, normal work of breathing, no wheezes, rales, rhonchi  Abd: soft, nontender, nondistended  Ext: normal bulk for age, normal tone, no dependent edema  Neuro: alert, oriented, face symmetrical, moving all extremities well  Psych: normal mood, appropriate affect    Tele:  SR this am, no sustained arrhythmia seen    Results Review (reviewed the patient's recent labs in the electronic medical record):     EK23 - NSR, anteroseptal infarct, inferolateral ischemia     23 - [facility]  Angiographic Findings:  Coronary circulation is left dominant  Left main: The left main coronary artery divides into the left anterior descending and circumflex coronary arteries. The left main coronary artery has no significant disease.  LAD: The left anterior descending gives rise to the diagonal branches as well as multiple septal perforators before terminating as an apical recurrent branch. The proximal LAD has an 80-90 % stenosis. The distal LAD has an 80-90% stenosis. The first diagonal branch of the left anterior descending is subtotally occluded.  LCX: The circumflex coronary artery is a dominant vessel giving rise to the obtuse marginal  branches, posterolateral left ventricular branches and posterior descending artery. The mid circumflex is subtotally occluded. The largest obtuse marginal branch is subtotally occluded. The distal AV groove vessel is occluded.  RCA: The right coronary artery is a small caliber nondominant vessel. The proximal RCA is chronically occluded.     10/2/23 - Transthoracic Echo Complete W/ Cont if Necessary Per Protocol    Left ventricular systolic function is low normal. Calculated left ventricular EF = 50.7% Normal left ventricular cavity size noted. Left ventricular wall thickness is consistent with mild concentric hypertrophy. Left ventricular diastolic function is consistent with (grade I) impaired relaxation.    Normal right ventricular cavity size, wall thickness, systolic function and septal motion noted.    Left atrial volume is mildly increased    The aortic valve appears trileaflet. Trace aortic valve regurgitation is present. No hemodynamically significant aortic valve stenosis is present.    Mitral annular calcification is present. Mild mitral valve regurgitation is present. No significant mitral valve stenosis is present.    The tricuspid valve is grossly normal in structure. Mild to moderate tricuspid valve regurgitation is present. Estimated right ventricular systolic pressure from tricuspid regurgitation is mildly elevated (35-45 mmHg).    Aortic root = 3.8 cm       Radiology: No radiology results for the last day      Lab Results   Component Value Date    WBC 8.10 10/02/2023    HGB 12.6 (L) 10/02/2023    HCT 38.3 10/02/2023    MCV 97.7 (H) 10/02/2023     10/02/2023     Lab Results   Component Value Date    GLUCOSE 112 (H) 10/03/2023    CALCIUM 8.7 10/03/2023     10/03/2023    K 4.2 10/03/2023    CO2 19.0 (L) 10/03/2023     10/03/2023    BUN 21 10/03/2023    CREATININE 0.92 10/03/2023    BCR 22.8 10/03/2023    ANIONGAP 14.0 10/03/2023     Lab Results   Component Value Date    TROPONINT  785 (C) 09/30/2023    TROPONINT 642 (C) 09/30/2023      Lab Results   Component Value Date    HGBA1C 7.10 (H) 09/30/2023      Lab Results   Component Value Date    CHOL 149 09/30/2023    CHLPL 112 08/28/2023    TRIG 194 (H) 09/30/2023    HDL 39 (L) 09/30/2023    LDL 77 09/30/2023      Assessment     CAD  NSTEMI   - CTS consulting, current plan for OR 10/6   - continue IV heparin   - aspirin, statin, beta blocker    PAF   - SR currently   - holding apixaban, on IV heparin    Hx DM   - holding home metformin   - will also hold empagliflozin, lisinopril in perioperative period    PRANEETH Del Rio MD  10/3/2023  12:44 EDT

## 2023-10-04 LAB
GLUCOSE BLDC GLUCOMTR-MCNC: 135 MG/DL (ref 70–130)
GLUCOSE BLDC GLUCOMTR-MCNC: 164 MG/DL (ref 70–130)
GLUCOSE BLDC GLUCOMTR-MCNC: 166 MG/DL (ref 70–130)
GLUCOSE BLDC GLUCOMTR-MCNC: 168 MG/DL (ref 70–130)
PA ADP PRP-ACNC: 172 PRU
UFH PPP CHRO-ACNC: 0.1 IU/ML (ref 0.3–0.7)
UFH PPP CHRO-ACNC: 0.12 IU/ML (ref 0.3–0.7)

## 2023-10-04 PROCEDURE — 25010000002 HEPARIN (PORCINE) PER 1000 UNITS

## 2023-10-04 PROCEDURE — 85520 HEPARIN ASSAY: CPT | Performed by: PHARMACIST

## 2023-10-04 PROCEDURE — 85520 HEPARIN ASSAY: CPT

## 2023-10-04 PROCEDURE — 99231 SBSQ HOSP IP/OBS SF/LOW 25: CPT | Performed by: INTERNAL MEDICINE

## 2023-10-04 PROCEDURE — 85576 BLOOD PLATELET AGGREGATION: CPT | Performed by: PHYSICIAN ASSISTANT

## 2023-10-04 PROCEDURE — 63710000001 INSULIN LISPRO (HUMAN) PER 5 UNITS: Performed by: INTERNAL MEDICINE

## 2023-10-04 PROCEDURE — 99232 SBSQ HOSP IP/OBS MODERATE 35: CPT

## 2023-10-04 PROCEDURE — 82948 REAGENT STRIP/BLOOD GLUCOSE: CPT

## 2023-10-04 PROCEDURE — 25010000002 HEPARIN (PORCINE) PER 1000 UNITS: Performed by: PHARMACIST

## 2023-10-04 RX ORDER — HEPARIN SODIUM 1000 [USP'U]/ML
1500 INJECTION, SOLUTION INTRAVENOUS; SUBCUTANEOUS ONCE
Status: COMPLETED | OUTPATIENT
Start: 2023-10-04 | End: 2023-10-04

## 2023-10-04 RX ORDER — HEPARIN SODIUM 1000 [USP'U]/ML
3100 INJECTION, SOLUTION INTRAVENOUS; SUBCUTANEOUS ONCE
Status: COMPLETED | OUTPATIENT
Start: 2023-10-04 | End: 2023-10-04

## 2023-10-04 RX ADMIN — HEPARIN SODIUM 1500 UNITS: 1000 INJECTION INTRAVENOUS; SUBCUTANEOUS at 09:17

## 2023-10-04 RX ADMIN — Medication 10 ML: at 22:59

## 2023-10-04 RX ADMIN — INSULIN LISPRO 2 UNITS: 100 INJECTION, SOLUTION INTRAVENOUS; SUBCUTANEOUS at 21:26

## 2023-10-04 RX ADMIN — HEPARIN SODIUM 3100 UNITS: 1000 INJECTION INTRAVENOUS; SUBCUTANEOUS at 17:34

## 2023-10-04 RX ADMIN — METOPROLOL SUCCINATE 25 MG: 25 TABLET, EXTENDED RELEASE ORAL at 09:19

## 2023-10-04 RX ADMIN — INSULIN LISPRO 2 UNITS: 100 INJECTION, SOLUTION INTRAVENOUS; SUBCUTANEOUS at 17:35

## 2023-10-04 RX ADMIN — ATORVASTATIN CALCIUM 80 MG: 40 TABLET, FILM COATED ORAL at 21:26

## 2023-10-04 RX ADMIN — ASPIRIN 81 MG: 81 TABLET, COATED ORAL at 09:19

## 2023-10-04 RX ADMIN — INSULIN LISPRO 2 UNITS: 100 INJECTION, SOLUTION INTRAVENOUS; SUBCUTANEOUS at 11:57

## 2023-10-04 RX ADMIN — Medication 10 ML: at 09:19

## 2023-10-04 NOTE — PLAN OF CARE
Problem: Adult Inpatient Plan of Care  Goal: Plan of Care Review  Outcome: Ongoing, Progressing  Flowsheets (Taken 10/3/2023 1542 by Petra Astudillo, PT)  Progress: no change  Plan of Care Reviewed With: patient  Outcome Evaluation: PT eval completed. Pt ambulated 300 feet CGA with no AD with mild unsteadiness, decreased gait speed, and decreased stride length. Pt demonstrated mobility below baseline function warranting IPPT POC with balance deficits, strength deficits, and decreased functional endurance warranting IPPT POC. d/c rec home with assist pending progress.  Goal: Patient-Specific Goal (Individualized)  Outcome: Ongoing, Progressing  Goal: Absence of Hospital-Acquired Illness or Injury  Outcome: Ongoing, Progressing  Intervention: Identify and Manage Fall Risk  Recent Flowsheet Documentation  Taken 10/3/2023 2200 by Yara Polanco RN  Safety Promotion/Fall Prevention:   safety round/check completed   room organization consistent  Taken 10/3/2023 2119 by Yara Polanco RN  Safety Promotion/Fall Prevention:   safety round/check completed   room organization consistent  Intervention: Prevent Skin Injury  Recent Flowsheet Documentation  Taken 10/3/2023 2200 by Yara Polanco RN  Body Position: position changed independently  Taken 10/3/2023 2119 by Yara Polanco RN  Body Position:   weight shifting   position changed independently  Intervention: Prevent and Manage VTE (Venous Thromboembolism) Risk  Recent Flowsheet Documentation  Taken 10/3/2023 2200 by Yara Polanco RN  Activity Management: activity encouraged  Taken 10/3/2023 2119 by Yara Polanco RN  Activity Management: activity encouraged  Range of Motion: active ROM (range of motion) encouraged  Intervention: Prevent Infection  Recent Flowsheet Documentation  Taken 10/3/2023 2200 by Yara Polanco RN  Infection Prevention:   environmental surveillance performed   rest/sleep promoted   single patient room  provided  Taken 10/3/2023 2119 by Yaar Polanco RN  Infection Prevention:   environmental surveillance performed   rest/sleep promoted   single patient room provided  Goal: Optimal Comfort and Wellbeing  Outcome: Ongoing, Progressing  Intervention: Provide Person-Centered Care  Recent Flowsheet Documentation  Taken 10/3/2023 2119 by Yara Polanco RN  Trust Relationship/Rapport:   care explained   emotional support provided   choices provided   empathic listening provided   questions answered   questions encouraged   reassurance provided   thoughts/feelings acknowledged  Goal: Readiness for Transition of Care  Outcome: Ongoing, Progressing     Problem: Fall Injury Risk  Goal: Absence of Fall and Fall-Related Injury  Outcome: Ongoing, Progressing  Intervention: Identify and Manage Contributors  Recent Flowsheet Documentation  Taken 10/3/2023 2200 by Yara Polanco RN  Medication Review/Management: medications reviewed  Taken 10/3/2023 2119 by Yara Polanco RN  Medication Review/Management: medications reviewed  Intervention: Promote Injury-Free Environment  Recent Flowsheet Documentation  Taken 10/3/2023 2200 by Yara Polanco RN  Safety Promotion/Fall Prevention:   safety round/check completed   room organization consistent  Taken 10/3/2023 2119 by Yara Polanco RN  Safety Promotion/Fall Prevention:   safety round/check completed   room organization consistent     Problem: Diabetes Comorbidity  Goal: Blood Glucose Level Within Targeted Range  Outcome: Ongoing, Progressing  Intervention: Monitor and Manage Glycemia  Recent Flowsheet Documentation  Taken 10/3/2023 2119 by Yara Polanco RN  Glycemic Management:   oral hydration promoted   blood glucose monitored     Problem: Anxiety  Goal: Anxiety Reduction or Resolution  Outcome: Ongoing, Progressing  Intervention: Promote Anxiety Reduction  Recent Flowsheet Documentation  Taken 10/3/2023 2119 by Yara Polanco  RN  Supportive Measures: active listening utilized  Family/Support System Care:   support provided   self-care encouraged     Problem: Activity Intolerance  Goal: Enhanced Capacity and Energy  Outcome: Ongoing, Progressing  Intervention: Optimize Activity Tolerance  Recent Flowsheet Documentation  Taken 10/3/2023 2200 by Yara Polanco, RN  Activity Management: activity encouraged  Taken 10/3/2023 2119 by Yara Polanco, RN  Activity Management: activity encouraged  Environmental Support:   environmental consistency promoted   calm environment promoted   personal routine supported   Goal Outcome Evaluation:  Plan of Care Reviewed With: patient        Progress: improving     Pt educated again on POC/surgery.  Pt very anxious about the procedure.  Staff sat down with pt and identified goals and how to progress in his POC.  Pt will need frequent repetitiveness

## 2023-10-04 NOTE — PROGRESS NOTES
HEPARIN INFUSION  Dann Metz is a  72 y.o. male receiving heparin infusion.     Therapy for (VTE/Cardiac): Cardiac  Patient Weight: 64.5 kg  Initial Bolus (Y/N): No  Any Bolus (Y/N):  Yes     Signs or Symptoms of Bleeding: none noted at present     Cardiac or Other (Not VTE)   Initial Bolus: 60 units/kg (Max 4,000 units)  Initial rate: 12 units/kg/hr (Max 1,000 units/hr)   Anti Xa Rebolus Infusion Hold time Change infusion Dose (Units/kg/hr) Next Anti Xa or aPTT Level Due   < 0.11 50 Units/kg  (4000 Units Max) None Increase by  3 Units/kg/hr 6 hours   0.11- 0.19 25 Units/kg  (2000 Units Max) None Increase by  2 Units/kg/hr 6 hours   0.2 - 0.29 0 None Increase by  1 Units/kg/hr 6 hours   0.3 - 0.5 0 None No Change 6 hours (after 2 consecutive levels in range check qAM)   0.51 - 0.6 0 None Decrease by  1 Units/kg/hr 6 hours   0.61 - 0.8 0 30 Minutes Decrease by  2 Units/kg/hr 6 hours   0.81 - 1 0 60 Minutes Decrease by  3 Units/kg/hr 6 hours   >1 0 Hold  After Anti Xa less than 0.5 decrease previous rate by  4 Units/kg/hr  Every 2 hours until Anti Xa  less than 0.5 then when infusion restarts in 6 hours     Recommend Xa every 6 hours.     Results from last 7 days   Lab Units 10/02/23  0317 10/01/23  0412 09/30/23  0257 09/29/23  2205   INR   --  1.10 1.18* 1.08   HEMOGLOBIN g/dL 12.6* 13.2 12.4*  --    HEMATOCRIT % 38.3 40.1 37.6  --    PLATELETS 10*3/mm3 212 220 205  --        Date   Time   Anti-Xa Current Rate (Unit/kg/hr) Bolus   (Units) Rate Change   (Unit/kg/hr) New Rate (Unit/kg/hr) Next   Anti-Xa Comments  Pump Check Daily   10/1 0400 1.0 new -- 12 12 1000 D/w RN, was on heparin drip at [facility]SH but has been off since 9/30 afternoon   10/1 1048 0.98 12 -- -3 9 1800 Hold 1 hr. DW RN. Pump verified.   10/1 1815 0.92 9 -- Hold x 1 hr then -3 6 0300 D/w YANICK Becerra   10/2 0317 0.59 6 -- -1 5 1100 D/w RN   10/2 1047 0.47 5 -- -- 5 1700 D/w RN   10/2 1812 0.47 5 -- -- 5 0600 DW RN   10/3 0843 0.40 5 -- -- 5 10/4 AM  D/w RN   10/4 0650 0.12 5 1500 +2 7 1500 D/w YANICK Lewis, PharmD  Pharmacy Resident  10/4/2023  08:51 EDT

## 2023-10-04 NOTE — PLAN OF CARE
Goal Outcome Evaluation:   Pt a/o x4, vs stable, and on RA. Pt resting in bed at this time, call light within reach. Heparin drip continued at 10 u/kg.

## 2023-10-04 NOTE — PROGRESS NOTES
[facility] Medicine Services  PROGRESS NOTE    Patient Name: Dann Metz  : 1950  MRN: [id number]    Date of Admission: 10/1/2023  Primary Care Physician: Ariana Lucas APRN    Subjective   Subjective     CC:  CAD, transfer for CABG eval    HPI:  Pt resting in bed. No issues overnight. Awaiting CABG    Objective   Objective     Vital Signs:   Temp:  [98 øF (36.7 øC)-98.4 øF (36.9 øC)] 98 øF (36.7 øC)  Heart Rate:  [60-74] 61  Resp:  [16] 16  BP: ()/(63-92) 114/83     Physical Exam:  Constitutional: No acute distress, awake, alert  HENT: NCAT, mucous membranes moist  Respiratory: Clear to auscultation bilaterally, respiratory effort normal   Cardiovascular: RRR, no murmurs, rubs, or gallops  Gastrointestinal: soft, nontender, nondistended  Musculoskeletal: No bilateral ankle edema  Psychiatric: Appropriate affect, cooperative  Neurologic: Oriented x 3, speech clear, no focal deficits  Skin: No rashes    Unchanged from 10/3      Results Reviewed:  LAB RESULTS:      Lab 10/04/23  0650 10/03/23  0843 10/02/23  1812 10/02/23  1047 10/02/23  0317 10/01/23  1048 10/01/23  0412 23  1346 23  0745 23  0257 23  0257 23  2205 23   WBC  --   --   --   --  8.10  --  10.52  --   --   --  10.15  --  9.7   HEMOGLOBIN  --   --   --   --  12.6*  --  13.2  --   --   --  12.4*  --  14.2   HEMATOCRIT  --   --   --   --  38.3  --  40.1  --   --   --  37.6  --  43.4   PLATELETS  --   --   --   --  212  --  220  --   --   --  205  --  218   NEUTROS ABS  --   --   --   --  4.35  --  7.30*  --   --   --  7.20*  --  7.5   IMMATURE GRANS (ABS)  --   --   --   --  0.02  --  0.04  --   --   --  0.02  --  0.0   LYMPHS ABS  --   --   --   --  2.47  --  1.88  --   --   --  1.99  --  1.4*   MONOS ABS  --   --   --   --  0.73  --  0.90  --   --   --  0.72  --  0.5   EOS ABS  --   --   --   --  0.47*  --  0.33  --   --   --  0.14  --  0.2   MCV  --   --   --   --   97.7*  --  96.9  --   --   --  95.2  --  95.4   PROTIME  --   --   --   --   --   --  14.4  --   --   --  15.6* 13.8  --    APTT  --   --   --   --   --   --  29.0* 152.0* 70.3  --  50.2*  --   --    HEPARIN ANTI-XA 0.12* 0.40 0.47 0.47 0.59   < > 1.00*  --  >1.10*   < > >1.10*  --   --     < > = values in this interval not displayed.         Lab 10/03/23  0644 10/02/23  0317 10/01/23  1815 10/01/23  0412 09/30/23  0538   SODIUM 136 140  --  141 143   POTASSIUM 4.2 4.0  --  4.2 4.1   CHLORIDE 103 104  --  103 106   CO2 19.0* 25.0  --  24.0 22.4   ANION GAP 14.0 11.0  --  14.0 14.6   BUN 21 20  --  17 24*   CREATININE 0.92 1.00  --  1.11 1.13   EGFR 89.5 81.0  --  71.4 69.9   GLUCOSE 112* 101*  --  125* 154*   CALCIUM 8.7 8.4*  --  8.9 9.4   MAGNESIUM 2.0 2.3 2.8* 1.5*  --    HEMOGLOBIN A1C  --   --   --   --  7.10*   TSH  --   --   --   --  2.060             Lab 10/01/23  0412 09/30/23  0929 09/30/23  0745 09/30/23  0257 09/29/23  2205   HSTROP T  --  785* 642*  --   --    PROTIME 14.4  --   --  15.6* 13.8   INR 1.10  --   --  1.18* 1.08         Lab 09/30/23  0538   CHOLESTEROL 149   LDL CHOL 77   HDL CHOL 39*   TRIGLYCERIDES 194*             Brief Urine Lab Results  (Last result in the past 365 days)        Color   Clarity   Blood   Leuk Est   Nitrite   Protein   CREAT   Urine HCG        06/25/23 1603 Yellow   Clear   SMALL   Negative   Negative                     Microbiology Results Abnormal       None            Adult Transthoracic Echo Complete W/ Cont if Necessary Per Protocol    Result Date: 10/2/2023    Left ventricular systolic function is low normal. Calculated left ventricular EF = 50.7% Normal left ventricular cavity size noted. Left ventricular wall thickness is consistent with mild concentric hypertrophy. Left ventricular diastolic function is consistent with (grade I) impaired relaxation.   Normal right ventricular cavity size, wall thickness, systolic function and septal motion noted.   Left atrial  volume is mildly increased   The aortic valve appears trileaflet. Trace aortic valve regurgitation is present. No hemodynamically significant aortic valve stenosis is present.   Mitral annular calcification is present. Mild mitral valve regurgitation is present. No significant mitral valve stenosis is present.   The tricuspid valve is grossly normal in structure. Mild to moderate tricuspid valve regurgitation is present. Estimated right ventricular systolic pressure from tricuspid regurgitation is mildly elevated (35-45 mmHg).   Aortic root = 3.8 cm     Duplex Carotid Ultrasound CAR    Result Date: 10/2/2023    Right internal carotid artery demonstrates a less than 50% stenosis.   Left internal carotid artery demonstrates a less than 50% stenosis.   Antegrade flow in the vertebral arteries bilaterally.      Results for orders placed during the hospital encounter of 10/01/23    Adult Transthoracic Echo Complete W/ Cont if Necessary Per Protocol    Interpretation Summary    Left ventricular systolic function is low normal. Calculated left ventricular EF = 50.7% Normal left ventricular cavity size noted. Left ventricular wall thickness is consistent with mild concentric hypertrophy. Left ventricular diastolic function is consistent with (grade I) impaired relaxation.    Normal right ventricular cavity size, wall thickness, systolic function and septal motion noted.    Left atrial volume is mildly increased    The aortic valve appears trileaflet. Trace aortic valve regurgitation is present. No hemodynamically significant aortic valve stenosis is present.    Mitral annular calcification is present. Mild mitral valve regurgitation is present. No significant mitral valve stenosis is present.    The tricuspid valve is grossly normal in structure. Mild to moderate tricuspid valve regurgitation is present. Estimated right ventricular systolic pressure from tricuspid regurgitation is mildly elevated (35-45 mmHg).    Aortic  root = 3.8 cm      Current medications:  Scheduled Meds:aspirin, 81 mg, Oral, Daily  atorvastatin, 80 mg, Oral, Nightly  insulin lispro, 2-7 Units, Subcutaneous, 4x Daily AC & at Bedtime  metoprolol succinate XL, 25 mg, Oral, Q24H  pharmacy consult - [place], , Does not apply, Daily  senna-docusate sodium, 2 tablet, Oral, BID  sodium chloride, 10 mL, Intravenous, Q12H      Continuous Infusions:heparin, 7 Units/kg/hr, Last Rate: 7 Units/kg/hr (10/04/23 0918)  Pharmacy to Dose Heparin,       PRN Meds:.  acetaminophen **OR** acetaminophen **OR** acetaminophen    ALPRAZolam    aluminum-magnesium hydroxide-simethicone    senna-docusate sodium **AND** polyethylene glycol **AND** bisacodyl **AND** bisacodyl    Calcium Replacement - Follow Nurse / BPA Driven Protocol    dextrose    dextrose    glucagon (human recombinant)    HYDROcodone-acetaminophen    ipratropium-albuterol    Magnesium Standard Dose Replacement - Follow Nurse / BPA Driven Protocol    Morphine **AND** naloxone    nitroglycerin    ondansetron **OR** ondansetron    Pharmacy to Dose Heparin    Phosphorus Replacement - Follow Nurse / BPA Driven Protocol    Potassium Replacement - Follow Nurse / BPA Driven Protocol    sodium chloride    sodium chloride    Assessment & Plan   Assessment & Plan     Active Hospital Problems    Diagnosis  POA    **Coronary artery disease [I25.10]  Yes    NSTEMI (non-ST elevated myocardial infarction) [I21.4]  Yes    Essential hypertension [I10]  Yes    Type 2 diabetes mellitus, without long-term current use of insulin [E11.9]  Yes      Resolved Hospital Problems   No resolved problems to display.        Brief Hospital Course to date:  Dann Metz is a 72 y.o. male with CAD, DMII, HTN who presents to [facility] with chest pain.    Assessment/Plan:      NSTEMI  -At [facility] troponins were 642 then 785.  EKG with inverted T waves anteriorly and nonspecific inferolateral ST changes.  Left heart cath showed severe three-vessel disease.   Cardiology recommended transfer to [facility].    -Patient currently with stable mild chest pressure for days.    -Continue heparin drip  -Continue aspirin and high intensity statin  -Continue telemetry  -Cardiology and cardiothoracic surgery following for CABG on 10/6     Paroxysmal atrial fibrillation  -Diagnosed in June of this year during stroke admission.  He was started on Eliquis at that time. Holding eliquis for now for planned CABG   -Currently rate controlled  -Currently on heparin drip  -Continue metoprolol     Type 2 diabetes  -A1c 7.1  -On metformin and Jardiance at home  -Corrective insulin here  -Consider addition of GLP-1 antagonist upon discharge  --glucoses generally stable.  no change for now.      Hypertension  -Continue lisinopril 10 mg, stable      History of ischemic stroke with residual right upper extremity weakness  -ASA and statin     DVT prophylaxis: On heparin drip    Expected Discharge Location and Transportation: CABG 10/6  Expected Discharge   Expected Discharge Date: 10/10/2023; Expected Discharge Time:      DVT prophylaxis:  Medical DVT prophylaxis orders are present.     AM-PAC 6 Clicks Score (PT): 18 (10/03/23 8817)    CODE STATUS:   Code Status and Medical Interventions:   Ordered at: 10/01/23 3050     Code Status (Patient has no pulse and is not breathing):    CPR (Attempt to Resuscitate)     Medical Interventions (Patient has pulse or is breathing):    Full Support       Kajal Martins,   10/04/23

## 2023-10-04 NOTE — PROGRESS NOTES
[facility] Cardiothoracic Surgery In-Patient Progress Note       LOS: 3 days     Chief Complaint: Coronary artery disease    Subjective  Denies chest pain or shortness of breath.    Objective  Vital Signs  Temp:  [98 øF (36.7 øC)-98.4 øF (36.9 øC)] 98 øF (36.7 øC)  Heart Rate:  [60-74] 61  Resp:  [16] 16  BP: ()/(63-92) 114/83        Physical Exam:   General Appearance: alert, appears stated age and cooperative   Lungs: clear to auscultation, respirations regular, respirations even, and respirations unlabored   Heart: regular rhythm & normal rate, normal S1, S2, no murmur, no gallop, no rub, and no click     Results     Results from last 7 days   Lab Units 10/02/23  0317   WBC 10*3/mm3 8.10   HEMOGLOBIN g/dL 12.6*   HEMATOCRIT % 38.3   PLATELETS 10*3/mm3 212       Results from last 7 days   Lab Units 10/03/23  0644   SODIUM mmol/L 136   POTASSIUM mmol/L 4.2   CHLORIDE mmol/L 103   CO2 mmol/L 19.0*   BUN mg/dL 21   CREATININE mg/dL 0.92   GLUCOSE mg/dL 112*   CALCIUM mg/dL 8.7       Assessment    Coronary artery disease    Essential hypertension    Type 2 diabetes mellitus, without long-term current use of insulin    NSTEMI (non-ST elevated myocardial infarction)      Plan   Continue with preoperative work-up and heparin drip  P2Y12 172 today  Tentatively on for CABG with EVH and DIANNA with Dr. Mora on Friday, 10/6      Lizzie Navarrete, APRN  10/04/23  10:28 EDT    --    I reviewed this documentation. I interviewed and examined this patient this morning. Plan as documented.     Guero Mora M.D., R.P.V.I.  Cardiothoracic and Vascular Surgeon  [facility]

## 2023-10-04 NOTE — PROGRESS NOTES
[facility] Cardiology  Inpatient Progress Note      Chief Complaint/Reason for consult:  CAD, NSTEMI         Subjective  No chest pain or dyspnea       Vital Sign Min/Max for last 24 hours  Temp  Min: 98 øF (36.7 øC)  Max: 98.4 øF (36.9 øC)   BP  Min: 98/64  Max: 114/83   Pulse  Min: 60  Max: 74   Resp  Min: 16  Max: 16   SpO2  Min: 95 %  Max: 97 %   No data recorded      Intake/Output Summary (Last 24 hours) at 10/4/2023 1004  Last data filed at 10/4/2023 0220  Gross per 24 hour   Intake 120 ml   Output 500 ml   Net -380 ml           Gen: well developed, sitting up in chair, comfortable appearing  HEENT: MMM, sclerae anicteric, conjunctivae normal  CV: regular rate, regular rhythm, no murmurs or rubs, normal S1, S2. 2+ radial and DP pulses  Pulm: RA, normal work of breathing, no wheezes, rales, rhonchi  Abd: soft, nontender, nondistended  Ext: normal bulk for age, normal tone, no dependent edema  Neuro: alert, oriented, face symmetrical, moving all extremities well  Psych: normal mood, appropriate affect    Tele:  SR am, no sustained arrhythmia seen overnight    Results Review (reviewed the patient's recent labs in the electronic medical record):     EK23 - NSR, anteroseptal infarct, inferolateral ischemia     23 - [facility]  Angiographic Findings:  Coronary circulation is left dominant  Left main: The left main coronary artery divides into the left anterior descending and circumflex coronary arteries. The left main coronary artery has no significant disease.  LAD: The left anterior descending gives rise to the diagonal branches as well as multiple septal perforators before terminating as an apical recurrent branch. The proximal LAD has an 80-90 % stenosis. The distal LAD has an 80-90% stenosis. The first diagonal branch of the left anterior descending is subtotally occluded.  LCX: The circumflex coronary artery is a dominant vessel giving rise to the obtuse marginal branches,  posterolateral left ventricular branches and posterior descending artery. The mid circumflex is subtotally occluded. The largest obtuse marginal branch is subtotally occluded. The distal AV groove vessel is occluded.  RCA: The right coronary artery is a small caliber nondominant vessel. The proximal RCA is chronically occluded.     10/2/23 - Transthoracic Echo Complete W/ Cont if Necessary Per Protocol    Left ventricular systolic function is low normal. Calculated left ventricular EF = 50.7% Normal left ventricular cavity size noted. Left ventricular wall thickness is consistent with mild concentric hypertrophy. Left ventricular diastolic function is consistent with (grade I) impaired relaxation.    Normal right ventricular cavity size, wall thickness, systolic function and septal motion noted.    Left atrial volume is mildly increased    The aortic valve appears trileaflet. Trace aortic valve regurgitation is present. No hemodynamically significant aortic valve stenosis is present.    Mitral annular calcification is present. Mild mitral valve regurgitation is present. No significant mitral valve stenosis is present.    The tricuspid valve is grossly normal in structure. Mild to moderate tricuspid valve regurgitation is present. Estimated right ventricular systolic pressure from tricuspid regurgitation is mildly elevated (35-45 mmHg).    Aortic root = 3.8 cm       Radiology: No radiology results for the last day      Lab Results   Component Value Date    WBC 8.10 10/02/2023    HGB 12.6 (L) 10/02/2023    HCT 38.3 10/02/2023    MCV 97.7 (H) 10/02/2023     10/02/2023     Lab Results   Component Value Date    GLUCOSE 112 (H) 10/03/2023    CALCIUM 8.7 10/03/2023     10/03/2023    K 4.2 10/03/2023    CO2 19.0 (L) 10/03/2023     10/03/2023    BUN 21 10/03/2023    CREATININE 0.92 10/03/2023    BCR 22.8 10/03/2023    ANIONGAP 14.0 10/03/2023     Lab Results   Component Value Date    TROPONINT 785 (C)  09/30/2023    TROPONINT 642 (C) 09/30/2023      Lab Results   Component Value Date    HGBA1C 7.10 (H) 09/30/2023      Lab Results   Component Value Date    CHOL 149 09/30/2023    CHLPL 112 08/28/2023    TRIG 194 (H) 09/30/2023    HDL 39 (L) 09/30/2023    LDL 77 09/30/2023      Assessment     CAD  NSTEMI   - CTS consulting, current plan for OR 10/6   - continue IV heparin   - aspirin, statin, beta blocker    PAF   - SR currently   - holding apixaban, on IV heparin    Hx DM   - holding home metformin   - will also hold empagliflozin, lisinopril in perioperative period    PRANEETH Del Roi MD  10/4/2023  10:04 EDT

## 2023-10-04 NOTE — CASE MANAGEMENT/SOCIAL WORK
Continued Stay Note  [facility]     Patient Name: Dann Metz  MRN: [id number]  Today's Date: 10/4/2023    Admit Date: 10/1/2023    Plan: TBD   Discharge Plan       Row Name 10/04/23 1013       Plan    Plan TBD    Patient/Family in Agreement with Plan yes    Plan Comments I met w/Mr. Metz in room. CABG planned for Friday w/Dr. Mora. D/C plan depends on post op needs. Pt aware that CM will continue to follow for any post op recs. Ambulated 300 feet w/PT yesterday. Pt hopeful to return home post op. CM will continue to follow.    Final Discharge Disposition Code 30 - still a patient                   Discharge Codes    No documentation.                 Expected Discharge Date and Time       Expected Discharge Date Expected Discharge Time    Oct 10, 2023               Franklin Quiroz RN

## 2023-10-05 ENCOUNTER — APPOINTMENT (OUTPATIENT)
Dept: GENERAL RADIOLOGY | Facility: HOSPITAL | Age: 73
DRG: 233 | End: 2023-10-05
Payer: MEDICARE

## 2023-10-05 ENCOUNTER — ANESTHESIA EVENT (OUTPATIENT)
Dept: PERIOP | Facility: HOSPITAL | Age: 73
DRG: 233 | End: 2023-10-05
Payer: MEDICARE

## 2023-10-05 LAB
ABO GROUP BLD: NORMAL
ABO GROUP BLD: NORMAL
AMPHET+METHAMPHET UR QL: NEGATIVE
AMPHETAMINES UR QL: NEGATIVE
BACTERIA UR QL AUTO: NORMAL /HPF
BARBITURATES UR QL SCN: NEGATIVE
BASOPHILS # BLD AUTO: 0.06 10*3/MM3 (ref 0–0.2)
BASOPHILS NFR BLD AUTO: 0.8 % (ref 0–1.5)
BENZODIAZ UR QL SCN: POSITIVE
BILIRUB UR QL STRIP: NEGATIVE
BLD GP AB SCN SERPL QL: NEGATIVE
BUPRENORPHINE SERPL-MCNC: NEGATIVE NG/ML
CANNABINOIDS SERPL QL: NEGATIVE
CLARITY UR: CLEAR
COCAINE UR QL: NEGATIVE
COLOR UR: YELLOW
DEPRECATED RDW RBC AUTO: 50 FL (ref 37–54)
EOSINOPHIL # BLD AUTO: 0.43 10*3/MM3 (ref 0–0.4)
EOSINOPHIL NFR BLD AUTO: 5.6 % (ref 0.3–6.2)
ERYTHROCYTE [DISTWIDTH] IN BLOOD BY AUTOMATED COUNT: 14.2 % (ref 12.3–15.4)
FENTANYL UR-MCNC: NEGATIVE NG/ML
GLUCOSE BLDC GLUCOMTR-MCNC: 126 MG/DL (ref 70–130)
GLUCOSE BLDC GLUCOMTR-MCNC: 173 MG/DL (ref 70–130)
GLUCOSE BLDC GLUCOMTR-MCNC: 227 MG/DL (ref 70–130)
GLUCOSE BLDC GLUCOMTR-MCNC: 309 MG/DL (ref 70–130)
GLUCOSE UR STRIP-MCNC: ABNORMAL MG/DL
HCT VFR BLD AUTO: 38.1 % (ref 37.5–51)
HGB BLD-MCNC: 12.5 G/DL (ref 13–17.7)
HGB UR QL STRIP.AUTO: NEGATIVE
HYALINE CASTS UR QL AUTO: NORMAL /LPF
IMM GRANULOCYTES # BLD AUTO: 0.02 10*3/MM3 (ref 0–0.05)
IMM GRANULOCYTES NFR BLD AUTO: 0.3 % (ref 0–0.5)
KETONES UR QL STRIP: ABNORMAL
LEUKOCYTE ESTERASE UR QL STRIP.AUTO: NEGATIVE
LYMPHOCYTES # BLD AUTO: 2.29 10*3/MM3 (ref 0.7–3.1)
LYMPHOCYTES NFR BLD AUTO: 29.7 % (ref 19.6–45.3)
MCH RBC QN AUTO: 31.5 PG (ref 26.6–33)
MCHC RBC AUTO-ENTMCNC: 32.8 G/DL (ref 31.5–35.7)
MCV RBC AUTO: 96 FL (ref 79–97)
METHADONE UR QL SCN: NEGATIVE
MONOCYTES # BLD AUTO: 0.61 10*3/MM3 (ref 0.1–0.9)
MONOCYTES NFR BLD AUTO: 7.9 % (ref 5–12)
NEUTROPHILS NFR BLD AUTO: 4.31 10*3/MM3 (ref 1.7–7)
NEUTROPHILS NFR BLD AUTO: 55.7 % (ref 42.7–76)
NITRITE UR QL STRIP: NEGATIVE
NRBC BLD AUTO-RTO: 0 /100 WBC (ref 0–0.2)
OPIATES UR QL: NEGATIVE
OXYCODONE UR QL SCN: NEGATIVE
PA ADP PRP-ACNC: 206 PRU
PCP UR QL SCN: NEGATIVE
PH UR STRIP.AUTO: <=5 [PH] (ref 5–8)
PLATELET # BLD AUTO: 217 10*3/MM3 (ref 140–450)
PMV BLD AUTO: 10.7 FL (ref 6–12)
PROPOXYPH UR QL: NEGATIVE
PROT UR QL STRIP: ABNORMAL
RBC # BLD AUTO: 3.97 10*6/MM3 (ref 4.14–5.8)
RBC # UR STRIP: NORMAL /HPF
REF LAB TEST METHOD: NORMAL
RH BLD: POSITIVE
RH BLD: POSITIVE
SP GR UR STRIP: 1.03 (ref 1–1.03)
SQUAMOUS #/AREA URNS HPF: NORMAL /HPF
T&S EXPIRATION DATE: NORMAL
TRICYCLICS UR QL SCN: NEGATIVE
UFH PPP CHRO-ACNC: 0.2 IU/ML (ref 0.3–0.7)
UFH PPP CHRO-ACNC: 0.24 IU/ML (ref 0.3–0.7)
UFH PPP CHRO-ACNC: 0.26 IU/ML (ref 0.3–0.7)
UFH PPP CHRO-ACNC: 0.35 IU/ML (ref 0.3–0.7)
UROBILINOGEN UR QL STRIP: ABNORMAL
WBC # UR STRIP: NORMAL /HPF
WBC NRBC COR # BLD: 7.72 10*3/MM3 (ref 3.4–10.8)

## 2023-10-05 PROCEDURE — 85520 HEPARIN ASSAY: CPT

## 2023-10-05 PROCEDURE — 80307 DRUG TEST PRSMV CHEM ANLYZR: CPT

## 2023-10-05 PROCEDURE — 86901 BLOOD TYPING SEROLOGIC RH(D): CPT

## 2023-10-05 PROCEDURE — 85025 COMPLETE CBC W/AUTO DIFF WBC: CPT | Performed by: STUDENT IN AN ORGANIZED HEALTH CARE EDUCATION/TRAINING PROGRAM

## 2023-10-05 PROCEDURE — 86900 BLOOD TYPING SEROLOGIC ABO: CPT

## 2023-10-05 PROCEDURE — 97165 OT EVAL LOW COMPLEX 30 MIN: CPT

## 2023-10-05 PROCEDURE — 81001 URINALYSIS AUTO W/SCOPE: CPT

## 2023-10-05 PROCEDURE — 97535 SELF CARE MNGMENT TRAINING: CPT

## 2023-10-05 PROCEDURE — 25010000002 HEPARIN (PORCINE) 25000-0.45 UT/250ML-% SOLUTION

## 2023-10-05 PROCEDURE — 86923 COMPATIBILITY TEST ELECTRIC: CPT

## 2023-10-05 PROCEDURE — 86850 RBC ANTIBODY SCREEN: CPT

## 2023-10-05 PROCEDURE — 99232 SBSQ HOSP IP/OBS MODERATE 35: CPT | Performed by: INTERNAL MEDICINE

## 2023-10-05 PROCEDURE — 71045 X-RAY EXAM CHEST 1 VIEW: CPT

## 2023-10-05 PROCEDURE — 99024 POSTOP FOLLOW-UP VISIT: CPT

## 2023-10-05 PROCEDURE — 63710000001 INSULIN LISPRO (HUMAN) PER 5 UNITS: Performed by: INTERNAL MEDICINE

## 2023-10-05 PROCEDURE — 85576 BLOOD PLATELET AGGREGATION: CPT | Performed by: PHYSICIAN ASSISTANT

## 2023-10-05 PROCEDURE — 82948 REAGENT STRIP/BLOOD GLUCOSE: CPT

## 2023-10-05 PROCEDURE — 97530 THERAPEUTIC ACTIVITIES: CPT

## 2023-10-05 PROCEDURE — 97110 THERAPEUTIC EXERCISES: CPT

## 2023-10-05 RX ORDER — CEFAZOLIN SODIUM 2 G/100ML
2000 INJECTION, SOLUTION INTRAVENOUS ONCE
Status: COMPLETED | OUTPATIENT
Start: 2023-10-06 | End: 2023-10-06

## 2023-10-05 RX ORDER — HYDROXYZINE HYDROCHLORIDE 10 MG/1
10 TABLET, FILM COATED ORAL 3 TIMES DAILY PRN
Status: DISCONTINUED | OUTPATIENT
Start: 2023-10-05 | End: 2023-10-11 | Stop reason: HOSPADM

## 2023-10-05 RX ORDER — IPRATROPIUM BROMIDE AND ALBUTEROL SULFATE 2.5; .5 MG/3ML; MG/3ML
3 SOLUTION RESPIRATORY (INHALATION) EVERY 4 HOURS PRN
Status: DISCONTINUED | OUTPATIENT
Start: 2023-10-05 | End: 2023-10-11 | Stop reason: HOSPADM

## 2023-10-05 RX ORDER — CHLORHEXIDINE GLUCONATE 500 MG/1
CLOTH TOPICAL EVERY 12 HOURS PRN
Status: DISCONTINUED | OUTPATIENT
Start: 2023-10-05 | End: 2023-10-11 | Stop reason: HOSPADM

## 2023-10-05 RX ORDER — FAMOTIDINE 10 MG/ML
20 INJECTION, SOLUTION INTRAVENOUS ONCE
Status: CANCELLED | OUTPATIENT
Start: 2023-10-05 | End: 2023-10-05

## 2023-10-05 RX ORDER — CHLORHEXIDINE GLUCONATE ORAL RINSE 1.2 MG/ML
15 SOLUTION DENTAL EVERY 12 HOURS
Status: COMPLETED | OUTPATIENT
Start: 2023-10-05 | End: 2023-10-06

## 2023-10-05 RX ADMIN — SENNOSIDES AND DOCUSATE SODIUM 2 TABLET: 50; 8.6 TABLET ORAL at 09:08

## 2023-10-05 RX ADMIN — Medication 10 ML: at 22:22

## 2023-10-05 RX ADMIN — INSULIN LISPRO 3 UNITS: 100 INJECTION, SOLUTION INTRAVENOUS; SUBCUTANEOUS at 22:22

## 2023-10-05 RX ADMIN — INSULIN LISPRO 2 UNITS: 100 INJECTION, SOLUTION INTRAVENOUS; SUBCUTANEOUS at 17:33

## 2023-10-05 RX ADMIN — HEPARIN SODIUM 11 UNITS/KG/HR: 10000 INJECTION, SOLUTION INTRAVENOUS at 10:54

## 2023-10-05 RX ADMIN — CHLORHEXIDINE GLUCONATE 15 ML: 1.2 SOLUTION ORAL at 17:33

## 2023-10-05 RX ADMIN — INSULIN LISPRO 5 UNITS: 100 INJECTION, SOLUTION INTRAVENOUS; SUBCUTANEOUS at 12:19

## 2023-10-05 RX ADMIN — Medication 10 ML: at 09:09

## 2023-10-05 RX ADMIN — ASPIRIN 81 MG: 81 TABLET, COATED ORAL at 09:08

## 2023-10-05 RX ADMIN — ATORVASTATIN CALCIUM 80 MG: 40 TABLET, FILM COATED ORAL at 22:21

## 2023-10-05 RX ADMIN — MUPIROCIN 1 APPLICATION: 20 OINTMENT TOPICAL at 17:33

## 2023-10-05 RX ADMIN — METOPROLOL SUCCINATE 25 MG: 25 TABLET, EXTENDED RELEASE ORAL at 09:08

## 2023-10-05 NOTE — THERAPY EVALUATION
Patient Name: Dann Metz  : 1950    MRN: [id number]                              Today's Date: 10/5/2023       Admit Date: 10/1/2023    Visit Dx:     ICD-10-CM ICD-9-CM   1. Coronary artery disease involving native coronary artery of native heart with unstable angina pectoris  I25.110 414.01     411.1     Patient Active Problem List   Diagnosis    Acute CVA (cerebrovascular accident)    Essential hypertension    Type 2 diabetes mellitus, without long-term current use of insulin    NSTEMI (non-ST elevated myocardial infarction)    Type 1 myocardial infarction    Coronary artery disease    Vitamin D deficiency    RUE weakness     Past Medical History:   Diagnosis Date    Coronary artery disease     Diabetes mellitus     Hypertension     Stroke      Past Surgical History:   Procedure Laterality Date    CARDIAC CATHETERIZATION      CARDIAC CATHETERIZATION N/A 2023    Procedure: Coronary angiography;  Surgeon: Vince Redding MD;  Location: [facility] CATH INVASIVE LOCATION;  Service: Cardiology;  Laterality: N/A;      General Information       Row Name 10/05/23 1331          OT Time and Intention    Document Type evaluation  -KF     Mode of Treatment occupational therapy  -KF       Row Name 10/05/23 5267          General Information    Patient Profile Reviewed yes  -KF     Prior Level of Function independent:;transfer;bed mobility;ADL's  no DME, h/o of CVA with R sided weakness  -KF     Existing Precautions/Restrictions fall;cardiac  -KF     Barriers to Rehab medically complex;previous functional deficit  at times requires cues for situational awareness  -KF       Row Name 10/05/23 6463          Living Environment    People in Home significant other  -KF       Row Name 10/05/23 1338          Home Main Entrance    Number of Stairs, Main Entrance one  -KF       Row Name 10/05/23 3786          Stairs Within Home, Primary    Number of Stairs, Within Home, Primary none  -KF       Row Name 10/05/23 0862           Cognition    Orientation Status (Cognition) oriented x 3;verbal cues/prompts needed for orientation;situation  -       Row Name 10/05/23 1337          Safety Issues, Functional Mobility    Safety Issues Affecting Function (Mobility) insight into deficits/self-awareness;awareness of need for assistance;safety precaution awareness;problem-solving  -KF     Impairments Affecting Function (Mobility) balance;endurance/activity tolerance;strength  -KF               User Key  (r) = Recorded By, (t) = Taken By, (c) = Cosigned By      Initials Name Provider Type    KF Lauren Yen, OT Occupational Therapist                     Mobility/ADL's       Row Name 10/05/23 1339          Bed Mobility    Bed Mobility scooting/bridging;supine-sit  -KF     Scooting/Bridging [place] (Bed Mobility) contact guard;verbal cues  -KF     Supine-Sit [place] (Bed Mobility) contact guard;verbal cues  -KF     Bed Mobility, Safety Issues decreased use of arms for pushing/pulling  -KF     Assistive Device (Bed Mobility) bed rails;head of bed elevated  -KF     Comment, (Bed Mobility) education provided on sternal precautions potential and bed mobility compensatory strategies pending surgery with verbalized understanding  -       Row Name 10/05/23 1339          Transfers    Transfers sit-stand transfer;toilet transfer;stand-sit transfer;bed-chair transfer  -KF     Comment, (Transfers) cues for sequencing throughout, education on sequencing including pending surgical precautions potentially to provide education prior to surgical interventions  -       Row Name 10/05/23 1339          Bed-Chair Transfer    Bed-Chair [place] (Transfers) contact guard;verbal cues  -KF     Assistive Device (Bed-Chair Transfers) --  none  -       Row Name 10/05/23 1339          Sit-Stand Transfer    Sit-Stand [place] (Transfers) contact guard;verbal cues  -KF     Assistive Device (Sit-Stand Transfers) --  none  -KF       Row Name 10/05/23  1339          Stand-Sit Transfer    Stand-Sit [place] (Transfers) verbal cues;contact guard  -KF       Row Name 10/05/23 1339          Toilet Transfer    Type (Toilet Transfer) sit-stand  -KF     [place] Level (Toilet Transfer) contact guard;verbal cues  -KF     Assistive Device (Toilet Transfer) commode  -KF       Row Name 10/05/23 1339          Functional Mobility    Functional Mobility- Ind. Level contact guard assist  -KF     Functional Mobility- Device --  none  -KF     Functional Mobility-Distance (Feet) 30  -KF     Functional Mobility- Safety Issues weight-shifting ability decreased;sequencing ability decreased  -KF       Row Name 10/05/23 1339          Activities of Daily Living    BADL Assessment/Intervention toileting;grooming;lower body dressing  -KF       Row Name 10/05/23 1339          Toileting Assessment/Training    [place] Level (Toileting) adjust/manage clothing;perform perineal hygiene;standby assist  -KF     Assistive Devices (Toileting) commode  -KF     Position (Toileting) unsupported sitting;unsupported standing  -KF       Row Name 10/05/23 1339          Grooming Assessment/Training    [place] Level (Grooming) oral care regimen;hair care, combing/brushing;wash face, hands;verbal cues;standby assist  -KF     Position (Grooming) sink side;unsupported standing  -KF       Row Name 10/05/23 1339          Lower Body Dressing Assessment/Training    [place] Level (Lower Body Dressing) doff;pants/bottoms;minimum assist (75% patient effort)  -KF     Position (Lower Body Dressing) unsupported sitting;supported standing  -KF               User Key  (r) = Recorded By, (t) = Taken By, (c) = Cosigned By      Initials Name Provider Type    KF Lauren Yen, OT Occupational Therapist                   Obj/Interventions       Row Name 10/05/23 1342          Sensory Assessment (Somatosensory)    Sensory Assessment (Somatosensory) UE sensation intact  -KF       Row Name 10/05/23 1341           Vision Assessment/Intervention    Visual Impairment/Limitations WFL  -KF       Row Name 10/05/23 1342          Range of Motion Comprehensive    General Range of Motion bilateral upper extremity ROM WFL  -KF       Row Name 10/05/23 1342          Strength Comprehensive (MMT)    General Manual Muscle Testing (MMT) Assessment upper extremity strength deficits identified  -KF     Comment, General Manual Muscle Testing (MMT) Assessment BUE grossly 4+/5 with only slight weakness in R shoulder flexion compared to L shoulder flexion  -KF       Row Name 10/05/23 1342          Motor Skills    Motor Skills coordination  -KF     Coordination WFL;bilateral;upper extremity;bimanual skills  -KF       Row Name 10/05/23 1342          Balance    Balance Assessment sitting static balance;sitting dynamic balance;standing static balance;standing dynamic balance  -KF     Static Sitting Balance independent  -KF     Dynamic Sitting Balance supervision  -KF     Position, Sitting Balance unsupported;sitting edge of bed;sitting in chair  -KF     Static Standing Balance standby assist  -KF     Dynamic Standing Balance contact guard  -KF     Position/Device Used, Standing Balance unsupported  -KF     Balance Interventions standing;sit to stand;minimal challenge;occupation based/functional task;weight shifting activity;UE activity with balance activity  -KF     Comment, Balance mild unsteadiness but no overall LOB without AD  -KF               User Key  (r) = Recorded By, (t) = Taken By, (c) = Cosigned By      Initials Name Provider Type    KF Lauren Yen OT Occupational Therapist                   Goals/Plan       Row Name 10/05/23 1347          Bed Mobility Goal 1 (OT)    Activity/Assistive Device (Bed Mobility Goal 1, OT) sit to supine/supine to sit  -KF     [place] Level/Cues Needed (Bed Mobility Goal 1, OT) contact guard required;verbal cues required  -KF     Time Frame (Bed Mobility Goal 1, OT) long term goal (LTG);10  days  -KF     Strategies/Barriers (Bed Mobility Goal 1, OT) with min Vc's for sequencing for safety awareness  -KF     Progress/Outcomes (Bed Mobility Goal 1, OT) new goal;goal ongoing  -KF       Row Name 10/05/23 8379          Transfer Goal 1 (OT)    Activity/Assistive Device (Transfer Goal 1, OT) toilet;bed-to-chair/chair-to-bed  -KF     [place] Level/Cues Needed (Transfer Goal 1, OT) standby assist  -KF     Time Frame (Transfer Goal 1, OT) long term goal (LTG);10 days  -KF     Progress/Outcome (Transfer Goal 1, OT) new goal;goal ongoing  -KF       Row Name 10/05/23 8361          Dressing Goal 1 (OT)    Activity/Device (Dressing Goal 1, OT) lower body dressing  -KF     [place]/Cues Needed (Dressing Goal 1, OT) contact guard required;verbal cues required  -KF     Time Frame (Dressing Goal 1, OT) long term goal (LTG);10 days  -KF     Strategies/Barriers (Dressing Goal 1, OT) undergarment/socks with min Vc's for sequencing and safety awareness  -KF     Progress/Outcome (Dressing Goal 1, OT) new goal;goal ongoing  -KF       Row Name 10/05/23 0596          Grooming Goal 1 (OT)    Activity/Device (Grooming Goal 1, OT) hair care;oral care;wash face, hands  -KF     [place] (Grooming Goal 1, OT) standby assist  -KF     Time Frame (Grooming Goal 1, OT) long term goal (LTG);10 days  -KF     Strategies/Barriers (Grooming Goal 1, OT) standing sink side  -KF     Progress/Outcome (Grooming Goal 1, OT) goal ongoing;new goal  -KF       Row Name 10/05/23 1420          Therapy Assessment/Plan (OT)    Planned Therapy Interventions (OT) activity tolerance training;adaptive equipment training;BADL retraining;transfer/mobility retraining;strengthening exercise;ROM/therapeutic exercise;functional balance retraining;IADL retraining;patient/caregiver education/training;cognitive/visual perception retraining;occupation/activity based interventions  -KF               User Key  (r) = Recorded By, (t) = Taken By, (c) = Cosigned  By      Initials Name Provider Type    KF Lauren Yen, OT Occupational Therapist                   Clinical Impression       Row Name 10/05/23 1348          Pain Assessment    Pretreatment Pain Rating 0/10 - no pain  -KF     Posttreatment Pain Rating 0/10 - no pain  -KF     Pre/Posttreatment Pain Comment tolerated  -KF     Pain Intervention(s) Repositioned;Ambulation/increased activity  -KF       Row Name 10/05/23 1347          Plan of Care Review    Plan of Care Reviewed With patient  -KF     Progress no change  -KF     Outcome Evaluation OT eval completed with education provided on potential compensatory strategies post potential surgical interventions for ADL completion and functional transfers, demonstrates mild deficits in balance, strength, and activity tolerance compared to baseline for ADL completion, recom IPOT DC home with assist and HHOT pending progress  -       Row Name 10/05/23 134          Therapy Assessment/Plan (OT)    Rehab Potential (OT) good, to achieve stated therapy goals  -     Criteria for Skilled Therapeutic Interventions Met (OT) yes;meets criteria;skilled treatment is necessary  -KF     Therapy Frequency (OT) daily  -       Row Name 10/05/23 8583          Therapy Plan Review/Discharge Plan (OT)    Equipment Needs Upon Discharge (OT) --  TBD  -KF     Anticipated Discharge Disposition (OT) home with assist;home with home health  -       Row Name 10/05/23 1340          Vital Signs    Pre Systolic BP Rehab 122  -KF     Pre Treatment Diastolic BP 87  -KF     Pre SpO2 (%) 96  -KF     O2 Delivery Pre Treatment room air  -KF     Pre Patient Position Supine  -KF     Intra Patient Position Standing  -KF     Post Patient Position Sitting  -KF       Row Name 10/05/23 1346          Positioning and Restraints    Pre-Treatment Position in bed  -KF     Post Treatment Position chair  -KF     In Chair notified nsg;sitting;call light within reach;encouraged to call for assist;exit alarm  on;with other staff;waffle cushion;with family/caregiver  -KF               User Key  (r) = Recorded By, (t) = Taken By, (c) = Cosigned By      Initials Name Provider Type    Lauren Macdonald OT Occupational Therapist                   Outcome Measures       Row Name 10/05/23 1347          How much help from another is currently needed...    Putting on and taking off regular lower body clothing? 3  -KF     Bathing (including washing, rinsing, and drying) 3  -KF     Toileting (which includes using toilet bed pan or urinal) 3  -KF     Putting on and taking off regular upper body clothing 3  -KF     Taking care of personal grooming (such as brushing teeth) 3  -KF     Eating meals 4  -KF     AM-PAC 6 Clicks Score (OT) 19  -KF       Row Name 10/05/23 0908          How much help from another person do you currently need...    Turning from your back to your side while in flat bed without using bedrails? 4  -JS     Moving from lying on back to sitting on the side of a flat bed without bedrails? 4  -JS     Moving to and from a bed to a chair (including a wheelchair)? 3  -JS     Standing up from a chair using your arms (e.g., wheelchair, bedside chair)? 3  -JS     Climbing 3-5 steps with a railing? 3  -JS     To walk in hospital room? 3  -JS     AM-PAC 6 Clicks Score (PT) 20  -JS     Highest level of mobility 6 --> Walked 10 steps or more  -JS       Row Name 10/05/23 1347          Functional Assessment    Outcome Measure Options AM-PAC 6 Clicks Daily Activity (OT)  -               User Key  (r) = Recorded By, (t) = Taken By, (c) = Cosigned By      Initials Name Provider Type    Lauren Macdonald OT Occupational Therapist    Jodee Alvarado, RN Registered Nurse                    Occupational Therapy Education       Title: PT OT SLP Therapies (In Progress)       Topic: Occupational Therapy (In Progress)       Point: ADL training (Done)       Description:   Instruct learner(s) on proper safety adaptation and  remediation techniques during self care or transfers.   Instruct in proper use of assistive devices.                  Learning Progress Summary             Patient Acceptance, E,TB,D, VU,DU,NR by  at 10/5/2023 1348                         Point: Home exercise program (Not Started)       Description:   Instruct learner(s) on appropriate technique for monitoring, assisting and/or progressing therapeutic exercises/activities.                  Learner Progress:  Not documented in this visit.              Point: Precautions (Done)       Description:   Instruct learner(s) on prescribed precautions during self-care and functional transfers.                  Learning Progress Summary             Patient Acceptance, E,TB,D, VU,DU,NR by KF at 10/5/2023 1348                         Point: Body mechanics (Done)       Description:   Instruct learner(s) on proper positioning and spine alignment during self-care, functional mobility activities and/or exercises.                  Learning Progress Summary             Patient Acceptance, E,TB,D, VU,DU,NR by  at 10/5/2023 1348                                         User Key       Initials Effective Dates Name Provider Type Discipline     06/16/21 -  Lauren Yen OT Occupational Therapist OT                  OT Recommendation and Plan  Planned Therapy Interventions (OT): activity tolerance training, adaptive equipment training, BADL retraining, transfer/mobility retraining, strengthening exercise, ROM/therapeutic exercise, functional balance retraining, IADL retraining, patient/caregiver education/training, cognitive/visual perception retraining, occupation/activity based interventions  Therapy Frequency (OT): daily  Plan of Care Review  Plan of Care Reviewed With: patient  Progress: no change  Outcome Evaluation: OT eval completed with education provided on potential compensatory strategies post potential surgical interventions for ADL completion and functional  transfers, demonstrates mild deficits in balance, strength, and activity tolerance compared to baseline for ADL completion, recom IPOT DC home with assist and HHOT pending progress     Time Calculation:   Evaluation Complexity (OT)  Review Occupational Profile/Medical/Therapy History Complexity: brief/low complexity  Assessment, Occupational Performance/Identification of Deficit Complexity: 1-3 performance deficits  Clinical Decision Making Complexity (OT): problem focused assessment/low complexity  Overall Complexity of Evaluation (OT): low complexity     Time Calculation- OT       Row Name 10/05/23 1048             Time Calculation- OT    OT Start Time 1048  -KF      OT Received On 10/05/23  -KF      OT Goal Re-Cert Due Date 10/15/23  -KF         Timed Charges    82528 - OT Self Care/Mgmt Minutes 8  -KF         Untimed Charges    OT Eval/Re-eval Minutes 31  -KF         Total Minutes    Timed Charges Total Minutes 8  -KF      Untimed Charges Total Minutes 31  -KF       Total Minutes 39  -KF                User Key  (r) = Recorded By, (t) = Taken By, (c) = Cosigned By      Initials Name Provider Type    KF Lauren Yen OT Occupational Therapist                  Therapy Charges for Today       Code Description Service Date Service Provider Modifiers Qty    [id number]  OT SELF CARE/MGMT/TRAIN EA 15 MIN 10/5/2023 Lauren Yen OT GO 1    [id number]  OT EVAL LOW COMPLEXITY 3 10/5/2023 Lauren Yen OT GO 1                 Lauren Yen OT  10/5/2023

## 2023-10-05 NOTE — THERAPY TREATMENT NOTE
Patient Name: Dann Metz  : 1950    MRN: [id number]                              Today's Date: 10/5/2023       Admit Date: 10/1/2023    Visit Dx:     ICD-10-CM ICD-9-CM   1. Coronary artery disease involving native coronary artery of native heart with unstable angina pectoris  I25.110 414.01     411.1     Patient Active Problem List   Diagnosis    Acute CVA (cerebrovascular accident)    Essential hypertension    Type 2 diabetes mellitus, without long-term current use of insulin    NSTEMI (non-ST elevated myocardial infarction)    Type 1 myocardial infarction    Coronary artery disease    Vitamin D deficiency    RUE weakness     Past Medical History:   Diagnosis Date    Coronary artery disease     Diabetes mellitus     Hypertension     Stroke      Past Surgical History:   Procedure Laterality Date    CARDIAC CATHETERIZATION      CARDIAC CATHETERIZATION N/A 2023    Procedure: Coronary angiography;  Surgeon: Vince Redding MD;  Location: [facility] CATH INVASIVE LOCATION;  Service: Cardiology;  Laterality: N/A;      General Information       Row Name 10/05/23 1538          Physical Therapy Time and Intention    Document Type therapy note (daily note)  -     Mode of Treatment physical therapy  -       Row Name 10/05/23 1538          General Information    Patient Profile Reviewed yes  -     Existing Precautions/Restrictions fall;cardiac  -     Barriers to Rehab medically complex;previous functional deficit  -       Row Name 10/05/23 1538          Cognition    Orientation Status (Cognition) oriented x 3;verbal cues/prompts needed for orientation;situation  -       Row Name 10/05/23 1538          Safety Issues, Functional Mobility    Safety Issues Affecting Function (Mobility) awareness of need for assistance;insight into deficits/self-awareness;safety precaution awareness;problem-solving;safety precautions follow-through/compliance;sequencing abilities  -     Impairments Affecting Function  (Mobility) balance;endurance/activity tolerance;strength  -               User Key  (r) = Recorded By, (t) = Taken By, (c) = Cosigned By      Initials Name Provider Type     Petra Astudillo PT Physical Therapist                   Mobility       Row Name 10/05/23 1538          Bed Mobility    Comment, (Bed Mobility) up in room with nursing upon arrival  -       Row Name 10/05/23 1538          Bed-Chair Transfer    Bed-Chair [place] (Transfers) contact guard;verbal cues  -     Comment, (Bed-Chair Transfer) no AD  -HM       Row Name 10/05/23 1538          Sit-Stand Transfer    Sit-Stand [place] (Transfers) contact guard;verbal cues  -     Comment, (Sit-Stand Transfer) no AD, reviewed STS maintaining sternal precautions pending planned surgery  -       Row Name 10/05/23 1538          Gait/Stairs (Locomotion)    [place] Level (Gait) contact guard;1 person assist  -     Assistive Device (Gait) other (see comments)  no AD  -HM     Distance in Feet (Gait) 350  -     Deviations/Abnormal Patterns (Gait) bilateral deviations;theo decreased;stride length decreased;gait speed decreased  -     Comment, (Gait/Stairs) Pt ambulated 350 feet with intermittent unsteadiness with increased lateral sway at moments with decreased gait speed, decreased stride length, and decreased theo. Mobility limited d/t fatigue.  -               User Key  (r) = Recorded By, (t) = Taken By, (c) = Cosigned By      Initials Name Provider Type     Petra Astudillo PT Physical Therapist                   Obj/Interventions       Row Name 10/05/23 1542          Motor Skills    Therapeutic Exercise other (see comments)  BLE APs, heel slides, LAQ, SLR, seated marches, and hip ab/ad x 10 reps  -       Row Name 10/05/23 1542          Balance    Balance Assessment sitting static balance;sitting dynamic balance;sit to stand dynamic balance;standing static balance;standing dynamic balance  -     Static Sitting  Balance independent  -     Dynamic Sitting Balance supervision  -     Position, Sitting Balance unsupported;sitting in chair  -     Sit to Stand Dynamic Balance contact guard  -     Static Standing Balance standby assist  -     Dynamic Standing Balance contact guard  -     Position/Device Used, Standing Balance unsupported  -     Balance Interventions standing;dynamic;occupation based/functional task  -     Comment, Balance intermittent unsteadiness with increased lateral sway and narrow FEROZ at times  -               User Key  (r) = Recorded By, (t) = Taken By, (c) = Cosigned By      Initials Name Provider Type     Petra Astudillo, PT Physical Therapist                   Goals/Plan    No documentation.                  Clinical Impression       Row Name 10/05/23 1543          Pain    Pretreatment Pain Rating 0/10 - no pain  -     Posttreatment Pain Rating 0/10 - no pain  -     Pain Intervention(s) Ambulation/increased activity;Repositioned  -       Row Name 10/05/23 1543          Plan of Care Review    Plan of Care Reviewed With patient  -     Progress no change  -     Outcome Evaluation Pt increased distance ambulated at this date with intermittent unsteadiness with CGA with no AD. Pt gave good effort with BLE therex. Pt continued to present below baseline function warranting IPPT POC. d/c rec home with assist and HHPT pending progress.  -       Row Name 10/05/23 1540          Therapy Assessment/Plan (PT)    Rehab Potential (PT) good, to achieve stated therapy goals  -     Criteria for Skilled Interventions Met (PT) yes;meets criteria;skilled treatment is necessary  -     Therapy Frequency (PT) daily  -       Row Name 10/05/23 1540          Vital Signs    Posttreatment Heart Rate (beats/min) 73  -HM     Post SpO2 (%) 97  -HM     Pre Patient Position Standing  standing in room with nursing  -     Intra Patient Position Standing  -     Post Patient Position Sitting  -        Row Name 10/05/23 1543          Positioning and Restraints    Pre-Treatment Position standing in room  -     Post Treatment Position chair  -     In Chair notified nsg;reclined;sitting;call light within reach;encouraged to call for assist;exit alarm on;waffle cushion;legs elevated  -               User Key  (r) = Recorded By, (t) = Taken By, (c) = Cosigned By      Initials Name Provider Type     Petra Astudillo PT Physical Therapist                   Outcome Measures       Row Name 10/05/23 1549 10/05/23 0908       How much help from another person do you currently need...    Turning from your back to your side while in flat bed without using bedrails? 3  - 4  -JS    Moving from lying on back to sitting on the side of a flat bed without bedrails? 3  - 4  -JS    Moving to and from a bed to a chair (including a wheelchair)? 3  - 3  -JS    Standing up from a chair using your arms (e.g., wheelchair, bedside chair)? 3  - 3  -JS    Climbing 3-5 steps with a railing? 3  - 3  -JS    To walk in hospital room? 3  - 3  -JS    AM-PAC 6 Clicks Score (PT) 18  - 20  -JS    Highest level of mobility 6 --> Walked 10 steps or more  - 6 --> Walked 10 steps or more  -      Row Name 10/05/23 1549 10/05/23 1347       Functional Assessment    Outcome Measure Options AM-PAC 6 Clicks Basic Mobility (PT)  - AM-PAC 6 Clicks Daily Activity (OT)  -              User Key  (r) = Recorded By, (t) = Taken By, (c) = Cosigned By      Initials Name Provider Type     Lauren Yen OT Occupational Therapist    Jodee Alvarado, RN Registered Nurse     Petra Astudillo, MARLENE Physical Therapist                                 Physical Therapy Education       Title: PT OT SLP Therapies (In Progress)       Topic: Physical Therapy (Done)       Point: Mobility training (Done)       Learning Progress Summary             Patient Acceptance, E,TB, VU,NR by  at 10/5/2023 1550    Acceptance, E,TB, VU,NR by  at  10/3/2023 1548                         Point: Home exercise program (Done)       Learning Progress Summary             Patient Acceptance, E,TB, VU,NR by  at 10/5/2023 1550                         Point: Body mechanics (Done)       Learning Progress Summary             Patient Acceptance, E,TB, VU,NR by  at 10/5/2023 1550    Acceptance, E,TB, VU,NR by  at 10/3/2023 1548                         Point: Precautions (Done)       Learning Progress Summary             Patient Acceptance, E,TB, VU,NR by  at 10/5/2023 1550    Acceptance, E,TB, VU,NR by  at 10/3/2023 1548                                         User Key       Initials Effective Dates Name Provider Type Discipline     09/22/22 -  Petra Astudillo, PT Physical Therapist PT                  PT Recommendation and Plan  Planned Therapy Interventions (PT): balance training, bed mobility training, gait training, home exercise program, neuromuscular re-education, patient/family education, postural re-education, strengthening, ROM (range of motion), transfer training  Plan of Care Reviewed With: patient  Progress: no change  Outcome Evaluation: Pt increased distance ambulated at this date with intermittent unsteadiness with CGA with no AD. Pt gave good effort with BLE therex. Pt continued to present below baseline function warranting IPPT POC. d/c rec home with assist and HHPT pending progress.     Time Calculation:   PT Evaluation Complexity  History, PT Evaluation Complexity: 3 or more personal factors and/or comorbidities  Examination of Body Systems (PT Eval Complexity): total of 3 or more elements  Clinical Presentation (PT Evaluation Complexity): evolving  Clinical Decision Making (PT Evaluation Complexity): moderate complexity  Overall Complexity (PT Evaluation Complexity): moderate complexity     PT Charges       Row Name 10/05/23 1552             Time Calculation    Start Time 1457  -HM      PT Received On 10/05/23  -         Timed Charges     83963 - PT Therapeutic Exercise Minutes 10  -HM      61376 - PT Therapeutic Activity Minutes 13  -HM         Total Minutes    Timed Charges Total Minutes 23  -HM       Total Minutes 23  -HM                User Key  (r) = Recorded By, (t) = Taken By, (c) = Cosigned By      Initials Name Provider Type     Petra Astudillo, MARLENE Physical Therapist                  Therapy Charges for Today       Code Description Service Date Service Provider Modifiers Qty    15281097303 HC PT THER PROC EA 15 MIN 10/5/2023 Petra Astudillo, PT GP 1    42723351944 HC PT THERAPEUTIC ACT EA 15 MIN 10/5/2023 Petra Astudillo, PT GP 1            PT G-Codes  Outcome Measure Options: AM-PAC 6 Clicks Basic Mobility (PT)  AM-PAC 6 Clicks Score (PT): 18  AM-PAC 6 Clicks Score (OT): 19  PT Discharge Summary  Anticipated Discharge Disposition (PT): home with assist, home with home health    Petra Astudillo PT  10/5/2023

## 2023-10-05 NOTE — STS RISK SCORE
\Procedure Type: Isolated CABG  Perioperative Outcome Estimate %  Operative Mortality 1.8%  Morbidity & Mortality 7.2%  Stroke 1.26%  Renal Failure 1.35%  Reoperation 2.47%  Prolonged Ventilation 3.57%  Deep Sternal Wound Infection 0.157%  Long Hospital Stay (>14 days) 3.73%  Short Hospital Stay (<6 days)* 45.6%

## 2023-10-05 NOTE — PROGRESS NOTES
[facility] Cardiology  Inpatient Progress Note      Chief Complaint/Reason for consult:  CAD, NSTEMI         Subjective  No chest pain or dyspnea, no acute events overnight       Vital Sign Min/Max for last 24 hours  Temp  Min: 97.7 øF (36.5 øC)  Max: 98.3 øF (36.8 øC)   BP  Min: 109/72  Max: 122/78   Pulse  Min: 56  Max: 101   Resp  Min: 16  Max: 16   SpO2  Min: 94 %  Max: 97 %   No data recorded      Intake/Output Summary (Last 24 hours) at 10/5/2023 1123  Last data filed at 10/5/2023 0735  Gross per 24 hour   Intake 1038 ml   Output 520 ml   Net 518 ml           Gen: well developed, sitting up in chair, comfortable appearing  HEENT: MMM, sclerae anicteric, conjunctivae normal  CV: regular rate, regular rhythm, no murmurs or rubs, normal S1, S2. 2+ radial and DP pulses  Pulm: RA, normal work of breathing, no wheezes, rales, rhonchi  Abd: soft, nontender, nondistended  Ext: normal bulk for age, normal tone, no dependent edema  Neuro: alert, oriented, face symmetrical, moving all extremities well  Psych: normal mood, appropriate affect    Tele:  SR, PVCs seen, no sustained arrhythmia seen overnight    Results Review (reviewed the patient's recent labs in the electronic medical record):     EK23 - NSR, anteroseptal infarct, inferolateral ischemia     23 - [facility]  Angiographic Findings:  Coronary circulation is left dominant  Left main: The left main coronary artery divides into the left anterior descending and circumflex coronary arteries. The left main coronary artery has no significant disease.  LAD: The left anterior descending gives rise to the diagonal branches as well as multiple septal perforators before terminating as an apical recurrent branch. The proximal LAD has an 80-90 % stenosis. The distal LAD has an 80-90% stenosis. The first diagonal branch of the left anterior descending is subtotally occluded.  LCX: The circumflex coronary artery is a dominant vessel giving rise to the  obtuse marginal branches, posterolateral left ventricular branches and posterior descending artery. The mid circumflex is subtotally occluded. The largest obtuse marginal branch is subtotally occluded. The distal AV groove vessel is occluded.  RCA: The right coronary artery is a small caliber nondominant vessel. The proximal RCA is chronically occluded.     10/2/23 - Transthoracic Echo Complete W/ Cont if Necessary Per Protocol    Left ventricular systolic function is low normal. Calculated left ventricular EF = 50.7% Normal left ventricular cavity size noted. Left ventricular wall thickness is consistent with mild concentric hypertrophy. Left ventricular diastolic function is consistent with (grade I) impaired relaxation.    Normal right ventricular cavity size, wall thickness, systolic function and septal motion noted.    Left atrial volume is mildly increased    The aortic valve appears trileaflet. Trace aortic valve regurgitation is present. No hemodynamically significant aortic valve stenosis is present.    Mitral annular calcification is present. Mild mitral valve regurgitation is present. No significant mitral valve stenosis is present.    The tricuspid valve is grossly normal in structure. Mild to moderate tricuspid valve regurgitation is present. Estimated right ventricular systolic pressure from tricuspid regurgitation is mildly elevated (35-45 mmHg).    Aortic root = 3.8 cm     Radiology: No radiology results for the last day      Lab Results   Component Value Date    WBC 7.72 10/05/2023    HGB 12.5 (L) 10/05/2023    HCT 38.1 10/05/2023    MCV 96.0 10/05/2023     10/05/2023     Lab Results   Component Value Date    GLUCOSE 112 (H) 10/03/2023    CALCIUM 8.7 10/03/2023     10/03/2023    K 4.2 10/03/2023    CO2 19.0 (L) 10/03/2023     10/03/2023    BUN 21 10/03/2023    CREATININE 0.92 10/03/2023    BCR 22.8 10/03/2023    ANIONGAP 14.0 10/03/2023     Lab Results   Component Value Date     TROPONINT 785 (C) 09/30/2023    TROPONINT 642 (C) 09/30/2023      Lab Results   Component Value Date    HGBA1C 7.10 (H) 09/30/2023      Lab Results   Component Value Date    CHOL 149 09/30/2023    CHLPL 112 08/28/2023    TRIG 194 (H) 09/30/2023    HDL 39 (L) 09/30/2023    LDL 77 09/30/2023      Assessment     CAD  NSTEMI, type II due to fixed CAD   - CTS consulting, current plan for OR 10/6   - continue IV heparin   - aspirin, statin, beta blocker    PAF   - SR currently   - holding apixaban, on IV heparin    Hx DM   - holding home metformin   - holding empagliflozin, lisinopril in perioperative period    PRANEETH Del Rio MD  10/5/2023  11:23 EDT

## 2023-10-05 NOTE — PROGRESS NOTES
[facility] Medicine Services  PROGRESS NOTE    Patient Name: Dann Metz  : 1950  MRN: [id number]    Date of Admission: 10/1/2023  Primary Care Physician: Ariana Lucas APRN    Subjective   Subjective     CC:  CAD, transfer for CABG eval    HPI:  Resting in bed. States he is anxious and would like to brush his teeth    Objective   Objective     Vital Signs:   Temp:  [97.7 øF (36.5 øC)-98.3 øF (36.8 øC)] 98.3 øF (36.8 øC)  Heart Rate:  [] 57  Resp:  [16] 16  BP: (109-122)/(69-86) 121/86     Physical Exam:  Constitutional: No acute distress, awake, alert  HENT: NCAT, mucous membranes moist  Respiratory: Clear to auscultation bilaterally, respiratory effort normal   Cardiovascular: RRR, no murmurs, rubs, or gallops  Gastrointestinal: Positive bowel sounds, soft, nontender, nondistended  Musculoskeletal: No bilateral ankle edema  Psychiatric: Appropriate affect, cooperative  Neurologic: Oriented x 3, strength symmetric in all extremities, Cranial Nerves grossly intact to confrontation, speech clear  Skin: No rashes          Results Reviewed:  LAB RESULTS:      Lab 10/05/23  0450 10/04/23  2247 10/04/23  1551 10/04/23  0650 10/03/23  0843 10/02/23  1047 10/02/23  0317 10/01/23  1048 10/01/23  0412 23  1346 23  0745 23  0257 23  0257 23  2205 23   WBC 7.72  --   --   --   --   --  8.10  --  10.52  --   --   --  10.15  --  9.7   HEMOGLOBIN 12.5*  --   --   --   --   --  12.6*  --  13.2  --   --   --  12.4*  --  14.2   HEMATOCRIT 38.1  --   --   --   --   --  38.3  --  40.1  --   --   --  37.6  --  43.4   PLATELETS 217  --   --   --   --   --  212  --  220  --   --   --  205  --  218   NEUTROS ABS 4.31  --   --   --   --   --  4.35  --  7.30*  --   --   --  7.20*  --  7.5   IMMATURE GRANS (ABS) 0.02  --   --   --   --   --  0.02  --  0.04  --   --   --  0.02  --  0.0   LYMPHS ABS 2.29  --   --   --   --   --  2.47  --  1.88  --   --    --  1.99  --  1.4*   MONOS ABS 0.61  --   --   --   --   --  0.73  --  0.90  --   --   --  0.72  --  0.5   EOS ABS 0.43*  --   --   --   --   --  0.47*  --  0.33  --   --   --  0.14  --  0.2   MCV 96.0  --   --   --   --   --  97.7*  --  96.9  --   --   --  95.2  --  95.4   PROTIME  --   --   --   --   --   --   --   --  14.4  --   --   --  15.6* 13.8  --    APTT  --   --   --   --   --   --   --   --  29.0* 152.0* 70.3  --  50.2*  --   --    HEPARIN ANTI-XA 0.24* 0.35 0.10* 0.12* 0.40   < > 0.59   < > 1.00*  --  >1.10*   < > >1.10*  --   --     < > = values in this interval not displayed.         Lab 10/03/23  0644 10/02/23  0317 10/01/23  1815 10/01/23  0412 09/30/23  0538   SODIUM 136 140  --  141 143   POTASSIUM 4.2 4.0  --  4.2 4.1   CHLORIDE 103 104  --  103 106   CO2 19.0* 25.0  --  24.0 22.4   ANION GAP 14.0 11.0  --  14.0 14.6   BUN 21 20  --  17 24*   CREATININE 0.92 1.00  --  1.11 1.13   EGFR 89.5 81.0  --  71.4 69.9   GLUCOSE 112* 101*  --  125* 154*   CALCIUM 8.7 8.4*  --  8.9 9.4   MAGNESIUM 2.0 2.3 2.8* 1.5*  --    HEMOGLOBIN A1C  --   --   --   --  7.10*   TSH  --   --   --   --  2.060             Lab 10/01/23  0412 09/30/23  0929 09/30/23  0745 09/30/23  0257 09/29/23  2205   HSTROP T  --  785* 642*  --   --    PROTIME 14.4  --   --  15.6* 13.8   INR 1.10  --   --  1.18* 1.08         Lab 09/30/23  0538   CHOLESTEROL 149   LDL CHOL 77   HDL CHOL 39*   TRIGLYCERIDES 194*             Brief Urine Lab Results  (Last result in the past 365 days)        Color   Clarity   Blood   Leuk Est   Nitrite   Protein   CREAT   Urine HCG        06/25/23 1603 Yellow   Clear   SMALL   Negative   Negative                     Microbiology Results Abnormal       None            No radiology results from the last 24 hrs    Results for orders placed during the hospital encounter of 10/01/23    Adult Transthoracic Echo Complete W/ Cont if Necessary Per Protocol    Interpretation Summary    Left ventricular systolic function  is low normal. Calculated left ventricular EF = 50.7% Normal left ventricular cavity size noted. Left ventricular wall thickness is consistent with mild concentric hypertrophy. Left ventricular diastolic function is consistent with (grade I) impaired relaxation.    Normal right ventricular cavity size, wall thickness, systolic function and septal motion noted.    Left atrial volume is mildly increased    The aortic valve appears trileaflet. Trace aortic valve regurgitation is present. No hemodynamically significant aortic valve stenosis is present.    Mitral annular calcification is present. Mild mitral valve regurgitation is present. No significant mitral valve stenosis is present.    The tricuspid valve is grossly normal in structure. Mild to moderate tricuspid valve regurgitation is present. Estimated right ventricular systolic pressure from tricuspid regurgitation is mildly elevated (35-45 mmHg).    Aortic root = 3.8 cm      Current medications:  Scheduled Meds:aspirin, 81 mg, Oral, Daily  atorvastatin, 80 mg, Oral, Nightly  [START ON 10/6/2023] ceFAZolin, 2,000 mg, Intravenous, Once  chlorhexidine, 15 mL, Mouth/Throat, Q12H  insulin lispro, 2-7 Units, Subcutaneous, 4x Daily AC & at Bedtime  metoprolol succinate XL, 25 mg, Oral, Q24H  mupirocin, 1 application , Each Nare, Q12H  pharmacy consult - MTM, , Does not apply, Daily  senna-docusate sodium, 2 tablet, Oral, BID  sodium chloride, 10 mL, Intravenous, Q12H      Continuous Infusions:heparin, 11 Units/kg/hr, Last Rate: 11 Units/kg/hr (10/05/23 1054)  Pharmacy to Dose Heparin,       PRN Meds:.  acetaminophen **OR** acetaminophen **OR** acetaminophen    ALPRAZolam    aluminum-magnesium hydroxide-simethicone    senna-docusate sodium **AND** polyethylene glycol **AND** bisacodyl **AND** bisacodyl    Calcium Replacement - Follow Nurse / BPA Driven Protocol    Chlorhexidine Gluconate Cloth    dextrose    dextrose    glucagon (human recombinant)     HYDROcodone-acetaminophen    hydrOXYzine    ipratropium-albuterol    Magnesium Standard Dose Replacement - Follow Nurse / BPA Driven Protocol    Morphine **AND** naloxone    nitroglycerin    ondansetron **OR** ondansetron    Pharmacy to Dose Heparin    Phosphorus Replacement - Follow Nurse / BPA Driven Protocol    Potassium Replacement - Follow Nurse / BPA Driven Protocol    sodium chloride    sodium chloride    Assessment & Plan   Assessment & Plan     Active Hospital Problems    Diagnosis  POA    **Coronary artery disease [I25.10]  Yes    NSTEMI (non-ST elevated myocardial infarction) [I21.4]  Yes    Essential hypertension [I10]  Yes    Type 2 diabetes mellitus, without long-term current use of insulin [E11.9]  Yes      Resolved Hospital Problems   No resolved problems to display.        Brief Hospital Course to date:  Dann Metz is a 72 y.o. male with CAD, DMII, HTN who presents to [facility] with chest pain found to have NSTEMI and CAD    This patient's problems and plans were partially entered by my partner and updated as appropriate by me 10/05/23.      Assessment/Plan:      NSTEMI  -At [facility] troponins were 642 then 785.  EKG with inverted T waves anteriorly and nonspecific inferolateral ST changes.  Left heart cath showed severe three-vessel disease.    -Continue heparin drip  -Continue aspirin, BB, and high intensity statin  -Continue telemetry  -Cardiology and cardiothoracic surgery following for CABG on 10/6     Paroxysmal atrial fibrillation  -Diagnosed in June of this year during stroke admission.  He was started on Eliquis at that time. Holding eliquis while on heparin gtt  -Currently rate controlled  -Currently on heparin drip  -Continue metoprolol     Type 2 diabetes  -A1c 7.1  -On metformin and Jardiance at home  -Corrective insulin here  --glucoses generally stable.  no change      Hypertension  -holding lisinopril 10 mg perioperatively      History of ischemic stroke with residual right  upper extremity weakness  -ASA and statin     DVT prophylaxis: On heparin drip    Expected Discharge Location and Transportation: CABG 10/6  Expected Discharge   Expected Discharge Date: 10/12/2023; Expected Discharge Time:      DVT prophylaxis:  Medical DVT prophylaxis orders are present.     AM-PAC 6 Clicks Score (PT): 18 (10/04/23 0919)    CODE STATUS:   Code Status and Medical Interventions:   Ordered at: 10/01/23 0404     Code Status (Patient has no pulse and is not breathing):    CPR (Attempt to Resuscitate)     Medical Interventions (Patient has pulse or is breathing):    Full Support       Yamel Burleson, APRN  10/05/23

## 2023-10-05 NOTE — PLAN OF CARE
Goal Outcome Evaluation:   Pt a/o x4, vs stable, and on RA. Pt resting in bed at this time, call light within reach. Heparin drip continued at 12 u/kg. Pt pre-CABG checklist started.

## 2023-10-05 NOTE — PROGRESS NOTES
[facility] Cardiothoracic Surgery In-Patient Progress Note       LOS: 4 days     Chief Complaint: Coronary artery disease    Subjective  Denies chest pain or shortness of breath.  Remains on heparin drip.    Objective  Vital Signs  Temp:  [97.7 øF (36.5 øC)-98.3 øF (36.8 øC)] 98.3 øF (36.8 øC)  Heart Rate:  [] 57  Resp:  [16] 16  BP: (109-122)/(69-86) 121/86        Physical Exam:   General Appearance: alert, appears stated age and cooperative   Lungs: clear to auscultation, respirations regular, respirations even, and respirations unlabored   Heart: regular rhythm & normal rate, normal S1, S2, no murmur, no gallop, no rub, and no click     Results     Results from last 7 days   Lab Units 10/05/23  0450   WBC 10*3/mm3 7.72   HEMOGLOBIN g/dL 12.5*   HEMATOCRIT % 38.1   PLATELETS 10*3/mm3 217       Results from last 7 days   Lab Units 10/03/23  0644   SODIUM mmol/L 136   POTASSIUM mmol/L 4.2   CHLORIDE mmol/L 103   CO2 mmol/L 19.0*   BUN mg/dL 21   CREATININE mg/dL 0.92   GLUCOSE mg/dL 112*   CALCIUM mg/dL 8.7       Assessment    Coronary artery disease    Essential hypertension    Type 2 diabetes mellitus, without long-term current use of insulin    NSTEMI (non-ST elevated myocardial infarction)      Plan   Preop for CABG with EVH and DIANNA with Dr. Mora tomorrow, 10/6  N.p.o. after midnight    Lizzie Navarrete, APRN  10/05/23  07:46 EDT      --    I reviewed this documentation. I interviewed and examined this patient this morning. Plan as documented.     Guero Mora M.D., R.P.V.I.  Cardiothoracic and Vascular Surgeon  Psychiatric

## 2023-10-05 NOTE — PLAN OF CARE
Goal Outcome Evaluation:  Plan of Care Reviewed With: patient        Progress: no change  Outcome Evaluation: Pt increased distance ambulated at this date with intermittent unsteadiness with CGA with no AD. Pt gave good effort with BLE therex. Pt continued to present below baseline function warranting IPPT POC. d/c rec home with assist and HHPT pending progress.      Anticipated Discharge Disposition (PT): home with assist, home with home health

## 2023-10-05 NOTE — PROGRESS NOTES
HEPARIN INFUSION  Dann Metz is a  72 y.o. male receiving heparin infusion.     Therapy for (VTE/Cardiac): Cardiac  Patient Weight: 64.5 kg  Initial Bolus (Y/N): No  Any Bolus (Y/N):  Yes     Signs or Symptoms of Bleeding: none noted at present     Cardiac or Other (Not VTE)   Initial Bolus: 60 units/kg (Max 4,000 units)  Initial rate: 12 units/kg/hr (Max 1,000 units/hr)   Anti Xa Rebolus Infusion Hold time Change infusion Dose (Units/kg/hr) Next Anti Xa or aPTT Level Due   < 0.11 50 Units/kg  (4000 Units Max) None Increase by  3 Units/kg/hr 6 hours   0.11- 0.19 25 Units/kg  (2000 Units Max) None Increase by  2 Units/kg/hr 6 hours   0.2 - 0.29 0 None Increase by  1 Units/kg/hr 6 hours   0.3 - 0.5 0 None No Change 6 hours (after 2 consecutive levels in range check qAM)   0.51 - 0.6 0 None Decrease by  1 Units/kg/hr 6 hours   0.61 - 0.8 0 30 Minutes Decrease by  2 Units/kg/hr 6 hours   0.81 - 1 0 60 Minutes Decrease by  3 Units/kg/hr 6 hours   >1 0 Hold  After Anti Xa less than 0.5 decrease previous rate by  4 Units/kg/hr  Every 2 hours until Anti Xa  less than 0.5 then when infusion restarts in 6 hours     Recommend Xa every 6 hours.     Results from last 7 days   Lab Units 10/05/23  0450 10/02/23  0317 10/01/23  0412 09/30/23  0257 09/30/23  0257 09/29/23  2205   INR   --   --  1.10  --  1.18* 1.08   HEMOGLOBIN g/dL 12.5* 12.6* 13.2   < > 12.4*  --    HEMATOCRIT % 38.1 38.3 40.1   < > 37.6  --    PLATELETS 10*3/mm3 217 212 220   < > 205  --     < > = values in this interval not displayed.       Date   Time   Anti-Xa Current Rate (Unit/kg/hr) Bolus   (Units) Rate Change   (Unit/kg/hr) New Rate (Unit/kg/hr) Next   Anti-Xa Comments  Pump Check Daily   10/1 0400 1.0 new -- 12 12 1000 D/w RN, was on heparin drip at OSH but has been off since 9/30 afternoon   10/1 1048 0.98 12 -- -3 9 1800 Hold 1 hr. KAYCE RN. Pump verified.   10/1 1815 0.92 9 -- Hold x 1 hr then -3 6 0300 D/w YANICK Becerra   10/2 0317 0.59 6 -- -1 5 1100  D/w RN   10/2 1047 0.47 5 -- -- 5 1700 D/w RN   10/2 1812 0.47 5 -- -- 5 0600 DW RN   10/3 0843 0.40 5 -- -- 5 10/4 AM D/w RN   10/4 0650 0.12 5 1500 +2 7 1500 D/w RN   10/4 1551 0.1 7 3100 +3 10 2300 D/w RN   10/4 2247 0.35 10 -- -- 10 0500 D/w RN   10/5 0450 0.24 10 -- +1 11 1200 D/w RN   10/5 1151 0.26 11 -- +1 12 2000 D/w RN                                                                                                        Jayde Lewis, PharmD  Pharmacy Resident  10/5/2023  13:47 EDT

## 2023-10-05 NOTE — ANESTHESIA PREPROCEDURE EVALUATION
Anesthesia Evaluation     Patient summary reviewed and Nursing notes reviewed   no history of anesthetic complications:   NPO Solid Status: > 8 hours  NPO Liquid Status: > 2 hours           Airway   Mallampati: II  TM distance: >3 FB  Neck ROM: full  Possible difficult intubation  Dental - normal exam     Pulmonary - normal exam   (-) asthma, sleep apnea, not a smoker  Cardiovascular - normal exam  Exercise tolerance: good (4-7 METS)    ECG reviewed  PT is on anticoagulation therapy  Patient on routine beta blocker and Beta blocker given within 24 hours of surgery    (+) hypertension, CAD, dysrhythmias Paroxysmal Atrial Fib, angina    ROS comment: 10/2/23-lvef 53, mild clvh, gr1dd, nl rvsf/sz, trace ai, no as, mac present, mild mr no ms, mild to mod tr, rvsp 35-45, ao root 3.8cm    9/30/23-lhc- prox lad 80-90 distal 80-90, first diag of lad subtotally occluded, mid cx subtotally occluded, largest om is subtotally occluded, distal AV groove vessel occluded, prox RCA chronically occluded    Neuro/Psych  (+) CVA (?RUE intermittent symptoms), poor historian.  GI/Hepatic/Renal/Endo    (+) GERD well controlled, diabetes mellitus  (-) hepatitis, liver disease, no renal disease    Musculoskeletal     Abdominal    Substance History - negative use     OB/GYN          Other        ROS/Med Hx Other: Hgb 12.5   Eliquis held, on heparin gtt  Nstemi  No h/o pain or difficulty swallowing. No h/o esophageal nor gastric pathology nor procedures.                    Anesthesia Plan    ASA 4     general, CVL and [place]     (Risks and benefits of general anesthesia discussed with patient (including MI, CVA, death, recall, aspiration, oropharyngeal/dental damage), questions answered, agreeable to proceed.  Risks of DIANNA discussed with patient (Op/dental damage, dysphagia, perforation,etc); questions answered, agreeable to proceed. )  intravenous induction   Postoperative Plan: Expected vent after surgery  Anesthetic plan, risks, benefits,  and alternatives have been provided, discussed and informed consent has been obtained with: patient.    Use of blood products discussed with patient  Consented to blood products.      CODE STATUS:    Code Status (Patient has no pulse and is not breathing): CPR (Attempt to Resuscitate)  Medical Interventions (Patient has pulse or is breathing): Full Support

## 2023-10-05 NOTE — PLAN OF CARE
Goal Outcome Evaluation:  Plan of Care Reviewed With: patient        Progress: no change  Outcome Evaluation: OT eval completed with education provided on potential compensatory strategies post potential surgical interventions for ADL completion and functional transfers, demonstrates mild deficits in balance, strength, and activity tolerance compared to baseline for ADL completion, recom IPOT DC home with assist and HHOT pending progress      Anticipated Discharge Disposition (OT): home with assist, home with home health

## 2023-10-06 ENCOUNTER — ANCILLARY PROCEDURE (OUTPATIENT)
Dept: PERIOP | Facility: HOSPITAL | Age: 73
DRG: 233 | End: 2023-10-06
Payer: MEDICARE

## 2023-10-06 ENCOUNTER — APPOINTMENT (OUTPATIENT)
Dept: GENERAL RADIOLOGY | Facility: HOSPITAL | Age: 73
DRG: 233 | End: 2023-10-06
Payer: MEDICARE

## 2023-10-06 ENCOUNTER — ANESTHESIA EVENT CONVERTED (OUTPATIENT)
Dept: ANESTHESIOLOGY | Facility: HOSPITAL | Age: 73
DRG: 233 | End: 2023-10-06
Payer: MEDICARE

## 2023-10-06 ENCOUNTER — ANESTHESIA (OUTPATIENT)
Dept: PERIOP | Facility: HOSPITAL | Age: 73
DRG: 233 | End: 2023-10-06
Payer: MEDICARE

## 2023-10-06 LAB
ACT BLD: 113 SECONDS (ref 82–152)
ACT BLD: 113 SECONDS (ref 82–152)
ACT BLD: 197 SECONDS (ref 82–152)
ACT BLD: 275 SECONDS (ref 82–152)
ACT BLD: 426 SECONDS (ref 82–152)
ACT BLD: 450 SECONDS (ref 82–152)
ACT BLD: 462 SECONDS (ref 82–152)
ACT BLD: 467 SECONDS (ref 82–152)
ACT BLD: 498 SECONDS (ref 82–152)
ACT BLD: 504 SECONDS (ref 82–152)
ACT BLD: 516 SECONDS (ref 82–152)
ACT BLD: 552 SECONDS (ref 82–152)
ALBUMIN SERPL-MCNC: 3.9 G/DL (ref 3.5–5.2)
ALBUMIN SERPL-MCNC: 4.4 G/DL (ref 3.5–5.2)
ANION GAP SERPL CALCULATED.3IONS-SCNC: 13 MMOL/L (ref 5–15)
ANION GAP SERPL CALCULATED.3IONS-SCNC: 13 MMOL/L (ref 5–15)
APTT PPP: 32.8 SECONDS (ref 22–39)
ARTERIAL PATENCY WRIST A: ABNORMAL
ATMOSPHERIC PRESS: ABNORMAL MM[HG]
BASE EXCESS BLDA CALC-SCNC: -1 MMOL/L (ref -5–5)
BASE EXCESS BLDA CALC-SCNC: -1 MMOL/L (ref -5–5)
BASE EXCESS BLDA CALC-SCNC: -2 MMOL/L (ref -5–5)
BASE EXCESS BLDA CALC-SCNC: -3 MMOL/L (ref -5–5)
BASE EXCESS BLDA CALC-SCNC: -4 MMOL/L (ref -5–5)
BASE EXCESS BLDA CALC-SCNC: -5 MMOL/L (ref -5–5)
BASE EXCESS BLDA CALC-SCNC: -5 MMOL/L (ref -5–5)
BASE EXCESS BLDA CALC-SCNC: 0 MMOL/L (ref -5–5)
BASE EXCESS BLDA CALC-SCNC: 0.7 MMOL/L (ref 0–2)
BASE EXCESS BLDA CALC-SCNC: 1 MMOL/L (ref -5–5)
BASE EXCESS BLDA CALC-SCNC: 1 MMOL/L (ref -5–5)
BASE EXCESS BLDA CALC-SCNC: 5 MMOL/L (ref -5–5)
BDY SITE: ABNORMAL
BODY TEMPERATURE: 37 C
BUN SERPL-MCNC: 16 MG/DL (ref 8–23)
BUN SERPL-MCNC: 16 MG/DL (ref 8–23)
BUN/CREAT SERPL: 14.5 (ref 7–25)
BUN/CREAT SERPL: 16 (ref 7–25)
CA-I BLDA-SCNC: 0.91 MMOL/L (ref 1.2–1.32)
CA-I BLDA-SCNC: 0.97 MMOL/L (ref 1.2–1.32)
CA-I BLDA-SCNC: 1 MMOL/L (ref 1.2–1.32)
CA-I BLDA-SCNC: 1.01 MMOL/L (ref 1.2–1.32)
CA-I BLDA-SCNC: 1.07 MMOL/L (ref 1.2–1.32)
CA-I BLDA-SCNC: 1.08 MMOL/L (ref 1.2–1.32)
CA-I BLDA-SCNC: 1.18 MMOL/L (ref 1.2–1.32)
CA-I BLDA-SCNC: 1.21 MMOL/L (ref 1.2–1.32)
CA-I BLDA-SCNC: 1.26 MMOL/L (ref 1.2–1.32)
CA-I BLDA-SCNC: 1.29 MMOL/L (ref 1.2–1.32)
CA-I BLDA-SCNC: 1.36 MMOL/L (ref 1.2–1.32)
CA-I BLDA-SCNC: 1.37 MMOL/L (ref 1.2–1.32)
CA-I BLDA-SCNC: 1.93 MMOL/L (ref 1.2–1.32)
CA-I SERPL ISE-MCNC: 1.26 MMOL/L (ref 1.12–1.32)
CALCIUM SPEC-SCNC: 9.3 MG/DL (ref 8.6–10.5)
CALCIUM SPEC-SCNC: 9.7 MG/DL (ref 8.6–10.5)
CHLORIDE SERPL-SCNC: 107 MMOL/L (ref 98–107)
CHLORIDE SERPL-SCNC: 108 MMOL/L (ref 98–107)
CO2 BLDA-SCNC: 22 MMOL/L (ref 24–29)
CO2 BLDA-SCNC: 22 MMOL/L (ref 24–29)
CO2 BLDA-SCNC: 23 MMOL/L (ref 24–29)
CO2 BLDA-SCNC: 23 MMOL/L (ref 24–29)
CO2 BLDA-SCNC: 23.3 MMOL/L (ref 22–33)
CO2 BLDA-SCNC: 24 MMOL/L (ref 24–29)
CO2 BLDA-SCNC: 25 MMOL/L (ref 24–29)
CO2 BLDA-SCNC: 26 MMOL/L (ref 24–29)
CO2 BLDA-SCNC: 27 MMOL/L (ref 24–29)
CO2 BLDA-SCNC: 30 MMOL/L (ref 24–29)
CO2 SERPL-SCNC: 22 MMOL/L (ref 22–29)
CO2 SERPL-SCNC: 22 MMOL/L (ref 22–29)
COHGB MFR BLD: 1.1 % (ref 0–2)
CREAT SERPL-MCNC: 1 MG/DL (ref 0.76–1.27)
CREAT SERPL-MCNC: 1.1 MG/DL (ref 0.76–1.27)
DEPRECATED RDW RBC AUTO: 51.9 FL (ref 37–54)
DEPRECATED RDW RBC AUTO: 53.1 FL (ref 37–54)
EGFRCR SERPLBLD CKD-EPI 2021: 72.2 ML/MIN/1.73
EGFRCR SERPLBLD CKD-EPI 2021: 81 ML/MIN/1.73
EPAP: 0
ERYTHROCYTE [DISTWIDTH] IN BLOOD BY AUTOMATED COUNT: 15.7 % (ref 12.3–15.4)
ERYTHROCYTE [DISTWIDTH] IN BLOOD BY AUTOMATED COUNT: 16.3 % (ref 12.3–15.4)
FIBRINOGEN PPP-MCNC: 178 MG/DL (ref 203–470)
FSP PPP LA-ACNC: NEGATIVE
GLUCOSE BLDC GLUCOMTR-MCNC: 110 MG/DL (ref 70–130)
GLUCOSE BLDC GLUCOMTR-MCNC: 119 MG/DL (ref 70–130)
GLUCOSE BLDC GLUCOMTR-MCNC: 122 MG/DL (ref 70–130)
GLUCOSE BLDC GLUCOMTR-MCNC: 123 MG/DL (ref 70–130)
GLUCOSE BLDC GLUCOMTR-MCNC: 124 MG/DL (ref 70–130)
GLUCOSE BLDC GLUCOMTR-MCNC: 136 MG/DL (ref 70–130)
GLUCOSE BLDC GLUCOMTR-MCNC: 144 MG/DL (ref 70–130)
GLUCOSE BLDC GLUCOMTR-MCNC: 147 MG/DL (ref 70–130)
GLUCOSE BLDC GLUCOMTR-MCNC: 162 MG/DL (ref 70–130)
GLUCOSE BLDC GLUCOMTR-MCNC: 163 MG/DL (ref 70–130)
GLUCOSE BLDC GLUCOMTR-MCNC: 175 MG/DL (ref 70–130)
GLUCOSE BLDC GLUCOMTR-MCNC: 177 MG/DL (ref 70–130)
GLUCOSE BLDC GLUCOMTR-MCNC: 182 MG/DL (ref 70–130)
GLUCOSE BLDC GLUCOMTR-MCNC: 183 MG/DL (ref 70–130)
GLUCOSE BLDC GLUCOMTR-MCNC: 186 MG/DL (ref 70–130)
GLUCOSE BLDC GLUCOMTR-MCNC: 187 MG/DL (ref 70–130)
GLUCOSE BLDC GLUCOMTR-MCNC: 200 MG/DL (ref 70–130)
GLUCOSE BLDC GLUCOMTR-MCNC: 205 MG/DL (ref 70–130)
GLUCOSE BLDC GLUCOMTR-MCNC: 223 MG/DL (ref 70–130)
GLUCOSE BLDC GLUCOMTR-MCNC: 233 MG/DL (ref 70–130)
GLUCOSE BLDC GLUCOMTR-MCNC: 238 MG/DL (ref 70–130)
GLUCOSE BLDC GLUCOMTR-MCNC: 255 MG/DL (ref 70–130)
GLUCOSE SERPL-MCNC: 142 MG/DL (ref 65–99)
GLUCOSE SERPL-MCNC: 187 MG/DL (ref 65–99)
HCO3 BLDA-SCNC: 21 MMOL/L (ref 22–26)
HCO3 BLDA-SCNC: 21 MMOL/L (ref 22–26)
HCO3 BLDA-SCNC: 21.3 MMOL/L (ref 22–26)
HCO3 BLDA-SCNC: 21.9 MMOL/L (ref 22–26)
HCO3 BLDA-SCNC: 22.5 MMOL/L (ref 20–26)
HCO3 BLDA-SCNC: 22.7 MMOL/L (ref 22–26)
HCO3 BLDA-SCNC: 23.5 MMOL/L (ref 22–26)
HCO3 BLDA-SCNC: 23.9 MMOL/L (ref 22–26)
HCO3 BLDA-SCNC: 24.3 MMOL/L (ref 22–26)
HCO3 BLDA-SCNC: 24.6 MMOL/L (ref 22–26)
HCO3 BLDA-SCNC: 25.1 MMOL/L (ref 22–26)
HCO3 BLDA-SCNC: 25.3 MMOL/L (ref 22–26)
HCO3 BLDA-SCNC: 25.4 MMOL/L (ref 22–26)
HCO3 BLDA-SCNC: 29 MMOL/L (ref 22–26)
HCT VFR BLD AUTO: 32.5 % (ref 37.5–51)
HCT VFR BLD AUTO: 32.9 % (ref 37.5–51)
HCT VFR BLD CALC: 35.5 % (ref 38–51)
HCT VFR BLDA CALC: 21 % (ref 38–51)
HCT VFR BLDA CALC: 21 % (ref 38–51)
HCT VFR BLDA CALC: 22 % (ref 38–51)
HCT VFR BLDA CALC: 22 % (ref 38–51)
HCT VFR BLDA CALC: 26 % (ref 38–51)
HCT VFR BLDA CALC: 26 % (ref 38–51)
HCT VFR BLDA CALC: 27 % (ref 38–51)
HCT VFR BLDA CALC: 28 % (ref 38–51)
HCT VFR BLDA CALC: 28 % (ref 38–51)
HCT VFR BLDA CALC: 29 % (ref 38–51)
HCT VFR BLDA CALC: 30 % (ref 38–51)
HCT VFR BLDA CALC: 34 % (ref 38–51)
HCT VFR BLDA CALC: 39 % (ref 38–51)
HGB BLD-MCNC: 11 G/DL (ref 13–17.7)
HGB BLD-MCNC: 11.1 G/DL (ref 13–17.7)
HGB BLDA-MCNC: 10.2 G/DL (ref 12–17)
HGB BLDA-MCNC: 11.6 G/DL (ref 12–17)
HGB BLDA-MCNC: 11.6 G/DL (ref 13.5–17.5)
HGB BLDA-MCNC: 13.3 G/DL (ref 12–17)
HGB BLDA-MCNC: 7.1 G/DL (ref 12–17)
HGB BLDA-MCNC: 7.1 G/DL (ref 12–17)
HGB BLDA-MCNC: 7.5 G/DL (ref 12–17)
HGB BLDA-MCNC: 7.5 G/DL (ref 12–17)
HGB BLDA-MCNC: 8.8 G/DL (ref 12–17)
HGB BLDA-MCNC: 8.8 G/DL (ref 12–17)
HGB BLDA-MCNC: 9.2 G/DL (ref 12–17)
HGB BLDA-MCNC: 9.5 G/DL (ref 12–17)
HGB BLDA-MCNC: 9.5 G/DL (ref 12–17)
HGB BLDA-MCNC: 9.9 G/DL (ref 12–17)
INHALED O2 CONCENTRATION: 100 %
INR PPP: 1.7 (ref 0.89–1.12)
IPAP: 0
MAGNESIUM SERPL-MCNC: 1.6 MG/DL (ref 1.6–2.4)
MAGNESIUM SERPL-MCNC: 2.7 MG/DL (ref 1.6–2.4)
MCH RBC QN AUTO: 30.7 PG (ref 26.6–33)
MCH RBC QN AUTO: 30.7 PG (ref 26.6–33)
MCHC RBC AUTO-ENTMCNC: 33.7 G/DL (ref 31.5–35.7)
MCHC RBC AUTO-ENTMCNC: 33.8 G/DL (ref 31.5–35.7)
MCV RBC AUTO: 90.8 FL (ref 79–97)
MCV RBC AUTO: 90.9 FL (ref 79–97)
METHGB BLD QL: 0.5 % (ref 0–1.5)
MODALITY: ABNORMAL
OXYHGB MFR BLDV: 99 % (ref 94–99)
PAW @ PEAK INSP FLOW SETTING VENT: 0 CMH2O
PCO2 BLDA: 27.2 MM HG (ref 35–45)
PCO2 BLDA: 32.3 MM HG (ref 35–45)
PCO2 BLDA: 34.9 MM HG (ref 35–45)
PCO2 BLDA: 35.9 MM HG (ref 35–45)
PCO2 BLDA: 36.8 MM HG (ref 35–45)
PCO2 BLDA: 37.8 MM HG (ref 35–45)
PCO2 BLDA: 38.3 MM HG (ref 35–45)
PCO2 BLDA: 38.5 MM HG (ref 35–45)
PCO2 BLDA: 39.2 MM HG (ref 35–45)
PCO2 BLDA: 39.8 MM HG (ref 35–45)
PCO2 BLDA: 40.3 MM HG (ref 35–45)
PCO2 BLDA: 40.6 MM HG (ref 35–45)
PCO2 BLDA: 42.1 MM HG (ref 35–45)
PCO2 BLDA: 42.2 MM HG (ref 35–45)
PCO2 TEMP ADJ BLD: 27.2 MM HG (ref 35–48)
PEEP RESPIRATORY: 5 CM[H2O]
PH BLDA: 7.31 PH UNITS (ref 7.35–7.6)
PH BLDA: 7.34 PH UNITS (ref 7.35–7.6)
PH BLDA: 7.34 PH UNITS (ref 7.35–7.6)
PH BLDA: 7.38 PH UNITS (ref 7.35–7.6)
PH BLDA: 7.39 PH UNITS (ref 7.35–7.6)
PH BLDA: 7.4 PH UNITS (ref 7.35–7.6)
PH BLDA: 7.42 PH UNITS (ref 7.35–7.6)
PH BLDA: 7.43 PH UNITS (ref 7.35–7.6)
PH BLDA: 7.46 PH UNITS (ref 7.35–7.6)
PH BLDA: 7.46 PH UNITS (ref 7.35–7.6)
PH BLDA: 7.53 PH UNITS (ref 7.35–7.45)
PH, TEMP CORRECTED: 7.53 PH UNITS
PHOSPHATE SERPL-MCNC: 1.8 MG/DL (ref 2.5–4.5)
PHOSPHATE SERPL-MCNC: 3.2 MG/DL (ref 2.5–4.5)
PLATELET # BLD AUTO: 126 10*3/MM3 (ref 140–450)
PLATELET # BLD AUTO: 137 10*3/MM3 (ref 140–450)
PMV BLD AUTO: 10.3 FL (ref 6–12)
PMV BLD AUTO: 10.5 FL (ref 6–12)
PO2 BLDA: 105 MMHG (ref 80–105)
PO2 BLDA: 234 MM HG (ref 83–108)
PO2 BLDA: 312 MMHG (ref 80–105)
PO2 BLDA: 341 MMHG (ref 80–105)
PO2 BLDA: 364 MMHG (ref 80–105)
PO2 BLDA: 39 MMHG (ref 80–105)
PO2 BLDA: 401 MMHG (ref 80–105)
PO2 BLDA: 401 MMHG (ref 80–105)
PO2 BLDA: 488 MMHG (ref 80–105)
PO2 BLDA: 488 MMHG (ref 80–105)
PO2 BLDA: 49 MMHG (ref 80–105)
PO2 BLDA: 514 MMHG (ref 80–105)
PO2 BLDA: 576 MMHG (ref 80–105)
PO2 BLDA: 582 MMHG (ref 80–105)
PO2 TEMP ADJ BLD: 234 MM HG (ref 83–108)
POTASSIUM BLDA-SCNC: 3.6 MMOL/L (ref 3.5–4.9)
POTASSIUM BLDA-SCNC: 3.8 MMOL/L (ref 3.5–4.9)
POTASSIUM BLDA-SCNC: 4 MMOL/L (ref 3.5–4.9)
POTASSIUM BLDA-SCNC: 4.1 MMOL/L (ref 3.5–4.9)
POTASSIUM BLDA-SCNC: 4.4 MMOL/L (ref 3.5–4.9)
POTASSIUM BLDA-SCNC: 4.4 MMOL/L (ref 3.5–4.9)
POTASSIUM BLDA-SCNC: 4.5 MMOL/L (ref 3.5–4.9)
POTASSIUM BLDA-SCNC: 5.1 MMOL/L (ref 3.5–4.9)
POTASSIUM BLDA-SCNC: 5.2 MMOL/L (ref 3.5–4.9)
POTASSIUM BLDA-SCNC: 6.1 MMOL/L (ref 3.5–4.9)
POTASSIUM SERPL-SCNC: 3.8 MMOL/L (ref 3.5–5.2)
POTASSIUM SERPL-SCNC: 4.6 MMOL/L (ref 3.5–5.2)
PROTHROMBIN TIME: 20.1 SECONDS (ref 12.2–14.5)
PSV: 10 CMH2O
RBC # BLD AUTO: 3.58 10*6/MM3 (ref 4.14–5.8)
RBC # BLD AUTO: 3.62 10*6/MM3 (ref 4.14–5.8)
SAO2 % BLDA: 100 % (ref 95–98)
SAO2 % BLDA: 75 % (ref 95–98)
SAO2 % BLDA: 82 % (ref 95–98)
SAO2 % BLDA: 98 % (ref 95–98)
SODIUM BLD-SCNC: 133 MMOL/L (ref 138–146)
SODIUM BLD-SCNC: 133 MMOL/L (ref 138–146)
SODIUM BLD-SCNC: 134 MMOL/L (ref 138–146)
SODIUM BLD-SCNC: 136 MMOL/L (ref 138–146)
SODIUM BLD-SCNC: 137 MMOL/L (ref 138–146)
SODIUM BLD-SCNC: 138 MMOL/L (ref 138–146)
SODIUM BLD-SCNC: 138 MMOL/L (ref 138–146)
SODIUM BLD-SCNC: 139 MMOL/L (ref 138–146)
SODIUM BLD-SCNC: 140 MMOL/L (ref 138–146)
SODIUM BLD-SCNC: 140 MMOL/L (ref 138–146)
SODIUM BLD-SCNC: 141 MMOL/L (ref 138–146)
SODIUM BLD-SCNC: 142 MMOL/L (ref 138–146)
SODIUM BLD-SCNC: 143 MMOL/L (ref 138–146)
SODIUM SERPL-SCNC: 142 MMOL/L (ref 136–145)
SODIUM SERPL-SCNC: 143 MMOL/L (ref 136–145)
TOTAL RATE: 18 BREATHS/MINUTE
VENTILATOR MODE: ABNORMAL
VT ON VENT VENT: 0.52 ML
WBC NRBC COR # BLD: 9.05 10*3/MM3 (ref 3.4–10.8)
WBC NRBC COR # BLD: 9.37 10*3/MM3 (ref 3.4–10.8)

## 2023-10-06 PROCEDURE — B24BZZ4 ULTRASONOGRAPHY OF HEART WITH AORTA, TRANSESOPHAGEAL: ICD-10-PCS | Performed by: STUDENT IN AN ORGANIZED HEALTH CARE EDUCATION/TRAINING PROGRAM

## 2023-10-06 PROCEDURE — P9035 PLATELET PHERES LEUKOREDUCED: HCPCS

## 2023-10-06 PROCEDURE — 25010000002 ALBUMIN HUMAN 5% PER 50 ML: Performed by: PHYSICIAN ASSISTANT

## 2023-10-06 PROCEDURE — 02100Z8 BYPASS CORONARY ARTERY, ONE ARTERY FROM RIGHT INTERNAL MAMMARY, OPEN APPROACH: ICD-10-PCS | Performed by: STUDENT IN AN ORGANIZED HEALTH CARE EDUCATION/TRAINING PROGRAM

## 2023-10-06 PROCEDURE — 36430 TRANSFUSION BLD/BLD COMPNT: CPT

## 2023-10-06 PROCEDURE — 93318 ECHO TRANSESOPHAGEAL INTRAOP: CPT | Performed by: ANESTHESIOLOGY

## 2023-10-06 PROCEDURE — 33257 ABLATE ATRIA LMTD ADD-ON: CPT | Performed by: STUDENT IN AN ORGANIZED HEALTH CARE EDUCATION/TRAINING PROGRAM

## 2023-10-06 PROCEDURE — 85384 FIBRINOGEN ACTIVITY: CPT | Performed by: INTERNAL MEDICINE

## 2023-10-06 PROCEDURE — 82375 ASSAY CARBOXYHB QUANT: CPT

## 2023-10-06 PROCEDURE — P9016 RBC LEUKOCYTES REDUCED: HCPCS

## 2023-10-06 PROCEDURE — 25010000002 ACETAMINOPHEN 10 MG/ML SOLUTION: Performed by: PHYSICIAN ASSISTANT

## 2023-10-06 PROCEDURE — 82947 ASSAY GLUCOSE BLOOD QUANT: CPT

## 2023-10-06 PROCEDURE — 025T0ZZ DESTRUCTION OF LEFT PULMONARY VEIN, OPEN APPROACH: ICD-10-PCS | Performed by: STUDENT IN AN ORGANIZED HEALTH CARE EDUCATION/TRAINING PROGRAM

## 2023-10-06 PROCEDURE — P9041 ALBUMIN (HUMAN),5%, 50ML: HCPCS | Performed by: PHYSICIAN ASSISTANT

## 2023-10-06 PROCEDURE — 85362 FIBRIN DEGRADATION PRODUCTS: CPT | Performed by: INTERNAL MEDICINE

## 2023-10-06 PROCEDURE — 84295 ASSAY OF SERUM SODIUM: CPT

## 2023-10-06 PROCEDURE — 83735 ASSAY OF MAGNESIUM: CPT | Performed by: PHYSICIAN ASSISTANT

## 2023-10-06 PROCEDURE — 25010000002 PAPAVERINE PER 60 MG: Performed by: STUDENT IN AN ORGANIZED HEALTH CARE EDUCATION/TRAINING PROGRAM

## 2023-10-06 PROCEDURE — 25010000002 FENTANYL CITRATE (PF) 50 MCG/ML SOLUTION: Performed by: STUDENT IN AN ORGANIZED HEALTH CARE EDUCATION/TRAINING PROGRAM

## 2023-10-06 PROCEDURE — C1729 CATH, DRAINAGE: HCPCS | Performed by: STUDENT IN AN ORGANIZED HEALTH CARE EDUCATION/TRAINING PROGRAM

## 2023-10-06 PROCEDURE — 25010000002 ALBUMIN HUMAN 5% PER 50 ML: Performed by: ANESTHESIOLOGY

## 2023-10-06 PROCEDURE — 021009W BYPASS CORONARY ARTERY, ONE ARTERY FROM AORTA WITH AUTOLOGOUS VENOUS TISSUE, OPEN APPROACH: ICD-10-PCS | Performed by: STUDENT IN AN ORGANIZED HEALTH CARE EDUCATION/TRAINING PROGRAM

## 2023-10-06 PROCEDURE — 85014 HEMATOCRIT: CPT

## 2023-10-06 PROCEDURE — 93005 ELECTROCARDIOGRAM TRACING: CPT | Performed by: INTERNAL MEDICINE

## 2023-10-06 PROCEDURE — 25010000002 ACETAMINOPHEN 10 MG/ML SOLUTION: Performed by: ANESTHESIOLOGY

## 2023-10-06 PROCEDURE — 83050 HGB METHEMOGLOBIN QUAN: CPT

## 2023-10-06 PROCEDURE — 5A1221Z PERFORMANCE OF CARDIAC OUTPUT, CONTINUOUS: ICD-10-PCS | Performed by: STUDENT IN AN ORGANIZED HEALTH CARE EDUCATION/TRAINING PROGRAM

## 2023-10-06 PROCEDURE — C1751 CATH, INF, PER/CENT/MIDLINE: HCPCS | Performed by: ANESTHESIOLOGY

## 2023-10-06 PROCEDURE — 85730 THROMBOPLASTIN TIME PARTIAL: CPT | Performed by: PHYSICIAN ASSISTANT

## 2023-10-06 PROCEDURE — 0 MAGNESIUM SULFATE 4 GM/100ML SOLUTION: Performed by: STUDENT IN AN ORGANIZED HEALTH CARE EDUCATION/TRAINING PROGRAM

## 2023-10-06 PROCEDURE — 86900 BLOOD TYPING SEROLOGIC ABO: CPT

## 2023-10-06 PROCEDURE — 94002 VENT MGMT INPAT INIT DAY: CPT

## 2023-10-06 PROCEDURE — P9041 ALBUMIN (HUMAN),5%, 50ML: HCPCS | Performed by: ANESTHESIOLOGY

## 2023-10-06 PROCEDURE — P9100 PATHOGEN TEST FOR PLATELETS: HCPCS

## 2023-10-06 PROCEDURE — C1713 ANCHOR/SCREW BN/BN,TIS/BN: HCPCS | Performed by: STUDENT IN AN ORGANIZED HEALTH CARE EDUCATION/TRAINING PROGRAM

## 2023-10-06 PROCEDURE — 33534 CABG ARTERIAL TWO: CPT | Performed by: PHYSICIAN ASSISTANT

## 2023-10-06 PROCEDURE — 25010000002 MIDAZOLAM PER 1 MG: Performed by: ANESTHESIOLOGY

## 2023-10-06 PROCEDURE — 33534 CABG ARTERIAL TWO: CPT | Performed by: STUDENT IN AN ORGANIZED HEALTH CARE EDUCATION/TRAINING PROGRAM

## 2023-10-06 PROCEDURE — 25010000002 PROTAMINE SULFATE PER 10 MG: Performed by: ANESTHESIOLOGY

## 2023-10-06 PROCEDURE — 71045 X-RAY EXAM CHEST 1 VIEW: CPT

## 2023-10-06 PROCEDURE — 93010 ELECTROCARDIOGRAM REPORT: CPT | Performed by: INTERNAL MEDICINE

## 2023-10-06 PROCEDURE — 82330 ASSAY OF CALCIUM: CPT | Performed by: PHYSICIAN ASSISTANT

## 2023-10-06 PROCEDURE — 25810000003 SODIUM CHLORIDE 0.9 % SOLUTION: Performed by: ANESTHESIOLOGY

## 2023-10-06 PROCEDURE — 25010000002 HEPARIN (PORCINE) PER 1000 UNITS: Performed by: ANESTHESIOLOGY

## 2023-10-06 PROCEDURE — 85027 COMPLETE CBC AUTOMATED: CPT | Performed by: PHYSICIAN ASSISTANT

## 2023-10-06 PROCEDURE — 85610 PROTHROMBIN TIME: CPT | Performed by: PHYSICIAN ASSISTANT

## 2023-10-06 PROCEDURE — 25010000002 FENTANYL CITRATE (PF) 1000 MCG/20ML SOLUTION: Performed by: ANESTHESIOLOGY

## 2023-10-06 PROCEDURE — 94799 UNLISTED PULMONARY SVC/PX: CPT

## 2023-10-06 PROCEDURE — 25010000002 HEPARIN (PORCINE) PER 1000 UNITS: Performed by: STUDENT IN AN ORGANIZED HEALTH CARE EDUCATION/TRAINING PROGRAM

## 2023-10-06 PROCEDURE — 25810000003 DEXTROSE 5 % WITH KCL 20 MEQ 20 MEQ/L SOLUTION: Performed by: PHYSICIAN ASSISTANT

## 2023-10-06 PROCEDURE — 25810000003 SODIUM CHLORIDE PER 500 ML: Performed by: STUDENT IN AN ORGANIZED HEALTH CARE EDUCATION/TRAINING PROGRAM

## 2023-10-06 PROCEDURE — 02L70CK OCCLUSION OF LEFT ATRIAL APPENDAGE WITH EXTRALUMINAL DEVICE, OPEN APPROACH: ICD-10-PCS | Performed by: STUDENT IN AN ORGANIZED HEALTH CARE EDUCATION/TRAINING PROGRAM

## 2023-10-06 PROCEDURE — 06BP4ZZ EXCISION OF RIGHT SAPHENOUS VEIN, PERCUTANEOUS ENDOSCOPIC APPROACH: ICD-10-PCS | Performed by: STUDENT IN AN ORGANIZED HEALTH CARE EDUCATION/TRAINING PROGRAM

## 2023-10-06 PROCEDURE — 82805 BLOOD GASES W/O2 SATURATION: CPT

## 2023-10-06 PROCEDURE — 0 DEXTROSE 5 % SOLUTION 250 ML FLEX CONT: Performed by: ANESTHESIOLOGY

## 2023-10-06 PROCEDURE — 99233 SBSQ HOSP IP/OBS HIGH 50: CPT | Performed by: INTERNAL MEDICINE

## 2023-10-06 PROCEDURE — 33257 ABLATE ATRIA LMTD ADD-ON: CPT | Performed by: PHYSICIAN ASSISTANT

## 2023-10-06 PROCEDURE — P9037 PLATE PHERES LEUKOREDU IRRAD: HCPCS

## 2023-10-06 PROCEDURE — 82803 BLOOD GASES ANY COMBINATION: CPT

## 2023-10-06 PROCEDURE — 02100Z9 BYPASS CORONARY ARTERY, ONE ARTERY FROM LEFT INTERNAL MAMMARY, OPEN APPROACH: ICD-10-PCS | Performed by: STUDENT IN AN ORGANIZED HEALTH CARE EDUCATION/TRAINING PROGRAM

## 2023-10-06 PROCEDURE — 84132 ASSAY OF SERUM POTASSIUM: CPT

## 2023-10-06 PROCEDURE — 25010000002 CEFAZOLIN PER 500 MG: Performed by: ANESTHESIOLOGY

## 2023-10-06 PROCEDURE — 80069 RENAL FUNCTION PANEL: CPT | Performed by: PHYSICIAN ASSISTANT

## 2023-10-06 PROCEDURE — 82330 ASSAY OF CALCIUM: CPT

## 2023-10-06 PROCEDURE — 025S0ZZ DESTRUCTION OF RIGHT PULMONARY VEIN, OPEN APPROACH: ICD-10-PCS | Performed by: STUDENT IN AN ORGANIZED HEALTH CARE EDUCATION/TRAINING PROGRAM

## 2023-10-06 PROCEDURE — 93005 ELECTROCARDIOGRAM TRACING: CPT | Performed by: PHYSICIAN ASSISTANT

## 2023-10-06 PROCEDURE — 25810000003 LACTATED RINGERS PER 1000 ML: Performed by: ANESTHESIOLOGY

## 2023-10-06 PROCEDURE — 85347 COAGULATION TIME ACTIVATED: CPT

## 2023-10-06 PROCEDURE — 25010000002 CEFAZOLIN IN DEXTROSE 2000 MG/ 100 ML SOLUTION

## 2023-10-06 DEVICE — LIGACLIP MCA MULTIPLE CLIP APPLIERS, 20 SMALL CLIPS
Type: IMPLANTABLE DEVICE | Site: CHEST | Status: FUNCTIONAL
Brand: LIGACLIP

## 2023-10-06 DEVICE — SEALANT HEMO TACHOSIL FIBRIN PTCH 9.5X4.8CM: Type: IMPLANTABLE DEVICE | Site: CHEST | Status: FUNCTIONAL

## 2023-10-06 DEVICE — APPL CLIP LAA ATRICLIP FLXV 40MM: Type: IMPLANTABLE DEVICE | Site: HEART | Status: FUNCTIONAL

## 2023-10-06 DEVICE — WR SUT NONABS MF SS CCS 1/2CIR CUT/CONV 7/0 18IN M655G: Type: IMPLANTABLE DEVICE | Site: CHEST | Status: FUNCTIONAL

## 2023-10-06 DEVICE — TEMP PACING WIRE
Type: IMPLANTABLE DEVICE | Site: CHEST | Status: FUNCTIONAL
Brand: MYO/WIRE

## 2023-10-06 RX ORDER — DOPAMINE HYDROCHLORIDE 160 MG/100ML
2-20 INJECTION, SOLUTION INTRAVENOUS CONTINUOUS PRN
Status: DISCONTINUED | OUTPATIENT
Start: 2023-10-06 | End: 2023-10-09

## 2023-10-06 RX ORDER — LIDOCAINE HYDROCHLORIDE 10 MG/ML
0.5 INJECTION, SOLUTION EPIDURAL; INFILTRATION; INTRACAUDAL; PERINEURAL ONCE AS NEEDED
Status: DISCONTINUED | OUTPATIENT
Start: 2023-10-06 | End: 2023-10-06 | Stop reason: HOSPADM

## 2023-10-06 RX ORDER — CEFAZOLIN SODIUM 1 G/3ML
INJECTION, POWDER, FOR SOLUTION INTRAMUSCULAR; INTRAVENOUS AS NEEDED
Status: DISCONTINUED | OUTPATIENT
Start: 2023-10-06 | End: 2023-10-06 | Stop reason: SURG

## 2023-10-06 RX ORDER — DEXMEDETOMIDINE HYDROCHLORIDE 4 UG/ML
.2-1.5 INJECTION, SOLUTION INTRAVENOUS CONTINUOUS PRN
Status: DISCONTINUED | OUTPATIENT
Start: 2023-10-06 | End: 2023-10-09

## 2023-10-06 RX ORDER — ALBUMIN, HUMAN INJ 5% 5 %
12.5 SOLUTION INTRAVENOUS
Status: COMPLETED | OUTPATIENT
Start: 2023-10-06 | End: 2023-10-07

## 2023-10-06 RX ORDER — NOREPINEPHRINE BITARTRATE 0.03 MG/ML
.02-.3 INJECTION, SOLUTION INTRAVENOUS CONTINUOUS PRN
Status: DISCONTINUED | OUTPATIENT
Start: 2023-10-06 | End: 2023-10-11 | Stop reason: HOSPADM

## 2023-10-06 RX ORDER — ONDANSETRON 2 MG/ML
4 INJECTION INTRAMUSCULAR; INTRAVENOUS EVERY 6 HOURS PRN
Status: DISCONTINUED | OUTPATIENT
Start: 2023-10-06 | End: 2023-10-11 | Stop reason: HOSPADM

## 2023-10-06 RX ORDER — MIDAZOLAM HYDROCHLORIDE 1 MG/ML
INJECTION INTRAMUSCULAR; INTRAVENOUS AS NEEDED
Status: DISCONTINUED | OUTPATIENT
Start: 2023-10-06 | End: 2023-10-06 | Stop reason: SURG

## 2023-10-06 RX ORDER — FENTANYL CITRATE 0.05 MG/ML
INJECTION, SOLUTION INTRAMUSCULAR; INTRAVENOUS AS NEEDED
Status: DISCONTINUED | OUTPATIENT
Start: 2023-10-06 | End: 2023-10-06 | Stop reason: SURG

## 2023-10-06 RX ORDER — NICOTINE POLACRILEX 4 MG
15 LOZENGE BUCCAL
Status: DISCONTINUED | OUTPATIENT
Start: 2023-10-06 | End: 2023-10-07

## 2023-10-06 RX ORDER — ROCURONIUM BROMIDE 10 MG/ML
INJECTION, SOLUTION INTRAVENOUS AS NEEDED
Status: DISCONTINUED | OUTPATIENT
Start: 2023-10-06 | End: 2023-10-06 | Stop reason: SURG

## 2023-10-06 RX ORDER — SODIUM CHLORIDE 0.9 % (FLUSH) 0.9 %
10 SYRINGE (ML) INJECTION AS NEEDED
Status: DISCONTINUED | OUTPATIENT
Start: 2023-10-06 | End: 2023-10-06 | Stop reason: HOSPADM

## 2023-10-06 RX ORDER — ALBUMIN, HUMAN INJ 5% 5 %
SOLUTION INTRAVENOUS
Status: ACTIVE
Start: 2023-10-06 | End: 2023-10-07

## 2023-10-06 RX ORDER — DEXMEDETOMIDINE HYDROCHLORIDE 4 UG/ML
INJECTION, SOLUTION INTRAVENOUS CONTINUOUS PRN
Status: DISCONTINUED | OUTPATIENT
Start: 2023-10-06 | End: 2023-10-06 | Stop reason: SURG

## 2023-10-06 RX ORDER — MAGNESIUM HYDROXIDE 1200 MG/15ML
LIQUID ORAL AS NEEDED
Status: DISCONTINUED | OUTPATIENT
Start: 2023-10-06 | End: 2023-10-06 | Stop reason: HOSPADM

## 2023-10-06 RX ORDER — ACETAMINOPHEN 325 MG/1
650 TABLET ORAL EVERY 8 HOURS
Status: DISCONTINUED | OUTPATIENT
Start: 2023-10-06 | End: 2023-10-11 | Stop reason: HOSPADM

## 2023-10-06 RX ORDER — ACETAMINOPHEN 10 MG/ML
1000 INJECTION, SOLUTION INTRAVENOUS ONCE
Status: COMPLETED | OUTPATIENT
Start: 2023-10-06 | End: 2023-10-06

## 2023-10-06 RX ORDER — CHLORHEXIDINE GLUCONATE ORAL RINSE 1.2 MG/ML
15 SOLUTION DENTAL EVERY 12 HOURS SCHEDULED
Status: DISPENSED | OUTPATIENT
Start: 2023-10-06 | End: 2023-10-08

## 2023-10-06 RX ORDER — POLYETHYLENE GLYCOL 3350 17 G/17G
17 POWDER, FOR SOLUTION ORAL DAILY PRN
Status: DISCONTINUED | OUTPATIENT
Start: 2023-10-06 | End: 2023-10-11 | Stop reason: HOSPADM

## 2023-10-06 RX ORDER — PROTAMINE SULFATE 10 MG/ML
50 INJECTION, SOLUTION INTRAVENOUS ONCE
Status: DISCONTINUED | OUTPATIENT
Start: 2023-10-06 | End: 2023-10-07

## 2023-10-06 RX ORDER — ALBUMIN, HUMAN INJ 5% 5 %
25 SOLUTION INTRAVENOUS ONCE
Status: DISCONTINUED | OUTPATIENT
Start: 2023-10-06 | End: 2023-10-06

## 2023-10-06 RX ORDER — ALBUTEROL SULFATE 2.5 MG/3ML
2.5 SOLUTION RESPIRATORY (INHALATION) EVERY 4 HOURS PRN
Status: ACTIVE | OUTPATIENT
Start: 2023-10-06 | End: 2023-10-07

## 2023-10-06 RX ORDER — PAPAVERINE HYDROCHLORIDE 30 MG/ML
INJECTION INTRAMUSCULAR; INTRAVENOUS AS NEEDED
Status: DISCONTINUED | OUTPATIENT
Start: 2023-10-06 | End: 2023-10-06 | Stop reason: HOSPADM

## 2023-10-06 RX ORDER — DEXTROSE MONOHYDRATE 25 G/50ML
10-50 INJECTION, SOLUTION INTRAVENOUS
Status: DISCONTINUED | OUTPATIENT
Start: 2023-10-06 | End: 2023-10-07

## 2023-10-06 RX ORDER — FENTANYL CITRATE 50 UG/ML
25 INJECTION, SOLUTION INTRAMUSCULAR; INTRAVENOUS
Status: DISCONTINUED | OUTPATIENT
Start: 2023-10-06 | End: 2023-10-07

## 2023-10-06 RX ORDER — MIDAZOLAM HYDROCHLORIDE 1 MG/ML
0.5 INJECTION INTRAMUSCULAR; INTRAVENOUS
Status: DISCONTINUED | OUTPATIENT
Start: 2023-10-06 | End: 2023-10-06 | Stop reason: HOSPADM

## 2023-10-06 RX ORDER — ATORVASTATIN CALCIUM 40 MG/1
40 TABLET, FILM COATED ORAL NIGHTLY
Status: DISCONTINUED | OUTPATIENT
Start: 2023-10-06 | End: 2023-10-06 | Stop reason: SDUPTHER

## 2023-10-06 RX ORDER — FAMOTIDINE 20 MG/1
20 TABLET, FILM COATED ORAL ONCE
Status: COMPLETED | OUTPATIENT
Start: 2023-10-06 | End: 2023-10-06

## 2023-10-06 RX ORDER — DOBUTAMINE HYDROCHLORIDE 100 MG/100ML
2-20 INJECTION INTRAVENOUS CONTINUOUS PRN
Status: DISCONTINUED | OUTPATIENT
Start: 2023-10-06 | End: 2023-10-09

## 2023-10-06 RX ORDER — HYDROCODONE BITARTRATE AND ACETAMINOPHEN 7.5; 325 MG/1; MG/1
1 TABLET ORAL EVERY 4 HOURS PRN
Status: DISCONTINUED | OUTPATIENT
Start: 2023-10-06 | End: 2023-10-11 | Stop reason: HOSPADM

## 2023-10-06 RX ORDER — ETOMIDATE 2 MG/ML
INJECTION INTRAVENOUS AS NEEDED
Status: DISCONTINUED | OUTPATIENT
Start: 2023-10-06 | End: 2023-10-06 | Stop reason: SURG

## 2023-10-06 RX ORDER — THROMBIN HUMAN AND FIBRINOGEN 2; 5.5 [USP'U]/1; MG/1
PATCH TOPICAL AS NEEDED
Status: DISCONTINUED | OUTPATIENT
Start: 2023-10-06 | End: 2023-10-06 | Stop reason: HOSPADM

## 2023-10-06 RX ORDER — CALCIUM CHLORIDE 100 MG/ML
INJECTION INTRAVENOUS; INTRAVENTRICULAR AS NEEDED
Status: DISCONTINUED | OUTPATIENT
Start: 2023-10-06 | End: 2023-10-06 | Stop reason: SURG

## 2023-10-06 RX ORDER — LIDOCAINE HYDROCHLORIDE 10 MG/ML
INJECTION, SOLUTION EPIDURAL; INFILTRATION; INTRACAUDAL; PERINEURAL AS NEEDED
Status: DISCONTINUED | OUTPATIENT
Start: 2023-10-06 | End: 2023-10-06 | Stop reason: SURG

## 2023-10-06 RX ORDER — BISACODYL 10 MG
10 SUPPOSITORY, RECTAL RECTAL DAILY PRN
Status: DISCONTINUED | OUTPATIENT
Start: 2023-10-06 | End: 2023-10-11 | Stop reason: HOSPADM

## 2023-10-06 RX ORDER — NICARDIPINE HYDROCHLORIDE 2.5 MG/ML
INJECTION INTRAVENOUS AS NEEDED
Status: DISCONTINUED | OUTPATIENT
Start: 2023-10-06 | End: 2023-10-06 | Stop reason: SURG

## 2023-10-06 RX ORDER — SODIUM CHLORIDE 9 MG/ML
40 INJECTION, SOLUTION INTRAVENOUS AS NEEDED
Status: DISCONTINUED | OUTPATIENT
Start: 2023-10-06 | End: 2023-10-06 | Stop reason: HOSPADM

## 2023-10-06 RX ORDER — VECURONIUM BROMIDE 20 MG/20ML
INJECTION, POWDER, LYOPHILIZED, FOR SOLUTION INTRAVENOUS AS NEEDED
Status: DISCONTINUED | OUTPATIENT
Start: 2023-10-06 | End: 2023-10-06 | Stop reason: SURG

## 2023-10-06 RX ORDER — POTASSIUM CHLORIDE, DEXTROSE MONOHYDRATE 150; 5 MG/100ML; G/100ML
30 INJECTION, SOLUTION INTRAVENOUS CONTINUOUS
Status: DISCONTINUED | OUTPATIENT
Start: 2023-10-06 | End: 2023-10-11 | Stop reason: HOSPADM

## 2023-10-06 RX ORDER — ACETAMINOPHEN 10 MG/ML
INJECTION, SOLUTION INTRAVENOUS AS NEEDED
Status: DISCONTINUED | OUTPATIENT
Start: 2023-10-06 | End: 2023-10-06 | Stop reason: SURG

## 2023-10-06 RX ORDER — MILRINONE LACTATE 1 MG/ML
INJECTION, SOLUTION INTRAVENOUS AS NEEDED
Status: DISCONTINUED | OUTPATIENT
Start: 2023-10-06 | End: 2023-10-06 | Stop reason: SURG

## 2023-10-06 RX ORDER — ASPIRIN 325 MG
325 TABLET, DELAYED RELEASE (ENTERIC COATED) ORAL DAILY
Status: DISCONTINUED | OUTPATIENT
Start: 2023-10-07 | End: 2023-10-11 | Stop reason: HOSPADM

## 2023-10-06 RX ORDER — OXYCODONE HYDROCHLORIDE 10 MG/1
10 TABLET ORAL EVERY 4 HOURS PRN
Status: DISCONTINUED | OUTPATIENT
Start: 2023-10-06 | End: 2023-10-11 | Stop reason: HOSPADM

## 2023-10-06 RX ORDER — IBUPROFEN 600 MG/1
1 TABLET ORAL
Status: DISCONTINUED | OUTPATIENT
Start: 2023-10-06 | End: 2023-10-07

## 2023-10-06 RX ORDER — GABAPENTIN 300 MG/1
300 CAPSULE ORAL ONCE
Status: COMPLETED | OUTPATIENT
Start: 2023-10-06 | End: 2023-10-06

## 2023-10-06 RX ORDER — ASPIRIN 325 MG
325 TABLET ORAL ONCE
Status: COMPLETED | OUTPATIENT
Start: 2023-10-06 | End: 2023-10-06

## 2023-10-06 RX ORDER — SODIUM CHLORIDE 9 MG/ML
INJECTION, SOLUTION INTRAVENOUS CONTINUOUS PRN
Status: DISCONTINUED | OUTPATIENT
Start: 2023-10-06 | End: 2023-10-06 | Stop reason: SURG

## 2023-10-06 RX ORDER — SODIUM CHLORIDE, SODIUM LACTATE, POTASSIUM CHLORIDE, CALCIUM CHLORIDE 600; 310; 30; 20 MG/100ML; MG/100ML; MG/100ML; MG/100ML
9 INJECTION, SOLUTION INTRAVENOUS CONTINUOUS
Status: DISCONTINUED | OUTPATIENT
Start: 2023-10-06 | End: 2023-10-07

## 2023-10-06 RX ORDER — PROTAMINE SULFATE 10 MG/ML
INJECTION, SOLUTION INTRAVENOUS AS NEEDED
Status: DISCONTINUED | OUTPATIENT
Start: 2023-10-06 | End: 2023-10-06 | Stop reason: SURG

## 2023-10-06 RX ORDER — PANTOPRAZOLE SODIUM 40 MG/1
40 TABLET, DELAYED RELEASE ORAL
Status: DISCONTINUED | OUTPATIENT
Start: 2023-10-07 | End: 2023-10-11 | Stop reason: HOSPADM

## 2023-10-06 RX ORDER — MEPERIDINE HYDROCHLORIDE 25 MG/ML
25 INJECTION INTRAMUSCULAR; INTRAVENOUS; SUBCUTANEOUS EVERY 4 HOURS PRN
Status: ACTIVE | OUTPATIENT
Start: 2023-10-06 | End: 2023-10-07

## 2023-10-06 RX ORDER — MORPHINE SULFATE 2 MG/ML
2 INJECTION, SOLUTION INTRAMUSCULAR; INTRAVENOUS
Status: DISCONTINUED | OUTPATIENT
Start: 2023-10-06 | End: 2023-10-11 | Stop reason: HOSPADM

## 2023-10-06 RX ORDER — SODIUM CHLORIDE 9 MG/ML
INJECTION, SOLUTION INTRAVENOUS AS NEEDED
Status: DISCONTINUED | OUTPATIENT
Start: 2023-10-06 | End: 2023-10-06 | Stop reason: HOSPADM

## 2023-10-06 RX ORDER — HEPARIN SODIUM 1000 [USP'U]/ML
INJECTION, SOLUTION INTRAVENOUS; SUBCUTANEOUS AS NEEDED
Status: DISCONTINUED | OUTPATIENT
Start: 2023-10-06 | End: 2023-10-06 | Stop reason: SURG

## 2023-10-06 RX ORDER — MAGNESIUM SULFATE HEPTAHYDRATE 40 MG/ML
4 INJECTION, SOLUTION INTRAVENOUS ONCE
Status: COMPLETED | OUTPATIENT
Start: 2023-10-06 | End: 2023-10-06

## 2023-10-06 RX ORDER — SODIUM CHLORIDE 0.9 % (FLUSH) 0.9 %
10 SYRINGE (ML) INJECTION EVERY 12 HOURS SCHEDULED
Status: DISCONTINUED | OUTPATIENT
Start: 2023-10-06 | End: 2023-10-06 | Stop reason: HOSPADM

## 2023-10-06 RX ORDER — POTASSIUM CHLORIDE 29.8 MG/ML
20 INJECTION INTRAVENOUS ONCE
Status: DISCONTINUED | OUTPATIENT
Start: 2023-10-06 | End: 2023-10-07

## 2023-10-06 RX ORDER — NITROGLYCERIN 400 UG/1
2 SPRAY ORAL
Status: DISCONTINUED | OUTPATIENT
Start: 2023-10-06 | End: 2023-10-11 | Stop reason: HOSPADM

## 2023-10-06 RX ORDER — ALBUMIN, HUMAN INJ 5% 5 %
250 SOLUTION INTRAVENOUS AS NEEDED
Status: COMPLETED | OUTPATIENT
Start: 2023-10-06 | End: 2023-10-06

## 2023-10-06 RX ORDER — GABAPENTIN 100 MG/1
100 CAPSULE ORAL EVERY 8 HOURS
Status: DISCONTINUED | OUTPATIENT
Start: 2023-10-06 | End: 2023-10-06

## 2023-10-06 RX ORDER — METOPROLOL TARTRATE 5 MG/5ML
2.5 INJECTION INTRAVENOUS EVERY 6 HOURS SCHEDULED
Status: DISCONTINUED | OUTPATIENT
Start: 2023-10-06 | End: 2023-10-07

## 2023-10-06 RX ORDER — ALBUMIN, HUMAN INJ 5% 5 %
SOLUTION INTRAVENOUS CONTINUOUS PRN
Status: DISCONTINUED | OUTPATIENT
Start: 2023-10-06 | End: 2023-10-06 | Stop reason: SURG

## 2023-10-06 RX ORDER — CEFAZOLIN SODIUM 2 G/100ML
2 INJECTION, SOLUTION INTRAVENOUS EVERY 8 HOURS
Status: COMPLETED | OUTPATIENT
Start: 2023-10-06 | End: 2023-10-08

## 2023-10-06 RX ORDER — NITROGLYCERIN 0.4 MG/1
0.4 TABLET SUBLINGUAL
Status: DISCONTINUED | OUTPATIENT
Start: 2023-10-06 | End: 2023-10-11 | Stop reason: HOSPADM

## 2023-10-06 RX ORDER — AMINOCAPROIC ACID 250 MG/ML
INJECTION, SOLUTION INTRAVENOUS AS NEEDED
Status: DISCONTINUED | OUTPATIENT
Start: 2023-10-06 | End: 2023-10-06 | Stop reason: SURG

## 2023-10-06 RX ORDER — AMOXICILLIN 250 MG
2 CAPSULE ORAL 2 TIMES DAILY
Status: DISCONTINUED | OUTPATIENT
Start: 2023-10-06 | End: 2023-10-11 | Stop reason: HOSPADM

## 2023-10-06 RX ADMIN — HEPARIN SODIUM 5000 UNITS: 1000 INJECTION INTRAVENOUS; SUBCUTANEOUS at 10:23

## 2023-10-06 RX ADMIN — ALBUMIN (HUMAN): 12.5 INJECTION, SOLUTION INTRAVENOUS at 13:12

## 2023-10-06 RX ADMIN — CALCIUM CHLORIDE 1 G: 100 INJECTION INTRAVENOUS; INTRAVENTRICULAR at 14:45

## 2023-10-06 RX ADMIN — ROCURONIUM BROMIDE 30 MG: 10 SOLUTION INTRAVENOUS at 12:11

## 2023-10-06 RX ADMIN — CHLORHEXIDINE GLUCONATE 15 ML: 1.2 SOLUTION ORAL at 05:06

## 2023-10-06 RX ADMIN — ROCURONIUM BROMIDE 30 MG: 10 SOLUTION INTRAVENOUS at 13:31

## 2023-10-06 RX ADMIN — HEPARIN SODIUM 5000 UNITS: 1000 INJECTION INTRAVENOUS; SUBCUTANEOUS at 09:26

## 2023-10-06 RX ADMIN — CEFAZOLIN SODIUM 2000 MG: 2 INJECTION, SOLUTION INTRAVENOUS at 07:27

## 2023-10-06 RX ADMIN — LIDOCAINE HYDROCHLORIDE 70 MG: 10 INJECTION, SOLUTION EPIDURAL; INFILTRATION; INTRACAUDAL; PERINEURAL at 07:10

## 2023-10-06 RX ADMIN — NOREPINEPHRINE BITARTRATE 0.02 MCG/KG/MIN: 1 INJECTION, SOLUTION, CONCENTRATE INTRAVENOUS at 07:27

## 2023-10-06 RX ADMIN — POTASSIUM PHOSPHATE, MONOBASIC POTASSIUM PHOSPHATE, DIBASIC 15 MMOL: 224; 236 INJECTION, SOLUTION, CONCENTRATE INTRAVENOUS at 18:55

## 2023-10-06 RX ADMIN — MILRINONE LACTATE 3 MG: 1 INJECTION, SOLUTION INTRAVENOUS at 12:22

## 2023-10-06 RX ADMIN — CEFAZOLIN 2 G: 1 INJECTION, POWDER, FOR SOLUTION INTRAMUSCULAR; INTRAVENOUS; PARENTERAL at 15:27

## 2023-10-06 RX ADMIN — INSULIN HUMAN 1.8 UNITS/HR: 1 INJECTION, SOLUTION INTRAVENOUS at 10:56

## 2023-10-06 RX ADMIN — ALBUMIN (HUMAN) 250 ML: 12.5 INJECTION, SOLUTION INTRAVENOUS at 22:31

## 2023-10-06 RX ADMIN — FAMOTIDINE 20 MG: 20 TABLET, FILM COATED ORAL at 06:40

## 2023-10-06 RX ADMIN — SODIUM CHLORIDE, POTASSIUM CHLORIDE, SODIUM LACTATE AND CALCIUM CHLORIDE 9 ML/HR: 600; 310; 30; 20 INJECTION, SOLUTION INTRAVENOUS at 06:40

## 2023-10-06 RX ADMIN — HEPARIN SODIUM 20000 UNITS: 1000 INJECTION INTRAVENOUS; SUBCUTANEOUS at 14:05

## 2023-10-06 RX ADMIN — ALBUMIN (HUMAN) 12.5 G: 12.5 INJECTION, SOLUTION INTRAVENOUS at 23:08

## 2023-10-06 RX ADMIN — ROCURONIUM BROMIDE 50 MG: 10 SOLUTION INTRAVENOUS at 10:36

## 2023-10-06 RX ADMIN — ASPIRIN 325 MG: 325 TABLET ORAL at 17:42

## 2023-10-06 RX ADMIN — NOREPINEPHRINE BITARTRATE 0.02 MCG/KG/MIN: 1 INJECTION, SOLUTION, CONCENTRATE INTRAVENOUS at 07:50

## 2023-10-06 RX ADMIN — PROTAMINE SULFATE 250 MG: 10 INJECTION, SOLUTION INTRAVENOUS at 14:45

## 2023-10-06 RX ADMIN — ROCURONIUM BROMIDE 30 MG: 10 SOLUTION INTRAVENOUS at 09:02

## 2023-10-06 RX ADMIN — FENTANYL CITRATE 500 MCG: 0.05 INJECTION, SOLUTION INTRAMUSCULAR; INTRAVENOUS at 07:10

## 2023-10-06 RX ADMIN — Medication 2 G: at 17:55

## 2023-10-06 RX ADMIN — VECURONIUM BROMIDE 10 MG: 1 INJECTION, POWDER, LYOPHILIZED, FOR SOLUTION INTRAVENOUS at 13:50

## 2023-10-06 RX ADMIN — ALBUMIN (HUMAN) 250 ML: 12.5 INJECTION, SOLUTION INTRAVENOUS at 17:00

## 2023-10-06 RX ADMIN — ROCURONIUM BROMIDE 40 MG: 10 SOLUTION INTRAVENOUS at 08:05

## 2023-10-06 RX ADMIN — ALBUMIN (HUMAN) 250 ML: 12.5 INJECTION, SOLUTION INTRAVENOUS at 18:20

## 2023-10-06 RX ADMIN — ALBUMIN (HUMAN) 250 ML: 12.5 INJECTION, SOLUTION INTRAVENOUS at 16:15

## 2023-10-06 RX ADMIN — ALBUMIN (HUMAN): 12.5 INJECTION, SOLUTION INTRAVENOUS at 13:29

## 2023-10-06 RX ADMIN — HEPARIN SODIUM 25000 UNITS: 1000 INJECTION INTRAVENOUS; SUBCUTANEOUS at 10:36

## 2023-10-06 RX ADMIN — CEFAZOLIN 2 G: 1 INJECTION, POWDER, FOR SOLUTION INTRAMUSCULAR; INTRAVENOUS; PARENTERAL at 11:27

## 2023-10-06 RX ADMIN — DEXMEDETOMIDINE HYDROCHLORIDE 1 MCG/KG/HR: 4 INJECTION, SOLUTION INTRAVENOUS at 17:35

## 2023-10-06 RX ADMIN — CALCIUM CHLORIDE 0.7 G: 100 INJECTION INTRAVENOUS; INTRAVENTRICULAR at 12:50

## 2023-10-06 RX ADMIN — HEPARIN SODIUM 35000 UNITS: 1000 INJECTION INTRAVENOUS; SUBCUTANEOUS at 13:54

## 2023-10-06 RX ADMIN — FENTANYL CITRATE 250 MCG: 0.05 INJECTION, SOLUTION INTRAMUSCULAR; INTRAVENOUS at 10:36

## 2023-10-06 RX ADMIN — GABAPENTIN 300 MG: 300 CAPSULE ORAL at 06:39

## 2023-10-06 RX ADMIN — ROCURONIUM BROMIDE 60 MG: 10 SOLUTION INTRAVENOUS at 07:11

## 2023-10-06 RX ADMIN — Medication 10 ML: at 20:46

## 2023-10-06 RX ADMIN — PROTAMINE SULFATE 350 MG: 10 INJECTION, SOLUTION INTRAVENOUS at 12:50

## 2023-10-06 RX ADMIN — ALBUMIN (HUMAN): 12.5 INJECTION, SOLUTION INTRAVENOUS at 13:31

## 2023-10-06 RX ADMIN — DEXMEDETOMIDINE HYDROCHLORIDE 0.2 MCG/KG/HR: 4 INJECTION, SOLUTION INTRAVENOUS at 10:29

## 2023-10-06 RX ADMIN — ALBUMIN (HUMAN): 12.5 INJECTION, SOLUTION INTRAVENOUS at 13:24

## 2023-10-06 RX ADMIN — MIDAZOLAM 3 MG: 1 INJECTION INTRAMUSCULAR; INTRAVENOUS at 10:36

## 2023-10-06 RX ADMIN — CHLORHEXIDINE GLUCONATE 15 ML: 1.2 SOLUTION ORAL at 20:10

## 2023-10-06 RX ADMIN — MIDAZOLAM 2 MG: 1 INJECTION INTRAMUSCULAR; INTRAVENOUS at 07:08

## 2023-10-06 RX ADMIN — ATORVASTATIN CALCIUM 80 MG: 40 TABLET, FILM COATED ORAL at 20:11

## 2023-10-06 RX ADMIN — ACETAMINOPHEN 1000 MG: 10 INJECTION INTRAVENOUS at 20:11

## 2023-10-06 RX ADMIN — MAGNESIUM SULFATE HEPTAHYDRATE 4 G: 40 INJECTION, SOLUTION INTRAVENOUS at 17:49

## 2023-10-06 RX ADMIN — METOPROLOL SUCCINATE 25 MG: 25 TABLET, EXTENDED RELEASE ORAL at 06:40

## 2023-10-06 RX ADMIN — ACETAMINOPHEN 1000 MG: 10 INJECTION INTRAVENOUS at 12:52

## 2023-10-06 RX ADMIN — ETOMIDATE 16 MG: 2 INJECTION INTRAVENOUS at 07:10

## 2023-10-06 RX ADMIN — AMINOCAPROIC ACID 10 G: 250 INJECTION, SOLUTION INTRAVENOUS at 12:50

## 2023-10-06 RX ADMIN — MUPIROCIN 1 APPLICATION: 20 OINTMENT TOPICAL at 05:07

## 2023-10-06 RX ADMIN — FENTANYL CITRATE 250 MCG: 0.05 INJECTION, SOLUTION INTRAMUSCULAR; INTRAVENOUS at 08:05

## 2023-10-06 RX ADMIN — SODIUM CHLORIDE: 9 INJECTION, SOLUTION INTRAVENOUS at 07:23

## 2023-10-06 RX ADMIN — NICARDIPINE HYDROCHLORIDE 0.1 MG: 25 INJECTION, SOLUTION INTRAVENOUS at 10:38

## 2023-10-06 RX ADMIN — AMINOCAPROIC ACID 10 G: 250 INJECTION, SOLUTION INTRAVENOUS at 08:16

## 2023-10-06 RX ADMIN — ETOMIDATE 2 MG: 2 INJECTION INTRAVENOUS at 07:07

## 2023-10-06 RX ADMIN — POTASSIUM CHLORIDE AND DEXTROSE MONOHYDRATE 30 ML/HR: 150; 5 INJECTION, SOLUTION INTRAVENOUS at 16:30

## 2023-10-06 NOTE — BRIEF OP NOTE
Dann Isaías  10/6/2023    Pre-op Diagnosis:   Coronary artery disease involving native coronary artery of native heart with unstable angina pectoris [I25.110]       Post-Op Diagnosis Codes:     * Coronary artery disease involving native coronary artery of native heart with unstable angina pectoris [I25.110]    Procedure/CPTr Codes:        Procedure(s):  MEDIAN STERNOTOMY, CORONARY ARTERY BYPASS GRAFTING X 2 UTILIZING THE LEFT AND RIGHT INTERNAL MAMMARY ARTERIES, ENDOSCOPIC VEIN HARVESTING OF THE RIGHT GREATER SAPHENOUS VEIN, EXPLORATION OF THE LEFT LEG, PULMONARY VEIN ISOLATION, POSTERIOR LEFT ATRIAL WALL ABLATION (ROOF AND FLOOR), LEFT ATRIAL APPENDAGE LIGATION, TRANSESOPHAGEAL ECHOCARDIOGRAM PER ANESTHESIA              Surgeon(s):  Guero Mora MD    Anesthesia: General    Staff:   Circulator: Ciara Cr, RN; Bassam Whitaker RN  Perfusionist: Ai Vogt; Romario Boo  Scrub Person: Bibi Diggs; Murphy Funk; Jamil Bojorquez  Nursing Assistant: Paddy Woodward PCT; Francis Gaitan  Assistant: Georgi Miller PA; Lindsey Dillard PA-C  Assistant: Georgi Miller PA; Lindsey Dillard PA-C      Estimated Blood Loss:  350 ml    Urine Voided: 550 mL    Specimens:                None          Drains:   Urethral Catheter Non-latex;Straight-tip;Temperature probe 16 Fr. (Active)       Y Chest Tube 1 and 2 1 Mediastinal 32 Fr. 2 Mediastinal 32 Fr. (Active)       Y Chest Tube 3 and 4 3 Mediastinal 32 Fr. 4 Mediastinal 28 Fr. (Active)       [REMOVED] Chest Tube 3 Mediastinal (Removed)       Findings: Excellent technical result        Complications: None     Assistant: Georgi Miller PA; Lindsey Dillard PA-C  was responsible for performing the following activities: Retraction, Suction, Irrigation, Suturing, Closing, Placing Dressing, Harvesting of Vessels, and Bypass Grafting and their skilled assistance was necessary for the success of this case.    Guero Mora MD     Date: 10/6/2023   Time: 15:38 EDT

## 2023-10-06 NOTE — ANESTHESIA PROCEDURE NOTES
Arterial Line      Patient reassessed immediately prior to procedure    Patient location during procedure: pre-op   Line placed for hemodynamic monitoring.  Performed By   Anesthesiologist: Kayce Jackson DO   Preanesthetic Checklist  Completed: patient identified, IV checked, site marked, risks and benefits discussed, surgical consent, monitors and equipment checked, pre-op evaluation and timeout performed  Arterial Line Prep    Sterile Tech: cap, gloves and sterile barriers  Prep: ChloraPrep  Patient monitoring: blood pressure monitoring, continuous pulse oximetry and EKG  Arterial Line Procedure   Laterality:right  Location:  radial artery  Catheter size: 20 G   Guidance: ultrasound guided and palpation technique  Number of attempts: 1  Successful placement: yes   Post Assessment   Dressing Type: line sutured, occlusive dressing applied, secured with tape and wrist guard applied.   Complications no  Circ/Move/Sens Assessment: normal and unchanged.   Patient Tolerance: patient tolerated the procedure well with no apparent complications

## 2023-10-06 NOTE — ANESTHESIA POSTPROCEDURE EVALUATION
Patient: Dann Metz    Procedure Summary       Date: 10/06/23 Room / Location:  [facility] [address] [place] [address] [facility]    Anesthesia Start: 0705 Anesthesia Stop: 1610    Procedure: MEDIAN STERNOTOMY, CORONARY ARTERY BYPASS GRAFTING X 2 UTILIZING THE LEFT AND RIGHT INTERNAL MAMMARY ARTERIES, ENDOSCOPIC VEIN HARVESTING OF THE RIGHT GREATER SAPHENOUS VEIN, EXPLORATION OF THE LEFT LEG, PULMONARY VEIN ISOLATION, POSTERIOR LEFT ATRIAL WALL ABLATION (ROOF AND FLOOR), LEFT ATRIAL APPENDAGE LIGATION, TRANSESOPHAGEAL ECHOCARDIOGRAM PER ANESTHESIA (Chest) Diagnosis:       Coronary artery disease involving native coronary artery of native heart with unstable angina pectoris      (Coronary artery disease involving native coronary artery of native heart with unstable angina pectoris [I25.110])    Surgeons: Guero Mora MD Provider: Kayce Jackson DO    Anesthesia Type: general, CVL, [place] ASA Status: 4            Anesthesia Type: general, CVL, [place]    Vitals  No vitals data found for the desired time range.          Post Anesthesia Care and Evaluation    Patient location during evaluation: ICU  Patient participation: complete - patient cannot participate  Level of consciousness: obtunded/minimal responses  Pain score: 0  Pain management: adequate    Airway patency: patent  Anesthetic complications: No anesthetic complications  PONV Status: none  Cardiovascular status: acceptable  Respiratory status: acceptable, ventilator and intubated  Hydration status: acceptable

## 2023-10-06 NOTE — OP NOTE
DATE OF PROCEDURE: 10/6/2023     PREOPERATIVE DIAGNOSES:  1. NSTEMI in the setting of multivessel coronary artery disease  2.  Hypertension  3.  Diabetes  4.  Stroke  5.  Right upper extremity weakness  6.  Paroxysmal atrial fibrillation     The patient's Society of Thoracic Surgeons (STS) risk estimates were discussed with the patient and/or family preoperatively.     POSTOPERATIVE DIAGNOSES:    Same     PROCEDURES PERFORMED:    1. Coronary artery bypass graft x 2 (LIMA-BRANDON-LAD, LIMA-GSV-OM2)  2. Endoscopic vein harvesting (right greater saphenous vein)  3.  Median sternotomy  4.  Bilateral internal mammary artery harvest  5.  Exploration of left greater saphenous vein  6.  Bilateral pulmonary vein isolation and posterior left atrial wall ablation (roof and floor) using Atricure Encompass clamp  7.  Ligation of left atrial appendage using 40 mm Atricure Atriclip     SURGEON: Guero Mora M.D.       ASSISTANTS:    Assistant: Georgi Miller PA; Lindsey Dillard PA-C  was responsible for performing the following activities: Retraction, Suction, Irrigation, Suturing, Closing, Placing Dressing, Harvesting of Vessels, and Bypass Grafting and their skilled assistance was necessary for the success of this case.    Circulator: Ciara Cr, YANICK; Bassam Whitaker RN  Perfusionist: Ai Vogt; Romario Boo  Scrub Person: Bibi Diggs; Murphy Funk; Jamil Bojorquez  Nursing Assistant: Paddy Woodward PCT; Siconolfi, Alec  Assistant: Georgi Miller PA; Lindsey Dillard PA-C     ANESTHESIA: General endotracheal anesthesia with transesophageal echocardiography     ESTIMATED BLOOD LOSS: 350 mL     CROSSCLAMP TIME: 70 minutes       TOTAL CARDIOPULMONARY BYPASS TIME: 136 minutes      INDICATIONS: 72-year-old male with history of stroke with right upper extremity weakness, hypertension, diabetes who presented to [facility] and was found to have NSTEMI in the setting of multivessel coronary  artery disease by interventional cardiologist Dr. Redding.  The patient was transferred to [facility] under the care of Dr. Tirso Paiz who asked me to consult on the case and be the operative surgeon.  The patient was found to be a good candidate for surgical revascularization and I discussed with him the risks of surgery including pain, bleeding, heart attack, infection, renal failure, stroke and death. The patient understood these risks and wished to proceed with surgery.      DESCRIPTION OF PROCEDURE: The patient was taken to the operating room and placed under general endotracheal anesthesia. A safety pause was performed in the presence of the operating room staff. A central venous catheter, radial arterial catheter, and Castaneda catheter were placed. The patient was prepped and draped in the usual sterile fashion and a timeout was performed, including the patient's name, procedure, consent, beta blockade administration and antibiotics were verified.  Of note, there was an excessive amount of thrombus at the ascending aorta which was concerning for proximal anastomosis pitfall and high risk for stroke.   The right greater saphenous vein was harvested using EVH technique.  Of this vein was atretic and there was a short segment of the vein that was adequate for bypass.  The left saphenous vein was also explored and found to be atretic and unusable unsuitable for bypass.  Subcutaneous tissues were closed in layers. Simultaneously, a median sternotomy incision was made and electrocautery was utilized to gain access to the sternum. A midline sternotomy was performed after lung desufflation and hemostasis was achieved with electrocautery.    Attention was turned to the left internal mammary artery, which was taken down in a skeletonized fashion using electrocautery and small clips. Dissection was performed proximally to the subclavian vein and distally to the bifurcation.  Similarly the right internal  mammary was taken down in a skeletonized fashion and transected proximally and distally.  The bifurcation was ligated with 2 large clips to each branch and sharply divided. This revealed excellent flow within the mammary artery which was ligated distally using small clips and irrigated with papaverine solution and placed in a papaverine-soaked sponge in the left pleural space. The pericardium was opened and stay sutures were placed to create a pericardial well.  An end-to-side anastomosis using the free BRANDON graft was created at the mid LIMA.  There was adequate flow in both grafts.  Next, Ethibond sutures were placed in the ascending aorta and systemic heparin was administered. Additional cannulation sutures were placed in the right atrial appendage and right atrium. After verification of satisfactory activated clotting time, the arterial cannula was placed and connected to the cardiopulmonary bypass circuit after being de-aired. The line was tested and was adequate pressure and flow. The venous cannula was inserted followed by retrograde cardioplegia lines. A slit within the pericardial well along the cephalad portion was created for appropriate transition of the mammary pedicle into the mediastinum. Cardiopulmonary bypass was initiated and the patient was allowed to drift in temperature and distal bypass targets were verified.  The transverse and oblique sinuses were opened bluntly and the encompass clamp was inserted with great meticulous care through these sinuses.  The encompass clamp was closed and the aforementioned radiofrequency ablation lines were created and tested after multiple applications of radiofrequency and found to have no electrical activity in the lateral pulmonary veins bilaterally.  Bypass flow was dropped and an ascending aortic root vent catheter was placed and secured with a 4-0 Prolene stitch. Again the flow was dropped and the aortic crossclamp was applied. Cardioplegia was administered  in an antegrade resulting in immediate cessation of cardiac activity. Cold saline was irrigated into the chest after a phrenic nerve protecting pad was placed at the lateral and inferior left pericardium. There was an acceptable septal temperature response. The root vent suction was turned on high and the  coronary artery distribution was thoroughly inspected.    The inferior wall arteries of the heart were approximately 0.5 mm in diameter and unsuitable for bypass.  The first marginal branch of the circumflex artery was diminutive and unsuitable for bypass, however the second marginal branch was approximately 1 mm in diameter and an arteriotomy was made on the proximal portion of this vessel and extended proximally and distally. An end-to-side anastomosis was performed with running 7-0 Prolene suture and tied down. Cardioplegia was given down the anastomosis via hand injection with no evidence of leak and good blood flow. Verification of vein length was obtained by filling the heart and the vein graft was trimmed to the appropriate length.  There was no other bypass target suitable for bypass.  A 40 mm clip was placed at the left atrial appendage. The LAD was inspected and an arteriotomy was made on the distal LAD and extended proximally and distally on this 2 mm diameter vessel which was heavily diseased. The mammary artery was trimmed proximal to the bifurcation and beveled for grafting. An end-to-side anastomosis with a 7-0 Prolene suture was constructed and tied down. The bulldog clamp was removed and there was excellent flow within the vessel and adequate filling of the LAD. The patient was placed in steep Trendelenburg position. The root vent was turned on high suction and cardiopulmonary bypass flow was turned down with crossclamp subsequently removed. Bypass flows were returned to normal and the heart returned to spontaneous sinus rhythm without fibrillation. The proximal and distal anastomoses were then  inspected and found to be hemostatic. The patient was subsequently weaned from cardiopulmonary bypass and decannulation was successfully carried out. All cannulation sites were reinforced with additional 4-0 Prolene suture and inspected for hemostasis. Doppler examination of bypass grafts revealed excellent flow within the mammary and vein grafts.  There was a large amount of dark blood that was noted to be coming from the inferior lateral aspect of the heart, and thus we inspected this area which appeared to be extending inferiorly from the base of the left atrial appendage.  Multiple pledgeted pericardial sutures were placed at this location in an attempt to stop the bleeding with great difficulty, and this required return to bypass for safety.  I have a repeat placed the aortic cannula into the proximal ascending aorta and again therapeutic heparin was administered.  A cannula was placed into the aorta and the right atrial appendage appendage again, and bypass was initiated.  The area at the lateral aspect of the heart at the base of the left atrial appendage was found to have torn slightly and extended down towards the left ventricle, and for sutures of 4-0 and 3-0 Prolene pericardial patches were placed to control this.  The patient was rapidly weaned from bypass and decannulated.  Protamine was administered.  There is no appreciable bleeding without re-lifting the heart due to concern for bleeding and tearing stitches.  Ventricular pacing wires were placed.  2 chest tubes were placed within the pleural spaces and mediastinum. The sternum was reapproximated with stainless steel wires and the linea alba was closed with multiple interrupted Ethibond suture. Subcutaneous tissues were closed with a Vicryl suture and the inferior aspect of the incision was closed with additional interrupted 2-0 Vicryl sutures in the dermal layer. The skin was reapproximated with a 4-0 Monocryl subcuticular stitch and overlying skin  glue was applied to the incision. Gauze and tape were applied to the chest tube sites and the patient was subsequently transported to the cardiac ICU in stable condition, intubated.      Guero Mora M.D., R.P.V.I.  Cardiothoracic and Vascular Surgeon  [facility]

## 2023-10-06 NOTE — PROGRESS NOTES
Intensive Care Follow-up     Hospital:  LOS: 5 days   Mr. Dann Metz, 72 y.o. male is followed for:   Coronary artery disease   Postoperative glycemic, electrolyte and respiratory management         History of present illness:   72-year-old male with past medical history of coronary disease, type 2 diabetes, hypertension, recent CVA, atrial fibrillation.  Patient presented to [facility] with complaints of shortness of breath and fatigue over several days.  Patient had elevated troponins and normal EKG so was transferred to [facility].  Patient was seen by cardiology and underwent coronary angiogram where he was found to have multivessel coronary disease so was discussed with CT surgery and accepted in transfer for surgical revascularization.  Patient apparently also was having paroxysmal atrial fibrillation runs and was kept on IV heparin.  Patient was seen by CT surgery and deemed candidate for surgical revascularization.  Preop PFTs showed nonspecific defect with FEV1 of 77% predicted.  Carotid duplex showed bilateral less than 50% stenosis.  Patient was taken to operating room on 10/6 and underwent coronary bypass graft surgery x2.  Endoscopic vein harvesting, bilateral internal mammary artery harvest, exploration of left greater saphenous vein and bilateral pulmonary vein isolation with posterior left atrial wall ablation along with ligation of left atrial appendage clipping.  Estimated blood loss was 300 mL.  Cross-clamp time was 70 minutes and under 36 minutes of bypass time.  Intraoperatively patient was weaned off bypass but there was bleeding noted from the inferolateral aspect of the heart.  Patient was placed back on bypass circuit and bleeding was able to be stopped.  Patient was rapidly weaned from bypass and decannulated.  And during this process patient required 4 units of PRBCs and 2 units of platelets.    Patient was brought to the intensive care unit post TAVR  "procedure on mechanical ventilation with 100% FiO2 on SIMV mode, 2 mg/h of nicardipine drip, Precedex infusion and insulin drip at 0.5 units/h.  Blood oozing noted from mediastinal chest tubes and will be monitored closely.    The patient's past medical, surgical and social history were reviewed and updated in Epic as appropriate.       Objective     Infusions:  heparin, 13 Units/kg/hr, Last Rate: Stopped (10/06/23 0308)  insulin regular 1 unit/mL in 0.9% sodium chloride, 0-50 Units/hr, Last Rate: 0.5 Units/hr (10/06/23 1511)  lactated ringers, 9 mL/hr, Last Rate: Stopped (10/06/23 1306)  Pharmacy to Dose Heparin,       Medications:  albumin human, , ,   [MAR Hold] aspirin, 81 mg, Oral, Daily  [MAR Hold] atorvastatin, 80 mg, Oral, Nightly  [MAR Hold] insulin lispro, 2-7 Units, Subcutaneous, 4x Daily AC & at Bedtime  metoprolol succinate XL, 25 mg, Oral, Q24H  [MAR Hold] pharmacy consult - [place], , Does not apply, Daily  [MAR Hold] senna-docusate sodium, 2 tablet, Oral, BID  [MAR Hold] sodium chloride, 10 mL, Intravenous, Q12H  sodium chloride, 10 mL, Intravenous, Q12H        Vital Sign Min/Max for last 24 hours  Temp  Min: 97.3 øF (36.3 øC)  Max: 98.4 øF (36.9 øC)   BP  Min: 110/79  Max: 137/92   Pulse  Min: 56  Max: 91   Resp  Min: 16  Max: 18   SpO2  Min: 95 %  Max: 98 %   No data recorded       Input/Output for last 24 hour shift  10/05 0701 - 10/06 0700  In: 234.1 [I.V.:234.1]  Out: 650 [Urine:650]   S RR:  [6-13] 10  Objective:  Vital signs: (most recent): Blood pressure 137/92, pulse 90, temperature 97.3 øF (36.3 øC), temperature source Temporal, resp. rate 16, height 160 cm (63\"), weight 62.3 kg (137 lb 6.4 oz), SpO2 97 %.          General Appearance:  Not in distress, no asynchrony with mechanical ventilator.  Neck:  Endotracheal tube clean.   Lungs:   B/L Breath sounds present with decreased breath sounds on bases, no wheezing heard, no crackles.  Chest tubes in place and draining serosanguineous " fluid  Heart: S1 and S2 present, no murmur, +rub  Abdomen: Soft, nontender, no guarding or rigidity, bowel sounds positive.  Extremities: Dressing intact lower extremity,  no edema, cool to touch.  Neurologic:  Pt sedated on the vent. Not able to participate in full neurological exam    Ventilator settings: SIMV: FiO2 100%      Results from last 7 days   Lab Units 10/05/23  0450 10/02/23  0317 10/01/23  0412   WBC 10*3/mm3 7.72 8.10 10.52   HEMOGLOBIN g/dL 12.5* 12.6* 13.2   PLATELETS 10*3/mm3 217 212 220     Results from last 7 days   Lab Units 10/03/23  0644 10/02/23  0317 10/01/23  1815 10/01/23  0412   SODIUM mmol/L 136 140  --  141   POTASSIUM mmol/L 4.2 4.0  --  4.2   CO2 mmol/L 19.0* 25.0  --  24.0   BUN mg/dL 21 20  --  17   CREATININE mg/dL 0.92 1.00  --  1.11   MAGNESIUM mg/dL 2.0 2.3 2.8* 1.5*   GLUCOSE mg/dL 112* 101*  --  125*     Estimated Creatinine Clearance: 65.8 mL/min (by C-G formula based on SCr of 0.92 mg/dL).          Images:   Chest x-ray reviewed personally and showed endotracheal tube above abhi.  Right IJ catheter in good position.  Chest tubes are in good position.  No obvious pneumothorax.  Slight elevation of right hemidiaphragm noted.  Nonspecific gas pattern and small intestine and colon.    I reviewed the patient's results and images.     Assessment & Plan   Impression        Coronary artery disease    Essential hypertension    Type 2 diabetes mellitus, without long-term current use of insulin    NSTEMI (non-ST elevated myocardial infarction)       Plan        1.  Patient s/p coronary bypass graft surgery x2 along with pulmonary vein isolation and atrial clipping.  Course was complicated by intraoperative bleeding requiring multiple blood products and platelets.  Continue to monitor for excessive bleed.  May need Feiba if continues to have bleeding.  Send labs including coagulation profile now and would recommend replacement as needed.  CT surgery following postop course  closely.  2.  Continue aspirin, statin.  Beta-blockers unable to tolerate hemodynamically.  3.  Preop PFTs were acceptable.  Will follow blood gases and make adjustments to mechanical ventilation.  Will continue to work toward weaning from mechanical ventilation per ICU protocol.  4.  Light sedation for comfort and ventilator synchrony still shows signs of hemodynamic stability and no further bleeding.  Once that occurs we will work toward weaning from mechanical ventilation.  5.  Monitor urine output and electrolytes closely.  Replace electrolytes per ICU protocol.  6.  Insulin infusion as needed for postop hyperglycemia management.  Goal blood glucose 1 40-1 80.  7.  GI prophylaxis.  8.  SCDs for DVT prophylaxis.    Continue rest of the ICU care.    Plan of care and goals reviewed with multidisciplinary/antibiotic stewardship team during rounds.   I discussed the patient's findings and my recommendations with nursing staff       Time spent  35 min  (exclusive of procedure time)  including high complexity decision making to assess, manipulate, and support vital organ system failure in this individual who has impairment of one or more vital organ systems such that there is a high probability of imminent or life threatening deterioration in the patient's condition.      Mike Daley MD, [facility]  Pulmonary, Critical care and Sleep Medicine

## 2023-10-06 NOTE — ANESTHESIA PROCEDURE NOTES
Airway  Urgency: elective    Date/Time: 10/6/2023 7:13 AM  Airway not difficult    General Information and Staff    Patient location during procedure: OR  Anesthesiologist: Kayce Jackson DO    Indications and Patient Condition  Indications for airway management: airway protection    Preoxygenated: yes  MILS not maintained throughout  Mask difficulty assessment: 2 - vent by mask + OA or adjuvant +/- NMBA    Final Airway Details  Final airway type: endotracheal airway      Successful airway: ETT  Cuffed: yes   Successful intubation technique: direct laryngoscopy  Endotracheal tube insertion site: oral  Blade: Yuridia  Blade size: 4  ETT size (mm): 8.0  Cormack-Lehane Classification: grade IIa - partial view of glottis  Placement verified by: chest auscultation and capnometry   Measured from: lips  ETT/EBT  to lips (cm): 23  Number of attempts at approach: 1  Assessment: lips, teeth, and gum same as pre-op and atraumatic intubation    Additional Comments  Negative epigastric sounds, Breath sound equal bilaterally with symmetric chest rise and fall

## 2023-10-06 NOTE — ANESTHESIA PROCEDURE NOTES
Intra-Op Anesthesia DIANNA    Procedure Performed: Intra-Op Anesthesia DIANNA       Start Time:        End Time:      Preanesthesia Checklist:  Patient identified, IV assessed, risks and benefits discussed, monitors and equipment assessed, procedure being performed at surgeon's request and anesthesia consent obtained.    General Procedure Information  Diagnostic Indications for Echo:  assessment of surgical repair and hemodynamic monitoring  Physician Requesting Echo: Guero Mora MD  Location performed:  OR  Intubated  Bite block placed  Heart visualized  Probe Insertion:  Easy  Probe Type:  Multiplane  Modalities:  2D only, color flow mapping, continuous wave Doppler and pulse wave Doppler        Anesthesia Information  Performed Personally      Echocardiogram Comments:       Dx DIANNA performed by Yamilet LUCERO in OR for CABG + LAAL  Surgeon: Dr. Mora    Preoperative informed consent obtained.  DIANNA probe passed without difficulty.      Baseline exam: LVEF 40% with global Hypokinesis (apical AK, anterolateral AK, basal inferior AK, inferolateral AK).  Moderate RV dilation with preserved function.  Mild-mod TR, mild MR, no MS.  Trileaflet AV, trace AI (annulus 22mm, SoV 3.7cm, STJ 2.8cm, proximal ascending 3.6cm, no AS (SHABANA 2.0).  No PFO nor SCOTT clot.  No dissection noted in imaged aorta regions; nonmobile plaquing of descending aorta present.  Significant plaquing in arch (largest 1.4cm) with tiny filamentous/mobile component -images reviewed with Dr. Mora.   Pericardial effusion present, no significant pleural effusion. Findings reviewed with surgeon in real time.    Post CPB:  S/p mv cabg ;  Biventricular function stable on norepi inotropic support.  Initial global hypokinesis, improved by conclusion to EF ~55 on norepi.  Slight worsening in TR (now moderate), no new significant valvular abnormalities.  SCOTT appears excluded.  Aorta intact in visualized portions without evidence of dissection.  DIANNA probe gently removed  without blood present on probe.

## 2023-10-07 ENCOUNTER — APPOINTMENT (OUTPATIENT)
Dept: GENERAL RADIOLOGY | Facility: HOSPITAL | Age: 73
DRG: 233 | End: 2023-10-07
Payer: MEDICARE

## 2023-10-07 LAB
ALBUMIN SERPL-MCNC: 4.3 G/DL (ref 3.5–5.2)
ALBUMIN/GLOB SERPL: 3.3 G/DL
ALP SERPL-CCNC: 40 U/L (ref 39–117)
ALT SERPL W P-5'-P-CCNC: 10 U/L (ref 1–41)
ANION GAP SERPL CALCULATED.3IONS-SCNC: 9 MMOL/L (ref 5–15)
ARTERIAL PATENCY WRIST A: ABNORMAL
AST SERPL-CCNC: 36 U/L (ref 1–40)
ATMOSPHERIC PRESS: ABNORMAL MM[HG]
BASE EXCESS BLDA CALC-SCNC: -2.5 MMOL/L (ref 0–2)
BASOPHILS # BLD AUTO: 0.03 10*3/MM3 (ref 0–0.2)
BASOPHILS NFR BLD AUTO: 0.3 % (ref 0–1.5)
BDY SITE: ABNORMAL
BH BB BLOOD EXPIRATION DATE: NORMAL
BH BB BLOOD TYPE BARCODE: 6200
BH BB DISPENSE STATUS: NORMAL
BH BB PRODUCT CODE: NORMAL
BH BB UNIT NUMBER: NORMAL
BILIRUB SERPL-MCNC: 1.5 MG/DL (ref 0–1.2)
BODY TEMPERATURE: 37 C
BUN SERPL-MCNC: 16 MG/DL (ref 8–23)
BUN/CREAT SERPL: 12.6 (ref 7–25)
CALCIUM SPEC-SCNC: 8.9 MG/DL (ref 8.6–10.5)
CHLORIDE SERPL-SCNC: 108 MMOL/L (ref 98–107)
CO2 BLDA-SCNC: 23.7 MMOL/L (ref 22–33)
CO2 SERPL-SCNC: 23 MMOL/L (ref 22–29)
COHGB MFR BLD: 1.2 % (ref 0–2)
CREAT SERPL-MCNC: 1.27 MG/DL (ref 0.76–1.27)
CROSSMATCH INTERPRETATION: NORMAL
DEPRECATED RDW RBC AUTO: 56 FL (ref 37–54)
EGFRCR SERPLBLD CKD-EPI 2021: 60.8 ML/MIN/1.73
EOSINOPHIL # BLD AUTO: 0.01 10*3/MM3 (ref 0–0.4)
EOSINOPHIL NFR BLD AUTO: 0.1 % (ref 0.3–6.2)
ERYTHROCYTE [DISTWIDTH] IN BLOOD BY AUTOMATED COUNT: 17.2 % (ref 12.3–15.4)
GLOBULIN UR ELPH-MCNC: 1.3 GM/DL
GLUCOSE BLDC GLUCOMTR-MCNC: 147 MG/DL (ref 70–130)
GLUCOSE BLDC GLUCOMTR-MCNC: 161 MG/DL (ref 70–130)
GLUCOSE BLDC GLUCOMTR-MCNC: 168 MG/DL (ref 70–130)
GLUCOSE BLDC GLUCOMTR-MCNC: 172 MG/DL (ref 70–130)
GLUCOSE BLDC GLUCOMTR-MCNC: 174 MG/DL (ref 70–130)
GLUCOSE BLDC GLUCOMTR-MCNC: 177 MG/DL (ref 70–130)
GLUCOSE BLDC GLUCOMTR-MCNC: 179 MG/DL (ref 70–130)
GLUCOSE BLDC GLUCOMTR-MCNC: 184 MG/DL (ref 70–130)
GLUCOSE BLDC GLUCOMTR-MCNC: 191 MG/DL (ref 70–130)
GLUCOSE BLDC GLUCOMTR-MCNC: 199 MG/DL (ref 70–130)
GLUCOSE BLDC GLUCOMTR-MCNC: 204 MG/DL (ref 70–130)
GLUCOSE BLDC GLUCOMTR-MCNC: 215 MG/DL (ref 70–130)
GLUCOSE SERPL-MCNC: 204 MG/DL (ref 65–99)
HCO3 BLDA-SCNC: 22.5 MMOL/L (ref 20–26)
HCT VFR BLD AUTO: 30.8 % (ref 37.5–51)
HCT VFR BLD CALC: 32.5 % (ref 38–51)
HGB BLD-MCNC: 10.3 G/DL (ref 13–17.7)
HGB BLDA-MCNC: 10.6 G/DL (ref 13.5–17.5)
IMM GRANULOCYTES # BLD AUTO: 0.03 10*3/MM3 (ref 0–0.05)
IMM GRANULOCYTES NFR BLD AUTO: 0.3 % (ref 0–0.5)
INHALED O2 CONCENTRATION: 40 %
INR PPP: 1.53 (ref 0.89–1.12)
LYMPHOCYTES # BLD AUTO: 0.64 10*3/MM3 (ref 0.7–3.1)
LYMPHOCYTES NFR BLD AUTO: 7 % (ref 19.6–45.3)
MAGNESIUM SERPL-MCNC: 2.4 MG/DL (ref 1.6–2.4)
MCH RBC QN AUTO: 30.6 PG (ref 26.6–33)
MCHC RBC AUTO-ENTMCNC: 33.4 G/DL (ref 31.5–35.7)
MCV RBC AUTO: 91.4 FL (ref 79–97)
METHGB BLD QL: 0.7 % (ref 0–1.5)
MODALITY: ABNORMAL
MONOCYTES # BLD AUTO: 0.87 10*3/MM3 (ref 0.1–0.9)
MONOCYTES NFR BLD AUTO: 9.5 % (ref 5–12)
NEUTROPHILS NFR BLD AUTO: 7.62 10*3/MM3 (ref 1.7–7)
NEUTROPHILS NFR BLD AUTO: 82.8 % (ref 42.7–76)
NRBC BLD AUTO-RTO: 0 /100 WBC (ref 0–0.2)
OXYHGB MFR BLDV: 97.7 % (ref 94–99)
PCO2 BLDA: 38.9 MM HG (ref 35–45)
PCO2 TEMP ADJ BLD: 38.9 MM HG (ref 35–48)
PEEP RESPIRATORY: 5 CM[H2O]
PH BLDA: 7.37 PH UNITS (ref 7.35–7.45)
PH, TEMP CORRECTED: 7.37 PH UNITS
PHOSPHATE SERPL-MCNC: 3.4 MG/DL (ref 2.5–4.5)
PLATELET # BLD AUTO: 167 10*3/MM3 (ref 140–450)
PMV BLD AUTO: 11.2 FL (ref 6–12)
PO2 BLDA: 139 MM HG (ref 83–108)
PO2 TEMP ADJ BLD: 139 MM HG (ref 83–108)
POTASSIUM SERPL-SCNC: 4.5 MMOL/L (ref 3.5–5.2)
PROT SERPL-MCNC: 5.6 G/DL (ref 6–8.5)
PROTHROMBIN TIME: 18.5 SECONDS (ref 12.2–14.5)
QT INTERVAL: 418 MS
QTC INTERVAL: 441 MS
RBC # BLD AUTO: 3.37 10*6/MM3 (ref 4.14–5.8)
SODIUM SERPL-SCNC: 140 MMOL/L (ref 136–145)
UNIT  ABO: NORMAL
UNIT  RH: NORMAL
VENTILATOR MODE: ABNORMAL
WBC NRBC COR # BLD: 9.2 10*3/MM3 (ref 3.4–10.8)

## 2023-10-07 PROCEDURE — 85025 COMPLETE CBC W/AUTO DIFF WBC: CPT | Performed by: PHYSICIAN ASSISTANT

## 2023-10-07 PROCEDURE — 99024 POSTOP FOLLOW-UP VISIT: CPT | Performed by: STUDENT IN AN ORGANIZED HEALTH CARE EDUCATION/TRAINING PROGRAM

## 2023-10-07 PROCEDURE — 82375 ASSAY CARBOXYHB QUANT: CPT

## 2023-10-07 PROCEDURE — 25010000002 CEFAZOLIN IN DEXTROSE 2-4 GM/100ML-% SOLUTION: Performed by: PHYSICIAN ASSISTANT

## 2023-10-07 PROCEDURE — 82805 BLOOD GASES W/O2 SATURATION: CPT

## 2023-10-07 PROCEDURE — 83050 HGB METHEMOGLOBIN QUAN: CPT

## 2023-10-07 PROCEDURE — 63710000001 INSULIN LISPRO (HUMAN) PER 5 UNITS: Performed by: INTERNAL MEDICINE

## 2023-10-07 PROCEDURE — 25010000002 MORPHINE PER 10 MG: Performed by: STUDENT IN AN ORGANIZED HEALTH CARE EDUCATION/TRAINING PROGRAM

## 2023-10-07 PROCEDURE — 85610 PROTHROMBIN TIME: CPT | Performed by: PHYSICIAN ASSISTANT

## 2023-10-07 PROCEDURE — 99232 SBSQ HOSP IP/OBS MODERATE 35: CPT | Performed by: INTERNAL MEDICINE

## 2023-10-07 PROCEDURE — 99233 SBSQ HOSP IP/OBS HIGH 50: CPT | Performed by: INTERNAL MEDICINE

## 2023-10-07 PROCEDURE — 83735 ASSAY OF MAGNESIUM: CPT | Performed by: STUDENT IN AN ORGANIZED HEALTH CARE EDUCATION/TRAINING PROGRAM

## 2023-10-07 PROCEDURE — 93005 ELECTROCARDIOGRAM TRACING: CPT | Performed by: PHYSICIAN ASSISTANT

## 2023-10-07 PROCEDURE — 97530 THERAPEUTIC ACTIVITIES: CPT

## 2023-10-07 PROCEDURE — 25010000002 ALBUMIN HUMAN 5% PER 50 ML: Performed by: PHYSICIAN ASSISTANT

## 2023-10-07 PROCEDURE — 94799 UNLISTED PULMONARY SVC/PX: CPT

## 2023-10-07 PROCEDURE — 84100 ASSAY OF PHOSPHORUS: CPT | Performed by: STUDENT IN AN ORGANIZED HEALTH CARE EDUCATION/TRAINING PROGRAM

## 2023-10-07 PROCEDURE — 97164 PT RE-EVAL EST PLAN CARE: CPT

## 2023-10-07 PROCEDURE — 71045 X-RAY EXAM CHEST 1 VIEW: CPT

## 2023-10-07 PROCEDURE — P9041 ALBUMIN (HUMAN),5%, 50ML: HCPCS | Performed by: PHYSICIAN ASSISTANT

## 2023-10-07 PROCEDURE — 94003 VENT MGMT INPAT SUBQ DAY: CPT

## 2023-10-07 PROCEDURE — 25010000002 CEFAZOLIN IN DEXTROSE 2000 MG/ 100 ML SOLUTION: Performed by: PHYSICIAN ASSISTANT

## 2023-10-07 PROCEDURE — 80053 COMPREHEN METABOLIC PANEL: CPT | Performed by: PHYSICIAN ASSISTANT

## 2023-10-07 PROCEDURE — 82948 REAGENT STRIP/BLOOD GLUCOSE: CPT

## 2023-10-07 PROCEDURE — 93010 ELECTROCARDIOGRAM REPORT: CPT | Performed by: INTERNAL MEDICINE

## 2023-10-07 RX ORDER — DEXTROSE MONOHYDRATE 25 G/50ML
25 INJECTION, SOLUTION INTRAVENOUS
Status: DISCONTINUED | OUTPATIENT
Start: 2023-10-07 | End: 2023-10-11 | Stop reason: HOSPADM

## 2023-10-07 RX ORDER — INSULIN LISPRO 100 [IU]/ML
2-9 INJECTION, SOLUTION INTRAVENOUS; SUBCUTANEOUS
Status: DISCONTINUED | OUTPATIENT
Start: 2023-10-07 | End: 2023-10-11 | Stop reason: HOSPADM

## 2023-10-07 RX ADMIN — Medication 10 ML: at 21:46

## 2023-10-07 RX ADMIN — ACETAMINOPHEN 650 MG: 325 TABLET ORAL at 03:09

## 2023-10-07 RX ADMIN — OXYCODONE HYDROCHLORIDE 10 MG: 10 TABLET ORAL at 03:09

## 2023-10-07 RX ADMIN — Medication 12.5 MG: at 21:45

## 2023-10-07 RX ADMIN — Medication 2 G: at 01:55

## 2023-10-07 RX ADMIN — ACETAMINOPHEN 650 MG: 325 TABLET ORAL at 21:45

## 2023-10-07 RX ADMIN — ALPRAZOLAM 0.25 MG: 0.25 TABLET ORAL at 21:45

## 2023-10-07 RX ADMIN — ALBUMIN (HUMAN) 12.5 G: 12.5 INJECTION, SOLUTION INTRAVENOUS at 00:10

## 2023-10-07 RX ADMIN — PANTOPRAZOLE SODIUM 40 MG: 40 TABLET, DELAYED RELEASE ORAL at 05:40

## 2023-10-07 RX ADMIN — Medication 2 G: at 20:15

## 2023-10-07 RX ADMIN — Medication 2 G: at 09:13

## 2023-10-07 RX ADMIN — MORPHINE SULFATE 2 MG: 2 INJECTION, SOLUTION INTRAMUSCULAR; INTRAVENOUS at 01:15

## 2023-10-07 RX ADMIN — NOREPINEPHRINE BITARTRATE 0.12 MCG/KG/MIN: 0.03 INJECTION, SOLUTION INTRAVENOUS at 09:09

## 2023-10-07 RX ADMIN — ASPIRIN 325 MG: 325 TABLET, COATED ORAL at 09:10

## 2023-10-07 RX ADMIN — INSULIN LISPRO 4 UNITS: 100 INJECTION, SOLUTION INTRAVENOUS; SUBCUTANEOUS at 21:45

## 2023-10-07 RX ADMIN — OXYCODONE HYDROCHLORIDE 10 MG: 10 TABLET ORAL at 11:53

## 2023-10-07 RX ADMIN — CHLORHEXIDINE GLUCONATE 15 ML: 1.2 SOLUTION ORAL at 09:10

## 2023-10-07 RX ADMIN — ACETAMINOPHEN 650 MG: 325 TABLET ORAL at 11:53

## 2023-10-07 RX ADMIN — HYDROCODONE BITARTRATE AND ACETAMINOPHEN 1 TABLET: 7.5; 325 TABLET ORAL at 21:46

## 2023-10-07 RX ADMIN — ATORVASTATIN CALCIUM 80 MG: 40 TABLET, FILM COATED ORAL at 21:45

## 2023-10-07 RX ADMIN — HYDROCODONE BITARTRATE AND ACETAMINOPHEN 1 TABLET: 5; 325 TABLET ORAL at 09:08

## 2023-10-07 RX ADMIN — INSULIN LISPRO 2 UNITS: 100 INJECTION, SOLUTION INTRAVENOUS; SUBCUTANEOUS at 18:27

## 2023-10-07 RX ADMIN — Medication 10 ML: at 09:14

## 2023-10-07 RX ADMIN — HYDROCODONE BITARTRATE AND ACETAMINOPHEN 1 TABLET: 5; 325 TABLET ORAL at 14:35

## 2023-10-07 NOTE — PROGRESS NOTES
"INTENSIVIST   PROGRESS NOTE     Hospital:  LOS: 6 days     Chief Complaint: Postoperative management    Subjective   S     Interval History: Patient doing well this morning.  Successfully extubated overnight.  Passed swallow study earlier this morning and initiating diet.  He remains on both Levophed and insulin drips at this time.  Vasopressor requirement decreasing.    The patient's relevant past medical, surgical and social history were reviewed and updated in Epic as appropriate.      Objective   O     Intake/Ouptut 24 hrs (7:00AM - 6:59 AM)  Intake & Output (last 3 days)         10/04 0701  10/05 0700 10/05 0701  10/06 0700 10/06 0701  10/07 0700 10/07 0701  10/08 0700    P.O. 960       I.V. (mL/kg) 318 (5.1) 234.1 (3.8) 1921.6 (30.8)     Blood   1946     Other   180     IV Piggyback   1885     Total Intake(mL/kg) 1278 (20.6) 234.1 (3.8) 5932.6 (95.2)     Urine (mL/kg/hr) 420 (0.3) 650 (0.4) 2775 (1.9)     Stool 0       Blood   400     Chest Tube   905     Total Output      Net +858 -415.9 +1852.6             Urine Unmeasured Occurrence 2 x       Stool Unmeasured Occurrence 1 x             Medications (drips):  dexmedetomidine, Last Rate: Stopped (10/06/23 2000)  dextrose 5 % with KCl 20 mEq, Last Rate: 30 mL/hr (10/06/23 1630)  DOBUTamine  DOPamine  EPINEPHrine  insulin, Last Rate: 1.8 Units/hr (10/07/23 0920)  lactated ringers, Last Rate: Stopped (10/06/23 1306)  niCARdipine, Last Rate: Stopped (10/06/23 1815)  norepinephrine, Last Rate: 0.12 mcg/kg/min (10/07/23 0909)  phenylephrine  propofol    Respiratory Support: 1L    Physical Examination:  Vital Signs: Blood pressure 96/62, pulse 66, temperature 99 øF (37.2 øC), temperature source Bladder, resp. rate 16, height 160 cm (63\"), weight 62.3 kg (137 lb 6.4 oz), SpO2 100%.    General: The patient appears in no acute distress.   Chest: Clear to auscultation bilaterally, No wheezing, rhonchi, or rales. Normal work of breathing. Equal chest " rise.  Cardiac: Regular rhythm, normal rate, S1S2 auscultated. No murmurs, rubs or gallops.   Abdomen: Soft, non-tender, non-distended, positive bowel sounds in all four quadrants.   Extremities: No lower extremity edema. No clubbing or cyanosis.  Skin: No rashes, open wounds, or bruising. Warm, dry, well-perfused.  Neuro: Motor power grossly intact bilaterally. Sensation intact. Speech fluid and fluent. Thought process coherent.  Psych: Alert and oriented x3. Mood stable.    Lines, Drains & Airways       Active LDAs       Name Placement date Placement time Site Days    Peripheral IV 10/06/23 0500 Distal;Posterior;Right Forearm 10/06/23  0500  Forearm  1    NG/OG Tube Nasogastric 16 Fr Left nostril 10/06/23  1640  Left nostril  less than 1    Urethral Catheter Non-latex;Straight-tip;Temperature probe 16 Fr. 10/06/23  --  -- 1    Y Chest Tube 1 and 2 1 Mediastinal 32 Fr. 2 Mediastinal 32 Fr. 10/06/23  --  -- 1    Y Chest Tube 3 and 4 3 Mediastinal 32 Fr. 4 Mediastinal 28 Fr. 10/06/23  --  -- 1    Arterial Line 10/06/23 Right Radial 10/06/23  0653  created via procedure documentation  Radial  1    Introducer- Double Lumen 10/06/23 Internal jugular Right 10/06/23  0831  created via procedure documentation  Internal jugular  1        Results from last 7 days   Lab Units 10/07/23  0302 10/06/23  2020 10/06/23  1624   WBC 10*3/mm3 9.20 9.37 9.05   HEMOGLOBIN g/dL 10.3* 11.0* 11.1*   MCV fL 91.4 90.8 90.9   PLATELETS 10*3/mm3 167 137* 126*     Results from last 7 days   Lab Units 10/07/23  0302 10/06/23  2020 10/06/23  1624   SODIUM mmol/L 140 142 143   POTASSIUM mmol/L 4.5 4.6 3.8   CO2 mmol/L 23.0 22.0 22.0   CREATININE mg/dL 1.27 1.00 1.10   GLUCOSE mg/dL 204* 187* 142*   MAGNESIUM mg/dL 2.4 2.7* 1.6   PHOSPHORUS mg/dL 3.4 3.2 1.8*     Estimated Creatinine Clearance: 47.7 mL/min (by C-G formula based on SCr of 1.27 mg/dL).  Results from last 7 days   Lab Units 10/07/23  0302   ALK PHOS U/L 40   BILIRUBIN mg/dL 1.5*    ALT (SGPT) U/L 10   AST (SGOT) U/L 36     Results from last 7 days   Lab Units 10/07/23  0051 10/06/23  1642 10/06/23  1505 10/06/23  1427 10/06/23  1423   PH, ARTERIAL pH units 7.371 7.526* 7.46 7.46 7.43   PCO2, ARTERIAL mm Hg 38.9 27.2*  --   --   --    PO2 ART mm Hg 139.0* 234.0*  --   --   --    FIO2 % 40 100  --   --   --      CXR (10/07):   Radiology Impression:   Interval extubation. Remaining support hardware projects unchanged. There is no significant pleural effusion or distinct pneumothorax. Scattered atelectasis persists without new focal airspace disease. Unchanged heart and mediastinal contours.     Results: Reviewed.  I reviewed the patient's new laboratory and imaging results.  I independently reviewed the patient's new images.    Medications: Reviewed.    Assessment & Plan    A / P     Active Hospital Problems    Diagnosis     **Coronary artery disease     NSTEMI (non-ST elevated myocardial infarction)     Essential hypertension     Type 2 diabetes mellitus, without long-term current use of insulin      Patient Isaías is a 72 year old male with past medical history of coronary disease, type 2 diabetes, hypertension, recent CVA, atrial fibrillation.     Admitted to the ICU status post CABG on 10/06.    Preop PFTs showed nonspecific defect with FEV1 of 77% predicted.  Carotid duplex showed bilateral less than 50% stenosis.     -  s/p coronary bypass graft surgery x2 along with pulmonary vein isolation and atrial clipping.  Course was complicated by intraoperative bleeding requiring multiple blood products and platelets.  Continue to monitor for excessive bleed.  May need Feiba if continues to have bleeding. CT surgery following postop course closely.  - Continue aspirin, statin.  Beta-blockers unable to tolerate hemodynamically  - Preop PFTs were acceptable.  Extubated overnight (10/06-07)  - Postoperative pain control with Tylenol, opioids  - Monitor urine output and electrolytes closely.   Replace electrolytes per ICU protocol.  - Blood glucose management per unit protocol    F-PO  A-NA  S-NA  T-SCDs  H-Head of bed greater than 30 degrees  U-PPI  G-FSBS per unit protocol, correction dose insulin  S-NA  B-Will monitor daily and provide regimen if indicated  I-CVC (10/06)  D-NA    Advance Directives:   Code Status and Medical Interventions:   Ordered at: 10/06/23 1628     Code Status (Patient has no pulse and is not breathing):    CPR (Attempt to Resuscitate)     Medical Interventions (Patient has pulse or is breathing):    Full Support     High level of risk due to severe exacerbation of chronic illness and illness with threat to life or bodily function.    I conducted multidisciplinary rounds in the plan of care was discussed with the multidisciplinary team at that time. In attendance at multidisciplinary rounds was clinical pharmacist, dietitian, nursing staff and case management.    I discussed the patient's findings and my recommendations with patient and nursing staff    -- Brandon Wilson MD  Pulmonary/Critical Care

## 2023-10-07 NOTE — PROGRESS NOTES
Dann Metz  [id number]  1950   LOS: 6 days   Patient Care Team:  Ariana Lucas APRN as PCP - General (Family Medicine)  Kwesi Rushing MD as Consulting Physician (Cardiology)    Chief Complaint:  NSTEMI / PAF / HTN / T2 DM / CABG x 2 & MAZE III    Subjective     Comfortable up in chair on 2 L/min nasal cannula (oximetry 98%).  He notes mild heartburn and occasional sputum production.  Incisional pain well controlled.  No headache or focal motor-sensory changes.    Objective     Vital Sign Min/Max for last 24 hours  Temp  Min: 95.4 øF (35.2 øC)  Max: 99 øF (37.2 øC)   BP  Min: 84/60  Max: 131/85   Pulse  Min: 51  Max: 67   Resp  Min: 16  Max: 29   SpO2  Min: 99 %  Max: 100 %               10/04/23  0542 10/06/23  0500   Weight: 62.1 kg (137 lb) 62.3 kg (137 lb 6.4 oz)         Intake/Output Summary (Last 24 hours) at 10/7/2023 0945  Last data filed at 10/7/2023 0600  Gross per 24 hour   Intake 5932.62 ml   Output 4080 ml   Net 1852.62 ml       Physical Exam:     General Appearance:    Alert, cooperative, in no acute distress   Lungs:   Left basilar crackles,respirations regular, even and                unlabored    Heart:    Regular and normal rate, normal S1 and S2, no            murmur, no gallop, no rub, no click   Abdomen:  Extremities:   Soft, nontender, bowel sounds audible x4    No edema, normal range of motion   Pulses:   Pulses palpable and equal bilaterally      Results Review:   Results from last 7 days   Lab Units 10/07/23  0302 10/06/23  2020 10/06/23  1624   SODIUM mmol/L 140 142 143   POTASSIUM mmol/L 4.5 4.6 3.8   CHLORIDE mmol/L 108* 107 108*   CO2 mmol/L 23.0 22.0 22.0   BUN mg/dL 16 16 16   CREATININE mg/dL 1.27 1.00 1.10   GLUCOSE mg/dL 204* 187* 142*   CALCIUM mg/dL 8.9 9.3 9.7     Results from last 7 days   Lab Units 10/07/23  0302 10/06/23  2020 10/06/23  1624   WBC 10*3/mm3 9.20 9.37 9.05   HEMOGLOBIN g/dL 10.3* 11.0* 11.1*   HEMATOCRIT % 30.8* 32.5* 32.9*   PLATELETS 10*3/mm3  167 137* 126*     CXR:    Findings:    Interval extubation. Remaining support hardware projects unchanged. There is no significant pleural effusion or distinct pneumothorax. Scattered atelectasis persists without new focal airspace disease. Unchanged heart and mediastinal contours.     IMPRESSION:    Interval extubation. Remaining support hardware projects unchanged. There is no significant pleural effusion or distinct pneumothorax. Scattered atelectasis persists without new focal airspace disease. Unchanged heart and mediastinal contours.    EKG:    Normal sinus rhythm  Septal infarct (cited on or before 28-JUN-2023)  T wave abnormality, consider lateral ischemia  Abnormal ECG  When compared with ECG of 06-OCT-2023 16:29, (Unconfirmed)  aberrant conduction is no longer present  Questionable change in initial forces of Septal leads  T wave inversion no longer evident in Inferior leads    Medication Review: REVIEWED; DRIPS:    D5W with 20 mill equivalents KCl/liter 30 cc/NR  Regular insulin 1.8 units/hour continuous infusion    Assessment & Plan     Acceptable initial course with persistent sinus rhythm and acceptable chest x-ray.  Pulmonary hygiene encouraged.  Would continue current cardiac medications.      Coronary artery disease    Essential hypertension    Type 2 diabetes mellitus, without long-term current use of insulin    NSTEMI (non-ST elevated myocardial infarction)        10/07/23  09:45 EDT

## 2023-10-07 NOTE — PROGRESS NOTES
"CARDIOTHORACIC AND VASCULAR SURGERY PROGRESS NOTE    OVERNIGHT EVENTS  No acute events overnight  Extubated    SUBJECTIVE  Pain controlled, denies nausea, vomiting, shortness of breath      OBJECTIVE  BP 96/62 (BP Location: Left arm, Patient Position: Lying)   Pulse 66   Temp 99 øF (37.2 øC) (Bladder)   Resp 16   Ht 160 cm (63\")   Wt 62.3 kg (137 lb 6.4 oz)   SpO2 100%   BMI 24.34 kg/mý   General no acute distress, pleasant, interactive  Head normocephalic, atraumatic  Eyes clear sclerae  ENT no discharge, neck supple  Mouth mucous membranes moist  Cardiac regular rate rhythm, no murmurs rubs gallops  Vascular warm well perfused x4 extremities  Pulmonary lungs clear to auscultation bilaterally  Abdomen soft nontender nondistended  Lymphatic no edema bilateral lower extremities  Neurological grossly intact  Psychological appropriate  Dermatological no lesions in sun exposed areas  Musculoskeletal normal tone and bulk    WBC   Date Value Ref Range Status   10/07/2023 9.20 3.40 - 10.80 10*3/mm3 Final     RBC   Date Value Ref Range Status   10/07/2023 3.37 (L) 4.14 - 5.80 10*6/mm3 Final     Hemoglobin   Date Value Ref Range Status   10/07/2023 10.3 (L) 13.0 - 17.7 g/dL Final     Hematocrit   Date Value Ref Range Status   10/07/2023 30.8 (L) 37.5 - 51.0 % Final     MCV   Date Value Ref Range Status   10/07/2023 91.4 79.0 - 97.0 fL Final     MCH   Date Value Ref Range Status   10/07/2023 30.6 26.6 - 33.0 pg Final     MCHC   Date Value Ref Range Status   10/07/2023 33.4 31.5 - 35.7 g/dL Final     RDW   Date Value Ref Range Status   10/07/2023 17.2 (H) 12.3 - 15.4 % Final     RDW-SD   Date Value Ref Range Status   10/07/2023 56.0 (H) 37.0 - 54.0 fl Final     MPV   Date Value Ref Range Status   10/07/2023 11.2 6.0 - 12.0 fL Final     Platelets   Date Value Ref Range Status   10/07/2023 167 140 - 450 10*3/mm3 Final     Neutrophil %   Date Value Ref Range Status   10/07/2023 82.8 (H) 42.7 - 76.0 % Final     Lymphocyte % " "  Date Value Ref Range Status   10/07/2023 7.0 (L) 19.6 - 45.3 % Final     Monocyte %   Date Value Ref Range Status   10/07/2023 9.5 5.0 - 12.0 % Final     Eosinophil %   Date Value Ref Range Status   10/07/2023 0.1 (L) 0.3 - 6.2 % Final     Basophil %   Date Value Ref Range Status   10/07/2023 0.3 0.0 - 1.5 % Final     Immature Grans %   Date Value Ref Range Status   10/07/2023 0.3 0.0 - 0.5 % Final     Neutrophils, Absolute   Date Value Ref Range Status   10/07/2023 7.62 (H) 1.70 - 7.00 10*3/mm3 Final     Lymphocytes, Absolute   Date Value Ref Range Status   10/07/2023 0.64 (L) 0.70 - 3.10 10*3/mm3 Final     Monocytes, Absolute   Date Value Ref Range Status   10/07/2023 0.87 0.10 - 0.90 10*3/mm3 Final     Eosinophils, Absolute   Date Value Ref Range Status   10/07/2023 0.01 0.00 - 0.40 10*3/mm3 Final     Basophils, Absolute   Date Value Ref Range Status   10/07/2023 0.03 0.00 - 0.20 10*3/mm3 Final     Immature Grans, Absolute   Date Value Ref Range Status   10/07/2023 0.03 0.00 - 0.05 10*3/mm3 Final     nRBC   Date Value Ref Range Status   10/07/2023 0.0 0.0 - 0.2 /100 WBC Final       Basic Metabolic Panel    Sodium Sodium   Date Value Ref Range Status   10/07/2023 140 136 - 145 mmol/L Final   10/06/2023 142 136 - 145 mmol/L Final   10/06/2023 143 136 - 145 mmol/L Final      Potassium Potassium   Date Value Ref Range Status   10/07/2023 4.5 3.5 - 5.2 mmol/L Final   10/06/2023 4.6 3.5 - 5.2 mmol/L Final   10/06/2023 3.8 3.5 - 5.2 mmol/L Final     Comment:     Slight hemolysis detected by analyzer. Results may be affected.      Chloride Chloride   Date Value Ref Range Status   10/07/2023 108 (H) 98 - 107 mmol/L Final   10/06/2023 107 98 - 107 mmol/L Final   10/06/2023 108 (H) 98 - 107 mmol/L Final      Bicarbonate No results found for: \"PLASMABICARB\"   BUN BUN   Date Value Ref Range Status   10/07/2023 16 8 - 23 mg/dL Final   10/06/2023 16 8 - 23 mg/dL Final   10/06/2023 16 8 - 23 mg/dL Final      Creatinine " "Creatinine   Date Value Ref Range Status   10/07/2023 1.27 0.76 - 1.27 mg/dL Final   10/06/2023 1.00 0.76 - 1.27 mg/dL Final   10/06/2023 1.10 0.76 - 1.27 mg/dL Final      Calcium Calcium   Date Value Ref Range Status   10/07/2023 8.9 8.6 - 10.5 mg/dL Final   10/06/2023 9.3 8.6 - 10.5 mg/dL Final   10/06/2023 9.7 8.6 - 10.5 mg/dL Final      Glucose      No components found for: \"GLUCOSE.*\"         Scheduled Meds:acetaminophen, 650 mg, Oral, Q8H  aspirin, 325 mg, Oral, Daily  atorvastatin, 80 mg, Oral, Nightly  ceFAZolin, 2 g, Intravenous, Q8H  chlorhexidine, 15 mL, Mouth/Throat, Q12H  metoprolol tartrate, 12.5 mg, Oral, Q12H  pantoprazole, 40 mg, Oral, Q AM  pharmacy consult - [place], , Does not apply, Daily  potassium chloride, 20 mEq, Intravenous, Once  protamine, 50 mg, Intravenous, Once  senna-docusate sodium, 2 tablet, Oral, BID  sodium chloride, 10 mL, Intravenous, Q12H      Continuous Infusions:dexmedetomidine, 0.2-1.5 mcg/kg/hr, Last Rate: Stopped (10/06/23 2000)  dextrose 5 % with KCl 20 mEq, 30 mL/hr, Last Rate: 30 mL/hr (10/06/23 1630)  DOBUTamine, 2-20 mcg/kg/min  DOPamine, 2-20 mcg/kg/min  EPINEPHrine, 0.02-0.3 mcg/kg/min  insulin, 0-100 Units/hr, Last Rate: 1.9 Units/hr (10/07/23 0645)  lactated ringers, 9 mL/hr, Last Rate: Stopped (10/06/23 1306)  niCARdipine, 5-15 mg/hr, Last Rate: Stopped (10/06/23 1815)  norepinephrine, 0.02-0.3 mcg/kg/min, Last Rate: 0.14 mcg/kg/min (10/07/23 0430)  phenylephrine, 0.5-3 mcg/kg/min  propofol, 5-50 mcg/kg/min      PRN Meds:.  acetaminophen **OR** acetaminophen **OR** acetaminophen    albuterol    ALPRAZolam    aluminum-magnesium hydroxide-simethicone    [DISCONTINUED] senna-docusate sodium **AND** polyethylene glycol **AND** bisacodyl **AND** bisacodyl    bisacodyl    Calcium Replacement - Follow Nurse / BPA Driven Protocol    Chlorhexidine Gluconate Cloth    dexmedetomidine    dextrose    dextrose    DOBUTamine    DOPamine    EPINEPHrine    fentaNYL citrate (PF)    " glucagon (human recombinant)    HYDROcodone-acetaminophen    HYDROcodone-acetaminophen    hydrOXYzine    ipratropium-albuterol    Magnesium Cardiology Dose Replacement - Follow Nurse / BPA Driven Protocol    Morphine    Morphine **AND** naloxone    niCARdipine    nitroglycerin    nitroglycerin    norepinephrine    ondansetron    ondansetron **OR** [DISCONTINUED] ondansetron    oxyCODONE    phenylephrine    Phosphorus Replacement - Follow Nurse / BPA Driven Protocol    polyethylene glycol    Potassium Replacement - Follow Nurse / BPA Driven Protocol    propofol    racemic epinephrine    sodium bicarbonate    sodium chloride    sodium chloride    IMAGING  CXR reviewed, no pneumothorax or effusion appreciable    ASSESSMENT  72-year-old man with history of hypertension and diabetes who presented to [facility] with NSTEMI in the setting of multivessel coronary artery disease who is now status post CABG x2 utilizing bilateral internal mammary arteries as well as encompass clamp posterior left atrial roof and floor radiofrequency ablation and pulmonary vein isolation bilaterally with left atrial appendage clip placement on October 6, 2023, uncomplicated.      Coronary artery disease    Essential hypertension    Type 2 diabetes mellitus, without long-term current use of insulin    NSTEMI (non-ST elevated myocardial infarction)        PLAN  Aspirin 162 mg daily  Statin  We will begin Plavix once chest tubes removed  Continue chest tubes to -20 mmHg suction  Heparin 5000 units every 8 hours subcutaneously  Intermittent pneumatic compression stockings  Ambulation  Goal MAP 65-85  Remain in ICU    Guero Mora M.D., R.P.V.I.  Cardiothoracic and Vascular Surgeon  [facility]    Guero Mora MD  10/07/23  08:54 EDT

## 2023-10-07 NOTE — THERAPY RE-EVALUATION
Patient Name: Dann Metz  : 1950    MRN: [id number]                              Today's Date: 10/7/2023       Admit Date: 10/1/2023    Visit Dx:     ICD-10-CM ICD-9-CM   1. Coronary artery disease involving native coronary artery of native heart with unstable angina pectoris  I25.110 414.01     411.1     Patient Active Problem List   Diagnosis    Acute CVA (cerebrovascular accident)    Essential hypertension    Type 2 diabetes mellitus, without long-term current use of insulin    NSTEMI (non-ST elevated myocardial infarction)    Type 1 myocardial infarction    Coronary artery disease    Vitamin D deficiency    RUE weakness     Past Medical History:   Diagnosis Date    Coronary artery disease     Diabetes mellitus     Hypertension     Stroke      Past Surgical History:   Procedure Laterality Date    CARDIAC CATHETERIZATION      CARDIAC CATHETERIZATION N/A 2023    Procedure: Coronary angiography;  Surgeon: Vince Redding MD;  Location: [facility] CATH INVASIVE LOC[facility];  Service: Cardiology;  Laterality: N/A;      General Information       Row Name 10/07/23 1332          Physical Therapy Time and Intention    Document Type re-evaluation  -KG     Mode of Treatment physical therapy  -KG       Row Name 10/07/23 3539          General Information    Patient Profile Reviewed yes  -KG     Prior Level of Function --  please see IE  -KG     Existing Precautions/Restrictions cardiac;fall;oxygen therapy device and L/min;sternal;other (see comments)  confusion; remote CVA with residual R sided weakness  -KG     Barriers to Rehab medically complex;previous functional deficit;cognitive status  -KG       Row Name 10/07/23 1338          Living Environment    People in Home significant other  -KG       Row Name 10/07/23 133          Home Main Entrance    Number of Stairs, Main Entrance five  -KG     Stair Railings, Main Entrance railing on left side (ascending)  -KG       Row Name 10/07/23 1331          Stairs  Within Home, Primary    Number of Stairs, Within Home, Primary none  -KG       Row Name 10/07/23 1331          Cognition    Orientation Status (Cognition) oriented to;person  -KG       Row Name 10/07/23 1339          Safety Issues, Functional Mobility    Safety Issues Affecting Function (Mobility) ability to follow commands;awareness of need for assistance;insight into deficits/self-awareness;safety precaution awareness;safety precautions follow-through/compliance;sequencing abilities  -KG     Impairments Affecting Function (Mobility) balance;cognition;coordination;endurance/activity tolerance;postural/trunk control;pain;shortness of breath;strength  -KG     Cognitive Impairments, Mobility Safety/Performance attention;awareness, need for assistance;insight into deficits/self-awareness;safety precaution awareness;safety precaution follow-through;sequencing abilities  -KG               User Key  (r) = Recorded By, (t) = Taken By, (c) = Cosigned By      Initials Name Provider Type    KG Myra Buck, PT Physical Therapist                   Mobility       Row Name 10/07/23 1335          Bed Mobility    Comment, (Bed Mobility) UIC  -KG       Row Name 10/07/23 1335          Transfers    Comment, (Transfers) VC's for sequencing and safe hand placement to maintain sternal precautions. Upon initial stand pt demonstrated significant posterior lean with increased weight bearing through nancy heels.  -KG       Row Name 10/07/23 1335          Sit-Stand Transfer    Sit-Stand [place] (Transfers) moderate assist (50% patient effort);2 person assist;verbal cues  -KG       Row Name 10/07/23 1338          Gait/Stairs (Locomotion)    [place] Level (Gait) moderate assist (50% patient effort);2 person assist;1 person to manage equipment;verbal cues  chair follow  -KG     Assistive Device (Gait) other (see comments)  B UE support on tripod monitor  -KG     Distance in Feet (Gait) 10  -KG     Deviations/Abnormal Patterns  (Gait) bilateral deviations;base of support, narrow;theo decreased;festinating/shuffling;stride length decreased  -KG     Bilateral Gait Deviations forward flexed posture;heel strike decreased  -KG     Comment, (Gait/Stairs) Pt demonstrated step to gait pattern with slow theo and short, shuffled steps. Pt with very forward flexed posture; worsening with continued forward ambulation. Required max cueing to correct. Pt unsteady with decreased balance and coordination. Returned to room in chair due to increased weakness.  -KG               User Key  (r) = Recorded By, (t) = Taken By, (c) = Cosigned By      Initials Name Provider Type    KG Myra Buck, PT Physical Therapist                   Obj/Interventions       Row Name 10/07/23 1337          Range of Motion Comprehensive    General Range of Motion no range of motion deficits identified  -KG     Comment, General Range of Motion B LE WFL  -KG       Row Name 10/07/23 1337          Strength Comprehensive (MMT)    Comment, General Manual Muscle Testing (MMT) Assessment B LE grossly 3+/5  -KG       Row Name 10/07/23 1337          Balance    Balance Assessment sitting static balance;standing static balance;standing dynamic balance  -KG     Static Sitting Balance minimal assist  -KG     Position, Sitting Balance supported;sitting in chair  -KG     Static Standing Balance minimal assist;2-person assist  -KG     Dynamic Standing Balance moderate assist;2-person assist  -KG     Position/Device Used, Standing Balance supported  -KG               User Key  (r) = Recorded By, (t) = Taken By, (c) = Cosigned By      Initials Name Provider Type    KG Myra Buck PT Physical Therapist                   Goals/Plan       Row Name 10/07/23 1340          Bed Mobility Goal 1 (PT)    Activity/Assistive Device (Bed Mobility Goal 1, PT) sit to supine;supine to sit  -KG     [place] Level/Cues Needed (Bed Mobility Goal 1, PT) moderate assist (50-74% patient  effort)  -KG     Time Frame (Bed Mobility Goal 1, PT) 2 weeks  -KG     Progress/Outcomes (Bed Mobility Goal 1, PT) goal ongoing;goal revised this date  -KG       Row Name 10/07/23 1340          Transfer Goal 1 (PT)    Activity/Assistive Device (Transfer Goal 1, PT) sit-to-stand/stand-to-sit;bed-to-chair/chair-to-bed  -KG     [place] Level/Cues Needed (Transfer Goal 1, PT) minimum assist (75% or more patient effort)  -KG     Time Frame (Transfer Goal 1, PT) 2 weeks  -KG     Progress/Outcome (Transfer Goal 1, PT) goal ongoing;goal revised this date  -KG       Row Name 10/07/23 1340          Gait Training Goal 1 (PT)    Activity/Assistive Device (Gait Training Goal 1, PT) gait (walking locomotion);assistive device use;walker, rolling  -KG     [place] Level (Gait Training Goal 1, PT) minimum assist (75% or more patient effort)  -KG     Distance (Gait Training Goal 1, PT) 150 feet  -KG     Time Frame (Gait Training Goal 1, PT) 2 weeks  -KG     Progress/Outcome (Gait Training Goal 1, PT) goal ongoing;goal revised this date  -KG       Row Name 10/07/23 1340          Therapy Assessment/Plan (PT)    Planned Therapy Interventions (PT) balance training;bed mobility training;gait training;strengthening;transfer training  -KG               User Key  (r) = Recorded By, (t) = Taken By, (c) = Cosigned By      Initials Name Provider Type    KG Myra Buck N, PT Physical Therapist                   Clinical Impression       Row Name 10/07/23 7915          Pain    Additional Documentation Pain Scale: FACES Pre/Post-Treatment (Group)  -KG       Row Name 10/07/23 4366          Pain Scale: FACES Pre/Post-Treatment    Pain: FACES Scale, Pretreatment 4-->hurts little more  -KG     Posttreatment Pain Rating 6-->hurts even more  -KG     Pain Location incisional  -KG     Pain Location - chest  -KG       Row Name 10/07/23 6255          Plan of Care Review    Plan of Care Reviewed With patient  -KG     Outcome Evaluation PT  re-evaluation completed for pt s/p CABG x2 presenting with generalized weakness, impaired balance and coordination, confusion, and decreased functional mobility. Pt ambulated 10ft with modA x2 +1 and B UE support on tripod monitor. Chair follow for safety. Pt's goals have been revised and pt would continue to benefit from PT skilled care. Recommend D/C to SNF.  -KG       Row Name 10/07/23 1338          Therapy Assessment/Plan (PT)    Rehab Potential (PT) good, to achieve stated therapy goals  -KG     Criteria for Skilled Interventions Met (PT) yes;skilled treatment is necessary  -KG     Therapy Frequency (PT) daily  -KG       Row Name 10/07/23 1338          Vital Signs    Pre Systolic BP Rehab 107  -KG     Pre Treatment Diastolic BP 66  -KG     Post Systolic BP Rehab 105  -KG     Post Treatment Diastolic BP 66  -KG     Pretreatment Heart Rate (beats/min) 68  -KG     Posttreatment Heart Rate (beats/min) 73  -KG     Pre SpO2 (%) 97  -KG     O2 Delivery Pre Treatment supplemental O2  -KG     Post SpO2 (%) 99  -KG     O2 Delivery Post Treatment supplemental O2  -KG     Pre Patient Position Sitting  -KG     Intra Patient Position Standing  -KG     Post Patient Position Sitting  -KG       Row Name 10/07/23 1338          Positioning and Restraints    Pre-Treatment Position sitting in chair/recliner  -KG     Post Treatment Position chair  -KG     In Chair reclined;call light within reach;encouraged to call for assist;with nsg;RUE elevated;LUE elevated;legs elevated  -KG               User Key  (r) = Recorded By, (t) = Taken By, (c) = Cosigned By      Initials Name Provider Type    KG Myra Buck, PT Physical Therapist                   Outcome Measures       Row Name 10/07/23 1343          How much help from another person do you currently need...    Turning from your back to your side while in flat bed without using bedrails? 2  -KG     Moving from lying on back to sitting on the side of a flat bed without  bedrails? 2  -KG     Moving to and from a bed to a chair (including a wheelchair)? 2  -KG     Standing up from a chair using your arms (e.g., wheelchair, bedside chair)? 2  -KG     Climbing 3-5 steps with a railing? 1  -KG     To walk in hospital room? 2  -KG     AM-PAC 6 Clicks Score (PT) 11  -KG     Highest level of mobility 4 --> Transferred to chair/commode  -KG       Row Name 10/07/23 1340          Functional Assessment    Outcome Measure Options AM-PAC 6 Clicks Basic Mobility (PT)  -KG               User Key  (r) = Recorded By, (t) = Taken By, (c) = Cosigned By      Initials Name Provider Type    KG Myra Buck PT Physical Therapist                                 Physical Therapy Education       Title: PT OT SLP Therapies (In Progress)       Topic: Physical Therapy (In Progress)       Point: Mobility training (In Progress)       Learning Progress Summary             Patient Acceptance, E, NR by KG at 10/7/2023 0858    Acceptance, E,TB, VU,NR by  at 10/5/2023 1550    Acceptance, E,TB, VU,NR by  at 10/3/2023 1548                         Point: Home exercise program (In Progress)       Learning Progress Summary             Patient Acceptance, E, NR by KG at 10/7/2023 0858    Acceptance, E,TB, VU,NR by  at 10/5/2023 1550                         Point: Body mechanics (In Progress)       Learning Progress Summary             Patient Acceptance, E, NR by KG at 10/7/2023 0858    Acceptance, E,TB, VU,NR by  at 10/5/2023 1550    Acceptance, E,TB, VU,NR by  at 10/3/2023 1548                         Point: Precautions (In Progress)       Learning Progress Summary             Patient Acceptance, E, NR by KG at 10/7/2023 0858    Acceptance, E,TB, VU,NR by  at 10/5/2023 1550    Acceptance, E,TB, VU,NR by  at 10/3/2023 1548                                         User Key       Initials Effective Dates Name Provider Type Discipline    KG 05/22/20 -  Myra Buck PT Physical Therapist PT      09/22/22 -  Petra Astudillo, PT Physical Therapist PT                  PT Recommendation and Plan  Planned Therapy Interventions (PT): balance training, bed mobility training, gait training, strengthening, transfer training  Plan of Care Reviewed With: patient  Outcome Evaluation: PT re-evaluation completed for pt s/p CABG x2 presenting with generalized weakness, impaired balance and coordination, confusion, and decreased functional mobility. Pt ambulated 10ft with modA x2 +1 and B UE support on tripod monitor. Chair follow for safety. Pt's goals have been revised and pt would continue to benefit from PT skilled care. Recommend D/C to SNF.     Time Calculation:         PT Charges       Row Name 10/07/23 0858             Time Calculation    Start Time 0858  -KG      PT Received On 10/07/23  -KG      PT Goal Re-Cert Due Date 10/17/23  -KG         Time Calculation- PT    Total Timed Code Minutes- PT 25 minute(s)  -KG         Timed Charges    90075 - PT Therapeutic Activity Minutes 25  -KG         Untimed Charges    PT Eval/Re-eval Minutes 20  -KG         Total Minutes    Timed Charges Total Minutes 25  -KG      Untimed Charges Total Minutes 20  -KG       Total Minutes 45  -KG                User Key  (r) = Recorded By, (t) = Taken By, (c) = Cosigned By      Initials Name Provider Type    KG Myra Buck, PT Physical Therapist                  Therapy Charges for Today       Code Description Service Date Service Provider Modifiers Qty    99692005789 HC PT THERAPEUTIC ACT EA 15 MIN 10/7/2023 Myra Buck, PT GP 2    86779125500 HC PT RE-EVAL ESTABLISHED PLAN 2 10/7/2023 Myra Buck, PT GP 1    68029746466 HC PT THER SUPP EA 15 MIN 10/7/2023 Myra Buck, PT GP 3            PT G-Codes  Outcome Measure Options: AM-PAC 6 Clicks Basic Mobility (PT)  AM-PAC 6 Clicks Score (PT): 11  AM-PAC 6 Clicks Score (OT): 19  PT Discharge Summary  Anticipated Discharge Disposition (PT): skilled  nursing facility    Elis Buck, PT  10/7/2023

## 2023-10-07 NOTE — PLAN OF CARE
Goal Outcome Evaluation:  Plan of Care Reviewed With: patient           Outcome Evaluation: PT re-evaluation completed for pt s/p CABG x2 presenting with generalized weakness, impaired balance and coordination, confusion, and decreased functional mobility. Pt ambulated 10ft with modA x2 +1 and B UE support on tripod monitor. Chair follow for safety. Pt's goals have been revised and pt would continue to benefit from PT skilled care. Recommend D/C to SNF.      Anticipated Discharge Disposition (PT): skilled nursing facility

## 2023-10-07 NOTE — PLAN OF CARE
Goal Outcome Evaluation:  Plan of Care Reviewed With: son        Progress: improving  Outcome Evaluation: Pt had CABG x2 with B internal mammary, R EVH, L explore. 4 PRBCs and 2 Plts were given in the OR. Out to 2H ICU at 16:05 on precedex, insulin, and cardene. Cardene eventually weaned off and low dose levo started. 750 albumin given. MTs drainage went from 230 mls out initially to 100mls out at 1700 to 20 mls an hour at 1900. VSS, will continue to monitor.

## 2023-10-08 ENCOUNTER — APPOINTMENT (OUTPATIENT)
Dept: GENERAL RADIOLOGY | Facility: HOSPITAL | Age: 73
DRG: 233 | End: 2023-10-08
Payer: MEDICARE

## 2023-10-08 LAB
ANION GAP SERPL CALCULATED.3IONS-SCNC: 8 MMOL/L (ref 5–15)
BH BB BLOOD EXPIRATION DATE: NORMAL
BH BB BLOOD EXPIRATION DATE: NORMAL
BH BB BLOOD TYPE BARCODE: 6200
BH BB BLOOD TYPE BARCODE: 6200
BH BB DISPENSE STATUS: NORMAL
BH BB DISPENSE STATUS: NORMAL
BH BB PRODUCT CODE: NORMAL
BH BB PRODUCT CODE: NORMAL
BH BB UNIT NUMBER: NORMAL
BH BB UNIT NUMBER: NORMAL
BUN SERPL-MCNC: 18 MG/DL (ref 8–23)
BUN/CREAT SERPL: 16.8 (ref 7–25)
CALCIUM SPEC-SCNC: 8.7 MG/DL (ref 8.6–10.5)
CHLORIDE SERPL-SCNC: 105 MMOL/L (ref 98–107)
CO2 SERPL-SCNC: 22 MMOL/L (ref 22–29)
CREAT SERPL-MCNC: 1.07 MG/DL (ref 0.76–1.27)
CROSSMATCH INTERPRETATION: NORMAL
CROSSMATCH INTERPRETATION: NORMAL
DEPRECATED RDW RBC AUTO: 61.6 FL (ref 37–54)
EGFRCR SERPLBLD CKD-EPI 2021: 74.7 ML/MIN/1.73
ERYTHROCYTE [DISTWIDTH] IN BLOOD BY AUTOMATED COUNT: 17.9 % (ref 12.3–15.4)
GLUCOSE BLDC GLUCOMTR-MCNC: 174 MG/DL (ref 70–130)
GLUCOSE BLDC GLUCOMTR-MCNC: 227 MG/DL (ref 70–130)
GLUCOSE BLDC GLUCOMTR-MCNC: 247 MG/DL (ref 70–130)
GLUCOSE BLDC GLUCOMTR-MCNC: 320 MG/DL (ref 70–130)
GLUCOSE SERPL-MCNC: 220 MG/DL (ref 65–99)
HCT VFR BLD AUTO: 31.8 % (ref 37.5–51)
HGB BLD-MCNC: 10.4 G/DL (ref 13–17.7)
MCH RBC QN AUTO: 30.8 PG (ref 26.6–33)
MCHC RBC AUTO-ENTMCNC: 32.7 G/DL (ref 31.5–35.7)
MCV RBC AUTO: 94.1 FL (ref 79–97)
PLATELET # BLD AUTO: 194 10*3/MM3 (ref 140–450)
PMV BLD AUTO: 11.2 FL (ref 6–12)
POTASSIUM SERPL-SCNC: 4.7 MMOL/L (ref 3.5–5.2)
QT INTERVAL: 372 MS
QTC INTERVAL: 439 MS
RBC # BLD AUTO: 3.38 10*6/MM3 (ref 4.14–5.8)
SODIUM SERPL-SCNC: 135 MMOL/L (ref 136–145)
UNIT  ABO: NORMAL
UNIT  ABO: NORMAL
UNIT  RH: NORMAL
UNIT  RH: NORMAL
WBC NRBC COR # BLD: 12.19 10*3/MM3 (ref 3.4–10.8)

## 2023-10-08 PROCEDURE — 82948 REAGENT STRIP/BLOOD GLUCOSE: CPT

## 2023-10-08 PROCEDURE — 63710000001 INSULIN LISPRO (HUMAN) PER 5 UNITS: Performed by: INTERNAL MEDICINE

## 2023-10-08 PROCEDURE — 85027 COMPLETE CBC AUTOMATED: CPT | Performed by: PHYSICIAN ASSISTANT

## 2023-10-08 PROCEDURE — 80048 BASIC METABOLIC PNL TOTAL CA: CPT | Performed by: PHYSICIAN ASSISTANT

## 2023-10-08 PROCEDURE — 93005 ELECTROCARDIOGRAM TRACING: CPT | Performed by: PHYSICIAN ASSISTANT

## 2023-10-08 PROCEDURE — 25010000002 ALBUMIN HUMAN 5% PER 50 ML: Performed by: STUDENT IN AN ORGANIZED HEALTH CARE EDUCATION/TRAINING PROGRAM

## 2023-10-08 PROCEDURE — 71045 X-RAY EXAM CHEST 1 VIEW: CPT

## 2023-10-08 PROCEDURE — 25010000002 MORPHINE PER 10 MG: Performed by: STUDENT IN AN ORGANIZED HEALTH CARE EDUCATION/TRAINING PROGRAM

## 2023-10-08 PROCEDURE — 99024 POSTOP FOLLOW-UP VISIT: CPT | Performed by: STUDENT IN AN ORGANIZED HEALTH CARE EDUCATION/TRAINING PROGRAM

## 2023-10-08 PROCEDURE — P9041 ALBUMIN (HUMAN),5%, 50ML: HCPCS | Performed by: STUDENT IN AN ORGANIZED HEALTH CARE EDUCATION/TRAINING PROGRAM

## 2023-10-08 PROCEDURE — 99233 SBSQ HOSP IP/OBS HIGH 50: CPT | Performed by: INTERNAL MEDICINE

## 2023-10-08 PROCEDURE — 25010000002 CEFAZOLIN IN DEXTROSE 2-4 GM/100ML-% SOLUTION: Performed by: PHYSICIAN ASSISTANT

## 2023-10-08 PROCEDURE — 93010 ELECTROCARDIOGRAM REPORT: CPT | Performed by: INTERNAL MEDICINE

## 2023-10-08 PROCEDURE — 99232 SBSQ HOSP IP/OBS MODERATE 35: CPT | Performed by: INTERNAL MEDICINE

## 2023-10-08 RX ORDER — BUMETANIDE 0.25 MG/ML
1 INJECTION INTRAMUSCULAR; INTRAVENOUS ONCE
Status: COMPLETED | OUTPATIENT
Start: 2023-10-08 | End: 2023-10-08

## 2023-10-08 RX ORDER — ALBUMIN, HUMAN INJ 5% 5 %
250 SOLUTION INTRAVENOUS ONCE
Status: COMPLETED | OUTPATIENT
Start: 2023-10-08 | End: 2023-10-08

## 2023-10-08 RX ADMIN — ALBUMIN (HUMAN) 250 ML: 12.5 INJECTION, SOLUTION INTRAVENOUS at 14:09

## 2023-10-08 RX ADMIN — Medication 10 ML: at 23:15

## 2023-10-08 RX ADMIN — Medication 10 ML: at 08:16

## 2023-10-08 RX ADMIN — ACETAMINOPHEN 650 MG: 325 TABLET ORAL at 12:39

## 2023-10-08 RX ADMIN — INSULIN LISPRO 7 UNITS: 100 INJECTION, SOLUTION INTRAVENOUS; SUBCUTANEOUS at 21:11

## 2023-10-08 RX ADMIN — ACETAMINOPHEN 650 MG: 325 TABLET ORAL at 03:54

## 2023-10-08 RX ADMIN — ALPRAZOLAM 0.25 MG: 0.25 TABLET ORAL at 23:15

## 2023-10-08 RX ADMIN — BUMETANIDE 1 MG: 0.25 INJECTION, SOLUTION INTRAMUSCULAR; INTRAVENOUS at 08:07

## 2023-10-08 RX ADMIN — INSULIN LISPRO 2 UNITS: 100 INJECTION, SOLUTION INTRAVENOUS; SUBCUTANEOUS at 17:16

## 2023-10-08 RX ADMIN — ACETAMINOPHEN 650 MG: 325 TABLET ORAL at 09:29

## 2023-10-08 RX ADMIN — CHLORHEXIDINE GLUCONATE 15 ML: 1.2 SOLUTION ORAL at 08:07

## 2023-10-08 RX ADMIN — MORPHINE SULFATE 2 MG: 2 INJECTION, SOLUTION INTRAMUSCULAR; INTRAVENOUS at 13:06

## 2023-10-08 RX ADMIN — OXYCODONE HYDROCHLORIDE 10 MG: 10 TABLET ORAL at 01:38

## 2023-10-08 RX ADMIN — ATORVASTATIN CALCIUM 80 MG: 40 TABLET, FILM COATED ORAL at 21:10

## 2023-10-08 RX ADMIN — HYDROCODONE BITARTRATE AND ACETAMINOPHEN 1 TABLET: 7.5; 325 TABLET ORAL at 23:14

## 2023-10-08 RX ADMIN — CHLORHEXIDINE GLUCONATE 15 ML: 1.2 SOLUTION ORAL at 21:12

## 2023-10-08 RX ADMIN — INSULIN LISPRO 4 UNITS: 100 INJECTION, SOLUTION INTRAVENOUS; SUBCUTANEOUS at 12:39

## 2023-10-08 RX ADMIN — ACETAMINOPHEN 650 MG: 325 TABLET ORAL at 21:09

## 2023-10-08 RX ADMIN — POLYETHYLENE GLYCOL 3350 17 G: 17 POWDER, FOR SOLUTION ORAL at 12:58

## 2023-10-08 RX ADMIN — INSULIN LISPRO 4 UNITS: 100 INJECTION, SOLUTION INTRAVENOUS; SUBCUTANEOUS at 08:11

## 2023-10-08 RX ADMIN — ASPIRIN 325 MG: 325 TABLET, COATED ORAL at 08:07

## 2023-10-08 RX ADMIN — Medication 2 G: at 01:39

## 2023-10-08 RX ADMIN — NOREPINEPHRINE BITARTRATE 0.04 MCG/KG/MIN: 0.03 INJECTION, SOLUTION INTRAVENOUS at 14:08

## 2023-10-08 RX ADMIN — PANTOPRAZOLE SODIUM 40 MG: 40 TABLET, DELAYED RELEASE ORAL at 05:25

## 2023-10-08 NOTE — PROGRESS NOTES
Dann [place]  [id number]  1950   LOS: 7 days   Patient Care Team:  Ariana Lucas APRN as PCP - General (Family Medicine)  Kwesi Rushing MD as Consulting Physician (Cardiology)    Chief Complaint:  NSTEMI / PAF / HTN / T2 DM / CABG x 2 & MAZE III     Subjective     Comfortable up in chair.  Mild incisional pain.  Suboptimal appetite with minimal breakfast intake but no additional GI complaints.  He does note frequent nonproductive cough.  No anginal type chest discomfort, increased tachypnea/dyspnea, nausea, or emesis.  Except for room air oximetry (92%).    Objective     Vital Sign Min/Max for last 24 hours  Temp  Min: 97.7 øF (36.5 øC)  Max: 99.2 øF (37.3 øC)   BP  Min: 81/57  Max: 114/69   Pulse  Min: 68  Max: 91   Resp  Min: 16  Max: 27   SpO2  Min: 89 %  Max: 100 %               10/04/23  0542 10/06/23  0500   Weight: 62.1 kg (137 lb) 62.3 kg (137 lb 6.4 oz)         Intake/Output Summary (Last 24 hours) at 10/8/2023 0833  Last data filed at 10/8/2023 0600  Gross per 24 hour   Intake 1703.86 ml   Output 860 ml   Net 843.86 ml       Physical Exam:     General Appearance:    Alert, cooperative, in no acute distress   Lungs:   Left basilar crackles,respirations regular, even and                unlabored    Heart:    Regular and normal rate, normal S1 and S2, no            murmur, no gallop, no rub, no click   Abdomen:  Extremities:   Soft, nontender, bowel sounds audible x4    No edema, normal range of motion   Pulses:   Pulses palpable and equal bilaterally      Results Review:   Results from last 7 days   Lab Units 10/08/23  0345 10/07/23  0302 10/06/23  2020   SODIUM mmol/L 135* 140 142   POTASSIUM mmol/L 4.7 4.5 4.6   CHLORIDE mmol/L 105 108* 107   CO2 mmol/L 22.0 23.0 22.0   BUN mg/dL 18 16 16   CREATININE mg/dL 1.07 1.27 1.00   GLUCOSE mg/dL 220* 204* 187*   CALCIUM mg/dL 8.7 8.9 9.3     Results from last 7 days   Lab Units 10/08/23  0345 10/07/23  0302 10/06/23  2020   WBC 10*3/mm3 12.19* 9.20  9.37   HEMOGLOBIN g/dL 10.4* 10.3* 11.0*   HEMATOCRIT % 31.8* 30.8* 32.5*   PLATELETS 10*3/mm3 194 167 137*     CXR:    Findings:    NG tube has been removed. Right IJ catheter remains in the SVC. The heart is upper normal size. There is mild remaining bibasilar atelectasis. Interstitial changes elsewhere have cleared. No pneumothorax is seen. Increased upper abdominal bowel gas may \represent an ileus.     IMPRESSION: Mild remaining bibasilar atelectasis. No pneumothorax or other new chest disease is seen. Questionable ileus.    EKG:     Normal sinus rhythm  Septal infarct (cited on or before 28-JUN-2023)  Lateral injury pattern  ** ** ACUTE MI ** **  Abnormal ECG  When compared with ECG of 07-OCT-2023 03:46,  ST elevation now present in Lateral leads  T wave inversion no longer evident in Anterolateral leads    ACCU-CHEKS: 147 - 184 - 215 - 220 - 227 MG/DL.    Medication Review: REVIEWED; NO DRIPS.    Assessment & Plan     Acceptable hemodynamics and GFR remains stable with mild acute blood loss anemia and mild leukocytosis.  Would continue current cardiac medications.  Hopefully chest tubes out soon with transfer to telemetry.      Coronary artery disease    Essential hypertension    Type 2 diabetes mellitus, without long-term current use of insulin    NSTEMI (non-ST elevated myocardial infarction)    10/08/23  08:33 EDT

## 2023-10-08 NOTE — PROGRESS NOTES
"INTENSIVIST   PROGRESS NOTE     Hospital:  LOS: 7 days     Chief Complaint: Postoperative management    Subjective   S     Interval History: No acute issues overnight.  Patient sitting upright in bedside chair during assessment.  Primary nursing in room.  Patient slightly confused today and somewhat tangential but appropriate from a hemodynamic standpoint.  He remains on low-dose norepinephrine infusion.  He reports some chest soreness but denies dyspnea, exertional pain, nausea, vomiting.    The patient's relevant past medical, surgical and social history were reviewed and updated in Epic as appropriate.      Objective   O     Intake/Ouptut 24 hrs (7:00AM - 6:59 AM)  Intake & Output (last 3 days)         10/05 0701  10/06 0700 10/06 0701  10/07 0700 10/07 0701  10/08 0700 10/08 0701  10/09 0700    P.O.   340     I.V. (mL/kg) 234.1 (3.8) 1921.6 (30.8) 1063.9 (17.1) 37 (0.6)    Blood  1946      Other  180      IV Piggyback  1885 300     Total Intake(mL/kg) 234.1 (3.8) 5932.6 (95.2) 1703.9 (27.3) 37 (0.6)    Urine (mL/kg/hr) 650 (0.4) 2775 (1.9) 565 (0.4) 220 (0.6)    Stool        Blood  400      Chest Tube  905 500     Total Output 650 4080 1065 220    Net -415.9 +1852.6 +638.9 -183                  Medications (drips):  dexmedetomidine, Last Rate: Stopped (10/06/23 2000)  dextrose 5 % with KCl 20 mEq, Last Rate: 30 mL/hr (10/06/23 1630)  DOBUTamine  DOPamine  EPINEPHrine  niCARdipine, Last Rate: Stopped (10/06/23 1815)  norepinephrine, Last Rate: 0.07 mcg/kg/min (10/08/23 0800)  phenylephrine    Respiratory Support: 1L    Physical Examination:  Vital Signs: Blood pressure 101/68, pulse 80, temperature 98.6 øF (37 øC), temperature source Oral, resp. rate 22, height 160 cm (63\"), weight 62.3 kg (137 lb 6.4 oz), SpO2 96%.    General: The patient appears in no acute distress.   Chest: Clear to auscultation bilaterally, No wheezing, rhonchi, or rales. Normal work of breathing. Equal chest rise.  Cardiac: Regular rhythm, " normal rate, S1S2 auscultated. No murmurs, rubs or gallops.   Extremities: No lower extremity edema. No clubbing or cyanosis.  Skin: No rashes, open wounds, or bruising. Warm, dry, well-perfused.  Neuro: Motor power grossly intact bilaterally. Sensation intact. Speech fluid and fluent.   Psych: Alert and oriented x2. Mood stable.    Lines, Drains & Airways       Active LDAs       Name Placement date Placement time Site Days    Peripheral IV 10/06/23 0500 Distal;Posterior;Right Forearm 10/06/23  0500  Forearm  1    NG/OG Tube Nasogastric 16 Fr Left nostril 10/06/23  1640  Left nostril  less than 1    Urethral Catheter Non-latex;Straight-tip;Temperature probe 16 Fr. 10/06/23  --  -- 1    Y Chest Tube 1 and 2 1 Mediastinal 32 Fr. 2 Mediastinal 32 Fr. 10/06/23  --  -- 1    Y Chest Tube 3 and 4 3 Mediastinal 32 Fr. 4 Mediastinal 28 Fr. 10/06/23  --  -- 1    Arterial Line 10/06/23 Right Radial 10/06/23  0653  created via procedure documentation  Radial  1    Introducer- Double Lumen 10/06/23 Internal jugular Right 10/06/23  0831  created via procedure documentation  Internal jugular  1        Results from last 7 days   Lab Units 10/08/23  0345 10/07/23  0302 10/06/23  2020   WBC 10*3/mm3 12.19* 9.20 9.37   HEMOGLOBIN g/dL 10.4* 10.3* 11.0*   MCV fL 94.1 91.4 90.8   PLATELETS 10*3/mm3 194 167 137*     Results from last 7 days   Lab Units 10/08/23  0345 10/07/23  0302 10/06/23  2020 10/06/23  1624   SODIUM mmol/L 135* 140 142 143   POTASSIUM mmol/L 4.7 4.5 4.6 3.8   CO2 mmol/L 22.0 23.0 22.0 22.0   CREATININE mg/dL 1.07 1.27 1.00 1.10   GLUCOSE mg/dL 220* 204* 187* 142*   MAGNESIUM mg/dL  --  2.4 2.7* 1.6   PHOSPHORUS mg/dL  --  3.4 3.2 1.8*     Estimated Creatinine Clearance: 56.6 mL/min (by C-G formula based on SCr of 1.07 mg/dL).  Results from last 7 days   Lab Units 10/07/23  0302   ALK PHOS U/L 40   BILIRUBIN mg/dL 1.5*   ALT (SGPT) U/L 10   AST (SGOT) U/L 36     Results from last 7 days   Lab Units 10/07/23  0051  10/06/23  1642 10/06/23  1505 10/06/23  1427 10/06/23  1423   PH, ARTERIAL pH units 7.371 7.526* 7.46 7.46 7.43   PCO2, ARTERIAL mm Hg 38.9 27.2*  --   --   --    PO2 ART mm Hg 139.0* 234.0*  --   --   --    FIO2 % 40 100  --   --   --      CXR (10/07):   Radiology Impression:   Interval extubation. Remaining support hardware projects unchanged. There is no significant pleural effusion or distinct pneumothorax. Scattered atelectasis persists without new focal airspace disease. Unchanged heart and mediastinal contours.     CXR (10/08/2023):   Radiology Impression:   Mild remaining bibasilar atelectasis. No pneumothorax or other new chest disease is seen. Questionable ileus.     Results: Reviewed.  I reviewed the patient's new laboratory and imaging results.  I independently reviewed the patient's new images.    Medications: Reviewed.    Assessment & Plan    A / P     Active Hospital Problems    Diagnosis     **Coronary artery disease     NSTEMI (non-ST elevated myocardial infarction)     Essential hypertension     Type 2 diabetes mellitus, without long-term current use of insulin      Patient Isaías is a 72 year old male with past medical history of coronary disease, type 2 diabetes, hypertension, recent CVA, atrial fibrillation.     Admitted to the ICU status post CABG on 10/06.    Preop PFTs showed nonspecific defect with FEV1 of 77% predicted.  Carotid duplex showed bilateral less than 50% stenosis.     -  s/p coronary bypass graft surgery x2 along with pulmonary vein isolation and atrial clipping.  Course was complicated by intraoperative bleeding requiring multiple blood products and platelets.  Continue to monitor for excessive bleed.  May need Feiba if continues to have bleeding. CT surgery following postop course closely. H/H stable  - Continue aspirin, statin.  Beta-blockers unable to tolerate hemodynamically.  Plavix to be initiated once chest tubes are removed  - Do believe patient is somewhat more  confused morning of 10/08 compared to prior exam.  Slight increase in white count and continued vasopressor use (but decreasing requirement).  He is afebrile.  He notes some dysuria on 10/08; Castaneda removed on 10/07.  CXR personally reviewed and stable.  Low threshold for more aggressive infectious work-up and antibiotic initiation in setting of clinical decompensation  - Questionable ileus appreciated on CXR this morning AM.  Patient has ambulated x2 on 10/08.  No vomiting or abdominal pain.  No stool but possibly passing gas.  Has been refusing stool softeners but convinced to start MiraLAX later today  - Preop PFTs were acceptable.  Extubated overnight (10/06-07)  - Postoperative pain control with Tylenol, opioids  - Monitor urine output and electrolytes closely.  Replace electrolytes per ICU protocol.  - Blood glucose management per unit protocol    F-PO  A-NA  S-NA  T-SQH  H-Head of bed greater than 30 degrees  U-PPI  G-FSBS per unit protocol, correction dose insulin  S-NA  B-Will monitor daily and provide regimen if indicated  I-CVC (10/06)  D-NA    Advance Directives:   Code Status and Medical Interventions:   Ordered at: 10/06/23 6579     Code Status (Patient has no pulse and is not breathing):    CPR (Attempt to Resuscitate)     Medical Interventions (Patient has pulse or is breathing):    Full Support     High level of risk due to severe exacerbation of chronic illness and illness with threat to life or bodily function.    I conducted multidisciplinary rounds in the plan of care was discussed with the multidisciplinary team at that time. In attendance at multidisciplinary rounds was clinical pharmacist, dietitian, nursing staff and case management.    I discussed the patient's findings and my recommendations with patient and nursing staff    -- Brandon Wilson MD  Pulmonary/Critical Care

## 2023-10-08 NOTE — PLAN OF CARE
"Goal Outcome Evaluation:      1. Neuro-  Mr Isaías is oriented x 4 but frequently asks the same question (I.e.  \"Can I pee while laying in the bed\"  RN explains each time that he has an external catheter which allows him to urinate while laying in the bed.\".     He ambulated hallway twice  feet each time.    2. Respiratory-   Room air kept oxygen saturation greater than 95%.    Ct drainage -- CT 1&2= 50 ml  and CT 3&4= 100 ml    3. Cardiac-  Sinus rhythm (HR 75-85 bpm) and SBP  mmHg while weaning Levophed to 0.04 mcg/kg/min.  MAP greater than 65 mmHg.  Albumin 250 ml administered.    4. GI-  Pt had poor appetite.    5. -  500 ml urine output with Bumex administered in a.m.                  "

## 2023-10-08 NOTE — PROGRESS NOTES
"CARDIOTHORACIC AND VASCULAR SURGERY PROGRESS NOTE    OVERNIGHT EVENTS  No acute events overnight    SUBJECTIVE  Pain controlled, denies nausea, vomiting, shortness of breath    OBJECTIVE  BP (!) 88/58 (BP Location: Left arm, Patient Position: Lying)   Pulse 75   Temp 98.7 øF (37.1 øC) (Oral)   Resp 20   Ht 160 cm (63\")   Wt 62.3 kg (137 lb 6.4 oz)   SpO2 94%   BMI 24.34 kg/mý   General no acute distress, pleasant, interactive  Head normocephalic, atraumatic  Eyes clear sclerae  ENT no discharge, neck supple  Mouth mucous membranes moist  Cardiac regular rate rhythm, no murmurs rubs gallops  Vascular warm well perfused x4 extremities  Pulmonary lungs clear to auscultation bilaterally  Abdomen soft nontender nondistended  Lymphatic no edema bilateral lower extremities  Neurological grossly intact  Psychological appropriate  Dermatological no lesions in sun exposed areas  Musculoskeletal normal tone and bulk    WBC   Date Value Ref Range Status   10/08/2023 12.19 (H) 3.40 - 10.80 10*3/mm3 Final     RBC   Date Value Ref Range Status   10/08/2023 3.38 (L) 4.14 - 5.80 10*6/mm3 Final     Hemoglobin   Date Value Ref Range Status   10/08/2023 10.4 (L) 13.0 - 17.7 g/dL Final     Hematocrit   Date Value Ref Range Status   10/08/2023 31.8 (L) 37.5 - 51.0 % Final     MCV   Date Value Ref Range Status   10/08/2023 94.1 79.0 - 97.0 fL Final     MCH   Date Value Ref Range Status   10/08/2023 30.8 26.6 - 33.0 pg Final     MCHC   Date Value Ref Range Status   10/08/2023 32.7 31.5 - 35.7 g/dL Final     RDW   Date Value Ref Range Status   10/08/2023 17.9 (H) 12.3 - 15.4 % Final     RDW-SD   Date Value Ref Range Status   10/08/2023 61.6 (H) 37.0 - 54.0 fl Final     MPV   Date Value Ref Range Status   10/08/2023 11.2 6.0 - 12.0 fL Final     Platelets   Date Value Ref Range Status   10/08/2023 194 140 - 450 10*3/mm3 Final     Neutrophil %   Date Value Ref Range Status   10/07/2023 82.8 (H) 42.7 - 76.0 % Final     Lymphocyte % " "  Date Value Ref Range Status   10/07/2023 7.0 (L) 19.6 - 45.3 % Final     Monocyte %   Date Value Ref Range Status   10/07/2023 9.5 5.0 - 12.0 % Final     Eosinophil %   Date Value Ref Range Status   10/07/2023 0.1 (L) 0.3 - 6.2 % Final     Basophil %   Date Value Ref Range Status   10/07/2023 0.3 0.0 - 1.5 % Final     Immature Grans %   Date Value Ref Range Status   10/07/2023 0.3 0.0 - 0.5 % Final     Neutrophils, Absolute   Date Value Ref Range Status   10/07/2023 7.62 (H) 1.70 - 7.00 10*3/mm3 Final     Lymphocytes, Absolute   Date Value Ref Range Status   10/07/2023 0.64 (L) 0.70 - 3.10 10*3/mm3 Final     Monocytes, Absolute   Date Value Ref Range Status   10/07/2023 0.87 0.10 - 0.90 10*3/mm3 Final     Eosinophils, Absolute   Date Value Ref Range Status   10/07/2023 0.01 0.00 - 0.40 10*3/mm3 Final     Basophils, Absolute   Date Value Ref Range Status   10/07/2023 0.03 0.00 - 0.20 10*3/mm3 Final     Immature Grans, Absolute   Date Value Ref Range Status   10/07/2023 0.03 0.00 - 0.05 10*3/mm3 Final     nRBC   Date Value Ref Range Status   10/07/2023 0.0 0.0 - 0.2 /100 WBC Final       Basic Metabolic Panel    Sodium Sodium   Date Value Ref Range Status   10/08/2023 135 (L) 136 - 145 mmol/L Final   10/07/2023 140 136 - 145 mmol/L Final   10/06/2023 142 136 - 145 mmol/L Final   10/06/2023 143 136 - 145 mmol/L Final      Potassium Potassium   Date Value Ref Range Status   10/08/2023 4.7 3.5 - 5.2 mmol/L Final   10/07/2023 4.5 3.5 - 5.2 mmol/L Final   10/06/2023 4.6 3.5 - 5.2 mmol/L Final   10/06/2023 3.8 3.5 - 5.2 mmol/L Final     Comment:     Slight hemolysis detected by analyzer. Results may be affected.      Chloride Chloride   Date Value Ref Range Status   10/08/2023 105 98 - 107 mmol/L Final   10/07/2023 108 (H) 98 - 107 mmol/L Final   10/06/2023 107 98 - 107 mmol/L Final   10/06/2023 108 (H) 98 - 107 mmol/L Final      Bicarbonate No results found for: \"PLASMABICARB\"   BUN BUN   Date Value Ref Range Status " "  10/08/2023 18 8 - 23 mg/dL Final   10/07/2023 16 8 - 23 mg/dL Final   10/06/2023 16 8 - 23 mg/dL Final   10/06/2023 16 8 - 23 mg/dL Final      Creatinine Creatinine   Date Value Ref Range Status   10/08/2023 1.07 0.76 - 1.27 mg/dL Final   10/07/2023 1.27 0.76 - 1.27 mg/dL Final   10/06/2023 1.00 0.76 - 1.27 mg/dL Final   10/06/2023 1.10 0.76 - 1.27 mg/dL Final      Calcium Calcium   Date Value Ref Range Status   10/08/2023 8.7 8.6 - 10.5 mg/dL Final   10/07/2023 8.9 8.6 - 10.5 mg/dL Final   10/06/2023 9.3 8.6 - 10.5 mg/dL Final   10/06/2023 9.7 8.6 - 10.5 mg/dL Final      Glucose      No components found for: \"GLUCOSE.*\"         Scheduled Meds:acetaminophen, 650 mg, Oral, Q8H  albumin human, 250 mL, Intravenous, Once  aspirin, 325 mg, Oral, Daily  atorvastatin, 80 mg, Oral, Nightly  bumetanide, 1 mg, Intravenous, Once  chlorhexidine, 15 mL, Mouth/Throat, Q12H  insulin lispro, 2-9 Units, Subcutaneous, 4x Daily AC & at Bedtime  metoprolol tartrate, 12.5 mg, Oral, Q12H  pantoprazole, 40 mg, Oral, Q AM  pharmacy consult - [facility], , Does not apply, Daily  senna-docusate sodium, 2 tablet, Oral, BID  sodium chloride, 10 mL, Intravenous, Q12H      Continuous Infusions:dexmedetomidine, 0.2-1.5 mcg/kg/hr, Last Rate: Stopped (10/06/23 2000)  dextrose 5 % with KCl 20 mEq, 30 mL/hr, Last Rate: 30 mL/hr (10/06/23 1630)  DOBUTamine, 2-20 mcg/kg/min  DOPamine, 2-20 mcg/kg/min  EPINEPHrine, 0.02-0.3 mcg/kg/min  niCARdipine, 5-15 mg/hr, Last Rate: Stopped (10/06/23 1815)  norepinephrine, 0.02-0.3 mcg/kg/min, Last Rate: 0.09 mcg/kg/min (10/08/23 0000)  phenylephrine, 0.5-3 mcg/kg/min      PRN Meds:.  acetaminophen **OR** acetaminophen **OR** acetaminophen    ALPRAZolam    aluminum-magnesium hydroxide-simethicone    [DISCONTINUED] senna-docusate sodium **AND** polyethylene glycol **AND** bisacodyl **AND** bisacodyl    bisacodyl    Calcium Replacement - Follow Nurse / BPA Driven Protocol    Chlorhexidine Gluconate Cloth    " dexmedetomidine    dextrose    DOBUTamine    DOPamine    EPINEPHrine    HYDROcodone-acetaminophen    HYDROcodone-acetaminophen    hydrOXYzine    ipratropium-albuterol    Magnesium Cardiology Dose Replacement - Follow Nurse / BPA Driven Protocol    Morphine    Morphine **AND** naloxone    niCARdipine    nitroglycerin    nitroglycerin    norepinephrine    ondansetron    ondansetron **OR** [DISCONTINUED] ondansetron    oxyCODONE    phenylephrine    Phosphorus Replacement - Follow Nurse / BPA Driven Protocol    polyethylene glycol    Potassium Replacement - Follow Nurse / BPA Driven Protocol    sodium chloride    sodium chloride    IMAGING  CXR reviewed, no pneumothorax or effusion appreciable    ASSESSMENT  72-year-old man with history of hypertension and diabetes who presented to [facility] with NSTEMI in the setting of multivessel coronary artery disease who is now status post CABG x2 utilizing bilateral internal mammary arteries as well as encompass clamp posterior left atrial roof and floor radiofrequency ablation and pulmonary vein isolation bilaterally with left atrial appendage clip placement on October 6, 2023, uncomplicated.    On October 8, 2023 postop day 2 the patient was requiring norepinephrine at 0.08 to maintain MAP of 70      Coronary artery disease    Essential hypertension    Type 2 diabetes mellitus, without long-term current use of insulin    NSTEMI (non-ST elevated myocardial infarction)        PLAN  Aspirin 162 mg daily  Statin  We will begin Plavix once chest tubes removed  Continue chest tubes to -20 mmHg suction  Heparin 5000 units every 8 hours subcutaneously  Intermittent pneumatic compression stockings  Ambulation  Goal MAP 65-85  Remain in ICU    Guero Mora M.D., R.P.V.I.  Cardiothoracic and Vascular Surgeon  [facility]    Guero Mora MD  10/08/23  07:13 EDT

## 2023-10-09 LAB
ANION GAP SERPL CALCULATED.3IONS-SCNC: 13 MMOL/L (ref 5–15)
BH BB BLOOD EXPIRATION DATE: NORMAL
BH BB BLOOD EXPIRATION DATE: NORMAL
BH BB BLOOD TYPE BARCODE: 6200
BH BB BLOOD TYPE BARCODE: 6200
BH BB DISPENSE STATUS: NORMAL
BH BB DISPENSE STATUS: NORMAL
BH BB PRODUCT CODE: NORMAL
BH BB PRODUCT CODE: NORMAL
BH BB UNIT NUMBER: NORMAL
BH BB UNIT NUMBER: NORMAL
BUN SERPL-MCNC: 22 MG/DL (ref 8–23)
BUN/CREAT SERPL: 19.8 (ref 7–25)
CALCIUM SPEC-SCNC: 8.6 MG/DL (ref 8.6–10.5)
CHLORIDE SERPL-SCNC: 103 MMOL/L (ref 98–107)
CO2 SERPL-SCNC: 21 MMOL/L (ref 22–29)
CREAT SERPL-MCNC: 1.11 MG/DL (ref 0.76–1.27)
CROSSMATCH INTERPRETATION: NORMAL
CROSSMATCH INTERPRETATION: NORMAL
DEPRECATED RDW RBC AUTO: 60.9 FL (ref 37–54)
EGFRCR SERPLBLD CKD-EPI 2021: 71.4 ML/MIN/1.73
ERYTHROCYTE [DISTWIDTH] IN BLOOD BY AUTOMATED COUNT: 17.2 % (ref 12.3–15.4)
GLUCOSE BLDC GLUCOMTR-MCNC: 227 MG/DL (ref 70–130)
GLUCOSE BLDC GLUCOMTR-MCNC: 243 MG/DL (ref 70–130)
GLUCOSE BLDC GLUCOMTR-MCNC: 252 MG/DL (ref 70–130)
GLUCOSE BLDC GLUCOMTR-MCNC: 294 MG/DL (ref 70–130)
GLUCOSE SERPL-MCNC: 203 MG/DL (ref 65–99)
HCT VFR BLD AUTO: 30.7 % (ref 37.5–51)
HGB BLD-MCNC: 9.9 G/DL (ref 13–17.7)
MCH RBC QN AUTO: 31 PG (ref 26.6–33)
MCHC RBC AUTO-ENTMCNC: 32.2 G/DL (ref 31.5–35.7)
MCV RBC AUTO: 96.2 FL (ref 79–97)
PLATELET # BLD AUTO: 143 10*3/MM3 (ref 140–450)
PMV BLD AUTO: 11.2 FL (ref 6–12)
POTASSIUM SERPL-SCNC: 4.5 MMOL/L (ref 3.5–5.2)
RBC # BLD AUTO: 3.19 10*6/MM3 (ref 4.14–5.8)
SODIUM SERPL-SCNC: 137 MMOL/L (ref 136–145)
UNIT  ABO: NORMAL
UNIT  ABO: NORMAL
UNIT  RH: NORMAL
UNIT  RH: NORMAL
WBC NRBC COR # BLD: 7.68 10*3/MM3 (ref 3.4–10.8)

## 2023-10-09 PROCEDURE — 97168 OT RE-EVAL EST PLAN CARE: CPT

## 2023-10-09 PROCEDURE — 99232 SBSQ HOSP IP/OBS MODERATE 35: CPT | Performed by: INTERNAL MEDICINE

## 2023-10-09 PROCEDURE — 80048 BASIC METABOLIC PNL TOTAL CA: CPT | Performed by: PHYSICIAN ASSISTANT

## 2023-10-09 PROCEDURE — 63710000001 INSULIN LISPRO (HUMAN) PER 5 UNITS: Performed by: INTERNAL MEDICINE

## 2023-10-09 PROCEDURE — 85027 COMPLETE CBC AUTOMATED: CPT | Performed by: PHYSICIAN ASSISTANT

## 2023-10-09 PROCEDURE — 97535 SELF CARE MNGMENT TRAINING: CPT

## 2023-10-09 PROCEDURE — 99233 SBSQ HOSP IP/OBS HIGH 50: CPT | Performed by: INTERNAL MEDICINE

## 2023-10-09 PROCEDURE — 97530 THERAPEUTIC ACTIVITIES: CPT

## 2023-10-09 PROCEDURE — 82948 REAGENT STRIP/BLOOD GLUCOSE: CPT

## 2023-10-09 PROCEDURE — 99024 POSTOP FOLLOW-UP VISIT: CPT | Performed by: STUDENT IN AN ORGANIZED HEALTH CARE EDUCATION/TRAINING PROGRAM

## 2023-10-09 RX ADMIN — ASPIRIN 325 MG: 325 TABLET, COATED ORAL at 08:09

## 2023-10-09 RX ADMIN — INSULIN LISPRO 6 UNITS: 100 INJECTION, SOLUTION INTRAVENOUS; SUBCUTANEOUS at 20:22

## 2023-10-09 RX ADMIN — ATORVASTATIN CALCIUM 80 MG: 40 TABLET, FILM COATED ORAL at 20:22

## 2023-10-09 RX ADMIN — ACETAMINOPHEN 650 MG: 325 TABLET ORAL at 11:07

## 2023-10-09 RX ADMIN — INSULIN LISPRO 6 UNITS: 100 INJECTION, SOLUTION INTRAVENOUS; SUBCUTANEOUS at 17:31

## 2023-10-09 RX ADMIN — INSULIN LISPRO 4 UNITS: 100 INJECTION, SOLUTION INTRAVENOUS; SUBCUTANEOUS at 08:09

## 2023-10-09 RX ADMIN — HYDROCODONE BITARTRATE AND ACETAMINOPHEN 1 TABLET: 5; 325 TABLET ORAL at 20:21

## 2023-10-09 RX ADMIN — Medication 10 ML: at 08:15

## 2023-10-09 RX ADMIN — OXYCODONE HYDROCHLORIDE 10 MG: 10 TABLET ORAL at 22:34

## 2023-10-09 RX ADMIN — Medication 10 ML: at 20:27

## 2023-10-09 RX ADMIN — INSULIN LISPRO 6 UNITS: 100 INJECTION, SOLUTION INTRAVENOUS; SUBCUTANEOUS at 12:36

## 2023-10-09 RX ADMIN — ACETAMINOPHEN 650 MG: 325 TABLET ORAL at 05:13

## 2023-10-09 RX ADMIN — PANTOPRAZOLE SODIUM 40 MG: 40 TABLET, DELAYED RELEASE ORAL at 05:14

## 2023-10-09 RX ADMIN — DOCUSATE SODIUM 50 MG AND SENNOSIDES 8.6 MG 2 TABLET: 8.6; 5 TABLET, FILM COATED ORAL at 08:15

## 2023-10-09 RX ADMIN — HYDROCODONE BITARTRATE AND ACETAMINOPHEN 1 TABLET: 5; 325 TABLET ORAL at 11:07

## 2023-10-09 NOTE — PROGRESS NOTES
"INTENSIVIST   PROGRESS NOTE     Hospital:  LOS: 8 days     Chief Complaint: Postoperative management    Subjective   S     Interval History: No acute issues overnight.  Patient is afebrile and hemodynamically stable.  He is complaining of some lower back and buttock pain that is worse when sitting upright.  Eating/drinking without nausea, vomiting, abdominal pain, fever, chills.    The patient's relevant past medical, surgical and social history were reviewed and updated in Epic as appropriate.      Objective   O     Intake/Ouptut 24 hrs (7:00AM - 6:59 AM)  Intake & Output (last 3 days)         10/06 0701  10/07 0700 10/07 0701  10/08 0700 10/08 0701  10/09 0700 10/09 0701  10/10 0700    P.O.  340 220     I.V. (mL/kg) 1921.6 (30.8) 1063.9 (17.1) 481 (7.7)     Blood 1946       Other 180       IV Piggyback 1885 300      Total Intake(mL/kg) 5932.6 (95.2) 1703.9 (27.3) 701 (11.3)     Urine (mL/kg/hr) 2775 (1.9) 565 (0.4) 1270 (0.8)     Blood 400       Chest Tube 905 500 150     Total Output 4080 1065 1420     Net +1852.6 +638.9 -719             Urine Unmeasured Occurrence   1 x           Medications (drips):  dextrose 5 % with KCl 20 mEq, Last Rate: 30 mL/hr (10/06/23 1630)  niCARdipine, Last Rate: Stopped (10/06/23 1815)  norepinephrine, Last Rate: 0.01 mcg/kg/min (10/09/23 0808)    Respiratory Support: Room air     Physical Examination:  Vital Signs: Blood pressure 101/66, pulse 89, temperature 97.7 øF (36.5 øC), temperature source Oral, resp. rate 16, height 160 cm (63\"), weight 62.3 kg (137 lb 6.4 oz), SpO2 97%.    General: The patient appears in no acute distress.   Chest: Clear to auscultation bilaterally, No wheezing, rhonchi, or rales. Normal work of breathing. Equal chest rise.  Cardiac: Regular rhythm, normal rate, S1S2 auscultated. No murmurs, rubs or gallops.   Extremities: No lower extremity edema. No clubbing or cyanosis.  Skin: No rashes, open wounds, or bruising. Warm, dry, well-perfused.  Neuro: Motor " power grossly intact bilaterally. Sensation intact. Speech fluid and fluent.   Psych: Alert and oriented x3. Mood stable.    Lines, Drains & Airways       Active LDAs       Name Placement date Placement time Site Days    Peripheral IV 10/06/23 0500 Distal;Posterior;Right Forearm 10/06/23  0500  Forearm  1    NG/OG Tube Nasogastric 16 Fr Left nostril 10/06/23  1640  Left nostril  less than 1    Urethral Catheter Non-latex;Straight-tip;Temperature probe 16 Fr. 10/06/23  --  -- 1    Y Chest Tube 1 and 2 1 Mediastinal 32 Fr. 2 Mediastinal 32 Fr. 10/06/23  --  -- 1    Y Chest Tube 3 and 4 3 Mediastinal 32 Fr. 4 Mediastinal 28 Fr. 10/06/23  --  -- 1    Arterial Line 10/06/23 Right Radial 10/06/23  0653  created via procedure documentation  Radial  1    Introducer- Double Lumen 10/06/23 Internal jugular Right 10/06/23  0831  created via procedure documentation  Internal jugular  1        Results from last 7 days   Lab Units 10/09/23  0734 10/08/23  0345 10/07/23  0302   WBC 10*3/mm3 7.68 12.19* 9.20   HEMOGLOBIN g/dL 9.9* 10.4* 10.3*   MCV fL 96.2 94.1 91.4   PLATELETS 10*3/mm3 143 194 167     Results from last 7 days   Lab Units 10/09/23  0508 10/08/23  0345 10/07/23  0302 10/06/23  2020 10/06/23  1624   SODIUM mmol/L 137 135* 140 142 143   POTASSIUM mmol/L 4.5 4.7 4.5 4.6 3.8   CO2 mmol/L 21.0* 22.0 23.0 22.0 22.0   CREATININE mg/dL 1.11 1.07 1.27 1.00 1.10   GLUCOSE mg/dL 203* 220* 204* 187* 142*   MAGNESIUM mg/dL  --   --  2.4 2.7* 1.6   PHOSPHORUS mg/dL  --   --  3.4 3.2 1.8*     Estimated Creatinine Clearance: 54.6 mL/min (by C-G formula based on SCr of 1.11 mg/dL).  Results from last 7 days   Lab Units 10/07/23  0302   ALK PHOS U/L 40   BILIRUBIN mg/dL 1.5*   ALT (SGPT) U/L 10   AST (SGOT) U/L 36     Results from last 7 days   Lab Units 10/07/23  0051 10/06/23  1642 10/06/23  1505 10/06/23  1427 10/06/23  1423   PH, ARTERIAL pH units 7.371 7.526* 7.46 7.46 7.43   PCO2, ARTERIAL mm Hg 38.9 27.2*  --   --   --    PO2  ART mm Hg 139.0* 234.0*  --   --   --    FIO2 % 40 100  --   --   --      CXR (10/07):   Radiology Impression:   Interval extubation. Remaining support hardware projects unchanged. There is no significant pleural effusion or distinct pneumothorax. Scattered atelectasis persists without new focal airspace disease. Unchanged heart and mediastinal contours.     CXR (10/08/2023):   Radiology Impression:   Mild remaining bibasilar atelectasis. No pneumothorax or other new chest disease is seen. Questionable ileus.     Results: Reviewed.  I reviewed the patient's new laboratory and imaging results.  I independently reviewed the patient's new images.    Medications: Reviewed.    Assessment & Plan    A / P     Active Hospital Problems    Diagnosis     **Coronary artery disease     NSTEMI (non-ST elevated myocardial infarction)     Essential hypertension     Type 2 diabetes mellitus, without long-term current use of insulin      Patient Isaías is a 72 year old male with past medical history of coronary disease, type 2 diabetes, hypertension, recent CVA, atrial fibrillation.     Admitted to the ICU status post CABG on 10/06.    Preop PFTs showed nonspecific defect with FEV1 of 77% predicted.  Carotid duplex showed bilateral less than 50% stenosis.     -  s/p coronary bypass graft surgery x2 along with pulmonary vein isolation and atrial clipping.  Course was complicated by intraoperative bleeding requiring multiple blood products and platelets. CT surgery following postop course closely. H/H stable  - Chest tube management per CTS: They remained this morning  - Continue aspirin, statin.  Beta-blockers unable to tolerate hemodynamically.  Plavix to be initiated once chest tubes are removed  - Mentation has improved over the last 24 hours.  WBC downtrending and norepinephrine has continued to decrease.  Patient remains afebrile. He notes some dysuria on 10/08; Castaneda removed on 10/07.  CXR personally reviewed and stable.  Low  threshold for more aggressive infectious work-up and antibiotic initiation in setting of clinical decompensation  - Questionable ileus appreciated on previous CXR.  Patient continues to ambulate.  He denies vomiting or abdominal pain.  No stool but confirmed that he is passing gas.  Had been refusing stool softeners but convinced to start   - Preop PFTs were acceptable.  Extubated overnight (10/06-07)  - Postoperative pain control with Tylenol, opioids  - Monitor urine output and electrolytes closely.  Replace electrolytes per ICU protocol  - Blood glucose management per unit protocol    F-PO  A-NA  S-NA  T-SCD  H-Head of bed greater than 30 degrees  U-PPI  G-FSBS per unit protocol, correction dose insulin  S-NA  B-Will monitor daily and provide regimen if indicated  I-CVC (10/06)  D-NA    Advance Directives:   Code Status and Medical Interventions:   Ordered at: 10/06/23 1623     Code Status (Patient has no pulse and is not breathing):    CPR (Attempt to Resuscitate)     Medical Interventions (Patient has pulse or is breathing):    Full Support     High level of risk due to severe exacerbation of chronic illness and illness with threat to life or bodily function.    I conducted multidisciplinary rounds in the plan of care was discussed with the multidisciplinary team at that time. In attendance at multidisciplinary rounds was clinical pharmacist, dietitian, nursing staff and case management.    I discussed the patient's findings and my recommendations with patient and nursing staff    -- Brandon Wilson MD  Pulmonary/Critical Care

## 2023-10-09 NOTE — PROGRESS NOTES
"                  Clinical Nutrition   Nutrition Support Assessment  Reason for Visit: DACIA MEJIA      Patient Name: Dann Metz  YOB: 1950  MRN: [id number]  Date of Encounter: 10/09/23 09:47 EDT  Admission date: 10/1/2023      Nutrition Assessment   Admission Diagnosis:  Coronary artery disease [I25.10]      Problem List:    Coronary artery disease    Essential hypertension    Type 2 diabetes mellitus, without long-term current use of insulin    NSTEMI (non-ST elevated myocardial infarction)      PMH:   He  has a past medical history of Coronary artery disease, Diabetes mellitus, Hypertension, and Stroke.    PSH:  He  has a past surgical history that includes Cardiac catheterization; Cardiac catheterization (N/A, 9/30/2023); and Coronary artery bypass graft (N/A, 10/6/2023).    Applicable Nutrition Concerns:   Skin:  Oral:  GI:    Applicable Interval History:   (10/6) s/p CABG, LAAL, intubated;  CXR - questionable ileus  (10/7) extubated, advanced to solid foods      Reported/Observed/Food/Nutrition Related History:   This morning RN reports mental status is improved, weaning levophed (0.01 mcg/kg/min currently), patient constipated and stool softener given. This afternoon RD visited with patient. Patient just finished walking. Agreeable to try Premier Protein drinks. RN reports patient ate 50% at breakfast and 75% at lunch today. No sausage d/t heart burn.      Anthropometrics     Flowsheet Rows      Flowsheet Row First Filed Value   Admission Height 160 cm (63\") Documented at 10/02/2023 1722   Admission Weight 63.3 kg (139 lb 9.6 oz) Documented at 10/01/2023 0405       Height: Height: 160 cm (63\")  Last Filed Weight: Weight: 62.3 kg (137 lb 6.4 oz) (10/06/23 0500)  Method: Weight Method: Standing scale  BMI: BMI (Calculated): 24.3  BMI classification: Normal: 18.5-24.9kg/m2  IBW:  124 lbs      Nutrition Focused Physical Exam     Date:  10/9  Unable to perform exam due to: Defer pending " indication    Current Nutrition Prescription   PO: Diet: Cardiac Diets, Diabetic Diets; Healthy Heart (2-3 Na+); Consistent Carbohydrate; Texture: Soft to Chew (NDD 3); Soft to Chew: Whole Meat; Fluid Consistency: Thin (IDDSI 0)  Oral Nutrition Supplement: N/A  Intake: 65% x 13 meals (10/1-10/5).  40% x 5 meals (10/7-present)      Nutrition Diagnosis     Date:              Updated:    Problem Inadequate oral intake   Etiology Clinical condition   Signs/Symptoms PO intake: 40% x 5 meals since CABG (10/6)   Status:     Goal:   General: Nutrition to support treatment  PO: Increase intake  EN/PN: N/A    Nutrition Intervention      Follow treatment progress, Care plan reviewed, Interview for preferences, Encourage intake, Supplement provided    -Ordered Premier Protein 2x daily.    Monitoring/Evaluation:   Per protocol, I&O, PO intake, Supplement intake, Pertinent labs, Weight, Skin status      Nena Walden RD  Time Spent: 20 min

## 2023-10-09 NOTE — PROGRESS NOTES
[facility] Cardiology  Inpatient Progress Note      Chief Complaint/Reason for consult:  CAD, NSTEMI         Subjective  Sitting up in chair, pain well controlled, no dyspnea on RA, remains on 0.01 of NE       Vital Sign Min/Max for last 24 hours  Temp  Min: 97.6 øF (36.4 øC)  Max: 98.1 øF (36.7 øC)   BP  Min: 89/59  Max: 126/78   Pulse  Min: 79  Max: 96   Resp  Min: 16  Max: 22   SpO2  Min: 94 %  Max: 99 %   No data recorded      Intake/Output Summary (Last 24 hours) at 10/9/2023 0909  Last data filed at 10/9/2023 0800  Gross per 24 hour   Intake 701 ml   Output 1480 ml   Net -779 ml           Gen: well developed, sitting up in chair, comfortable appearing  HEENT: MMM, sclerae anicteric, conjunctivae normal, R IJ CVC  CV: regular rate, regular rhythm, no murmurs or rubs, normal S1, S2. 2+ radial and DP pulses, mediastinal drain in place  Pulm: RA, normal work of breathing, decreased movement at bases without wheezes  Abd: soft, nontender, nondistended  Ext: normal bulk for age, normal tone, no dependent edema  Neuro: alert, oriented, face symmetrical, moving all extremities well  Psych: normal mood, appropriate affect    Tele:  SR, no arrhythmia overnight    Results Review (reviewed the patient's recent labs in the electronic medical record):     EK23 - NSR, anteroseptal infarct, inferolateral ischemia     23 - LHC  Angiographic Findings:  Coronary circulation is left dominant  Left main: The left main coronary artery divides into the left anterior descending and circumflex coronary arteries. The left main coronary artery has no significant disease.  LAD: The left anterior descending gives rise to the diagonal branches as well as multiple septal perforators before terminating as an apical recurrent branch. The proximal LAD has an 80-90 % stenosis. The distal LAD has an 80-90% stenosis. The first diagonal branch of the left anterior descending is subtotally occluded.  LCX: The  circumflex coronary artery is a dominant vessel giving rise to the obtuse marginal branches, posterolateral left ventricular branches and posterior descending artery. The mid circumflex is subtotally occluded. The largest obtuse marginal branch is subtotally occluded. The distal AV groove vessel is occluded.  RCA: The right coronary artery is a small caliber nondominant vessel. The proximal RCA is chronically occluded.     10/2/23 - Transthoracic Echo Complete W/ Cont if Necessary Per Protocol    Left ventricular systolic function is low normal. Calculated left ventricular EF = 50.7% Normal left ventricular cavity size noted. Left ventricular wall thickness is consistent with mild concentric hypertrophy. Left ventricular diastolic function is consistent with (grade I) impaired relaxation.    Normal right ventricular cavity size, wall thickness, systolic function and septal motion noted.    Left atrial volume is mildly increased    The aortic valve appears trileaflet. Trace aortic valve regurgitation is present. No hemodynamically significant aortic valve stenosis is present.    Mitral annular calcification is present. Mild mitral valve regurgitation is present. No significant mitral valve stenosis is present.    The tricuspid valve is grossly normal in structure. Mild to moderate tricuspid valve regurgitation is present. Estimated right ventricular systolic pressure from tricuspid regurgitation is mildly elevated (35-45 mmHg).    Aortic root = 3.8 cm     Radiology: XR Chest 1 View    Result Date: 10/8/2023  Impression: Mild remaining bibasilar atelectasis. No pneumothorax or other new chest disease is seen. Questionable ileus. Electronically Signed: Robson Monteiro MD  10/8/2023 8:08 AM EDT  Workstation ID: [id number]       Lab Results   Component Value Date    WBC 7.68 10/09/2023    HGB 9.9 (L) 10/09/2023    HCT 30.7 (L) 10/09/2023    MCV 96.2 10/09/2023     10/09/2023     Lab Results   Component Value Date     GLUCOSE 203 (H) 10/09/2023    CALCIUM 8.6 10/09/2023     10/09/2023    K 4.5 10/09/2023    CO2 21.0 (L) 10/09/2023     10/09/2023    BUN 22 10/09/2023    CREATININE 1.11 10/09/2023    BCR 19.8 10/09/2023    ANIONGAP 13.0 10/09/2023     Lab Results   Component Value Date    TROPONINT 785 (C) 09/30/2023    TROPONINT 642 (C) 09/30/2023      Lab Results   Component Value Date    HGBA1C 7.10 (H) 09/30/2023      Lab Results   Component Value Date    CHOL 149 09/30/2023    CHLPL 112 08/28/2023    TRIG 194 (H) 09/30/2023    HDL 39 (L) 09/30/2023    LDL 77 09/30/2023      Assessment     CAD s/p CABG (LIMA-BRANDON-LAD, LIMA-GSV-OM2) 10/6, Dr. Mora  NSTEMI, type II due to fixed CAD   - Routine post-operative management per CTS   - aspirin, statin   - beta blocker when BP allows    PAF   - SR currently, 10/6 with PVI and posterior wall ablation with Atricure encompass clamp, SCOTT ligation (40mm Atricure Clip)   - no AC in post-operative period, post procedure DIANNA notes SCOTT appears excluded    Hx DM   - holding home metformin   - holding empagliflozin, lisinopril in perioperative period    PRANEETH Del Rio MD  10/9/2023  09:09 EDT

## 2023-10-09 NOTE — THERAPY TREATMENT NOTE
Patient Name: Dann Metz  : 1950    MRN: [id number]                              Today's Date: 10/9/2023       Admit Date: 10/1/2023    Visit Dx:     ICD-10-CM ICD-9-CM   1. Coronary artery disease involving native coronary artery of native heart with unstable angina pectoris  I25.110 414.01     411.1     Patient Active Problem List   Diagnosis    Acute CVA (cerebrovascular accident)    Essential hypertension    Type 2 diabetes mellitus, without long-term current use of insulin    NSTEMI (non-ST elevated myocardial infarction)    Type 1 myocardial infarction    Coronary artery disease    Vitamin D deficiency    RUE weakness     Past Medical History:   Diagnosis Date    Coronary artery disease     Diabetes mellitus     Hypertension     Stroke      Past Surgical History:   Procedure Laterality Date    CARDIAC CATHETERIZATION      CARDIAC CATHETERIZATION N/A 2023    Procedure: Coronary angiography;  Surgeon: Vince Redding MD;  Location: [facility] CATH INVASIVE LOCATION;  Service: Cardiology;  Laterality: N/A;    CORONARY ARTERY BYPASS GRAFT N/A 10/6/2023    Procedure: MEDIAN STERNOTOMY, CORONARY ARTERY BYPASS GRAFTING X 2 UTILIZING THE LEFT AND RIGHT INTERNAL MAMMARY ARTERIES, ENDOSCOPIC VEIN HARVESTING OF THE RIGHT GREATER SAPHENOUS VEIN, EXPLORATION OF THE LEFT LEG, PULMONARY VEIN ISOLATION, POSTERIOR LEFT ATRIAL WALL ABLATION (ROOF AND FLOOR), LEFT ATRIAL APPENDAGE LIGATION, TRANSESOPHAGEAL ECHOCARDIOGRAM PER ANESTHESIA;  Surgeon: Guero Mora M      General Information       Row Name 10/09/23 1457          Physical Therapy Time and Intention    Document Type therapy note (daily note)  -KG     Mode of Treatment physical therapy  -KG       Row Name 10/09/23 1457          General Information    Existing Precautions/Restrictions cardiac;fall;oxygen therapy device and L/min;sternal;other (see comments)  confusion  -KG     Barriers to Rehab medically complex;previous functional deficit;cognitive  status  -KG       Row Name 10/09/23 1457          Cognition    Orientation Status (Cognition) oriented to;person;place  -KG       Row Name 10/09/23 1457          Safety Issues, Functional Mobility    Safety Issues Affecting Function (Mobility) awareness of need for assistance;insight into deficits/self-awareness;safety precaution awareness;safety precautions follow-through/compliance;sequencing abilities  -KG     Impairments Affecting Function (Mobility) balance;cognition;coordination;endurance/activity tolerance;postural/trunk control;shortness of breath;strength;pain  -KG     Cognitive Impairments, Mobility Safety/Performance attention;awareness, need for assistance;insight into deficits/self-awareness;safety precaution awareness;safety precaution follow-through;sequencing abilities  -KG               User Key  (r) = Recorded By, (t) = Taken By, (c) = Cosigned By      Initials Name Provider Type    KG Myra Buck, PT Physical Therapist                   Mobility       Row Name 10/09/23 1458          Bed Mobility    Comment, (Bed Mobility) UIC  -KG       Row Name 10/09/23 1458          Transfers    Comment, (Transfers) VC's for sequencing and safe hand placement to maintain sternal precautions.  -KG       Row Name 10/09/23 1458          Sit-Stand Transfer    Sit-Stand Hood (Transfers) minimum assist (75% patient effort);2 person assist;verbal cues  -KG       Row Name 10/09/23 1458          Gait/Stairs (Locomotion)    Hood Level (Gait) minimum assist (75% patient effort);1 person assist;1 person to manage equipment;verbal cues  chair follow  -KG     Assistive Device (Gait) other (see comments)  B UE support on tripod monitor  -KG     Distance in Feet (Gait) 100  -KG     Deviations/Abnormal Patterns (Gait) bilateral deviations;base of support, narrow;theo decreased;festinating/shuffling;stride length decreased  -KG     Bilateral Gait Deviations forward flexed posture;heel strike decreased   -KG     Comment, (Gait/Stairs) Pt demonstrated step to gait pattern with slow theo and short, shuffled steps. Max cueing for upright posture. Pt with mild to moderate instability throughout ambulation. Ambulation distance limited by fatigue and generalized weakness.  -KG               User Key  (r) = Recorded By, (t) = Taken By, (c) = Cosigned By      Initials Name Provider Type    KG Myra Buck, PT Physical Therapist                   Obj/Interventions       Row Name 10/09/23 1500          Balance    Balance Assessment sitting static balance;standing static balance;standing dynamic balance  -KG     Static Sitting Balance contact guard  -KG     Position, Sitting Balance unsupported;sitting in chair  -KG     Static Standing Balance minimal assist  -KG     Dynamic Standing Balance minimal assist;2-person assist  -KG     Position/Device Used, Standing Balance supported  -KG               User Key  (r) = Recorded By, (t) = Taken By, (c) = Cosigned By      Initials Name Provider Type    KG Myra Buck, PT Physical Therapist                   Goals/Plan    No documentation.                  Clinical Impression       Row Name 10/09/23 1501          Pain    Additional Documentation Pain Scale: FACES Pre/Post-Treatment (Group)  -KG       Row Name 10/09/23 1501          Pain Scale: FACES Pre/Post-Treatment    Pain: FACES Scale, Pretreatment 2-->hurts little bit  -KG     Posttreatment Pain Rating 4-->hurts little more  -KG     Pain Location incisional  -KG     Pain Location - chest  -KG       Row Name 10/09/23 1501          Plan of Care Review    Plan of Care Reviewed With patient  -KG     Progress improving  -KG     Outcome Evaluation Pt increased ambulation distance to 100ft with Mylene 1+1 and B UE support on tripod monitor. Chair follow for safety. Pt demonstrated slow theo and short, shuffled steps. Max cueing for upright posture. Pt continues to be limited by confusion and generalized weakness.  Continue to recommend PT skilled care and D/C to SNF.  -KG       Row Name 10/09/23 1501          Vital Signs    Pre Systolic BP Rehab 102  -KG     Pre Treatment Diastolic BP 63  -KG     Post Systolic BP Rehab 105  -KG     Post Treatment Diastolic BP 72  -KG     Pretreatment Heart Rate (beats/min) 86  -KG     Posttreatment Heart Rate (beats/min) 95  -KG     Pre SpO2 (%) 98  -KG     O2 Delivery Pre Treatment room air  -KG     Post SpO2 (%) 93  -KG     O2 Delivery Post Treatment supplemental O2  -KG     Pre Patient Position Sitting  -KG     Intra Patient Position Standing  -KG     Post Patient Position Sitting  -KG       Row Name 10/09/23 1501          Positioning and Restraints    Pre-Treatment Position sitting in chair/recliner  -KG     Post Treatment Position chair  -KG     In Chair sitting;call light within reach;encouraged to call for assist;with OT  -KG               User Key  (r) = Recorded By, (t) = Taken By, (c) = Cosigned By      Initials Name Provider Type    KG Myra Buck, PT Physical Therapist                   Outcome Measures       Row Name 10/09/23 1504          How much help from another person do you currently need...    Turning from your back to your side while in flat bed without using bedrails? 2  -KG     Moving from lying on back to sitting on the side of a flat bed without bedrails? 2  -KG     Moving to and from a bed to a chair (including a wheelchair)? 2  -KG     Standing up from a chair using your arms (e.g., wheelchair, bedside chair)? 2  -KG     Climbing 3-5 steps with a railing? 1  -KG     To walk in hospital room? 2  -KG     AM-PAC 6 Clicks Score (PT) 11  -KG     Highest level of mobility 4 --> Transferred to chair/commode  -KG       Row Name 10/09/23 1504 10/09/23 1154       Functional Assessment    Outcome Measure Options AM-PAC 6 Clicks Basic Mobility (PT)  -KG AM-PAC 6 Clicks Daily Activity (OT)  -AN              User Key  (r) = Recorded By, (t) = Taken By, (c) = Cosigned By       Initials Name Provider Type    KG Myra Buck, PT Physical Therapist    Ninoska Mancini, OT Occupational Therapist                                 Physical Therapy Education       Title: PT OT SLP Therapies (In Progress)       Topic: Physical Therapy (In Progress)       Point: Mobility training (In Progress)       Learning Progress Summary             Patient Acceptance, E, NR by KG at 10/9/2023 1046    Acceptance, E, NR by KG at 10/7/2023 0858    Acceptance, E,TB, VU,NR by  at 10/5/2023 1550    Acceptance, E,TB, VU,NR by HM at 10/3/2023 1548                         Point: Home exercise program (In Progress)       Learning Progress Summary             Patient Acceptance, E, NR by KG at 10/9/2023 1046    Acceptance, E, NR by KG at 10/7/2023 0858    Acceptance, E,TB, VU,NR by  at 10/5/2023 1550                         Point: Body mechanics (In Progress)       Learning Progress Summary             Patient Acceptance, E, NR by KG at 10/9/2023 1046    Acceptance, E, NR by KG at 10/7/2023 0858    Acceptance, E,TB, VU,NR by HM at 10/5/2023 1550    Acceptance, E,TB, VU,NR by HM at 10/3/2023 1548                         Point: Precautions (In Progress)       Learning Progress Summary             Patient Acceptance, E, NR by KG at 10/9/2023 1046    Acceptance, E, NR by KG at 10/7/2023 0858    Acceptance, E,TB, VU,NR by  at 10/5/2023 1550    Acceptance, E,TB, VU,NR by  at 10/3/2023 1548                                         User Key       Initials Effective Dates Name Provider Type Discipline     05/22/20 -  Myra Buck, PT Physical Therapist PT     09/22/22 -  Petra Astudillo PT Physical Therapist PT                  PT Recommendation and Plan  Planned Therapy Interventions (PT): balance training, bed mobility training, gait training, strengthening, transfer training  Plan of Care Reviewed With: patient  Progress: improving  Outcome Evaluation: Pt increased ambulation distance to  100ft with Mylene 1+1 and B UE support on tripod monitor. Chair follow for safety. Pt demonstrated slow theo and short, shuffled steps. Max cueing for upright posture. Pt continues to be limited by confusion and generalized weakness. Continue to recommend PT skilled care and D/C to SNF.     Time Calculation:         PT Charges       Row Name 10/09/23 1046             Time Calculation    Start Time 1046  -KG      PT Received On 10/09/23  -KG      PT Goal Re-Cert Due Date 10/17/23  -KG         Time Calculation- PT    Total Timed Code Minutes- PT 14 minute(s)  -KG         Timed Charges    51049 - PT Therapeutic Activity Minutes 14  -KG         Total Minutes    Timed Charges Total Minutes 14  -KG       Total Minutes 14  -KG                User Key  (r) = Recorded By, (t) = Taken By, (c) = Cosigned By      Initials Name Provider Type    Myra Andrews, PT Physical Therapist                  Therapy Charges for Today       Code Description Service Date Service Provider Modifiers Qty    37503093822 HC PT THERAPEUTIC ACT EA 15 MIN 10/9/2023 Myra Buck, PT GP 1            PT G-Codes  Outcome Measure Options: AM-PAC 6 Clicks Basic Mobility (PT)  AM-PAC 6 Clicks Score (PT): 11  AM-PAC 6 Clicks Score (OT): 14  PT Discharge Summary  Anticipated Discharge Disposition (PT): skilled nursing facility    Elis Buck PT  10/9/2023

## 2023-10-09 NOTE — PROGRESS NOTES
"Enter Query Response Below      Query Response: defibrination syndrome              If applicable, please update the problem list.   Patient: Dann Metz        : 1950  Account: [id number]           Admit Date:         How to Respond to this query:       a. Click New Note     b. Answer query within the yellow box.                c. Update the Problem List, if applicable.      If you have any questions about this query contact me at: [email or web address]     :     Patient s/p Cabg x 2, bilateral pulmonary vein isolation, posterior left atrial wall ablation and SCOTT clipping on 10/6. TOTAL CARDIOPULMONARY BYPASS TIME: 136 minutes   Post op labs H&H 11.1/32.9, Platelets 126, PTT 32.8, PT 20.1, INR 1.70, Fibrinogen 178, FSP negative.  Patient received 4 units PRBC's and 2 pack of platelets in OR.  10/6 Per Intensivist progress note \"Blood oozing noted from mediastinal chest tubes and will be monitored closely.\"    Please clarify if patient treated/monitored for the following:    - Defibrination syndrome.  - Thrombocytopenia only.  - Other___________.  - Unable to determine.    By submitting this query, we are merely seeking further clarification of documentation to accurately reflect all conditions that you are monitoring, evaluating, treating or that extend the hospitalization or utilize additional resources of care. Please utilize your independent clinical judgment when addressing the question(s) above.     This query and your response, once completed, will be entered into the legal medical record.    Sincerely,  Elissa Iyer RN  Clinical Documentation Integrity Program       "

## 2023-10-09 NOTE — PROGRESS NOTES
Order received for Phase II Cardiac Rehab. Patient was asleep at time of Cardiac Rehab consult. Staff will follow up once patient is transferred to telemetry.

## 2023-10-09 NOTE — CASE MANAGEMENT/SOCIAL WORK
Continued Stay Note  [facility]     Patient Name: Dann Metz  MRN: [id number]  Today's Date: 10/9/2023    Admit Date: 10/1/2023    Plan: Ongoing   Discharge Plan       Row Name 10/09/23 1326       Plan    Plan Ongoing    Patient/Family in Agreement with Plan other (see comments)    Plan Comments Pt was discussed in Medical Rounds today. Pt has 4 Chest tubes in place and ambulated 200 feet last evening. Pt has not had a bowel movement yet. RN is weaning pt's IV Levophed. PT/OT are to see pt today. D/C plan is ongoing.  will continue to follow    Final Discharge Disposition Code 30 - still a patient                   Discharge Codes    No documentation.                 Expected Discharge Date and Time       Expected Discharge Date Expected Discharge Time    Oct 13, 2023               Tona Zavala RN

## 2023-10-09 NOTE — PROGRESS NOTES
Cardiothoracic Surgery Progress Note      POD # 3 s/p CABG x 2     LOS: 8 days      Subjective:  On levophed  Up in chair  no complaints    Objective:  Vital Signs  Temp:  [97.6 øF (36.4 øC)-98.6 øF (37 øC)] 97.7 øF (36.5 øC)  Heart Rate:  [74-96] 84  Resp:  [16-22] 16  BP: ()/(58-91) 101/65    Physical Exam:   General Appearance: alert, appears stated age and cooperative   Lungs: clear to auscultation, respirations regular, respirations even, and respirations unlabored   Heart: regular rhythm & normal rate, normal S1, S2, no murmur, no gallop, no rub, and no click   Skin: Incision c/d/i    Output by Drain (mL) 10/08/23 0701 - 10/08/23 1900 10/08/23 1901 - 10/09/23 0700 10/09/23 0701 - 10/09/23 0710 Range Total   Y Chest Tube 1 and 2 1 Mediastinal 32 Fr. 2 Mediastinal 32 Fr. 50   50   Y Chest Tube 3 and 4 3 Mediastinal 32 Fr. 4 Mediastinal 28 Fr. 100   100        Results:    Results from last 7 days   Lab Units 10/08/23  0345   WBC 10*3/mm3 12.19*   HEMOGLOBIN g/dL 10.4*   HEMATOCRIT % 31.8*   PLATELETS 10*3/mm3 194     Results from last 7 days   Lab Units 10/09/23  0508   SODIUM mmol/L 137   POTASSIUM mmol/L 4.5   CHLORIDE mmol/L 103   CO2 mmol/L 21.0*   BUN mg/dL 22   CREATININE mg/dL 1.11   GLUCOSE mg/dL 203*   CALCIUM mg/dL 8.6       Assessment:  72-year-old man with history of hypertension and diabetes who presented to [facility] with NSTEMI in the setting of multivessel coronary artery disease who is now status post CABG x2 utilizing bilateral internal mammary arteries as well as encompass clamp posterior left atrial roof and floor radiofrequency ablation and pulmonary vein isolation bilaterally with left atrial appendage clip placement on October 6, 2023, uncomplicated.     On October 8, 2023 postop day 2 the patient was requiring norepinephrine at 0.08 to maintain MAP of 70    Plan:  aspirin 162  Statin  wean Levo to goal MAP 65  SQH, SCDs,   follow up CVP    Lindsey Dillard,  DONNA  10/09/23  07:09 EDT

## 2023-10-09 NOTE — PLAN OF CARE
Goal Outcome Evaluation:  Plan of Care Reviewed With: patient        Progress: no change  Outcome Evaluation: OT re-eval completed s/p CABG x 2. Pt continues to present below her functional baseline with deficits including generalized weakness, impaired balance, ADLs and mobility warranting skilled OT services. Pt ambulated <household distance within the room with Min A + 1 for equipment management. Pt required frequent cues to redirection and environmental navigation. Rec changed to SNF at dc.      Anticipated Discharge Disposition (OT): skilled nursing facility

## 2023-10-09 NOTE — THERAPY RE-EVALUATION
Patient Name: Dann Metz  : 1950    MRN: [id number]                              Today's Date: 10/9/2023       Admit Date: 10/1/2023    Visit Dx:     ICD-10-CM ICD-9-CM   1. Coronary artery disease involving native coronary artery of native heart with unstable angina pectoris  I25.110 414.01     411.1     Patient Active Problem List   Diagnosis    Acute CVA (cerebrovascular accident)    Essential hypertension    Type 2 diabetes mellitus, without long-term current use of insulin    NSTEMI (non-ST elevated myocardial infarction)    Type 1 myocardial infarction    Coronary artery disease    Vitamin D deficiency    RUE weakness     Past Medical History:   Diagnosis Date    Coronary artery disease     Diabetes mellitus     Hypertension     Stroke      Past Surgical History:   Procedure Laterality Date    CARDIAC CATHETERIZATION      CARDIAC CATHETERIZATION N/A 2023    Procedure: Coronary angiography;  Surgeon: Vince Redding MD;  Location: [facility] CATH INVASIVE LOCATION;  Service: Cardiology;  Laterality: N/A;    CORONARY ARTERY BYPASS GRAFT N/A 10/6/2023    Procedure: MEDIAN STERNOTOMY, CORONARY ARTERY BYPASS GRAFTING X 2 UTILIZING THE LEFT AND RIGHT INTERNAL MAMMARY ARTERIES, ENDOSCOPIC VEIN HARVESTING OF THE RIGHT GREATER SAPHENOUS VEIN, EXPLORATION OF THE LEFT LEG, PULMONARY VEIN ISOLATION, POSTERIOR LEFT ATRIAL WALL ABLATION (ROOF AND FLOOR), LEFT ATRIAL APPENDAGE LIGATION, TRANSESOPHAGEAL ECHOCARDIOGRAM PER ANESTHESIA;  Surgeon: Guero Mora M      General Information       Row Name 10/09/23 1135          OT Time and Intention    Document Type re-evaluation  -AN     Mode of Treatment occupational therapy  -AN       Row Name 10/09/23 1135          General Information    Patient Profile Reviewed yes  -AN     Prior Level of Function independent:;all household mobility;community mobility;gait;ADL's  -AN     Existing Precautions/Restrictions cardiac;fall;oxygen therapy device and  L/min;sternal;other (see comments)  CTx2, remote CVA with residual R sided weakness  -AN     Barriers to Rehab medically complex;previous functional deficit;cognitive status  -AN       Row Name 10/09/23 1135          Living Environment    People in Home significant other  -AN       Row Name 10/09/23 1135          Home Main Entrance    Number of Stairs, Main Entrance five  -AN     Stair Railings, Main Entrance railing on left side (ascending)  -AN       Row Name 10/09/23 1135          Stairs Within Home, Primary    Stairs, Within Home, Primary pt plans to stay on the 1st level apartment  -AN       Row Name 10/09/23 1135          Cognition    Orientation Status (Cognition) oriented to;person;place  -AN       Row Name 10/09/23 1135          Safety Issues, Functional Mobility    Safety Issues Affecting Function (Mobility) safety precaution awareness;safety precautions follow-through/compliance;judgment;sequencing abilities;insight into deficits/self-awareness;awareness of need for assistance;problem-solving  -AN     Impairments Affecting Function (Mobility) balance;cognition;coordination;endurance/activity tolerance;postural/trunk control;pain;shortness of breath;strength  -AN     Cognitive Impairments, Mobility Safety/Performance awareness, need for assistance;safety precaution follow-through;sequencing abilities;insight into deficits/self-awareness;judgment;problem-solving/reasoning;safety precaution awareness  -AN               User Key  (r) = Recorded By, (t) = Taken By, (c) = Cosigned By      Initials Name Provider Type    Ninoska Mancini OT Occupational Therapist                     Mobility/ADL's       Row Name 10/09/23 1138          Bed Mobility    Bed Mobility sit-supine;scooting/bridging  -AN     Scooting/Bridging [place] (Bed Mobility) contact guard;verbal cues  -AN     Sit-Supine [place] (Bed Mobility) moderate assist (50% patient effort);2 person assist;verbal cues;nonverbal cues  (demo/gesture)  -AN     Bed Mobility, Safety Issues decreased use of arms for pushing/pulling;impaired trunk control for bed mobility  -AN     Assistive Device (Bed Mobility) draw sheet  -AN     Comment, (Bed Mobility) pt performed sit to supine with cues to maintain sternal precautions throughout  -AN       Row Name 10/09/23 1138          Transfers    Transfers sit-stand transfer;stand-sit transfer;bed-chair transfer  -AN     Comment, (Transfers) STS performed from chair for toileting tasks and in order to return back to bed with cues for body mechanics and to maintain sternal precautions  -AN       Row Name 10/09/23 1138          Bed-Chair Transfer    Bed-Chair [place] (Transfers) verbal cues;nonverbal cues (demo/gesture);minimum assist (75% patient effort);1 person assist  -AN     Assistive Device (Bed-Chair Transfers) other (see comments)  UE support on telemonitor  -AN       Row Name 10/09/23 1138          Sit-Stand Transfer    Sit-Stand [place] (Transfers) verbal cues;nonverbal cues (demo/gesture);minimum assist (75% patient effort);2 person assist  -AN       Row Name 10/09/23 1138          Stand-Sit Transfer    Stand-Sit [place] (Transfers) verbal cues;nonverbal cues (demo/gesture);minimum assist (75% patient effort);2 person assist  -AN       Row Name 10/09/23 1138          Functional Mobility    Functional Mobility- Ind. Level minimum assist (75% patient effort);1 person + 1 person to manage equipment;verbal cues required;nonverbal cues required (demo/gesture)  -AN     Functional Mobility- Device other (see comments)  UE on telemonitor  -AN     Functional Mobility-Distance (Feet) --  <household distance  -AN     Functional Mobility- Safety Issues sequencing ability decreased;step length decreased;supplemental O2  -AN     Functional Mobility- Comment Pt ambulated short distance from bed to chair with UE on telemonitor with Min A + 1 for equipment management.  -AN       Row Name 10/09/23 1138           Activities of Daily Living    BADL Assessment/Intervention lower body dressing;toileting  -AN       Row Name 10/09/23 1138          Toileting Assessment/Training    [place] Level (Toileting) adjust/manage clothing;perform perineal hygiene;minimum assist (75% patient effort)  -AN     Assistive Devices (Toileting) urinal  -AN     Position (Toileting) supported standing  -AN       Row Name 10/09/23 1138          Lower Body Dressing Assessment/Training    [place] Level (Lower Body Dressing) socks;maximum assist (25% patient effort);don;doff  -AN     Position (Lower Body Dressing) supported sitting  -AN               User Key  (r) = Recorded By, (t) = Taken By, (c) = Cosigned By      Initials Name Provider Type    Ninoska Mancini OT Occupational Therapist                   Obj/Interventions       Row Name 10/09/23 1143          Sensory Assessment (Somatosensory)    Sensory Assessment (Somatosensory) UE sensation intact  -AN       [facility] Name 10/09/23 1143          Vision Assessment/Intervention    Visual Impairment/Limitations WFL  -AN       [facility] Name 10/09/23 1143          Range of Motion Comprehensive    General Range of Motion no range of motion deficits identified  -AN       Row Name 10/09/23 1143          Strength Comprehensive (MMT)    General Manual Muscle Testing (MMT) Assessment upper extremity strength deficits identified  -AN     Comment, General Manual Muscle Testing (MMT) Assessment BUE grossly 3+/5 observed functionally  -AN       [facility] Name 10/09/23 1143          Balance    Balance Assessment sitting static balance;sitting dynamic balance;sit to stand dynamic balance;standing static balance;standing dynamic balance  -AN     Static Sitting Balance standby assist  -AN     Dynamic Sitting Balance contact guard  -AN     Position, Sitting Balance sitting in chair;sitting edge of bed  -AN     Sit to Stand Dynamic Balance verbal cues;minimal assist;2-person assist  -AN     Static Standing  Balance verbal cues;non-verbal cues (demo/gesture);minimal assist;2-person assist  -AN     Dynamic Standing Balance verbal cues;minimal assist;1-person assist;1 person to manage equipment  -AN     Position/Device Used, Standing Balance supported  -AN     Balance Interventions standing;sit to stand;supported;static;dynamic;minimal challenge;occupation based/functional task  -AN               User Key  (r) = Recorded By, (t) = Taken By, (c) = Cosigned By      Initials Name Provider Type    Ninoska Mancini OT Occupational Therapist                   Goals/Plan       Row Name 10/09/23 1152          Bed Mobility Goal 1 (OT)    Activity/Assistive Device (Bed Mobility Goal 1, OT) sit to supine/supine to sit  -AN     [place] Level/Cues Needed (Bed Mobility Goal 1, OT) minimum assist (75% or more patient effort)  -AN     Time Frame (Bed Mobility Goal 1, OT) long term goal (LTG);10 days  -AN     Progress/Outcomes (Bed Mobility Goal 1, OT) goal revised this date  -AN       Row Name 10/09/23 1152          Transfer Goal 1 (OT)    Activity/Assistive Device (Transfer Goal 1, OT) sit-to-stand/stand-to-sit;toilet  -AN     [place] Level/Cues Needed (Transfer Goal 1, OT) contact guard required  -AN     Time Frame (Transfer Goal 1, OT) long term goal (LTG);10 days  -AN     Progress/Outcome (Transfer Goal 1, OT) goal revised this date  -AN       Row Name 10/09/23 1152          Dressing Goal 1 (OT)    Activity/Device (Dressing Goal 1, OT) lower body dressing;sock-aid;reacher;long-handled shoe horn  -AN     [place]/Cues Needed (Dressing Goal 1, OT) contact guard required;verbal cues required  -AN     Time Frame (Dressing Goal 1, OT) long term goal (LTG);10 days  -AN     Strategies/Barriers (Dressing Goal 1, OT) undergarment/socks with min Vc's for sequencing and safety awareness  -AN     Progress/Outcome (Dressing Goal 1, OT) goal revised this date  -AN       Row Name 10/09/23 1152          Grooming Goal 1 (OT)     Activity/Device (Grooming Goal 1, OT) hair care;oral care;wash face, hands  -AN     [place] (Grooming Goal 1, OT) standby assist  -AN     Time Frame (Grooming Goal 1, OT) long term goal (LTG);10 days  -AN     Progress/Outcome (Grooming Goal 1, OT) goal ongoing  -AN       Row Name 10/09/23 1152          Therapy Assessment/Plan (OT)    Planned Therapy Interventions (OT) activity tolerance training;adaptive equipment training;BADL retraining;cognitive/visual perception retraining;functional balance retraining;transfer/mobility retraining;strengthening exercise;occupation/activity based interventions;patient/caregiver education/training  -AN               User Key  (r) = Recorded By, (t) = Taken By, (c) = Cosigned By      Initials Name Provider Type    Ninoska Mancini OT Occupational Therapist                   Clinical Impression       Row Name 10/09/23 1140          Pain Assessment    Additional Documentation Pain Scale: FACES Pre/Post-Treatment (Group)  -AN       Row Name 10/09/23 7418          Pain Scale: FACES Pre/Post-Treatment    Pain: FACES Scale, Pretreatment 2-->hurts little bit  -AN     Posttreatment Pain Rating 2-->hurts little bit  -AN     Pain Location generalized  -AN     Pain Location - other (see comments)  buttocks  -AN     Pre/Posttreatment Pain Comment tolerated  -AN       Row Name 10/09/23 2459          Plan of Care Review    Plan of Care Reviewed With patient  -AN     Progress no change  -AN     Outcome Evaluation OT re-eval completed s/p CABG x 2. Pt continues to present below her functional baseline with deficits including generalized weakness, impaired balance, ADLs and mobility warranting skilled OT services. Pt ambulated <household distance within the room with Min A + 1 for equipment management. Pt required frequent cues to redirection and environmental navigation. Rec changed to SNF at [place].  -AN       Row Name 10/09/23 7104          Therapy Assessment/Plan (OT)    Patient/Family  Therapy Goal Statement (OT) Return to PLOF  -AN     Rehab Potential (OT) good, to achieve stated therapy goals  -AN     Criteria for Skilled Therapeutic Interventions Met (OT) yes;meets criteria;skilled treatment is necessary  -AN     Therapy Frequency (OT) daily  -AN       Row Name 10/09/23 1147          Therapy Plan Review/Discharge Plan (OT)    Anticipated Discharge Disposition (OT) skilled nursing facility  -AN       Row Name 10/09/23 1147          Vital Signs    Pre SpO2 (%) 85  -AN     O2 Delivery Pre Treatment room air  -AN     Intra SpO2 (%) 93  -AN     O2 Delivery Intra Treatment nasal cannula  RN approved 2L with mobility  -AN     Post SpO2 (%) 93  -AN     O2 Delivery Post Treatment nasal cannula  -AN     Pre Patient Position Sitting  -AN     Intra Patient Position Standing  -AN     Post Patient Position Supine  -AN       Row Name 10/09/23 1147          Positioning and Restraints    Pre-Treatment Position sitting in chair/recliner  -AN     Post Treatment Position bed  -AN     In Bed notified nsg;with nsg;encouraged to call for assist;call light within reach;RUE elevated;LUE elevated;side rails up x2;legs elevated  -AN               User Key  (r) = Recorded By, (t) = Taken By, (c) = Cosigned By      Initials Name Provider Type    AN Ninoska Edwards, CHANEL Occupational Therapist                   Outcome Measures       Row Name 10/09/23 1154          How much help from another is currently needed...    Putting on and taking off regular lower body clothing? 1  -AN     Bathing (including washing, rinsing, and drying) 2  -AN     Toileting (which includes using toilet bed pan or urinal) 2  -AN     Putting on and taking off regular upper body clothing 3  -AN     Taking care of personal grooming (such as brushing teeth) 3  -AN     Eating meals 3  -AN     AM-PAC 6 Clicks Score (OT) 14  -AN       Row Name 10/09/23 1154          Functional Assessment    Outcome Measure Options AM-PAC 6 Clicks Daily Activity (OT)  -AN                User Key  (r) = Recorded By, (t) = Taken By, (c) = Cosigned By      Initials Name Provider Type    Ninoska Mancini OT Occupational Therapist                    Occupational Therapy Education       Title: PT OT SLP Therapies (In Progress)       Topic: Occupational Therapy (In Progress)       Point: ADL training (Done)       Description:   Instruct learner(s) on proper safety adaptation and remediation techniques during self care or transfers.   Instruct in proper use of assistive devices.                  Learning Progress Summary             Patient Acceptance, E, VU,NR by AN at 10/9/2023 1154    Acceptance, E,TB,D, VU,DU,NR by  at 10/5/2023 1348                         Point: Home exercise program (Not Started)       Description:   Instruct learner(s) on appropriate technique for monitoring, assisting and/or progressing therapeutic exercises/activities.                  Learner Progress:  Not documented in this visit.              Point: Precautions (Done)       Description:   Instruct learner(s) on prescribed precautions during self-care and functional transfers.                  Learning Progress Summary             Patient Acceptance, E, VU,NR by AN at 10/9/2023 1154    Acceptance, E, NR by NP at 10/8/2023 0425    Acceptance, E,TB,D, VU,DU,NR by  at 10/5/2023 1348                         Point: Body mechanics (Done)       Description:   Instruct learner(s) on proper positioning and spine alignment during self-care, functional mobility activities and/or exercises.                  Learning Progress Summary             Patient Acceptance, E, VU,NR by AN at 10/9/2023 1154    Acceptance, E, NR by NP at 10/8/2023 0425    Acceptance, E,TB,D, VU,DU,NR by  at 10/5/2023 1348                                         User Key       Initials Effective Dates Name Provider Type Discipline     06/16/21 -  Lauren Yen OT Occupational Therapist OT     09/21/21 -  Ninoska Edwards OT  Occupational Therapist OT    NP 07/18/22 -  Sonali Tovar RN Registered Nurse Nurse                  OT Recommendation and Plan  Planned Therapy Interventions (OT): activity tolerance training, adaptive equipment training, BADL retraining, cognitive/visual perception retraining, functional balance retraining, transfer/mobility retraining, strengthening exercise, occupation/activity based interventions, patient/caregiver education/training  Therapy Frequency (OT): daily  Plan of Care Review  Plan of Care Reviewed With: patient  Progress: no change  Outcome Evaluation: OT re-eval completed s/p CABG x 2. Pt continues to present below her functional baseline with deficits including generalized weakness, impaired balance, ADLs and mobility warranting skilled OT services. Pt ambulated <household distance within the room with Min A + 1 for equipment management. Pt required frequent cues to redirection and environmental navigation. Rec changed to SNF at [place].     Time Calculation:         Time Calculation- OT       Row Name 10/09/23 1154             Time Calculation- OT    OT Start Time 1101  -AN      OT Received On 10/09/23  -AN      OT Goal Re-Cert Due Date 10/19/23  -AN         Timed Charges    86398 - OT Self Care/Mgmt Minutes 8  -AN         Untimed Charges    OT Eval/Re-eval Minutes 18  -AN         Total Minutes    Timed Charges Total Minutes 8  -AN      Untimed Charges Total Minutes 18  -AN       Total Minutes 26  -AN                User Key  (r) = Recorded By, (t) = Taken By, (c) = Cosigned By      Initials Name Provider Type    AN Ninoska Edwards OT Occupational Therapist                  Therapy Charges for Today       Code Description Service Date Service Provider Modifiers Qty    43101260073 HC OT SELF CARE/MGMT/TRAIN EA 15 MIN 10/9/2023 Ninoska Edwards, OT GO 1    60479988843 HC OT RE-EVAL 2 10/9/2023 Ninoska Edwards, OT GO 1                 Ninoskaleesa Edwards, OT  10/9/2023

## 2023-10-09 NOTE — PLAN OF CARE
Goal Outcome Evaluation:  Plan of Care Reviewed With: patient        Progress: improving  Outcome Evaluation: Pt increased ambulation distance to 100ft with [place] 1+1 and B UE support on tripod monitor. Chair follow for safety. Pt demonstrated slow theo and short, shuffled steps. Max cueing for upright posture. Pt continues to be limited by confusion and generalized weakness. Continue to recommend PT skilled care and D/C to SNF.      Anticipated Discharge Disposition (PT): skilled nursing facility

## 2023-10-10 LAB
ANION GAP SERPL CALCULATED.3IONS-SCNC: 7 MMOL/L (ref 5–15)
BUN SERPL-MCNC: 26 MG/DL (ref 8–23)
BUN/CREAT SERPL: 28.9 (ref 7–25)
CALCIUM SPEC-SCNC: 8.5 MG/DL (ref 8.6–10.5)
CHLORIDE SERPL-SCNC: 104 MMOL/L (ref 98–107)
CO2 SERPL-SCNC: 24 MMOL/L (ref 22–29)
CREAT SERPL-MCNC: 0.9 MG/DL (ref 0.76–1.27)
EGFRCR SERPLBLD CKD-EPI 2021: 91.9 ML/MIN/1.73
GLUCOSE BLDC GLUCOMTR-MCNC: 195 MG/DL (ref 70–130)
GLUCOSE BLDC GLUCOMTR-MCNC: 224 MG/DL (ref 70–130)
GLUCOSE BLDC GLUCOMTR-MCNC: 237 MG/DL (ref 70–130)
GLUCOSE BLDC GLUCOMTR-MCNC: 290 MG/DL (ref 70–130)
GLUCOSE SERPL-MCNC: 168 MG/DL (ref 65–99)
POTASSIUM SERPL-SCNC: 4.2 MMOL/L (ref 3.5–5.2)
SODIUM SERPL-SCNC: 135 MMOL/L (ref 136–145)

## 2023-10-10 PROCEDURE — 94761 N-INVAS EAR/PLS OXIMETRY MLT: CPT

## 2023-10-10 PROCEDURE — 63710000001 INSULIN LISPRO (HUMAN) PER 5 UNITS: Performed by: INTERNAL MEDICINE

## 2023-10-10 PROCEDURE — 99233 SBSQ HOSP IP/OBS HIGH 50: CPT | Performed by: INTERNAL MEDICINE

## 2023-10-10 PROCEDURE — 97530 THERAPEUTIC ACTIVITIES: CPT

## 2023-10-10 PROCEDURE — 82948 REAGENT STRIP/BLOOD GLUCOSE: CPT

## 2023-10-10 PROCEDURE — 99232 SBSQ HOSP IP/OBS MODERATE 35: CPT

## 2023-10-10 PROCEDURE — 80048 BASIC METABOLIC PNL TOTAL CA: CPT | Performed by: PHYSICIAN ASSISTANT

## 2023-10-10 PROCEDURE — 99024 POSTOP FOLLOW-UP VISIT: CPT | Performed by: PHYSICIAN ASSISTANT

## 2023-10-10 RX ORDER — SODIUM CHLORIDE 0.9 % (FLUSH) 0.9 %
10 SYRINGE (ML) INJECTION AS NEEDED
Status: CANCELLED | OUTPATIENT
Start: 2023-10-10

## 2023-10-10 RX ORDER — POLYETHYLENE GLYCOL 3350 17 G/17G
17 POWDER, FOR SOLUTION ORAL DAILY PRN
Status: CANCELLED | OUTPATIENT
Start: 2023-10-10

## 2023-10-10 RX ORDER — NITROGLYCERIN 0.4 MG/1
0.4 TABLET SUBLINGUAL
Status: CANCELLED | OUTPATIENT
Start: 2023-10-10

## 2023-10-10 RX ORDER — SODIUM CHLORIDE 9 MG/ML
40 INJECTION, SOLUTION INTRAVENOUS AS NEEDED
Status: CANCELLED | OUTPATIENT
Start: 2023-10-10

## 2023-10-10 RX ORDER — BISACODYL 10 MG
10 SUPPOSITORY, RECTAL RECTAL DAILY PRN
Status: CANCELLED | OUTPATIENT
Start: 2023-10-10

## 2023-10-10 RX ORDER — AMOXICILLIN 250 MG
2 CAPSULE ORAL 2 TIMES DAILY
Status: CANCELLED | OUTPATIENT
Start: 2023-10-10

## 2023-10-10 RX ORDER — LIDOCAINE 4 G/G
1 PATCH TOPICAL
Status: DISCONTINUED | OUTPATIENT
Start: 2023-10-10 | End: 2023-10-11 | Stop reason: HOSPADM

## 2023-10-10 RX ORDER — BISACODYL 5 MG/1
5 TABLET, DELAYED RELEASE ORAL DAILY PRN
Status: CANCELLED | OUTPATIENT
Start: 2023-10-10

## 2023-10-10 RX ORDER — SODIUM CHLORIDE 0.9 % (FLUSH) 0.9 %
10 SYRINGE (ML) INJECTION EVERY 12 HOURS SCHEDULED
Status: CANCELLED | OUTPATIENT
Start: 2023-10-10

## 2023-10-10 RX ADMIN — ACETAMINOPHEN 650 MG: 325 TABLET ORAL at 05:58

## 2023-10-10 RX ADMIN — DOCUSATE SODIUM 50 MG AND SENNOSIDES 8.6 MG 2 TABLET: 8.6; 5 TABLET, FILM COATED ORAL at 09:02

## 2023-10-10 RX ADMIN — HYDROCODONE BITARTRATE AND ACETAMINOPHEN 1 TABLET: 7.5; 325 TABLET ORAL at 10:15

## 2023-10-10 RX ADMIN — PANTOPRAZOLE SODIUM 40 MG: 40 TABLET, DELAYED RELEASE ORAL at 05:58

## 2023-10-10 RX ADMIN — Medication 12.5 MG: at 09:02

## 2023-10-10 RX ADMIN — LIDOCAINE 1 PATCH: 4 PATCH TOPICAL at 10:28

## 2023-10-10 RX ADMIN — ATORVASTATIN CALCIUM 80 MG: 40 TABLET, FILM COATED ORAL at 21:34

## 2023-10-10 RX ADMIN — OXYCODONE HYDROCHLORIDE 10 MG: 10 TABLET ORAL at 21:34

## 2023-10-10 RX ADMIN — INSULIN LISPRO 4 UNITS: 100 INJECTION, SOLUTION INTRAVENOUS; SUBCUTANEOUS at 21:34

## 2023-10-10 RX ADMIN — OXYCODONE HYDROCHLORIDE 10 MG: 10 TABLET ORAL at 02:19

## 2023-10-10 RX ADMIN — ACETAMINOPHEN 650 MG: 325 TABLET ORAL at 21:35

## 2023-10-10 RX ADMIN — Medication 10 ML: at 09:02

## 2023-10-10 RX ADMIN — INSULIN LISPRO 2 UNITS: 100 INJECTION, SOLUTION INTRAVENOUS; SUBCUTANEOUS at 09:03

## 2023-10-10 RX ADMIN — ASPIRIN 325 MG: 325 TABLET, COATED ORAL at 09:04

## 2023-10-10 RX ADMIN — Medication 12.5 MG: at 21:35

## 2023-10-10 RX ADMIN — INSULIN LISPRO 6 UNITS: 100 INJECTION, SOLUTION INTRAVENOUS; SUBCUTANEOUS at 17:27

## 2023-10-10 NOTE — PROGRESS NOTES
Cardiothoracic Surgery Progress Note      POD # 4 s/p CABG x 2     LOS: 9 days      Subjective:  Sitting in chair, states not comfortable this AM     Objective:  Vital Signs  Temp:  [97.6 øF (36.4 øC)-98.7 øF (37.1 øC)] 98.7 øF (37.1 øC)  Heart Rate:  [] 101  Resp:  [16-22] 16  BP: ()/(54-82) 101/77    Physical Exam:   General Appearance: alert, appears stated age and cooperative   Lungs: clear to auscultation, respirations regular, respirations even, and respirations unlabored   Heart: regular rhythm & normal rate, normal S1, S2, no murmur, no gallop, no rub, and no click   Skin: Incision c/d/i    Output by Drain (mL) 10/09/23 0701 - 10/09/23 1900 10/09/23 1901 - 10/10/23 0700 10/10/23 0701 - 10/10/23 0727 Range Total   Y Chest Tube 1 and 2 1 Mediastinal 32 Fr. 2 Mediastinal 32 Fr. 30 60  90   Y Chest Tube 3 and 4 3 Mediastinal 32 Fr. 4 Mediastinal 28 Fr. 90 50  140        Results:    Results from last 7 days   Lab Units 10/09/23  0734   WBC 10*3/mm3 7.68   HEMOGLOBIN g/dL 9.9*   HEMATOCRIT % 30.7*   PLATELETS 10*3/mm3 143     Results from last 7 days   Lab Units 10/10/23  0616   SODIUM mmol/L 135*   POTASSIUM mmol/L 4.2   CHLORIDE mmol/L 104   CO2 mmol/L 24.0   BUN mg/dL 26*   CREATININE mg/dL 0.90   GLUCOSE mg/dL 168*   CALCIUM mg/dL 8.5*       Assessment:  72-year-old man with history of hypertension and diabetes who presented to [facility] with NSTEMI in the setting of multivessel coronary artery disease who is now status post CABG x2 utilizing bilateral internal mammary arteries as well as encompass clamp posterior left atrial roof and floor radiofrequency ablation and pulmonary vein isolation bilaterally with left atrial appendage clip placement on October 6, 2023, uncomplicated.     On October 8, 2023 postop day 2 the patient was requiring norepinephrine at 0.08 to maintain MAP of 70    Plan:  DC chest tubes   Transfer to [facility]  Statin  Beta-blocker  SQH, SCDs,    PT  Discharge planning, goal 10/12/2023    Cherelle Mabry PA-C  10/10/23  07:27 EDT    --    I reviewed this documentation. I interviewed and examined this patient this morning. Plan as documented.     Guero Mora M.D., R.P.V.I.  Cardiothoracic and Vascular Surgeon  [facility]

## 2023-10-10 NOTE — THERAPY TREATMENT NOTE
Patient Name: Dann Metz  : 1950    MRN: [id number]                              Today's Date: 10/10/2023       Admit Date: 10/1/2023    Visit Dx:     ICD-10-CM ICD-9-CM   1. Coronary artery disease involving native coronary artery of native heart with unstable angina pectoris  I25.110 414.01     411.1     Patient Active Problem List   Diagnosis    Acute CVA (cerebrovascular accident)    Essential hypertension    Type 2 diabetes mellitus, without long-term current use of insulin    NSTEMI (non-ST elevated myocardial infarction)    Type 1 myocardial infarction    Coronary artery disease    Vitamin D deficiency    RUE weakness     Past Medical History:   Diagnosis Date    Coronary artery disease     Diabetes mellitus     Hypertension     Stroke      Past Surgical History:   Procedure Laterality Date    CARDIAC CATHETERIZATION      CARDIAC CATHETERIZATION N/A 2023    Procedure: Coronary angiography;  Surgeon: Vince Redding MD;  Location: [facility] CATH INVASIVE LOCATION;  Service: Cardiology;  Laterality: N/A;    CORONARY ARTERY BYPASS GRAFT N/A 10/6/2023    Procedure: MEDIAN STERNOTOMY, CORONARY ARTERY BYPASS GRAFTING X 2 UTILIZING THE LEFT AND RIGHT INTERNAL MAMMARY ARTERIES, ENDOSCOPIC VEIN HARVESTING OF THE RIGHT GREATER SAPHENOUS VEIN, EXPLORATION OF THE LEFT LEG, PULMONARY VEIN ISOLATION, POSTERIOR LEFT ATRIAL WALL ABLATION (ROOF AND FLOOR), LEFT ATRIAL APPENDAGE LIGATION, TRANSESOPHAGEAL ECHOCARDIOGRAM PER ANESTHESIA;  Surgeon: Guero Mora M      General Information       Row Name 10/10/23 3474          Physical Therapy Time and Intention    Document Type therapy note (daily note)  -JAIMEE     Mode of Treatment physical therapy  -JAIMEE       Row Name 10/10/23 6815          General Information    Patient Profile Reviewed yes  -JAIMEE     Existing Precautions/Restrictions cardiac;fall;oxygen therapy device and L/min;sternal  -JAIMEE     Barriers to Rehab medically complex  -JAIMEE       Row Name 10/10/23 8303           Living Environment    People in Home significant other  -JAIMEE       Row Name 10/10/23 1315          Home Main Entrance    Number of Stairs, Main Entrance five  -JAIMEE       Row Name 10/10/23 1315          Cognition    Orientation Status (Cognition) oriented x 3  -JAIMEE       Row Name 10/10/23 1315          Safety Issues, Functional Mobility    Safety Issues Affecting Function (Mobility) safety precautions follow-through/compliance;safety precaution awareness  -JAIMEE     Impairments Affecting Function (Mobility) balance;endurance/activity tolerance;strength;shortness of breath  -JAIMEE               User Key  (r) = Recorded By, (t) = Taken By, (c) = Cosigned By      Initials Name Provider Type    JAIMEE Екатерина Justice, PT Physical Therapist                   Mobility       Row Name 10/10/23 1315          Bed Mobility    Bed Mobility rolling left;rolling right;scooting/bridging;sit-supine  -JAIMEE     Rolling Left [place] (Bed Mobility) minimum assist (75% patient effort)  -JAIMEE     Rolling Right [place] (Bed Mobility) minimum assist (75% patient effort)  -JAIMEE     Scooting/Bridging [place] (Bed Mobility) contact guard  -JAIMEE     Sit-Supine [place] (Bed Mobility) minimum assist (75% patient effort)  -JAIMEE     Assistive Device (Bed Mobility) head of bed elevated  -JAIMEE     Comment, (Bed Mobility) patient needs cues for sequencing and to maintain sternal precautions  -JAIMEE       Row Name 10/10/23 1315          Transfers    Comment, (Transfers) cues for sternal precautions  -JAIMEE       Row Name 10/10/23 1315          Bed-Chair Transfer    Bed-Chair [place] (Transfers) minimum assist (75% patient effort)  -JAIMEE     Assistive Device (Bed-Chair Transfers) walker, front-wheeled  -JAIMEE       Row Name 10/10/23 1315          Sit-Stand Transfer    Sit-Stand [place] (Transfers) minimum assist (75% patient effort)  -JAIMEE     Assistive Device (Sit-Stand Transfers) walker, front-wheeled  -JAIMEE       Row Name 10/10/23 1315           Gait/Stairs (Locomotion)    [place] Level (Gait) contact guard  -JAIMEE     Assistive Device (Gait) walker, front-wheeled  -JAIMEE     Distance in Feet (Gait) 220  -JAIMEE     Deviations/Abnormal Patterns (Gait) stride length decreased;theo decreased  -JAIMEE     Bilateral Gait Deviations forward flexed posture  -JAIMEE     Comment, (Gait/Stairs) patient has step through gait pattern today needs cues for increased step length. patient shows improved mobility today  -JAIMEE               User Key  (r) = Recorded By, (t) = Taken By, (c) = Cosigned By      Initials Name Provider Type    Екатерина Boykin PT Physical Therapist                   Obj/Interventions       Row Name 10/10/23 1318          Balance    Balance Assessment sitting static balance;sitting dynamic balance;standing static balance;standing dynamic balance  -JAIMEE     Static Sitting Balance independent  -JAIMEE     Dynamic Sitting Balance independent  -JAIMEE     Position, Sitting Balance unsupported;sitting edge of bed  -JAIMEE     Static Standing Balance contact guard  -JAIMEE     Dynamic Standing Balance minimal assist  -JAIMEE     Position/Device Used, Standing Balance supported;walker, front-wheeled  -JAIMEE               User Key  (r) = Recorded By, (t) = Taken By, (c) = Cosigned By      Initials Name Provider Type    Екатерина Boykin PT Physical Therapist                   Goals/Plan       Row Name 10/10/23 1321          Bed Mobility Goal 1 (PT)    Activity/Assistive Device (Bed Mobility Goal 1, PT) sit to supine;supine to sit  -JAIMEE     [place] Level/Cues Needed (Bed Mobility Goal 1, PT) contact guard required  -JAIMEE     Time Frame (Bed Mobility Goal 1, PT) 2 weeks  -JAIMEE     Progress/Outcomes (Bed Mobility Goal 1, PT) goal met;goal revised this date;goal ongoing  -JAIMEE       Row Name 10/10/23 1321          Transfer Goal 1 (PT)    Activity/Assistive Device (Transfer Goal 1, PT) sit-to-stand/stand-to-sit;bed-to-chair/chair-to-bed  -JAIMEE     [place] Level/Cues Needed (Transfer  Goal 1, PT) contact guard required  -JAIMEE     Time Frame (Transfer Goal 1, PT) 2 weeks  -JAIMEE     Progress/Outcome (Transfer Goal 1, PT) goal met;goal revised this date;goal ongoing  -JAIMEE       Row Name 10/10/23 1321          Gait Training Goal 1 (PT)    Activity/Assistive Device (Gait Training Goal 1, PT) gait (walking locomotion);assistive device use;walker, rolling  -JAIMEE     [place] Level (Gait Training Goal 1, PT) contact guard required  -JAIMEE     Distance (Gait Training Goal 1, PT) 400  -JAIMEE     Time Frame (Gait Training Goal 1, PT) 2 weeks  -JAIMEE     Progress/Outcome (Gait Training Goal 1, PT) goal met;goal revised this date;goal ongoing  -JAIMEE               User Key  (r) = Recorded By, (t) = Taken By, (c) = Cosigned By      Initials Name Provider Type    Екатерина Boykin, PT Physical Therapist                   Clinical Impression       Row Name 10/10/23 1318          Pain    Pretreatment Pain Rating 2/10  -JAIMEE     Posttreatment Pain Rating 2/10  -JAIMEE     Pain Location incisional  -JAIMEE     Pain Location - chest  -JAIMEE     Pain Intervention(s) Repositioned;Ambulation/increased activity;Splinting  -JAIMEE       Row Name 10/10/23 1318          Plan of Care Review    Plan of Care Reviewed With patient  -JAIMEE     Progress improving  -JAIMEE     Outcome Evaluation patient was able to increase ambulation to 220 ft with rolling walker and assist of 1 patient has stable vitals with activity and is progressing toward mobility goals as expected. anticipaet patient to be able to go home with family assist at D/C pending progress with mobility  -JAIMEE       Row Name 10/10/23 1318          Therapy Assessment/Plan (PT)    Patient/Family Therapy Goals Statement (PT) go home  -JAIMEE     Rehab Potential (PT) good, to achieve stated therapy goals  -JAIMEE     Criteria for Skilled Interventions Met (PT) yes;skilled treatment is necessary  -JAIMEE     Therapy Frequency (PT) daily  -JAIMEE       Row Name 10/10/23 1318          Vital Signs    Pre Systolic BP Rehab  104  -JAIMEE     Pre Treatment Diastolic BP 70  -JAIMEE     Post Systolic BP Rehab 120  -JAIMEE     Post Treatment Diastolic BP 70  -JAIMEE     Pretreatment Heart Rate (beats/min) 98  -JAIMEE     Posttreatment Heart Rate (beats/min) 106  -JAIMEE     Pre SpO2 (%) 94  -JAIMEE     O2 Delivery Pre Treatment room air  -JAIMEE     Post SpO2 (%) 96  -JAIMEE     O2 Delivery Post Treatment room air  -JAIMEE     Pre Patient Position Sitting  -JAIMEE     Intra Patient Position Standing  -JAIMEE     Post Patient Position Supine  -JAIMEE       Row Name 10/10/23 1318          Positioning and Restraints    Pre-Treatment Position sitting in chair/recliner  -JAIMEE     Post Treatment Position bed  -JAIMEE     In Bed notified nsg;supine;call light within reach;encouraged to call for assist;with nsg  -JAIMEE               User Key  (r) = Recorded By, (t) = Taken By, (c) = Cosigned By      Initials Name Provider Type    Екатерина Boykin PT Physical Therapist                   Outcome Measures       Row Name 10/10/23 1322          How much help from another person do you currently need...    Turning from your back to your side while in flat bed without using bedrails? 3  -JAIMEE     Moving from lying on back to sitting on the side of a flat bed without bedrails? 3  -JAIMEE     Moving to and from a bed to a chair (including a wheelchair)? 3  -JAIMEE     Standing up from a chair using your arms (e.g., wheelchair, bedside chair)? 3  -JAIMEE     Climbing 3-5 steps with a railing? 3  -JAIMEE     To walk in hospital room? 3  -JAIMEE     AM-PAC 6 Clicks Score (PT) 18  -JAIMEE     Highest level of mobility 6 --> Walked 10 steps or more  -JAIMEE               User Key  (r) = Recorded By, (t) = Taken By, (c) = Cosigned By      Initials Name Provider Type    Екатерина Boykin PT Physical Therapist                                 Physical Therapy Education       Title: PT OT SLP Therapies (In Progress)       Topic: Physical Therapy (In Progress)       Point: Mobility training (In Progress)       Learning Progress Summary              Patient Acceptance, E, NR by JAIMEE at 10/10/2023 1020    Acceptance, E, NR by KG at 10/9/2023 1046    Acceptance, E, NR by KG at 10/7/2023 0858    Acceptance, E,TB, VU,NR by HM at 10/5/2023 1550    Acceptance, E,TB, VU,NR by HM at 10/3/2023 1548                         Point: Home exercise program (In Progress)       Learning Progress Summary             Patient Acceptance, E, NR by JAIMEE at 10/10/2023 1020    Acceptance, E, NR by KG at 10/9/2023 1046    Acceptance, E, NR by KG at 10/7/2023 0858    Acceptance, E,TB, VU,NR by HM at 10/5/2023 1550                         Point: Body mechanics (In Progress)       Learning Progress Summary             Patient Acceptance, E, NR by JAIMEE at 10/10/2023 1020    Acceptance, E, NR by KG at 10/9/2023 1046    Acceptance, E, NR by KG at 10/7/2023 0858    Acceptance, E,TB, VU,NR by HM at 10/5/2023 1550    Acceptance, E,TB, VU,NR by HM at 10/3/2023 1548                         Point: Precautions (In Progress)       Learning Progress Summary             Patient Acceptance, E, NR by JAIMEE at 10/10/2023 1020    Acceptance, E, NR by KG at 10/9/2023 1046    Acceptance, E, NR by KG at 10/7/2023 0858    Acceptance, E,TB, VU,NR by HM at 10/5/2023 1550    Acceptance, E,TB, VU,NR by  at 10/3/2023 1548                                         User Key       Initials Effective Dates Name Provider Type Discipline    JAIMEE 02/03/23 -  Екатерина Justice, PT Physical Therapist PT    KG 05/22/20 -  Myra Buck, PT Physical Therapist PT     09/22/22 -  Petra Astudillo, MARLENE Physical Therapist PT                  PT Recommendation and Plan     Plan of Care Reviewed With: patient  Progress: improving  Outcome Evaluation: patient was able to increase ambulation to 220 ft with rolling walker and assist of 1 patient has stable vitals with activity and is progressing toward mobility goals as expected. anticipaet patient to be able to go home with family assist at D/C pending progress with mobility      Time Calculation:         PT Charges       Row Name 10/10/23 1323             Time Calculation    Start Time 1020  -JAIMEE      PT Received On 10/10/23  -JAIMEE      PT Goal Re-Cert Due Date 10/17/23  -JAIMEE         Time Calculation- PT    Total Timed Code Minutes- PT 23 minute(s)  -JAIMEE         Timed Charges    74859 - PT Therapeutic Activity Minutes 23  -JAIMEE         Total Minutes    Timed Charges Total Minutes 23  -JAIMEE       Total Minutes 23  -JAIMEE                User Key  (r) = Recorded By, (t) = Taken By, (c) = Cosigned By      Initials Name Provider Type    Екатерина Boykin, PT Physical Therapist                  Therapy Charges for Today       Code Description Service Date Service Provider Modifiers Qty    55528679717 HC PT THERAPEUTIC ACT EA 15 MIN 10/10/2023 Екатерина Justice PT GP 2            PT G-Codes  Outcome Measure Options: AM-PAC 6 Clicks Basic Mobility (PT)  AM-PAC 6 Clicks Score (PT): 18  AM-PAC 6 Clicks Score (OT): 14  PT Discharge Summary  Anticipated Discharge Disposition (PT): home with assist    Екатерина Justice PT  10/10/2023

## 2023-10-10 NOTE — CASE MANAGEMENT/SOCIAL WORK
Continued Stay Note   Gregory     Patient Name: Dann Metz  MRN: [id number]  Today's Date: 10/10/2023    Admit Date: 10/1/2023    Plan: Home   Discharge Plan       Row Name 10/10/23 1408       Plan    Plan Home    Patient/Family in Agreement with Plan yes  Patient    Plan Comments Remains in ICU with plan to transfer to floor bed.  Met with patient at bedside, reports he continues to plan to return home at discharge.  Verified can stay at ex-girlfriend's apartment if unable to go upstairs to his.  Discussed in unit multidisciplinary rounds and with PT this morning.  Yulissa Shankar, Ext. [phone]    Final Discharge Disposition Code 01 - home or self-care                   Discharge Codes    No documentation.                 Expected Discharge Date and Time       Expected Discharge Date Expected Discharge Time    Oct 13, 2023               CARLOS King

## 2023-10-10 NOTE — PROGRESS NOTES
Order received for Phase II Cardiac Rehab. Patient was with OT and nurse at time of visit. Staff will follow up.

## 2023-10-10 NOTE — PLAN OF CARE
Goal Outcome Evaluation:  Plan of Care Reviewed With: patient        Progress: improving  Outcome Evaluation: patient was able to increase ambulation to 220 ft with rolling walker and assist of 1 patient has stable vitals with activity and is progressing toward mobility goals as expected. anticipaet patient to be able to go home with family assist at D/C pending progress with mobility      Anticipated Discharge Disposition (PT): home with assist

## 2023-10-10 NOTE — PROGRESS NOTES
"INTENSIVIST   PROGRESS NOTE     Hospital:  LOS: 9 days     Chief Complaint: Postoperative management    Subjective   S     Interval History: No acute issues overnight.  Chest tubes pulled this morning.  Patient afebrile and hemodynamically stable.  On nasal cannula with appropriate oxygen saturations.  Sitting upright in bedside chair with no acute complaints except for lower back/buttock pain.  Nurse notes no associated skin breakdown or change in area.  Tolerating meals and denies nausea, vomiting.  He denies chest pain, syncope, presyncope, cough.  Ambulated this morning without issues.    The patient's relevant past medical, surgical and social history were reviewed and updated in Epic as appropriate.      Objective   O     Intake/Ouptut 24 hrs (7:00AM - 6:59 AM)  Intake & Output (last 3 days)         10/07 0701  10/08 0700 10/08 0701  10/09 0700 10/09 0701  10/10 0700 10/10 0701  10/11 0700    P.O. 340 220 110     I.V. (mL/kg) 1063.9 (17.1) 481 (7.7)      Blood        Other        IV Piggyback 300       Total Intake(mL/kg) 1703.9 (27.3) 701 (11.3) 110 (1.8)     Urine (mL/kg/hr) 565 (0.4) 1270 (0.8) 900 (0.6)     Blood        Chest Tube 500 150 230     Total Output 1065 1420 1130     Net +638.9 -719 -1020             Urine Unmeasured Occurrence  1 x            Medications (drips):  dextrose 5 % with KCl 20 mEq, Last Rate: 30 mL/hr (10/06/23 1630)  niCARdipine, Last Rate: Stopped (10/06/23 1815)  norepinephrine, Last Rate: 0.01 mcg/kg/min (10/09/23 1445)    Respiratory Support: Room air     Physical Examination:  Vital Signs: Blood pressure 101/77, pulse 101, temperature 98.7 øF (37.1 øC), temperature source Oral, resp. rate 16, height 160 cm (63\"), weight 62.3 kg (137 lb 6.4 oz), SpO2 93%.    General: The patient appears in no acute distress.   Chest: Clear to auscultation bilaterally, No wheezing, rhonchi, or rales. Normal work of breathing. Equal chest rise.  Cardiac: Regular rhythm, normal rate, S1S2 " auscultated. No murmurs, rubs or gallops.   Extremities: No lower extremity edema. No clubbing or cyanosis.  Skin: No rashes, open wounds, or bruising. Warm, dry, well-perfused.  Neuro: Motor power grossly intact bilaterally. Sensation intact. Speech fluid and fluent.   Psych: Alert and oriented x3. Mood stable.    Lines, Drains & Airways       Active LDAs       Name Placement date Placement time Site Days    Peripheral IV 10/06/23 0500 Distal;Posterior;Right Forearm 10/06/23  0500  Forearm  1    NG/OG Tube Nasogastric 16 Fr Left nostril 10/06/23  1640  Left nostril  less than 1    Urethral Catheter Non-latex;Straight-tip;Temperature probe 16 Fr. 10/06/23  --  -- 1    Y Chest Tube 1 and 2 1 Mediastinal 32 Fr. 2 Mediastinal 32 Fr. 10/06/23  --  -- 1    Y Chest Tube 3 and 4 3 Mediastinal 32 Fr. 4 Mediastinal 28 Fr. 10/06/23  --  -- 1    Arterial Line 10/06/23 Right Radial 10/06/23  0653  created via procedure documentation  Radial  1    Introducer- Double Lumen 10/06/23 Internal jugular Right 10/06/23  0831  created via procedure documentation  Internal jugular  1        Results from last 7 days   Lab Units 10/09/23  0734 10/08/23  0345 10/07/23  0302   WBC 10*3/mm3 7.68 12.19* 9.20   HEMOGLOBIN g/dL 9.9* 10.4* 10.3*   MCV fL 96.2 94.1 91.4   PLATELETS 10*3/mm3 143 194 167     Results from last 7 days   Lab Units 10/10/23  0616 10/09/23  0508 10/08/23  0345 10/07/23  0302 10/06/23  2020 10/06/23  1624   SODIUM mmol/L 135* 137 135* 140 142 143   POTASSIUM mmol/L 4.2 4.5 4.7 4.5 4.6 3.8   CO2 mmol/L 24.0 21.0* 22.0 23.0 22.0 22.0   CREATININE mg/dL 0.90 1.11 1.07 1.27 1.00 1.10   GLUCOSE mg/dL 168* 203* 220* 204* 187* 142*   MAGNESIUM mg/dL  --   --   --  2.4 2.7* 1.6   PHOSPHORUS mg/dL  --   --   --  3.4 3.2 1.8*     Estimated Creatinine Clearance: 67.3 mL/min (by C-G formula based on SCr of 0.9 mg/dL).  Results from last 7 days   Lab Units 10/07/23  0302   ALK PHOS U/L 40   BILIRUBIN mg/dL 1.5*   ALT (SGPT) U/L 10    AST (SGOT) U/L 36     Results from last 7 days   Lab Units 10/07/23  0051 10/06/23  1642 10/06/23  1505 10/06/23  1427 10/06/23  1423   PH, ARTERIAL pH units 7.371 7.526* 7.46 7.46 7.43   PCO2, ARTERIAL mm Hg 38.9 27.2*  --   --   --    PO2 ART mm Hg 139.0* 234.0*  --   --   --    FIO2 % 40 100  --   --   --      CXR (10/07):   Radiology Impression:   Interval extubation. Remaining support hardware projects unchanged. There is no significant pleural effusion or distinct pneumothorax. Scattered atelectasis persists without new focal airspace disease. Unchanged heart and mediastinal contours.     CXR (10/08/2023):   Radiology Impression:   Mild remaining bibasilar atelectasis. No pneumothorax or other new chest disease is seen. Questionable ileus.     Results: Reviewed.  I reviewed the patient's new laboratory and imaging results.  I independently reviewed the patient's new images.    Medications: Reviewed.    Assessment & Plan    A / P     Active Hospital Problems    Diagnosis     **Coronary artery disease     NSTEMI (non-ST elevated myocardial infarction)     Essential hypertension     Type 2 diabetes mellitus, without long-term current use of insulin      Patient Isaías is a 72 year old male with past medical history of coronary disease, type 2 diabetes, hypertension, recent CVA, atrial fibrillation.     Admitted to the ICU status post CABG on 10/06.    Preop PFTs showed nonspecific defect with FEV1 of 77% predicted.  Carotid duplex showed bilateral less than 50% stenosis.     -  s/p coronary bypass graft surgery x2 along with pulmonary vein isolation and atrial clipping.  Course was complicated by intraoperative bleeding requiring multiple blood products and platelets. CT surgery following postop course closely. H/H stable  - Chest tubes removed on 10/10 AM  - Continue aspirin, statin.  Beta-blockers unable to tolerate hemodynamically.  Plavix likely to be initiated once chest tubes are removed  - Mentation  has improved over the last 24 hours.  WBC downtrending and norepinephrine has continued to decrease.  Patient remains afebrile. He notes some dysuria on 10/08; Castaneda removed on 10/07.  Serial CXR personally reviewed and stable.  Low threshold for more aggressive infectious work-up and antibiotic initiation in setting of clinical decompensation  - Questionable ileus appreciated on previous CXR.  Patient continues to ambulate.  He denies vomiting or abdominal pain.  No stool but confirmed that he is passing gas.  Had been refusing stool softeners but convinced to start   - Preop PFTs were acceptable.  Extubated overnight (10/06-07)  - Postoperative pain control with Tylenol, opioids.  Place lidocaine patch for lower extremity back pain this morning  - Monitor urine output and electrolytes closely.  Replace electrolytes per ICU protocol  - Blood glucose management per unit protocol    F-PO  A-NA  S-NA  T-SCD  H-Head of bed greater than 30 degrees  U-PPI  G-FSBS per unit protocol, correction dose insulin  S-NA  B-Will monitor daily and provide regimen if indicated  I-CVC (10/06)  D-NA    Advance Directives:   Code Status and Medical Interventions:   Ordered at: 10/06/23 1622     Code Status (Patient has no pulse and is not breathing):    CPR (Attempt to Resuscitate)     Medical Interventions (Patient has pulse or is breathing):    Full Support     High level of risk due to severe exacerbation of chronic illness and illness with threat to life or bodily function.    I conducted multidisciplinary rounds in the plan of care was discussed with the multidisciplinary team at that time. In attendance at multidisciplinary rounds was clinical pharmacist, dietitian, nursing staff and case management.    I discussed the patient's findings and my recommendations with patient and nursing staff    -- Brandon Wilson MD  Pulmonary/Critical Care

## 2023-10-10 NOTE — PROGRESS NOTES
[facility] Cardiology  Progress       Reason for visit:    CAD, NSTEMI       Active Hospital Problems    Diagnosis  POA    **Coronary artery disease [I25.10]  Yes    NSTEMI (non-ST elevated myocardial infarction) [I21.4]  Yes    Essential hypertension [I10]  Yes    Type 2 diabetes mellitus, without long-term current use of insulin [E11.9]  Yes            Interval history:   Patient examined in bed. No complaints this morning. Denies shortness of breath or chest pain. Chest tubes have been removed.            Vital Sign Min/Max for last 24 hours  Temp  Min: 97.6 øF (36.4 øC)  Max: 98.7 øF (37.1 øC)   BP  Min: 81/55  Max: 117/72   Pulse  Min: 85  Max: 102   Resp  Min: 16  Max: 22   SpO2  Min: 93 %  Max: 99 %   No data recorded      Intake/Output Summary (Last 24 hours) at 10/10/2023 0834  Last data filed at 10/10/2023 0600  Gross per 24 hour   Intake --   Output 970 ml   Net -970 ml           Physical Exam  Vitals and nursing note reviewed.   Constitutional:       General: He is awake.   Cardiovascular:      Rate and Rhythm: Normal rate and regular rhythm. No extrasystoles are present.     Pulses: Normal pulses.      Heart sounds:      Friction rub present.   Pulmonary:      Effort: Pulmonary effort is normal.      Breath sounds: Normal breath sounds.   Musculoskeletal:      Right lower leg: No edema.      Left lower leg: No edema.   Skin:     General: Skin is warm and dry.      Capillary Refill: Capillary refill takes less than 2 seconds.      Findings: Wound present.   Neurological:      Mental Status: He is alert.         Tele:  NSR    Results Review (reviewed the patient's recent labs in the electronic medical record):       Results from last 7 days   Lab Units 10/10/23  0616 10/09/23  0508 10/08/23  0345 10/07/23  0302 10/06/23  2020 10/06/23  1624   SODIUM mmol/L 135* 137 135* 140 142 143   POTASSIUM mmol/L 4.2 4.5 4.7 4.5 4.6 3.8   CHLORIDE mmol/L 104 103 105 108* 107 108*   BUN  mg/dL 26* 22 18 16 16 16   CREATININE mg/dL 0.90 1.11 1.07 1.27 1.00 1.10   MAGNESIUM mg/dL  --   --   --  2.4 2.7* 1.6         Results from last 7 days   Lab Units 10/09/23  0734 10/08/23  0345 10/07/23  0302   WBC 10*3/mm3 7.68 12.19* 9.20   HEMOGLOBIN g/dL 9.9* 10.4* 10.3*   HEMATOCRIT % 30.7* 31.8* 30.8*   PLATELETS 10*3/mm3 143 194 167       Lab Results   Component Value Date    HGBA1C 7.10 (H) 09/30/2023       Lab Results   Component Value Date    CHOL 149 09/30/2023    CHLPL 112 08/28/2023    TRIG 194 (H) 09/30/2023    HDL 39 (L) 09/30/2023    LDL 77 09/30/2023              CAD s/p CABG (LIMA-BRANDON-LAD, LIMA-GSV-OM2) 10/6, Dr. Mora  NSTEMI, type II due to fixed CAD   - Routine post-operative management per CTS   - aspirin, statin  - chest tubes removed         PAF   - SR currently, 10/6 with PVI and posterior wall ablation with Atricure encompass clamp, SCOTT ligation (40mm Atricure Clip)   - no AC in post-operative period, post procedure DIANNA notes SCOTT appears excluded     Hx DM   - holding home metformin   - holding empagliflozin, lisinopril in perioperative period             Weaned off of norepinephrine yesterday. Marginal BP overnight. Continue to hold SGLT2 and lisinopril in perioperative period. Resume when BP allows. Continue beta-blockade  Telemetry orders.   We will continue to follow.     Electronically signed by JANNY Summers, 10/10/23, 8:34 AM EDT.    JANNY Summers

## 2023-10-11 ENCOUNTER — READMISSION MANAGEMENT (OUTPATIENT)
Dept: CALL CENTER | Facility: HOSPITAL | Age: 73
End: 2023-10-11
Payer: MEDICARE

## 2023-10-11 VITALS
SYSTOLIC BLOOD PRESSURE: 93 MMHG | WEIGHT: 137.4 LBS | HEIGHT: 63 IN | DIASTOLIC BLOOD PRESSURE: 62 MMHG | HEART RATE: 92 BPM | TEMPERATURE: 98.4 F | OXYGEN SATURATION: 96 % | BODY MASS INDEX: 24.34 KG/M2 | RESPIRATION RATE: 16 BRPM

## 2023-10-11 LAB
GLUCOSE BLDC GLUCOMTR-MCNC: 180 MG/DL (ref 70–130)
GLUCOSE BLDC GLUCOMTR-MCNC: 226 MG/DL (ref 70–130)

## 2023-10-11 PROCEDURE — 99024 POSTOP FOLLOW-UP VISIT: CPT | Performed by: STUDENT IN AN ORGANIZED HEALTH CARE EDUCATION/TRAINING PROGRAM

## 2023-10-11 PROCEDURE — 99232 SBSQ HOSP IP/OBS MODERATE 35: CPT | Performed by: INTERNAL MEDICINE

## 2023-10-11 PROCEDURE — 82948 REAGENT STRIP/BLOOD GLUCOSE: CPT

## 2023-10-11 PROCEDURE — 94761 N-INVAS EAR/PLS OXIMETRY MLT: CPT

## 2023-10-11 PROCEDURE — 63710000001 INSULIN LISPRO (HUMAN) PER 5 UNITS: Performed by: INTERNAL MEDICINE

## 2023-10-11 PROCEDURE — 97530 THERAPEUTIC ACTIVITIES: CPT

## 2023-10-11 RX ORDER — ASPIRIN 81 MG/1
162 TABLET ORAL DAILY
Qty: 60 TABLET | Refills: 4 | Status: SHIPPED | OUTPATIENT
Start: 2023-10-11

## 2023-10-11 RX ORDER — CASTOR OIL AND BALSAM, PERU 788; 87 MG/G; MG/G
1 OINTMENT TOPICAL EVERY 12 HOURS SCHEDULED
Status: CANCELLED | OUTPATIENT
Start: 2023-10-11

## 2023-10-11 RX ORDER — HYDROCODONE BITARTRATE AND ACETAMINOPHEN 7.5; 325 MG/1; MG/1
1 TABLET ORAL EVERY 6 HOURS PRN
Qty: 20 TABLET | Refills: 0 | Status: SHIPPED | OUTPATIENT
Start: 2023-10-11

## 2023-10-11 RX ADMIN — ACETAMINOPHEN 650 MG: 325 TABLET ORAL at 06:13

## 2023-10-11 RX ADMIN — INSULIN LISPRO 2 UNITS: 100 INJECTION, SOLUTION INTRAVENOUS; SUBCUTANEOUS at 09:20

## 2023-10-11 RX ADMIN — EMPAGLIFLOZIN 10 MG: 10 TABLET, FILM COATED ORAL at 09:20

## 2023-10-11 RX ADMIN — INSULIN LISPRO 4 UNITS: 100 INJECTION, SOLUTION INTRAVENOUS; SUBCUTANEOUS at 12:12

## 2023-10-11 RX ADMIN — Medication 10 ML: at 00:18

## 2023-10-11 RX ADMIN — BISACODYL 10 MG: 10 SUPPOSITORY RECTAL at 12:06

## 2023-10-11 RX ADMIN — ACETAMINOPHEN 650 MG: 325 TABLET ORAL at 12:12

## 2023-10-11 RX ADMIN — Medication 12.5 MG: at 09:20

## 2023-10-11 RX ADMIN — HYDROCODONE BITARTRATE AND ACETAMINOPHEN 1 TABLET: 7.5; 325 TABLET ORAL at 00:19

## 2023-10-11 RX ADMIN — Medication 10 ML: at 09:33

## 2023-10-11 RX ADMIN — OXYCODONE HYDROCHLORIDE 10 MG: 10 TABLET ORAL at 09:27

## 2023-10-11 RX ADMIN — LIDOCAINE 1 PATCH: 4 PATCH TOPICAL at 09:35

## 2023-10-11 RX ADMIN — PANTOPRAZOLE SODIUM 40 MG: 40 TABLET, DELAYED RELEASE ORAL at 06:13

## 2023-10-11 RX ADMIN — ASPIRIN 325 MG: 325 TABLET, COATED ORAL at 09:36

## 2023-10-11 NOTE — CASE MANAGEMENT/SOCIAL WORK
Continued Stay Note   [facility]     Patient Name: Dann Metz  MRN: [id number]  Today's Date: 10/11/2023    Admit Date: 10/1/2023    Plan: Home with home health   Discharge Plan       Row Name 10/11/23 6011       Plan    Plan Home with home health    Patient/Family in Agreement with Plan yes  Patient and son    Plan Comments Planning discharge home today.  Referral made to [facility], agency agreeable to accept for home care--Faxed records and orders per request.  [facility] previously followed, discharged 1 month ago.  Discharge plan discussed with son, reports patient will be staying with his girlfriend Mel post discharge and she will be him at all times; has walker for home ambulation.  Son to transport home.  Discussed in unit multidisciplinary rounds this morning.  Yulissa Shankar, Ext,  [phone]    Final Discharge Disposition Code 06 - home with home health care                   Discharge Codes    No documentation.                 Expected Discharge Date and Time       Expected Discharge Date Expected Discharge Time    Oct 11, 2023               CARLOS King

## 2023-10-11 NOTE — PROGRESS NOTES
Cardiothoracic Surgery Progress Note      POD # 5 s/p CABG x 2     LOS: 10 days      Subjective:  Up in chair  Some anxiety this am    Objective:  Vital Signs  Temp:  [97.7 øF (36.5 øC)-98.8 øF (37.1 øC)] 97.7 øF (36.5 øC)  Heart Rate:  [] 90  Resp:  [16-18] 16  BP: ()/(63-89) 104/65    Physical Exam:   General Appearance: alert, appears stated age and cooperative   Lungs: clear to auscultation, respirations regular, respirations even, and respirations unlabored   Heart: regular rhythm & normal rate, normal S1, S2, no murmur, no gallop, no rub, and no click   Skin: Incision c/d/i    Results:    Results from last 7 days   Lab Units 10/09/23  0734   WBC 10*3/mm3 7.68   HEMOGLOBIN g/dL 9.9*   HEMATOCRIT % 30.7*   PLATELETS 10*3/mm3 143     Results from last 7 days   Lab Units 10/10/23  0616   SODIUM mmol/L 135*   POTASSIUM mmol/L 4.2   CHLORIDE mmol/L 104   CO2 mmol/L 24.0   BUN mg/dL 26*   CREATININE mg/dL 0.90   GLUCOSE mg/dL 168*   CALCIUM mg/dL 8.5*       Assessment:  72-year-old man with history of hypertension and diabetes who presented to [facility] with NSTEMI in the setting of multivessel coronary artery disease who is now status post CABG x2 utilizing bilateral internal mammary arteries as well as encompass clamp posterior left atrial roof and floor radiofrequency ablation and pulmonary vein isolation bilaterally with left atrial appendage clip placement on October 6, 2023, uncomplicated.     On October 8, 2023 postop day 2 the patient was requiring norepinephrine at 0.08 to maintain MAP of 70    Plan:  Aspirin 162  Plavix  Statin  Beta-blocker  DC home today  AC management per FAYE Dillard PA-C  10/11/23  06:51 EDT    --    I reviewed this documentation. I interviewed and examined this patient this morning. Plan as documented.     Guero Mora M.D., R.P.V.I.  Cardiothoracic and Vascular Surgeon  [facility]

## 2023-10-11 NOTE — THERAPY TREATMENT NOTE
Patient Name: Dann Metz  : 1950    MRN: [id number]                              Today's Date: 10/11/2023       Admit Date: 10/1/2023    Visit Dx:     ICD-10-CM ICD-9-CM   1. Coronary artery disease involving native coronary artery of native heart with unstable angina pectoris  I25.110 414.01     411.1   2. S/P CABG (coronary artery bypass graft)  Z95.1 V45.81     Patient Active Problem List   Diagnosis    Acute CVA (cerebrovascular accident)    Essential hypertension    Type 2 diabetes mellitus, without long-term current use of insulin    NSTEMI (non-ST elevated myocardial infarction)    Type 1 myocardial infarction    Coronary artery disease    Vitamin D deficiency    RUE weakness     Past Medical History:   Diagnosis Date    Coronary artery disease     Diabetes mellitus     Hypertension     Stroke      Past Surgical History:   Procedure Laterality Date    CARDIAC CATHETERIZATION      CARDIAC CATHETERIZATION N/A 2023    Procedure: Coronary angiography;  Surgeon: Vince Redding MD;  Location: [facility] CATH INVASIVE LOCATION;  Service: Cardiology;  Laterality: N/A;    CORONARY ARTERY BYPASS GRAFT N/A 10/6/2023    Procedure: MEDIAN STERNOTOMY, CORONARY ARTERY BYPASS GRAFTING X 2 UTILIZING THE LEFT AND RIGHT INTERNAL MAMMARY ARTERIES, ENDOSCOPIC VEIN HARVESTING OF THE RIGHT GREATER SAPHENOUS VEIN, EXPLORATION OF THE LEFT LEG, PULMONARY VEIN ISOLATION, POSTERIOR LEFT ATRIAL WALL ABLATION (ROOF AND FLOOR), LEFT ATRIAL APPENDAGE LIGATION, TRANSESOPHAGEAL ECHOCARDIOGRAM PER ANESTHESIA;  Surgeon: Guero Mora M      General Information       Row Name 10/11/23 1117          Physical Therapy Time and Intention    Document Type therapy note (daily note)  -KG     Mode of Treatment physical therapy  -KG       Row Name 10/11/23 1117          General Information    Existing Precautions/Restrictions cardiac;fall;oxygen therapy device and L/min;sternal;other (see comments)  confusion; increased anxiety  -KG      Barriers to Rehab medically complex;cognitive status  -KG       Row Name 10/11/23 1117          Cognition    Orientation Status (Cognition) oriented x 3  -KG       Row Name 10/11/23 1117          Safety Issues, Functional Mobility    Safety Issues Affecting Function (Mobility) awareness of need for assistance;insight into deficits/self-awareness;safety precaution awareness;safety precautions follow-through/compliance;sequencing abilities  -KG     Impairments Affecting Function (Mobility) balance;cognition;coordination;endurance/activity tolerance;pain;postural/trunk control;strength  -KG     Cognitive Impairments, Mobility Safety/Performance attention;awareness, need for assistance;insight into deficits/self-awareness;safety precaution awareness;safety precaution follow-through;sequencing abilities  -KG               User Key  (r) = Recorded By, (t) = Taken By, (c) = Cosigned By      Initials Name Provider Type    KG Myra Buck, PT Physical Therapist                   Mobility       Row Name 10/11/23 1120          Bed Mobility    Bed Mobility scooting/bridging;sit-supine  -KG     Scooting/Bridging [place] (Bed Mobility) maximum assist (25% patient effort);2 person assist;verbal cues  -KG     Sit-Supine [place] (Bed Mobility) moderate assist (50% patient effort);verbal cues  -KG     Assistive Device (Bed Mobility) draw sheet;head of bed elevated  -KG     Comment, (Bed Mobility) VC's for sequencing and technique. Pt required assistance at trunk and BLEs when returning to supine.  -KG       Row Name 10/11/23 1120          Transfers    Comment, (Transfers) VC's for sequencing. Pt required consistent cues to maintain sternal precautions.  -KG       Row Name 10/11/23 1120          Sit-Stand Transfer    Sit-Stand [place] (Transfers) minimum assist (75% patient effort);verbal cues  -KG       Row Name 10/11/23 1120          Gait/Stairs (Locomotion)    [place] Level (Gait) contact  guard;verbal cues  -KG     Assistive Device (Gait) other (see comments)  B UE support on tripod monitor  -KG     Distance in Feet (Gait) 220  -KG     Deviations/Abnormal Patterns (Gait) theo decreased;stride length decreased  -KG     Bilateral Gait Deviations forward flexed posture;heel strike decreased  -KG     Comment, (Gait/Stairs) Pt demonstrated step through gait pattern with slow theo and decreased step length. Frequent cues for upright posture with forward gaze. Pt with difficulty steering monitor to navigate around obstacles. Required intermittent physical assist to properly steer. Ambulation distance limited by fatigue.  -KG               User Key  (r) = Recorded By, (t) = Taken By, (c) = Cosigned By      Initials Name Provider Type    KG Myra Buck, PT Physical Therapist                   Obj/Interventions       Row Name 10/11/23 1127          Balance    Balance Assessment sitting static balance;standing static balance;standing dynamic balance  -KG     Static Sitting Balance standby assist  -KG     Position, Sitting Balance unsupported;sitting in chair  -KG     Static Standing Balance contact guard  -KG     Dynamic Standing Balance minimal assist  -KG     Position/Device Used, Standing Balance supported  -KG               User Key  (r) = Recorded By, (t) = Taken By, (c) = Cosigned By      Initials Name Provider Type    KG Myra Buck, PT Physical Therapist                   Goals/Plan    No documentation.                  Clinical Impression       Row Name 10/11/23 1127          Pain    Additional Documentation Pain Scale: FACES Pre/Post-Treatment (Group)  -KG       Row Name 10/11/23 1127          Pain Scale: FACES Pre/Post-Treatment    Pain: FACES Scale, Pretreatment 2-->hurts little bit  -KG     Posttreatment Pain Rating 4-->hurts little more  -KG     Pain Location incisional  -KG     Pain Location - chest  -KG       Row Name 10/11/23 1127          Plan of Care Review    Plan  of Care Reviewed With patient  -KG     Progress no change  -KG     Outcome Evaluation Pt ambulated 220ft with CGA and B UE support on tripod monitor. Pt required frequent cues for upright posture and for proper mangagement of monitor. Required physical assistance to help steer to avoid obstacles. Pt continues to be limited by confusion and increased anxiety. Requires frequent redirecting to task. Pt required consistent cueing to maintain sternal precautions. Continue to recommend PT skilled care and D/C home with 24/7 assistance and  PT services.  -KG       Row Name 10/11/23 1127          Vital Signs    Pre Systolic BP Rehab 98  -KG     Pre Treatment Diastolic BP 75  -KG     Post Systolic BP Rehab 123  -KG     Post Treatment Diastolic BP 75  -KG     Pretreatment Heart Rate (beats/min) 105  -KG     Posttreatment Heart Rate (beats/min) 103  -KG     Pre SpO2 (%) 96  -KG     O2 Delivery Pre Treatment room air  -KG     Post SpO2 (%) 95  -KG     O2 Delivery Post Treatment room air  -KG     Pre Patient Position Sitting  -KG     Intra Patient Position Standing  -KG     Post Patient Position Supine  -KG       Row Name 10/11/23 1127          Positioning and Restraints    Pre-Treatment Position sitting in chair/recliner  -KG     Post Treatment Position bed  -KG     In Bed notified nsg;supine;call light within reach;encouraged to call for assist;side rails up x3;RUE elevated;LUE elevated  -KG               User Key  (r) = Recorded By, (t) = Taken By, (c) = Cosigned By      Initials Name Provider Type    KG Myra Buck, PT Physical Therapist                   Outcome Measures       Row Name 10/11/23 1130          How much help from another person do you currently need...    Turning from your back to your side while in flat bed without using bedrails? 2  -KG     Moving from lying on back to sitting on the side of a flat bed without bedrails? 2  -KG     Moving to and from a bed to a chair (including a wheelchair)? 3   -KG     Standing up from a chair using your arms (e.g., wheelchair, bedside chair)? 3  -KG     Climbing 3-5 steps with a railing? 2  -KG     To walk in hospital room? 3  -KG     AM-PAC 6 Clicks Score (PT) 15  -KG     Highest level of mobility 4 --> Transferred to chair/commode  -KG       Row Name 10/11/23 1130          Functional Assessment    Outcome Measure Options AM-PAC 6 Clicks Basic Mobility (PT)  -KG               User Key  (r) = Recorded By, (t) = Taken By, (c) = Cosigned By      Initials Name Provider Type    KG Myra Buck, PT Physical Therapist                                 Physical Therapy Education       Title: PT OT SLP Therapies (In Progress)       Topic: Physical Therapy (In Progress)       Point: Mobility training (In Progress)       Learning Progress Summary             Patient Acceptance, E, NR by KG at 10/11/2023 0837    Acceptance, E, NR by JAIMEE at 10/10/2023 1020    Acceptance, E, NR by KG at 10/9/2023 1046    Acceptance, E, NR by KG at 10/7/2023 0858    Acceptance, E,TB, VU,NR by  at 10/5/2023 1550    Acceptance, E,TB, VU,NR by  at 10/3/2023 1548                         Point: Home exercise program (In Progress)       Learning Progress Summary             Patient Acceptance, E, NR by KG at 10/11/2023 0837    Acceptance, E, NR by JAIMEE at 10/10/2023 1020    Acceptance, E, NR by KG at 10/9/2023 1046    Acceptance, E, NR by KG at 10/7/2023 0858    Acceptance, E,TB, VU,NR by  at 10/5/2023 1550                         Point: Body mechanics (In Progress)       Learning Progress Summary             Patient Acceptance, E, NR by KG at 10/11/2023 0837    Acceptance, E, NR by JAIMEE at 10/10/2023 1020    Acceptance, E, NR by KG at 10/9/2023 1046    Acceptance, E, NR by KG at 10/7/2023 0858    Acceptance, E,TB, VU,NR by  at 10/5/2023 1550    Acceptance, E,TB, VU,NR by  at 10/3/2023 1548                         Point: Precautions (In Progress)       Learning Progress Summary              Patient Acceptance, E, NR by KG at 10/11/2023 0837    Acceptance, E, NR by JAIMEE at 10/10/2023 1020    Acceptance, E, NR by KG at 10/9/2023 1046    Acceptance, E, NR by KG at 10/7/2023 0858    Acceptance, E,TB, VU,NR by  at 10/5/2023 1550    Acceptance, E,TB, VU,NR by  at 10/3/2023 1548                                         User Key       Initials Effective Dates Name Provider Type Discipline    JAIMEE 02/03/23 -  Екатерина Justice, PT Physical Therapist PT    KG 05/22/20 -  Myra Buck, PT Physical Therapist PT     09/22/22 -  Petra Astudillo, PT Physical Therapist PT                  PT Recommendation and Plan  Planned Therapy Interventions (PT): balance training, bed mobility training, gait training, strengthening, transfer training  Plan of Care Reviewed With: patient  Progress: no change  Outcome Evaluation: Pt ambulated 220ft with CGA and B UE support on tripod monitor. Pt required frequent cues for upright posture and for proper mangagement of monitor. Required physical assistance to help steer to avoid obstacles. Pt continues to be limited by confusion and increased anxiety. Requires frequent redirecting to task. Pt required consistent cueing to maintain sternal precautions. Continue to recommend PT skilled care and D/C home with 24/7 assistance and  PT services.     Time Calculation:         PT Charges       Row Name 10/11/23 0837             Time Calculation    Start Time 0837  -KG      PT Received On 10/11/23  -KG      PT Goal Re-Cert Due Date 10/17/23  -KG         Time Calculation- PT    Total Timed Code Minutes- PT 39 minute(s)  -KG         Timed Charges    80383 - PT Therapeutic Activity Minutes 39  -KG         Total Minutes    Timed Charges Total Minutes 39  -KG       Total Minutes 39  -KG                User Key  (r) = Recorded By, (t) = Taken By, (c) = Cosigned By      Initials Name Provider Type    KG Myra Buck, PT Physical Therapist                  Therapy Charges for  Today       Code Description Service Date Service Provider Modifiers Qty    62382655956 HC PT THERAPEUTIC ACT EA 15 MIN 10/11/2023 Myra Buck, PT GP 3            PT G-Codes  Outcome Measure Options: AM-PAC 6 Clicks Basic Mobility (PT)  AM-PAC 6 Clicks Score (PT): 15  AM-PAC 6 Clicks Score (OT): 14  PT Discharge Summary  Anticipated Discharge Disposition (PT): home with 24/7 care, home with home health    Elis Buck, PT  10/11/2023

## 2023-10-11 NOTE — PROGRESS NOTES
[facility] Cardiology  Inpatient Progress Note      Chief Complaint/Reason for consult:  CAD, NSTEMI         Subjective  Having some frequent urination but denies dyspnea or chest pain       Vital Sign Min/Max for last 24 hours  Temp  Min: 97.7 øF (36.5 øC)  Max: 98.8 øF (37.1 øC)   BP  Min: 90/70  Max: 133/80   Pulse  Min: 83  Max: 105   Resp  Min: 16  Max: 18   SpO2  Min: 94 %  Max: 98 %   No data recorded      Intake/Output Summary (Last 24 hours) at 10/11/2023 0821  Last data filed at 10/11/2023 0600  Gross per 24 hour   Intake --   Output 1200 ml   Net -1200 ml           Gen: well developed, sitting up in chair, comfortable appearing  HEENT: MMM, sclerae anicteric, conjunctivae normal, R IJ CVC  CV: tachycardia, regular rhythm, no murmurs or rubs, normal S1, S2. 2+ radial and DP pulse  Pulm: RA, normal work of breathing, decreased movement at bases without wheezes  Abd: soft, nontender, nondistended  Ext: normal bulk for age, normal tone, no dependent edema  Neuro: alert, oriented, face symmetrical, moving all extremities well  Psych: normal mood, appropriate affect    Tele:  SR / ST no arrhythmia overnight    Results Review (reviewed the patient's recent labs in the electronic medical record):     EK23 - NSR, anteroseptal infarct, inferolateral ischemia     23 - C  Angiographic Findings:  Coronary circulation is left dominant  Left main: The left main coronary artery divides into the left anterior descending and circumflex coronary arteries. The left main coronary artery has no significant disease.  LAD: The left anterior descending gives rise to the diagonal branches as well as multiple septal perforators before terminating as an apical recurrent branch. The proximal LAD has an 80-90 % stenosis. The distal LAD has an 80-90% stenosis. The first diagonal branch of the left anterior descending is subtotally occluded.  LCX: The circumflex coronary artery is a dominant vessel  giving rise to the obtuse marginal branches, posterolateral left ventricular branches and posterior descending artery. The mid circumflex is subtotally occluded. The largest obtuse marginal branch is subtotally occluded. The distal AV groove vessel is occluded.  RCA: The right coronary artery is a small caliber nondominant vessel. The proximal RCA is chronically occluded.     10/2/23 - Transthoracic Echo Complete W/ Cont if Necessary Per Protocol    Left ventricular systolic function is low normal. Calculated left ventricular EF = 50.7% Normal left ventricular cavity size noted. Left ventricular wall thickness is consistent with mild concentric hypertrophy. Left ventricular diastolic function is consistent with (grade I) impaired relaxation.    Normal right ventricular cavity size, wall thickness, systolic function and septal motion noted.    Left atrial volume is mildly increased    The aortic valve appears trileaflet. Trace aortic valve regurgitation is present. No hemodynamically significant aortic valve stenosis is present.    Mitral annular calcification is present. Mild mitral valve regurgitation is present. No significant mitral valve stenosis is present.    The tricuspid valve is grossly normal in structure. Mild to moderate tricuspid valve regurgitation is present. Estimated right ventricular systolic pressure from tricuspid regurgitation is mildly elevated (35-45 mmHg).    Aortic root = 3.8 cm     Radiology: No radiology results for the last day      Lab Results   Component Value Date    WBC 7.68 10/09/2023    HGB 9.9 (L) 10/09/2023    HCT 30.7 (L) 10/09/2023    MCV 96.2 10/09/2023     10/09/2023     Lab Results   Component Value Date    GLUCOSE 168 (H) 10/10/2023    CALCIUM 8.5 (L) 10/10/2023     (L) 10/10/2023    K 4.2 10/10/2023    CO2 24.0 10/10/2023     10/10/2023    BUN 26 (H) 10/10/2023    CREATININE 0.90 10/10/2023    BCR 28.9 (H) 10/10/2023    ANIONGAP 7.0 10/10/2023     Lab  Results   Component Value Date    TROPONINT 785 (C) 09/30/2023    TROPONINT 642 (C) 09/30/2023      Lab Results   Component Value Date    HGBA1C 7.10 (H) 09/30/2023      Lab Results   Component Value Date    CHOL 149 09/30/2023    CHLPL 112 08/28/2023    TRIG 194 (H) 09/30/2023    HDL 39 (L) 09/30/2023    LDL 77 09/30/2023      Assessment     CAD s/p CABG (LIMA-BRANDON-LAD, LIMA-GSV-OM2) 10/6, Dr. Mora  NSTEMI, type II due to fixed CAD   - Routine post-operative management per CTS   - aspirin, statin   - beta blocker as BP allows    PAF   - SR currently, 10/6 with PVI and posterior wall ablation with Atricure encompass clamp, SCOTT ligation (40mm Atricure Clip)   - no AC aside from aspirin, post procedure DIANNA notes SCOTT appears excluded    Hx DM   - holding home metformin, will resume empagliflozin   - holding lisinopril with hypotension    Dispo   - At discharge will plan to follow-up with Dr. Kwesi Mary in [place]  10/11/2023  08:21 EDT

## 2023-10-11 NOTE — OUTREACH NOTE
Prep Survey      Flowsheet Row Responses   [facility] facility patient discharged from? [place]   Is LACE score < 7 ? No   Eligibility Readm Mgmt   Discharge diagnosis MEDIAN STERNOTOMY, CORONARY ARTERY BYPASS GRAFTING X 2 UTILIZING THE LEFT AND RIGHT INTERNAL MAMMARY ARTERIES, ENDOSCOPIC VEIN HARVESTING OF THE RIGHT GREATER SAPHENOUS VEIN, EXPLORATION OF THE LEFT LEG, PULMONARY VEIN ISOLATION, POSTERIOR LEFT ATRIAL WALL ABLATION (ROOF AND FLOOR), LEFT ATRIAL APPENDAGE LIGATION, TRANSESOPHAGEAL ECHOCARDIOGRAM PER ANESTHESIA   Does the patient have one of the following disease processes/diagnoses(primary or secondary)? Cardiothoracic surgery   Does the patient have Home health ordered? Yes   What is the Home health agency?  [facility]   Is there a DME ordered? No   Prep survey completed? Yes            Carina RED - Registered Nurse

## 2023-10-11 NOTE — NURSING NOTE
Pt. Referred for Phase II Cardiac Rehab. Staff discussed benefits of exercise, program protocol, and educational material provided. Teach back verified.  Permission granted from patient for staff to fax referral information to outlying program at this time.  Staff faxed referral info to [facility].

## 2023-10-11 NOTE — PLAN OF CARE
Goal Outcome Evaluation:  Plan of Care Reviewed With: patient        Progress: no change  Outcome Evaluation: Pt ambulated 220ft with CGA and B UE support on tripod monitor. Pt required frequent cues for upright posture and for proper mangagement of monitor. Required physical assistance to help steer to avoid obstacles. Pt continues to be limited by confusion and increased anxiety. Requires frequent redirecting to task. Pt required consistent cueing to maintain sternal precautions. Continue to recommend PT skilled care and D/C home with 24/7 assistance and  PT services.      Anticipated Discharge Disposition (PT): home with 24/7 care, home with home health

## 2023-10-16 ENCOUNTER — READMISSION MANAGEMENT (OUTPATIENT)
Dept: CALL CENTER | Facility: HOSPITAL | Age: 73
End: 2023-10-16
Payer: MEDICARE

## 2023-10-16 LAB
QT INTERVAL: 382 MS
QT INTERVAL: 474 MS
QTC INTERVAL: 448 MS
QTC INTERVAL: 481 MS

## 2023-10-16 NOTE — OUTREACH NOTE
CT Surgery Week 1 Survey      Flowsheet Row Responses   [facility] patient discharged from? [place]   Does the patient have one of the following disease processes/diagnoses(primary or secondary)? Cardiothoracic surgery   Week 1 attempt successful? Yes   Call start time 1359   Call end time 1403   Discharge diagnosis MEDIAN STERNOTOMY, CORONARY ARTERY BYPASS GRAFTING X 2 UTILIZING THE LEFT AND RIGHT INTERNAL MAMMARY ARTERIES, ENDOSCOPIC VEIN HARVESTING OF THE RIGHT GREATER SAPHENOUS VEIN, EXPLORATION OF THE LEFT LEG, PULMONARY VEIN ISOLATION, POSTERIOR LEFT ATRIAL WALL ABLATION (ROOF AND FLOOR), LEFT ATRIAL APPENDAGE LIGATION, TRANSESOPHAGEAL ECHOCARDIOGRAM PER ANESTHESIA   Person spoke with today (if not patient) and relationship Son   Meds reviewed with patient/caregiver? Yes   Is the patient having any side effects they believe may be caused by any medication additions or changes? No   Does the patient have all medications related to this admission filled (includes all antibiotics, pain medications, cardiac medications, etc.) Yes   Is the patient taking all medications as directed (includes completed medication regime)? Yes   Does the patient have a primary care provider?  Yes   Does the patient have an appointment scheduled with their C/T surgeon? Yes   Has the patient kept scheduled appointments due by today? N/A   What is the Home health agency?  [facility]   Has home health visited the patient within 72 hours of discharge? Yes   Psychosocial issues? No   Did the patient receive a copy of their discharge instructions? Yes   Nursing interventions Reviewed instructions with patient   What is the patient's perception of their health status since discharge? Improving   Nursing interventions Nurse provided patient education   Is the patient/caregiver able to teach back normal signs of recovery? Nausea and lack of appetite   Is the patient /caregiver able to teach back basic post-op care? Shower daily, No tub  bath, swimming, or hot tub until instructed by MD, Lifting as instructed by MD in discharge instructions, Drive as instructed by MD in discharge instructions   Is the patient/caregiver able to teach back signs and symptoms of incisional infection? Increased redness, swelling or pain at the incisonal site, Increased drainage or bleeding, Incisional warmth, Pus or odor from incision, Fever   Is the patient/caregiver able to teach back steps to recovery at home? Set small, achievable goals for return to baseline health, Eat a well-balance diet   Is the patient/caregiver able to teach back the hierarchy of who to call/visit for symptoms/problems? PCP, Specialist, Home health nurse, Urgent Care, ED, 911 Yes   Week 1 call completed? Yes   Wrap up additional comments No needs at this time.   Call end time 1403              ADRYAN OTTO - Registered Nurse

## 2023-10-25 ENCOUNTER — READMISSION MANAGEMENT (OUTPATIENT)
Dept: CALL CENTER | Facility: HOSPITAL | Age: 73
End: 2023-10-25
Payer: MEDICARE

## 2023-10-25 NOTE — OUTREACH NOTE
CT Surgery Week 2 Survey      Flowsheet Row Responses   [facility] facility patient discharged from? [place]   Does the patient have one of the following disease processes/diagnoses(primary or secondary)? Cardiothoracic surgery   Week 2 attempt successful? No   Unsuccessful attempts Attempt 1            Carina LEWIS - Registered Nurse

## 2023-10-26 ENCOUNTER — OFFICE VISIT (OUTPATIENT)
Dept: CARDIOLOGY | Facility: CLINIC | Age: 73
End: 2023-10-26
Payer: MEDICARE

## 2023-10-26 VITALS
BODY MASS INDEX: 23.92 KG/M2 | SYSTOLIC BLOOD PRESSURE: 110 MMHG | HEIGHT: 63 IN | OXYGEN SATURATION: 94 % | WEIGHT: 135 LBS | HEART RATE: 84 BPM | DIASTOLIC BLOOD PRESSURE: 78 MMHG

## 2023-10-26 DIAGNOSIS — I25.10 CAD, MULTIPLE VESSEL: Primary | ICD-10-CM

## 2023-10-26 RX ORDER — APIXABAN 5 MG/1
TABLET, FILM COATED ORAL
COMMUNITY
Start: 2023-10-17 | End: 2023-10-26

## 2023-10-26 RX ORDER — METOPROLOL SUCCINATE 25 MG/1
25 TABLET, EXTENDED RELEASE ORAL DAILY
Qty: 90 TABLET | Refills: 3 | Status: SHIPPED | OUTPATIENT
Start: 2023-10-26

## 2023-10-26 NOTE — PROGRESS NOTES
"             [facility] Cardiology Office Follow Up Note    Dann Metz  [id number]  10/26/2023    Primary Care Provider: Ariana Lucas APRN    Chief Complaint: Routine follow-up    History of Present Illness:   Mr. Dann Metz is a 72 y.o. male who presents to the Cardiology Clinic for routine follow-up.  The patient has a past medical history significant for hypertension and type 2 diabetes mellitus.  He also has a history of an acute CVA in .  His cardiac history is significant for paroxysmal atrial fibrillation, as well as multivessel coronary artery disease for which she underwent CABG in 10/23.  Following discharge from CABG, he reports fatigue.  He has not had any significant chest pain or chest discomfort.  He does appear to have some \"cloudy thinking\" ever since his discharge from the hospital.  No other specific complaints today.     Past Cardiac Testin. Last Coronary Angio: 10/23   1.  Severe three-vessel coronary artery disease  2. Prior Stress Testing: None  3. Last Echo: 10/23  1.  Normal left ventricular size with low normal LV systolic function, LVEF 50-55%.  2.  Mild concentric LVH.  3.  Grade 1 diastolic dysfunction  4.  Normal right ventricular size and systolic function.  5.  Normal left atrial index.  6.  Trace aortic regurgitation.  7.  Mild mitral regurgitation  8.  Mild to moderate tricuspid regurgitation  4. Prior Holter Monitor: None    Review of Systems:   Review of Systems   Constitutional:  Positive for fatigue. Negative for activity change, appetite change, chills, diaphoresis, fever, unexpected weight gain and unexpected weight loss.   Eyes:  Negative for blurred vision and double vision.   Respiratory:  Negative for cough, chest tightness, shortness of breath and wheezing.    Cardiovascular:  Negative for chest pain, palpitations and leg swelling.   Gastrointestinal:  Negative for abdominal pain, anal bleeding, blood in stool and GERD.   Endocrine: " "Negative for cold intolerance and heat intolerance.   Genitourinary:  Negative for hematuria.   Neurological:  Negative for dizziness, syncope, weakness and light-headedness.   Hematological:  Does not bruise/bleed easily.   Psychiatric/Behavioral:  Negative for depressed mood and stress. The patient is not nervous/anxious.        I have reviewed and/or updated the patient's past medical, past surgical, family, social history, problem list and allergies as appropriate.     Medications:     Current Outpatient Medications:     aspirin 81 MG EC tablet, Take 2 tablets by mouth Daily., Disp: 60 tablet, Rfl: 4    atorvastatin (LIPITOR) 40 MG tablet, Take 1 tablet by mouth Daily., Disp: , Rfl:     empagliflozin (Jardiance) 10 MG tablet tablet, Take 1 tablet by mouth Daily., Disp: , Rfl:     ezetimibe (ZETIA) 10 MG tablet, Take 1 tablet by mouth Daily., Disp: , Rfl:     HYDROcodone-acetaminophen (NORCO) 7.5-325 MG per tablet, Take 1 tablet by mouth Every 6 (Six) Hours As Needed for Moderate Pain., Disp: 20 tablet, Rfl: 0    metFORMIN (GLUCOPHAGE) 500 MG tablet, Take 2 tablets by mouth 2 (Two) Times a Day With Meals., Disp: , Rfl:     omeprazole (priLOSEC) 20 MG capsule, Take 1 capsule by mouth Daily As Needed., Disp: , Rfl:     metoprolol succinate XL (TOPROL-XL) 25 MG 24 hr tablet, Take 1 tablet by mouth Daily., Disp: 90 tablet, Rfl: 3    Physical Exam:  Vital Signs:   Vitals:    10/26/23 1002   BP: 110/78   BP Location: Left arm   Patient Position: Sitting   Cuff Size: Adult   Pulse: 84   SpO2: 94%   Weight: 61.2 kg (135 lb)   Height: 160 cm (63\")       Physical Exam  Constitutional:       General: He is not in acute distress.     Appearance: Normal appearance. He is not diaphoretic.   HENT:      Head: Normocephalic and atraumatic.   Cardiovascular:      Rate and Rhythm: Normal rate and regular rhythm.      Heart sounds: No murmur heard.  Pulmonary:      Effort: Pulmonary effort is normal. No respiratory distress.      " Breath sounds: Normal breath sounds. No stridor. No wheezing, rhonchi or rales.   Abdominal:      General: Bowel sounds are normal. There is no distension.      Palpations: Abdomen is soft.      Tenderness: There is no abdominal tenderness. There is no guarding or rebound.   Musculoskeletal:         General: No swelling. Normal range of motion.      Cervical back: Neck supple. No tenderness.   Skin:     General: Skin is warm and dry.      Comments: Sternotomy appears to be healing well   Neurological:      General: No focal deficit present.      Mental Status: He is alert and oriented to person, place, and time.   Psychiatric:         Mood and Affect: Mood normal.         Behavior: Behavior normal.         Results Review:   I reviewed the patient's new clinical results.        Assessment / Plan:     Multivessel coronary artery disease  -- Status post CABG 10/23  -- No recurrent anginal symptoms  -- Continue aspirin and high intensity statin  -- Referral to cardiac rehab    2.  PAF (paroxysmal atrial fibrillation)   -- Appears to be in sinus rhythm today  --Status post pulmonary vein isolation and left atrial appendage clipping at the time of CABG  --Eliquis discontinued given left atrial appendage clipping     2. Essential hypertension  -- BP adequately controlled     4.  Type 2 diabetes mellitus  -- Hemoglobin A1c 9.3% in 6/23  --Management per PCP     5.  History of CVA (cerebrovascular accident)  -- Suspect cardioembolic etiology  -- Status post left atrial appendage clipping       Follow Up:   Return in about 3 months (around 1/26/2024) for With Miriam.      Thank you for allowing me to participate in the care of your patient. Please to not hesitate to contact me with additional questions or concerns.     VICTORIANO Rushing MD  Interventional Cardiology   10/26/2023  09:57 EDT

## 2023-10-30 ENCOUNTER — READMISSION MANAGEMENT (OUTPATIENT)
Dept: CALL CENTER | Facility: HOSPITAL | Age: 73
End: 2023-10-30
Payer: MEDICARE

## 2023-10-30 NOTE — OUTREACH NOTE
CT Surgery Week 2 Survey      Flowsheet Row Responses   [facility] [place] patient discharged from? [place]   Does the patient have one of the following disease processes/diagnoses(primary or secondary)? Cardiothoracic surgery   Week 2 attempt successful? No   Unsuccessful attempts Attempt 2            Bere KLEIN - Registered Nurse

## 2023-10-31 ENCOUNTER — HOSPITAL ENCOUNTER (OUTPATIENT)
Facility: HOSPITAL | Age: 73
Discharge: HOME | End: 2023-10-31
Payer: MEDICARE

## 2023-10-31 PROCEDURE — 87086 URINE CULTURE/COLONY COUNT: CPT

## 2023-11-02 LAB — BACTERIA UR CULT: NORMAL

## 2023-11-07 NOTE — PROGRESS NOTES
[facility] Cardiothoracic Surgery Office Follow Up Note     Date of Encounter: 2023     Name: Dann Metz  : 1950     Referred By: No ref. provider found  PCP: Ariana Lucas APRN    Chief Complaint:    Chief Complaint   Patient presents with    Coronary Artery Disease     Hospital follow-up s/p CABGx2/ SCOTT clip 10/6/23. Patient has constipation the last 4 days. After going on walks patient feels exhaustion, fatigue, and no energy. He also has some confusion.        Subjective      History of Present Illness:    Dann Metz is a 72 y.o. male non-smoker with history of HTN, HLD on statin therapy, non-insulin-dependent DM, CVA 2023 with right-sided deficit, A-fib, and CAD with NSTEMI s/p CABG x2 (LIMA-BRANDON-LAD, LIMA-GSV-OM2) with left GSV exploration and right EVH, bilateral pulmonary vein isolation and posterior left atrial wall ablation using AtriCure Encompass clamp, and finally ligation of left atrial appendage with 40 mm Atricure clip on 10/6/2023 by Dr Mora.  His surgery was complicated by requirement to return to bypass, intraoperative bleeding at the SCOTT requiring 4 units PRBCs and 2 units platelets. Refer to operative report for details. His intraoperative DIANNA SCOTT appeared excluded. Patient met criteria for discharge on 10/11/2023 and has had no readmissions. He presents today for his initial postoperative eval. He denies any unusual chest pain or palpitations, no respiratory complaints, and no incisional concerns. His son has to provide most of the information because patient is slow to respond, confused by the questions, and asking repetitive questions. Son is concerned he has possibly had another stroke as he does not seem himself. He has been seen in post-op by cardiology 10/26/2023 and has upcoming appointment with neurology 2023    Review of Systems:  Review of Systems   Constitutional: Positive for malaise/fatigue. Negative for chills, decreased appetite,  diaphoresis, fever, night sweats, weight gain and weight loss.   HENT:  Negative for hoarse voice.    Eyes:  Negative for blurred vision, double vision and visual disturbance.   Cardiovascular:  Positive for dyspnea on exertion. Negative for chest pain, claudication, irregular heartbeat, leg swelling, near-syncope, orthopnea, palpitations, paroxysmal nocturnal dyspnea and syncope.   Respiratory:  Negative for cough, hemoptysis, shortness of breath, sputum production and wheezing.    Endocrine: Positive for cold intolerance.   Hematologic/Lymphatic: Negative for adenopathy and bleeding problem. Does not bruise/bleed easily.   Skin:  Negative for color change, nail changes, poor wound healing and rash.   Musculoskeletal:  Negative for back pain, falls and muscle cramps.   Gastrointestinal:  Positive for constipation. Negative for abdominal pain, dysphagia and heartburn.   Genitourinary:  Negative for flank pain.   Neurological:  Positive for weakness. Negative for brief paralysis, disturbances in coordination, dizziness, focal weakness, headaches, light-headedness, loss of balance, numbness, paresthesias, sensory change and vertigo.   Psychiatric/Behavioral:  Positive for altered mental status. Negative for depression and suicidal ideas.    Allergic/Immunologic: Negative for persistent infections.       I have reviewed the following portions of the patient's history: problem list, current medications, allergies, past surgical history, past medical history, past social history, past family history, and ROS and confirm it's accurate.    Allergies:  Allergies   Allergen Reactions    Penicillins Rash       Medications:      Current Outpatient Medications:     aspirin 81 MG EC tablet, Take 2 tablets by mouth Daily., Disp: 60 tablet, Rfl: 4    atorvastatin (LIPITOR) 40 MG tablet, Take 1 tablet by mouth Daily., Disp: , Rfl:     empagliflozin (Jardiance) 10 MG tablet tablet, Take 1 tablet by mouth Daily., Disp: , Rfl:      ezetimibe (ZETIA) 10 MG tablet, Take 1 tablet by mouth Daily., Disp: , Rfl:     HYDROcodone-acetaminophen (NORCO) 7.5-325 MG per tablet, Take 1 tablet by mouth Every 6 (Six) Hours As Needed for Moderate Pain., Disp: 20 tablet, Rfl: 0    metFORMIN (GLUCOPHAGE) 500 MG tablet, Take 2 tablets by mouth 2 (Two) Times a Day With Meals., Disp: , Rfl:     metoprolol succinate XL (TOPROL-XL) 25 MG 24 hr tablet, Take 1 tablet by mouth Daily., Disp: 90 tablet, Rfl: 3    omeprazole (priLOSEC) 20 MG capsule, Take 1 capsule by mouth Daily As Needed., Disp: , Rfl:     History:   Past Medical History:   Diagnosis Date    Coronary artery disease     Diabetes mellitus     Hypertension     Stroke        Past Surgical History:   Procedure Laterality Date    CARDIAC CATHETERIZATION      CARDIAC CATHETERIZATION N/A 9/30/2023    Procedure: Coronary angiography;  Surgeon: Vince Redding MD;  Location: [facility] CATH INVASIVE LOCATION;  Service: Cardiology;  Laterality: N/A;    CORONARY ARTERY BYPASS GRAFT N/A 10/6/2023    Procedure: MEDIAN STERNOTOMY, CORONARY ARTERY BYPASS GRAFTING X 2 UTILIZING THE LEFT AND RIGHT INTERNAL MAMMARY ARTERIES, ENDOSCOPIC VEIN HARVESTING OF THE RIGHT GREATER SAPHENOUS VEIN, EXPLORATION OF THE LEFT LEG, PULMONARY VEIN ISOLATION, POSTERIOR LEFT ATRIAL WALL ABLATION (ROOF AND FLOOR), LEFT ATRIAL APPENDAGE LIGATION, TRANSESOPHAGEAL ECHOCARDIOGRAM PER ANESTHESIA;  Surgeon: Guero Mora M       Social History     Socioeconomic History    Marital status:    Tobacco Use    Smoking status: Never    Smokeless tobacco: Never   Vaping Use    Vaping Use: Never used   Substance and Sexual Activity    Alcohol use: Never    Drug use: Never    Sexual activity: Defer        Family History   Problem Relation Age of Onset    Heart disease Mother     Stroke Father     Stroke Sister     Stroke Sister     Stroke Brother     Stroke Brother     Stroke Brother     Stroke Brother     Stroke Brother        Objective      Physical Exam:  Vitals:    11/09/23 0940 11/09/23 0943   BP: 122/70 130/78   BP Location: Right arm Left arm   Patient Position: Sitting Sitting   Pulse: 91    Temp: 97.5 °F (36.4 °C)    SpO2: 99%    Weight: 60.8 kg (134 lb)       Body mass index is 23.74 kg/m².    Physical Exam  Vitals and nursing note reviewed.   Constitutional:       Appearance: Normal appearance.   Neck:      Vascular: No carotid bruit.   Cardiovascular:      Rate and Rhythm: Normal rate and regular rhythm.      Pulses: Normal pulses.           Radial pulses are 2+ on the right side.      Heart sounds: Normal heart sounds, S1 normal and S2 normal. No murmur heard.  Pulmonary:      Effort: Pulmonary effort is normal.      Breath sounds: Normal breath sounds.   Chest:      Comments: No sternal movement   Abdominal:      Palpations: Abdomen is soft.   Musculoskeletal:         General: Normal range of motion.      Cervical back: Normal range of motion and neck supple.      Right lower leg: No edema.      Left lower leg: No edema.   Skin:     General: Skin is warm and dry.      Capillary Refill: Capillary refill takes less than 2 seconds.      Comments: Mid-sternotomy incision: surgical glue still intact. wound edges well approximated, no erythema, edema, or induration  Mediastinal tubes sites: well healing without erythema, edema, or drainage  Right EVH: with underlying induration without fluctuation consistent with hematoma. No surrounding erythema or warmth. Incision remains closed   Left EVH: well healing without erythema, edema, or drainage   Neurological:      General: No focal deficit present.      Mental Status: He is alert and oriented to person, place, and time. Mental status is at baseline. He is confused.      GCS: GCS eye subscore is 4. GCS verbal subscore is 5. GCS motor subscore is 6.      Motor: Weakness present.      Coordination: Coordination is intact.      Gait: Gait is intact.   Psychiatric:         Mood and Affect: Mood  normal.         Speech: Speech normal.         Behavior: Behavior normal. Behavior is cooperative.         Cognition and Memory: Cognition normal.         Imaging/Labs:  XR Chest 2 View (11/09/2023 10:01)     Duplex Carotid Ultrasound CAR (10/02/2023 17:22)     Right internal carotid artery demonstrates a less than 50% stenosis.    Left internal carotid artery demonstrates a less than 50% stenosis.    Antegrade flow in the vertebral arteries bilaterally.    Assessment / Plan      Assessment / Plan:  Diagnoses and all orders for this visit:    1. S/P CABG (coronary artery bypass graft) (Primary)         s/p CABG x2 (LIMA-BRANDON-LAD, LIMA-GSV-OM2) with left GSV exploration and right EVH, bilateral pulmonary vein isolation and posterior left atrial wall ablation using AtriCure Encompass clamp, and finally ligation of left atrial appendage with 40 mm Atricure clip on 10/6/2023 by Dr Mora.   surgery was complicated by requirement to return to bypass, intraoperative bleeding at the SCOTT requiring 4 units PRBCs and 2 units platelets.   no readmissions.   initial postop with no uncontrolled pain, respiratory complaints, or incisional concerns.   Sternotomy, MTS, and left EVH without complication. Surgical glue still intact. Right EVH with hematoma. Pt unable to articulate how he is providing wound care. Education provided for use of plain antibacterial soap  CXR without PTX or effusion. Sternal wires intact   No sternal mobility. Discussed ongoing sternal restrictions  Encouraged CR. Possible transportation barrier per son  Concern for cognitive decline. Pt is unable to answer some of NP's questions, provides delayed responds, and ask repetitive questions  Will reach out to neuro office to attempt to move up appointment   seen in post-op by cardiology 10/26/2023. Follow-up as previously determined     Patient Education:  Activity Restrictions: You may resume driving at 6 weeks post-operatively. No lifting more than 10 lbs for  12 weeks (3 months). At this time you can slowly increase your weight as tolerated. You should not partake in any overhead activities or movements that involve twisting of your upper chest and torso such as (but not limited to) golfing or tennis, lawn mowing including zero turn mowers, use of lawn trimmers or edgers, shoveling, painting, vacuuming, mopping, sweeping, etc.     Wound Care: continue to keep your incisions clean and dry. You may use plain antibacterial soap (i.e. dial) and water to clean and pat dry. No lotions, creams, oils, powders, salves, or ointments. Please watch for signs and symptoms of infection which can include but are not limited to: increased pain or tenderness around your incision, warmth to the site or fever, redness or red streaking, and foul smelling or appearing drainage. Clear drainage and bloody drainage are not worrisome. If your wound opens up please contact our office.      Follow Up:   Return if symptoms worsen or fail to improve.   Or sooner for any further concerns or worsening sign and symptoms. If unable to reach us in the office please dial 911 or go to the nearest emergency department.      JANNY Guillen  [facility] Cardiothoracic Surgery

## 2023-11-08 ENCOUNTER — READMISSION MANAGEMENT (OUTPATIENT)
Dept: CALL CENTER | Facility: HOSPITAL | Age: 73
End: 2023-11-08
Payer: MEDICARE

## 2023-11-08 NOTE — OUTREACH NOTE
CT Surgery Week 3 Survey      Flowsheet Row Responses   [facility] facility patient discharged from? [place]   Does the patient have one of the following disease processes/diagnoses(primary or secondary)? Cardiothoracic surgery   Week 3 attempt successful? No   Unsuccessful attempts Attempt 1  [All numbers listed were attempted-no answer]            Jana H - Registered Nurse

## 2023-11-09 ENCOUNTER — OFFICE VISIT (OUTPATIENT)
Dept: CARDIAC SURGERY | Facility: CLINIC | Age: 73
End: 2023-11-09
Payer: MEDICARE

## 2023-11-09 VITALS
TEMPERATURE: 97.5 F | HEART RATE: 91 BPM | BODY MASS INDEX: 23.74 KG/M2 | OXYGEN SATURATION: 99 % | DIASTOLIC BLOOD PRESSURE: 78 MMHG | WEIGHT: 134 LBS | SYSTOLIC BLOOD PRESSURE: 130 MMHG

## 2023-11-09 DIAGNOSIS — Z95.1 S/P CABG (CORONARY ARTERY BYPASS GRAFT): Primary | ICD-10-CM

## 2023-11-09 PROCEDURE — 99024 POSTOP FOLLOW-UP VISIT: CPT | Performed by: NURSE PRACTITIONER

## 2023-11-09 PROCEDURE — 1159F MED LIST DOCD IN RCRD: CPT | Performed by: NURSE PRACTITIONER

## 2023-11-09 PROCEDURE — 3078F DIAST BP <80 MM HG: CPT | Performed by: NURSE PRACTITIONER

## 2023-11-09 PROCEDURE — 1160F RVW MEDS BY RX/DR IN RCRD: CPT | Performed by: NURSE PRACTITIONER

## 2023-11-09 PROCEDURE — 3075F SYST BP GE 130 - 139MM HG: CPT | Performed by: NURSE PRACTITIONER

## 2023-11-21 ENCOUNTER — APPOINTMENT (OUTPATIENT)
Dept: CT IMAGING | Facility: HOSPITAL | Age: 73
End: 2023-11-21
Attending: FAMILY MEDICINE
Payer: MEDICARE

## 2023-11-21 ENCOUNTER — APPOINTMENT (OUTPATIENT)
Dept: GENERAL RADIOLOGY | Facility: HOSPITAL | Age: 73
End: 2023-11-21
Attending: FAMILY MEDICINE
Payer: MEDICARE

## 2023-11-21 ENCOUNTER — HOSPITAL ENCOUNTER (EMERGENCY)
Facility: HOSPITAL | Age: 73
Discharge: HOME | End: 2023-11-21
Attending: FAMILY MEDICINE
Payer: MEDICARE

## 2023-11-21 VITALS
DIASTOLIC BLOOD PRESSURE: 83 MMHG | RESPIRATION RATE: 16 BRPM | BODY MASS INDEX: 23.92 KG/M2 | WEIGHT: 135 LBS | HEART RATE: 85 BPM | HEIGHT: 63 IN | OXYGEN SATURATION: 99 % | SYSTOLIC BLOOD PRESSURE: 122 MMHG | TEMPERATURE: 98.3 F

## 2023-11-21 DIAGNOSIS — R41.0 CONFUSION: Primary | ICD-10-CM

## 2023-11-21 LAB
ALBUMIN SERPL-MCNC: 4.1 G/DL (ref 3.4–4.8)
ALBUMIN/GLOB SERPL: 2 {RATIO} (ref 0.8–2)
ALP SERPL-CCNC: 66 U/L (ref 25–100)
ALT SERPL-CCNC: 22 U/L (ref 4–36)
AMMONIA PLAS-SCNC: 23 MCG/DL (ref 27–102)
ANION GAP SERPL CALCULATED.3IONS-SCNC: 14 MMOL/L (ref 3–16)
AST SERPL-CCNC: 19 U/L (ref 8–33)
BACTERIA URNS QL MICRO: ABNORMAL /HPF
BASOPHILS # BLD: 0 K/UL (ref 0–0.1)
BASOPHILS NFR BLD: 0.6 %
BILIRUB SERPL-MCNC: 0.3 MG/DL (ref 0.3–1.2)
BILIRUB UR QL STRIP.AUTO: NEGATIVE
BUN SERPL-MCNC: 21 MG/DL (ref 6–20)
CALCIUM SERPL-MCNC: 9.9 MG/DL (ref 8.5–10.5)
CHLORIDE SERPL-SCNC: 99 MMOL/L (ref 98–107)
CLARITY UR: CLEAR
CO2 SERPL-SCNC: 24 MMOL/L (ref 20–30)
COLOR UR: YELLOW
CREAT SERPL-MCNC: 1 MG/DL (ref 0.4–1.2)
EOSINOPHIL # BLD: 0.3 K/UL (ref 0–0.4)
EOSINOPHIL NFR BLD: 4.4 %
EPI CELLS #/AREA URNS HPF: ABNORMAL /HPF (ref 0–5)
ERYTHROCYTE [DISTWIDTH] IN BLOOD BY AUTOMATED COUNT: 16 % (ref 11–16)
GFR SERPLBLD CREATININE-BSD FMLA CKD-EPI: >60 ML/MIN/{1.73_M2}
GLOBULIN SER CALC-MCNC: 2.1 G/DL
GLUCOSE SERPL-MCNC: 180 MG/DL (ref 74–106)
GLUCOSE UR STRIP.AUTO-MCNC: 500 MG/DL
HCT VFR BLD AUTO: 37.5 % (ref 40–54)
HGB BLD-MCNC: 12.2 G/DL (ref 13–18)
HGB UR QL STRIP.AUTO: NEGATIVE
IMM GRANULOCYTES # BLD: 0 K/UL
IMM GRANULOCYTES NFR BLD: 0.1 % (ref 0–5)
KETONES UR STRIP.AUTO-MCNC: NEGATIVE MG/DL
LEUKOCYTE ESTERASE UR QL STRIP.AUTO: NEGATIVE
LYMPHOCYTES # BLD: 1.6 K/UL (ref 1.5–4)
LYMPHOCYTES NFR BLD: 23.4 %
MCH RBC QN AUTO: 31.4 PG (ref 27–32)
MCHC RBC AUTO-ENTMCNC: 32.5 G/DL (ref 31–35)
MCV RBC AUTO: 96.6 FL (ref 80–100)
MONOCYTES # BLD: 0.5 K/UL (ref 0.2–0.8)
MONOCYTES NFR BLD: 7.7 %
NEUTROPHILS # BLD: 4.4 K/UL (ref 2–7.5)
NEUTS SEG NFR BLD: 63.8 %
NITRITE UR QL STRIP.AUTO: NEGATIVE
NT-PROBNP SERPL-MCNC: 1480 PG/ML (ref 0–1800)
PH UR STRIP.AUTO: 5 [PH] (ref 5–8)
PLATELET # BLD AUTO: 228 K/UL (ref 150–400)
PMV BLD AUTO: 9.7 FL (ref 6–10)
POTASSIUM SERPL-SCNC: 4.1 MMOL/L (ref 3.4–5.1)
PROT SERPL-MCNC: 6.2 G/DL (ref 6.4–8.3)
PROT UR STRIP.AUTO-MCNC: 30 MG/DL
RBC # BLD AUTO: 3.88 M/UL (ref 4.5–6)
RBC #/AREA URNS HPF: ABNORMAL /HPF (ref 0–4)
SODIUM SERPL-SCNC: 137 MMOL/L (ref 136–145)
SP GR UR STRIP.AUTO: 1.01 (ref 1–1.03)
TROPONIN, HIGH SENSITIVITY: 46 NG/L (ref 0–22)
TROPONIN, HIGH SENSITIVITY: 53 NG/L (ref 0–22)
UA COMPLETE W REFLEX CULTURE PNL UR: ABNORMAL
UA DIPSTICK W REFLEX MICRO PNL UR: YES
URN SPEC COLLECT METH UR: ABNORMAL
UROBILINOGEN UR STRIP-ACNC: 0.2 E.U./DL
WBC # BLD AUTO: 6.8 K/UL (ref 4–11)
WBC #/AREA URNS HPF: ABNORMAL /HPF (ref 0–5)

## 2023-11-21 PROCEDURE — 99285 EMERGENCY DEPT VISIT HI MDM: CPT

## 2023-11-21 PROCEDURE — 70450 CT HEAD/BRAIN W/O DYE: CPT

## 2023-11-21 PROCEDURE — 36415 COLL VENOUS BLD VENIPUNCTURE: CPT

## 2023-11-21 PROCEDURE — 84484 ASSAY OF TROPONIN QUANT: CPT

## 2023-11-21 PROCEDURE — 81001 URINALYSIS AUTO W/SCOPE: CPT

## 2023-11-21 PROCEDURE — 85025 COMPLETE CBC W/AUTO DIFF WBC: CPT

## 2023-11-21 PROCEDURE — 71045 X-RAY EXAM CHEST 1 VIEW: CPT

## 2023-11-21 PROCEDURE — 6370000000 HC RX 637 (ALT 250 FOR IP): Performed by: FAMILY MEDICINE

## 2023-11-21 PROCEDURE — 83880 ASSAY OF NATRIURETIC PEPTIDE: CPT

## 2023-11-21 PROCEDURE — 2580000003 HC RX 258: Performed by: FAMILY MEDICINE

## 2023-11-21 PROCEDURE — 82140 ASSAY OF AMMONIA: CPT

## 2023-11-21 PROCEDURE — 80053 COMPREHEN METABOLIC PANEL: CPT

## 2023-11-21 RX ORDER — TIRZEPATIDE 2.5 MG/.5ML
2.5 INJECTION, SOLUTION SUBCUTANEOUS WEEKLY
COMMUNITY

## 2023-11-21 RX ORDER — NITROGLYCERIN 0.4 MG/1
0.4 TABLET SUBLINGUAL EVERY 5 MIN PRN
Status: DISCONTINUED | OUTPATIENT
Start: 2023-11-21 | End: 2023-11-22 | Stop reason: HOSPADM

## 2023-11-21 RX ORDER — ASPIRIN 325 MG
325 TABLET ORAL ONCE
Status: COMPLETED | OUTPATIENT
Start: 2023-11-21 | End: 2023-11-21

## 2023-11-21 RX ORDER — 0.9 % SODIUM CHLORIDE 0.9 %
1000 INTRAVENOUS SOLUTION INTRAVENOUS ONCE
Status: COMPLETED | OUTPATIENT
Start: 2023-11-21 | End: 2023-11-21

## 2023-11-21 RX ADMIN — SODIUM CHLORIDE 1000 ML: 9 INJECTION, SOLUTION INTRAVENOUS at 20:57

## 2023-11-21 RX ADMIN — ASPIRIN 325 MG: 325 TABLET ORAL at 20:51

## 2023-11-21 RX ADMIN — Medication 0.4 MG: at 20:52

## 2023-11-21 ASSESSMENT — PAIN DESCRIPTION - LOCATION: LOCATION: CHEST

## 2023-11-21 ASSESSMENT — PAIN SCALES - GENERAL
PAINLEVEL_OUTOF10: 0
PAINLEVEL_OUTOF10: 5
PAINLEVEL_OUTOF10: 0

## 2023-11-21 ASSESSMENT — PAIN DESCRIPTION - DESCRIPTORS: DESCRIPTORS: PRESSURE

## 2023-11-21 ASSESSMENT — PAIN - FUNCTIONAL ASSESSMENT: PAIN_FUNCTIONAL_ASSESSMENT: NONE - DENIES PAIN

## 2023-11-22 NOTE — ED TRIAGE NOTES
Pt arrives to ED accompanied by son with c/o of AMS. Pt has becoming increasingly weaker and struggling \"mentally\" since his discharge from [facility] about a month ago after open heart surgery. Son states that Pt has bouts of confusion, is slow to respond and comprehend. Pt does have PMH of stroke prior to his heart surgery.

## 2023-11-22 NOTE — CARE COORDINATION
Pt's [facility] ([facility] called asking if pt was admitted. CM told them he was DC'd to home. They will continue care at home. No new orders needed.

## 2023-11-22 NOTE — ED NOTES
NIH stroke scale, ASA and Nitro delayed due to Pt not being in room, Pt gone to CT. Gilbert Chauhan RN  11/21/23 2049    Also, EKG delayed.       Gilbert Chauhan RN  11/21/23 2050

## 2023-11-22 NOTE — ED NOTES
Pt informed of d/c instructions, Pt verbalizes understanding. Pt d/c in care of son. Son also verbalizes understanding of discharge.       Johnna Fuentes RN  11/21/23 4992

## 2023-11-22 NOTE — ED PROVIDER NOTES
as CT, Ultrasound and MRI are read by the radiologist. Plain radiographic images are visualized and preliminarily interpreted by the ED Provider with the below findings:        Interpretation per the Radiologist below, if available at the time of this note:    XR CHEST PORTABLE   Final Result      No acute pulmonary disease. CT Head W/O Contrast   Final Result      1. No acute intracranial process. 2.  Small chronic right frontal lobe infarct. XR CHEST PORTABLE    Result Date: 11/21/2023  EXAM: PORTABLE AP CHEST X-RAY INDICATION: Altered Mental Status, COMPARISON: 9/29/2023 FINDINGS: Sternotomy wires and loop recorder are noted. There is no focal consolidation, pleural effusion, or pneumothorax. The cardiomediastinal silhouette is normal. The visible bony thorax is intact. No acute pulmonary disease. CT Head W/O Contrast    Result Date: 11/21/2023  PROCEDURE: CT head without contrast INDICATION: AMS; COMPARISON: 6/26/2023 TECHNIQUE: CT head was performed without contrast according to standard protocol. Axial images and multiplanar reformatted images reviewed. Up-to-date CT equipment and radiation dose reduction techniques were employed. FINDINGS: No acute intra- or extra-axial fluid collections are identified. There is mild cerebral volume loss with associated ventricular dilatation. The basilar cisterns are patent. No mass effect or midline shift is seen. There is a small chronic right frontal cortical infarct. Mild periventricular white matter hypoattenuation is indicative of chronic small vessel ischemic disease. Visualized portions of the orbits, paranasal sinuses, and mastoids appear normal. No acute fracture is identified. 1.  No acute intracranial process. 2.  Small chronic right frontal lobe infarct. No results found.     PROCEDURES   Unless otherwise noted below, none     Procedures    CRITICAL CARE TIME       EMERGENCY DEPARTMENT COURSE and DIFFERENTIAL DIAGNOSIS/MDM:

## 2023-11-27 ENCOUNTER — APPOINTMENT (OUTPATIENT)
Dept: CT IMAGING | Facility: HOSPITAL | Age: 73
End: 2023-11-27
Payer: MEDICARE

## 2023-11-27 ENCOUNTER — HOSPITAL ENCOUNTER (EMERGENCY)
Facility: HOSPITAL | Age: 73
Discharge: HOME | End: 2023-11-28
Attending: EMERGENCY MEDICINE | Admitting: EMERGENCY MEDICINE
Payer: MEDICARE

## 2023-11-27 DIAGNOSIS — F22 PARANOIA: Primary | ICD-10-CM

## 2023-11-27 DIAGNOSIS — R41.0 DELIRIUM: ICD-10-CM

## 2023-11-27 DIAGNOSIS — R53.81 PHYSICAL DEBILITY: ICD-10-CM

## 2023-11-27 LAB
ALBUMIN SERPL-MCNC: 4.1 G/DL (ref 3.5–5.2)
ALBUMIN/GLOB SERPL: 1.3 G/DL
ALP SERPL-CCNC: 67 U/L (ref 39–117)
ALT SERPL W P-5'-P-CCNC: 16 U/L (ref 1–41)
AMPHET+METHAMPHET UR QL: NEGATIVE
AMPHETAMINES UR QL: NEGATIVE
ANION GAP SERPL CALCULATED.3IONS-SCNC: 13.4 MMOL/L (ref 5–15)
APAP SERPL-MCNC: <5 MCG/ML (ref 0–30)
AST SERPL-CCNC: 20 U/L (ref 1–40)
BACTERIA UR QL AUTO: NORMAL /HPF
BARBITURATES UR QL SCN: NEGATIVE
BASOPHILS # BLD AUTO: 0.03 10*3/MM3 (ref 0–0.2)
BASOPHILS NFR BLD AUTO: 0.5 % (ref 0–1.5)
BENZODIAZ UR QL SCN: POSITIVE
BILIRUB SERPL-MCNC: 0.4 MG/DL (ref 0–1.2)
BILIRUB UR QL STRIP: NEGATIVE
BUN SERPL-MCNC: 19 MG/DL (ref 8–23)
BUN/CREAT SERPL: 21.1 (ref 7–25)
BUPRENORPHINE SERPL-MCNC: NEGATIVE NG/ML
CALCIUM SPEC-SCNC: 9.9 MG/DL (ref 8.6–10.5)
CANNABINOIDS SERPL QL: NEGATIVE
CHLORIDE SERPL-SCNC: 100 MMOL/L (ref 98–107)
CLARITY UR: CLEAR
CO2 SERPL-SCNC: 24.6 MMOL/L (ref 22–29)
COCAINE UR QL: NEGATIVE
COLOR UR: YELLOW
CREAT SERPL-MCNC: 0.9 MG/DL (ref 0.76–1.27)
DEPRECATED RDW RBC AUTO: 55.7 FL (ref 37–54)
EGFRCR SERPLBLD CKD-EPI 2021: 91.3 ML/MIN/1.73
EOSINOPHIL # BLD AUTO: 0.19 10*3/MM3 (ref 0–0.4)
EOSINOPHIL NFR BLD AUTO: 3 % (ref 0.3–6.2)
ERYTHROCYTE [DISTWIDTH] IN BLOOD BY AUTOMATED COUNT: 15.6 % (ref 12.3–15.4)
ETHANOL BLD-MCNC: <10 MG/DL (ref 0–10)
ETHANOL UR QL: <0.01 %
FENTANYL UR-MCNC: NEGATIVE NG/ML
GLOBULIN UR ELPH-MCNC: 3.1 GM/DL
GLUCOSE SERPL-MCNC: 120 MG/DL (ref 65–99)
GLUCOSE UR STRIP-MCNC: ABNORMAL MG/DL
HCT VFR BLD AUTO: 40.7 % (ref 37.5–51)
HGB BLD-MCNC: 13.3 G/DL (ref 13–17.7)
HGB UR QL STRIP.AUTO: NEGATIVE
HOLD SPECIMEN: NORMAL
HOLD SPECIMEN: NORMAL
HYALINE CASTS UR QL AUTO: NORMAL /LPF
IMM GRANULOCYTES # BLD AUTO: 0.01 10*3/MM3 (ref 0–0.05)
IMM GRANULOCYTES NFR BLD AUTO: 0.2 % (ref 0–0.5)
KETONES UR QL STRIP: ABNORMAL
LEUKOCYTE ESTERASE UR QL STRIP.AUTO: NEGATIVE
LYMPHOCYTES # BLD AUTO: 1.41 10*3/MM3 (ref 0.7–3.1)
LYMPHOCYTES NFR BLD AUTO: 22.2 % (ref 19.6–45.3)
MAGNESIUM SERPL-MCNC: 1.5 MG/DL (ref 1.6–2.4)
MCH RBC QN AUTO: 31.9 PG (ref 26.6–33)
MCHC RBC AUTO-ENTMCNC: 32.7 G/DL (ref 31.5–35.7)
MCV RBC AUTO: 97.6 FL (ref 79–97)
METHADONE UR QL SCN: NEGATIVE
MONOCYTES # BLD AUTO: 0.56 10*3/MM3 (ref 0.1–0.9)
MONOCYTES NFR BLD AUTO: 8.8 % (ref 5–12)
NEUTROPHILS NFR BLD AUTO: 4.15 10*3/MM3 (ref 1.7–7)
NEUTROPHILS NFR BLD AUTO: 65.3 % (ref 42.7–76)
NITRITE UR QL STRIP: NEGATIVE
NRBC BLD AUTO-RTO: 0 /100 WBC (ref 0–0.2)
OPIATES UR QL: NEGATIVE
OXYCODONE UR QL SCN: NEGATIVE
PCP UR QL SCN: NEGATIVE
PH UR STRIP.AUTO: <=5 [PH] (ref 5–8)
PLATELET # BLD AUTO: 223 10*3/MM3 (ref 140–450)
PMV BLD AUTO: 10.1 FL (ref 6–12)
POTASSIUM SERPL-SCNC: 4.4 MMOL/L (ref 3.5–5.2)
PROT SERPL-MCNC: 7.2 G/DL (ref 6–8.5)
PROT UR QL STRIP: ABNORMAL
RBC # BLD AUTO: 4.17 10*6/MM3 (ref 4.14–5.8)
RBC # UR STRIP: NORMAL /HPF
REF LAB TEST METHOD: NORMAL
SALICYLATES SERPL-MCNC: <0.3 MG/DL
SODIUM SERPL-SCNC: 138 MMOL/L (ref 136–145)
SP GR UR STRIP: >=1.03 (ref 1–1.03)
SQUAMOUS #/AREA URNS HPF: NORMAL /HPF
TRICYCLICS UR QL SCN: NEGATIVE
TROPONIN T SERPL HS-MCNC: 50 NG/L
UROBILINOGEN UR QL STRIP: ABNORMAL
WBC # UR STRIP: NORMAL /HPF
WBC NRBC COR # BLD AUTO: 6.35 10*3/MM3 (ref 3.4–10.8)
WHOLE BLOOD HOLD COAG: NORMAL
WHOLE BLOOD HOLD SPECIMEN: NORMAL

## 2023-11-27 PROCEDURE — 80143 DRUG ASSAY ACETAMINOPHEN: CPT | Performed by: PHYSICIAN ASSISTANT

## 2023-11-27 PROCEDURE — 83735 ASSAY OF MAGNESIUM: CPT | Performed by: PHYSICIAN ASSISTANT

## 2023-11-27 PROCEDURE — 85025 COMPLETE CBC W/AUTO DIFF WBC: CPT | Performed by: PHYSICIAN ASSISTANT

## 2023-11-27 PROCEDURE — 82077 ASSAY SPEC XCP UR&BREATH IA: CPT | Performed by: EMERGENCY MEDICINE

## 2023-11-27 PROCEDURE — 70450 CT HEAD/BRAIN W/O DYE: CPT

## 2023-11-27 PROCEDURE — 81001 URINALYSIS AUTO W/SCOPE: CPT | Performed by: EMERGENCY MEDICINE

## 2023-11-27 PROCEDURE — 36415 COLL VENOUS BLD VENIPUNCTURE: CPT

## 2023-11-27 PROCEDURE — 80053 COMPREHEN METABOLIC PANEL: CPT | Performed by: PHYSICIAN ASSISTANT

## 2023-11-27 PROCEDURE — 84484 ASSAY OF TROPONIN QUANT: CPT | Performed by: PHYSICIAN ASSISTANT

## 2023-11-27 PROCEDURE — 84484 ASSAY OF TROPONIN QUANT: CPT | Performed by: EMERGENCY MEDICINE

## 2023-11-27 PROCEDURE — 80307 DRUG TEST PRSMV CHEM ANLYZR: CPT | Performed by: EMERGENCY MEDICINE

## 2023-11-27 PROCEDURE — 80179 DRUG ASSAY SALICYLATE: CPT | Performed by: PHYSICIAN ASSISTANT

## 2023-11-27 PROCEDURE — 99284 EMERGENCY DEPT VISIT MOD MDM: CPT

## 2023-11-27 PROCEDURE — 93005 ELECTROCARDIOGRAM TRACING: CPT | Performed by: EMERGENCY MEDICINE

## 2023-11-27 PROCEDURE — 99285 EMERGENCY DEPT VISIT HI MDM: CPT

## 2023-11-27 RX ADMIN — MAGNESIUM OXIDE TAB 400 MG (241.3 MG ELEMENTAL MG) 400 MG: 400 (241.3 MG) TAB at 23:59

## 2023-11-28 VITALS
BODY MASS INDEX: 22.64 KG/M2 | TEMPERATURE: 98 F | HEART RATE: 90 BPM | HEIGHT: 63 IN | WEIGHT: 127.8 LBS | DIASTOLIC BLOOD PRESSURE: 96 MMHG | SYSTOLIC BLOOD PRESSURE: 136 MMHG | RESPIRATION RATE: 18 BRPM | OXYGEN SATURATION: 97 %

## 2023-11-28 VITALS
DIASTOLIC BLOOD PRESSURE: 92 MMHG | RESPIRATION RATE: 18 BRPM | SYSTOLIC BLOOD PRESSURE: 137 MMHG | OXYGEN SATURATION: 98 % | HEIGHT: 64 IN | WEIGHT: 152 LBS | BODY MASS INDEX: 25.95 KG/M2 | HEART RATE: 87 BPM | TEMPERATURE: 97.6 F

## 2023-11-28 DIAGNOSIS — R41.0 CONFUSION AND DISORIENTATION: Primary | ICD-10-CM

## 2023-11-28 LAB
ALBUMIN SERPL-MCNC: 4.3 G/DL (ref 3.5–5.2)
ALBUMIN/GLOB SERPL: 1.3 G/DL
ALP SERPL-CCNC: 73 U/L (ref 39–117)
ALT SERPL W P-5'-P-CCNC: 20 U/L (ref 1–41)
AMMONIA BLD-SCNC: <10 UMOL/L (ref 16–60)
ANION GAP SERPL CALCULATED.3IONS-SCNC: 11.5 MMOL/L (ref 5–15)
AST SERPL-CCNC: 19 U/L (ref 1–40)
BASOPHILS # BLD AUTO: 0.04 10*3/MM3 (ref 0–0.2)
BASOPHILS NFR BLD AUTO: 0.6 % (ref 0–1.5)
BILIRUB SERPL-MCNC: 0.4 MG/DL (ref 0–1.2)
BUN SERPL-MCNC: 18 MG/DL (ref 8–23)
BUN/CREAT SERPL: 20 (ref 7–25)
CALCIUM SPEC-SCNC: 10 MG/DL (ref 8.6–10.5)
CHLORIDE SERPL-SCNC: 98 MMOL/L (ref 98–107)
CO2 SERPL-SCNC: 28.5 MMOL/L (ref 22–29)
CREAT SERPL-MCNC: 0.9 MG/DL (ref 0.76–1.27)
DEPRECATED RDW RBC AUTO: 55.7 FL (ref 37–54)
EGFRCR SERPLBLD CKD-EPI 2021: 91.3 ML/MIN/1.73
EOSINOPHIL # BLD AUTO: 0.14 10*3/MM3 (ref 0–0.4)
EOSINOPHIL NFR BLD AUTO: 2 % (ref 0.3–6.2)
ERYTHROCYTE [DISTWIDTH] IN BLOOD BY AUTOMATED COUNT: 15.8 % (ref 12.3–15.4)
GLOBULIN UR ELPH-MCNC: 3.2 GM/DL
GLUCOSE SERPL-MCNC: 173 MG/DL (ref 65–99)
HCT VFR BLD AUTO: 41 % (ref 37.5–51)
HGB BLD-MCNC: 13.6 G/DL (ref 13–17.7)
IMM GRANULOCYTES # BLD AUTO: 0.02 10*3/MM3 (ref 0–0.05)
IMM GRANULOCYTES NFR BLD AUTO: 0.3 % (ref 0–0.5)
LIPASE SERPL-CCNC: 39 U/L (ref 13–60)
LYMPHOCYTES # BLD AUTO: 1.09 10*3/MM3 (ref 0.7–3.1)
LYMPHOCYTES NFR BLD AUTO: 15.2 % (ref 19.6–45.3)
MCH RBC QN AUTO: 31.9 PG (ref 26.6–33)
MCHC RBC AUTO-ENTMCNC: 33.2 G/DL (ref 31.5–35.7)
MCV RBC AUTO: 96.2 FL (ref 79–97)
MONOCYTES # BLD AUTO: 0.49 10*3/MM3 (ref 0.1–0.9)
MONOCYTES NFR BLD AUTO: 6.8 % (ref 5–12)
NEUTROPHILS NFR BLD AUTO: 5.38 10*3/MM3 (ref 1.7–7)
NEUTROPHILS NFR BLD AUTO: 75.1 % (ref 42.7–76)
NRBC BLD AUTO-RTO: 0 /100 WBC (ref 0–0.2)
PLATELET # BLD AUTO: 233 10*3/MM3 (ref 140–450)
PMV BLD AUTO: 9.7 FL (ref 6–12)
POTASSIUM SERPL-SCNC: 4.2 MMOL/L (ref 3.5–5.2)
PROT SERPL-MCNC: 7.5 G/DL (ref 6–8.5)
RBC # BLD AUTO: 4.26 10*6/MM3 (ref 4.14–5.8)
SODIUM SERPL-SCNC: 138 MMOL/L (ref 136–145)
TROPONIN T SERPL HS-MCNC: 50 NG/L
WBC NRBC COR # BLD AUTO: 7.16 10*3/MM3 (ref 3.4–10.8)

## 2023-11-28 PROCEDURE — 80053 COMPREHEN METABOLIC PANEL: CPT | Performed by: NURSE PRACTITIONER

## 2023-11-28 PROCEDURE — 83690 ASSAY OF LIPASE: CPT | Performed by: NURSE PRACTITIONER

## 2023-11-28 PROCEDURE — 82140 ASSAY OF AMMONIA: CPT | Performed by: NURSE PRACTITIONER

## 2023-11-28 PROCEDURE — 99283 EMERGENCY DEPT VISIT LOW MDM: CPT

## 2023-11-28 PROCEDURE — 85025 COMPLETE CBC W/AUTO DIFF WBC: CPT | Performed by: NURSE PRACTITIONER

## 2023-11-28 RX ORDER — TIRZEPATIDE 2.5 MG/.5ML
2.5 INJECTION, SOLUTION SUBCUTANEOUS WEEKLY
COMMUNITY

## 2023-11-28 RX ORDER — SODIUM CHLORIDE 0.9 % (FLUSH) 0.9 %
10 SYRINGE (ML) INJECTION AS NEEDED
Status: DISCONTINUED | OUTPATIENT
Start: 2023-11-28 | End: 2023-11-28 | Stop reason: HOSPADM

## 2023-11-28 NOTE — ED NOTES
"Pt son called and this time to report that he just got off of work and would be by to pick his dad up \"in about an hour.\" RN notified.   "

## 2023-11-28 NOTE — CONSULTS
Case Management consulted due to possible LTC placement.  The patient does have some confusion with noted short term memory impairment.  He is able to recall long past.  The patient's son is his POA and he does not feel his father is able to care for himself at home any longer.  He does have some paranoia while I am in room talking with him.  During our encounter he is alert but says he is afraid.  I asked what he is afraid of, and he says that he is afraid he is going to die.  I reassured him that the assessment from the MD proved that he is medically stable.  This was able to calm the patient a little.  He requested something to eat; he was given some peanut butter crackers after I cleared with his nurse.  In speaking further with the son he gives consent for referrals to be sent to [facility] and [facility].  The son states that the patient has siblings that are current residents at the facility.  Will send referral to [facility] and [facility].  Son is aware of DC orders and has been given instructions about the LTC admission process.  He has already been in communication with staff at [facility] and [facility].

## 2023-11-28 NOTE — DISCHARGE PLACEMENT REQUEST
"Jc Streeter (73 y.o. Male)      Long Term Care Referral      Date of Birth   1950    Social Security Number       Address   [address]  [address] [place] [id number]    Home Phone   [phone]    MRN   [id number]       Sabianist   None    Marital Status                               Admission Date   11/28/23    Admission Type   Emergency    Admitting Provider       Attending Provider   Juan Shabazz MD    Department, Room/Bed   [facility] EMERGENCY DEPARTMENT, 09/09       Discharge Date       Discharge Disposition       Discharge Destination                                 Attending Provider: Juan Shabazz MD    Allergies: Penicillins    Isolation: None   Infection: None   Code Status: Prior    Ht: 162.6 cm (64\")   Wt: 68.9 kg (152 lb)    Admission Cmt: None   Principal Problem: None                  Active Insurance as of 11/28/2023       Primary Coverage       Payor Plan Insurance Group Employer/Plan Group    MEDICARE MEDICARE B ONLY        Payor Plan Address Payor Plan Phone Number Payor Plan Fax Number Effective Dates    [address] [address] [phone]  11/1/2017 - None Entered    [facility] [id number]         Subscriber Name Subscriber Birth Date Member ID       JC STREETER 1950 [id number]               Secondary Coverage       Payor Plan Insurance Group Employer/Plan Group    [employer] [place]        Payor Plan Address Payor Plan Phone Number Payor Plan Fax Number Effective Dates    [address] [address] [phone]  6/28/2023 - None Entered    [facility] [id number]         Subscriber Name Subscriber Birth Date Member ID       JC STREETER 1950 [id number]                     Emergency Contacts        (Rel.) Home Phone Work Phone Mobile Phone    JC STREETER (Son) [phone] -- --    FERDINANDSACHA (Daughter) [phone] -- --              Insurance Information                  MEDICARE/MEDICARE B ONLY Phone: " [phone]    Subscriber: Dann Metz Subscriber#: [id number]    Group#: -- Precert#: --        [facility]/WELLCARE MEDICAID Phone: [phone]    Subscriber: Dann Metz Subscriber#: [id number]    Group#: -- Precert#: --          Problem List             Codes Noted - Resolved       Non-Hospital    Type 1 myocardial infarction ICD-10-CM: I21.9  ICD-9-CM: 410.91 10/1/2023 - Present    Coronary artery disease ICD-10-CM: I25.10  ICD-9-CM: 414.00 10/1/2023 - Present    NSTEMI (non-ST elevated myocardial infarction) ICD-10-CM: I21.4  ICD-9-CM: 410.70 9/30/2023 - Present    Acute CVA (cerebrovascular accident) ICD-10-CM: I63.9  ICD-9-CM: 434.91 6/28/2023 - Present    Essential hypertension ICD-10-CM: I10  ICD-9-CM: 401.9 6/28/2023 - Present    Type 2 diabetes mellitus, without long-term current use of insulin ICD-10-CM: E11.9  ICD-9-CM: 250.00 6/28/2023 - Present    Vitamin D deficiency ICD-10-CM: E55.9  ICD-9-CM: 268.9 6/26/2023 - Present    RUE weakness ICD-10-CM: R29.898  ICD-9-CM: 729.89 6/26/2023 - Present     History & Physical    No notes of this type exist for this encounter.          Discharge Summaryl        Divya Nash at 11/28/23 1628          Case Management/Social Work    Patient Name:  Dann Metz  YOB: 1950  MRN: [id number]  Admit Date:  11/28/2023      Case Management consulted due to possible LTC placement.  The patient does have some confusion with noted short term memory impairment.  He is able to recall long past.  The patient's son is his POA and he does not feel his father is able to care for himself at home any longer.  He does have some paranoia while I am in room talking with him.  During our encounter he is alert but says he is afraid.  I asked what he is afraid of, and he says that he is afraid he is going to die.  I reassured him that the assessment from the MD proved that he is medically stable.  This was able to calm the patient a little.  He  requested something to eat; he was given some peanut butter crackers after I cleared with his nurse.  In speaking further with the son he gives consent for referrals to be sent to [facility] and [facility].  The son states that the patient has siblings that are current residents at the facility.  Will send referral to [facility].  Son is aware of DC orders and has been given instructions about the LTC admission process.  He has already been in communication with staff at [facility]      Electronically signed by:  Divya Nash  11/28/23 16:28 EST    Electronically signed by Divya Nash at 11/28/23 1628       Divya Nash at 11/28/23 1615        Consult Orders    1. Inpatient Case Management  Consult [[id number]] ordered by Juan Shabazz MD at 11/28/23 1610                 Case Management consulted due to possible LTC placement.  The patient does have some confusion with noted short term memory impairment.  He is able to recall long past.  The patient's son is his POA and he does not feel his father is able to care for himself at home any longer.  He does have some paranoia while I am in room talking with him.  During our encounter he is alert but says he is afraid.  I asked what he is afraid of, and he says that he is afraid he is going to die.  I reassured him that the assessment from the MD proved that he is medically stable.  This was able to calm the patient a little.  He requested something to eat; he was given some peanut butter crackers after I cleared with his nurse.  In speaking further with the son he gives consent for referrals to be sent to [facility] and [facility].  The son states that the patient has siblings that are current residents at the facility.  Will send referral to [facility] and [facility].  Son is aware of DC orders and has been given instructions about the LTC admission process.  He has already been in communication with staff at [facility].    Electronically signed by Divya Nash at 11/28/23 1622       Justin Velásquez APRN at 11/28/23 1330          Subjective  History of Present Illness:    Chief Complaint: Altered mental status  History of Present Illness: 73-year-old male patient comes into the ED today via EMS after son has called EMS stating that his father is altered.      Nurses Notes reviewed and agree, including vitals, allergies, social history and prior medical history.       Allergies:    Penicillins      Past Surgical History:   Procedure Laterality Date    CARDIAC CATHETERIZATION      CARDIAC CATHETERIZATION N/A 9/30/2023    Procedure: Coronary angiography;  Surgeon: Vince Redding MD;  [facility];  Service: Cardiology;  Laterality: N/A;    CORONARY ARTERY BYPASS GRAFT N/A 10/6/2023    Procedure: MEDIAN STERNOTOMY, CORONARY ARTERY BYPASS GRAFTING X 2 UTILIZING THE LEFT AND RIGHT INTERNAL MAMMARY ARTERIES, ENDOSCOPIC VEIN HARVESTING OF THE RIGHT GREATER SAPHENOUS VEIN, EXPLORATION OF THE LEFT LEG, PULMONARY VEIN ISOLATION, POSTERIOR LEFT ATRIAL WALL ABLATION (ROOF AND FLOOR), LEFT ATRIAL APPENDAGE LIGATION, TRANSESOPHAGEAL ECHOCARDIOGRAM PER ANESTHESIA;  Surgeon: Guero Mora M         Social History     Socioeconomic History    Marital status:    Tobacco Use    Smoking status: Never    Smokeless tobacco: Never   Vaping Use    Vaping Use: Never used   Substance and Sexual Activity    Alcohol use: Never    Drug use: Never    Sexual activity: Defer         Family History   Problem Relation Age of Onset    Heart disease Mother     Stroke Father     Stroke Sister     Stroke Sister     Stroke Brother     Stroke Brother     Stroke Brother     Stroke Brother     Stroke Brother        REVIEW OF SYSTEMS: All systems reviewed and not pertinent unless noted.    Review of Systems    Objective    Physical Exam      Procedures    ED Course:         Lab Results (last 24 hours)       Procedure  Component Value Units Date/Time    Urine Drug Screen - Urine, Clean Catch [[id number]]  (Abnormal) Collected: 11/27/23 2045    Specimen: Urine, Clean Catch Updated: 11/27/23 2124     THC, Screen, Urine Negative     Phencyclidine (PCP), Urine Negative     Cocaine Screen, Urine Negative     Methamphetamine, Ur Negative     Opiate Screen Negative     Amphetamine Screen, Urine Negative     Benzodiazepine Screen, Urine Positive     Tricyclic Antidepressants Screen Negative     Methadone Screen, Urine Negative     Barbiturates Screen, Urine Negative     Oxycodone Screen, Urine Negative     Buprenorphine, Screen, Urine Negative    Narrative:      Cutoff For Drugs Screened:    Amphetamines               500 ng/ml  Barbiturates               200 ng/ml  Benzodiazepines            150 ng/ml  Cocaine                    150 ng/ml  Methadone                  200 ng/ml  Opiates                    100 ng/ml  Phencyclidine               25 ng/ml  THC                         50 ng/ml  Methamphetamine            500 ng/ml  Tricyclic Antidepressants  300 ng/ml  Oxycodone                  100 ng/ml  Buprenorphine               10 ng/ml    The normal value for all drugs tested is negative. This report includes unconfirmed screening results, with the cutoff values listed, to be used for medical treatment purposes only.  Unconfirmed results must not be used for non-medical purposes such as employment or legal testing.  Clinical consideration should be applied to any drug of abuse test, particularly when unconfirmed results are used.      Urinalysis With Microscopic If Indicated (No Culture) - Urine, Clean Catch [[id number]]  (Abnormal) Collected: 11/27/23 2045    Specimen: Urine, Clean Catch Updated: 11/27/23 2100     Color, UA Yellow     Appearance, UA Clear     pH, UA <=5.0     Specific Gravity, UA >=1.030     Glucose, UA >=1000 mg/dL (3+)     Ketones, UA Trace     Bilirubin, UA Negative     Blood, UA Negative     Protein, UA 30 mg/dL (1+)      Leuk Esterase, UA Negative     Nitrite, UA Negative     Urobilinogen, UA 0.2 E.U./dL    Fentanyl, Urine - Urine, Clean Catch [[id number]]  (Normal) Collected: 11/27/23 2045    Specimen: Urine, Clean Catch Updated: 11/27/23 2110     Fentanyl, Urine Negative    Narrative:      Negative Threshold:      Fentanyl 5 ng/mL     The normal value for the drug tested is negative. This report includes final unconfirmed screening results to be used for medical treatment purposes only. Unconfirmed results must not be used for non-medical purposes such as employment or legal testing. Clinical consideration should be applied to any drug of abuse test, particularly when unconfirmed results are used.           Urinalysis, Microscopic Only - Urine, Clean Catch [[id number]] Collected: 11/27/23 2045    Specimen: Urine, Clean Catch Updated: 11/27/23 2124     RBC, UA None Seen /HPF      WBC, UA 0-2 /HPF      Bacteria, UA None Seen /HPF      Squamous Epithelial Cells, UA None Seen /HPF      Hyaline Casts, UA None Seen /LPF      Methodology Manual Light Microscopy    Ethanol [[id number]] Collected: 11/27/23 2108    Specimen: Blood Updated: 11/27/23 2138     Ethanol <10 mg/dL      Ethanol % <0.010 %     Narrative:      This result is for medical use only and should not be used for forensic purposes.    CBC Auto Differential [[id number]]  (Abnormal) Collected: 11/27/23 2108    Specimen: Blood Updated: 11/27/23 2122     WBC 6.35 10*3/mm3      RBC 4.17 10*6/mm3      Hemoglobin 13.3 g/dL      Hematocrit 40.7 %      MCV 97.6 fL      MCH 31.9 pg      MCHC 32.7 g/dL      RDW 15.6 %      RDW-SD 55.7 fl      MPV 10.1 fL      Platelets 223 10*3/mm3      Neutrophil % 65.3 %      Lymphocyte % 22.2 %      Monocyte % 8.8 %      Eosinophil % 3.0 %      Basophil % 0.5 %      Immature Grans % 0.2 %      Neutrophils, Absolute 4.15 10*3/mm3      Lymphocytes, Absolute 1.41 10*3/mm3      Monocytes, Absolute 0.56 10*3/mm3      Eosinophils, Absolute 0.19 10*3/mm3       Basophils, Absolute 0.03 10*3/mm3      Immature Grans, Absolute 0.01 10*3/mm3      nRBC 0.0 /100 WBC     Comprehensive Metabolic Panel [[id number]]  (Abnormal) Collected: 11/27/23 2108    Specimen: Blood Updated: 11/27/23 2138     Glucose 120 mg/dL      BUN 19 mg/dL      Creatinine 0.90 mg/dL      Sodium 138 mmol/L      Potassium 4.4 mmol/L      Comment: Slight hemolysis detected by analyzer. Result may be falsely elevated.        Chloride 100 mmol/L      CO2 24.6 mmol/L      Calcium 9.9 mg/dL      Total Protein 7.2 g/dL      Albumin 4.1 g/dL      ALT (SGPT) 16 U/L      AST (SGOT) 20 U/L      Comment: Slight hemolysis detected by analyzer. Result may be falsely elevated.        Alkaline Phosphatase 67 U/L      Total Bilirubin 0.4 mg/dL      Globulin 3.1 gm/dL      A/G Ratio 1.3 g/dL      BUN/Creatinine Ratio 21.1     Anion Gap 13.4 mmol/L      eGFR 91.3 mL/min/1.73     Narrative:      GFR Normal >60  Chronic Kidney Disease <60  Kidney Failure <15    The GFR formula is only valid for adults with stable renal function between ages 18 and 70.    Magnesium [[id number]]  (Abnormal) Collected: 11/27/23 2108    Specimen: Blood Updated: 11/27/23 2138     Magnesium 1.5 mg/dL     Salicylate Level [[id number]]  (Normal) Collected: 11/27/23 2108    Specimen: Blood Updated: 11/27/23 2138     Salicylate <0.3 mg/dL     Acetaminophen Level [[id number]]  (Normal) Collected: 11/27/23 2108    Specimen: Blood Updated: 11/27/23 2138     Acetaminophen <5.0 mcg/mL     Narrative:      Toxic = Greater than 150 mcg/mL    Single High Sensitivity Troponin T [[id number]]  (Abnormal) Collected: 11/27/23 2108    Specimen: Blood Updated: 11/27/23 2150     HS Troponin T 50 ng/L     Narrative:      High Sensitive Troponin T Reference Range:  <14.0 ng/L- Negative Female for AMI  <22.0 ng/L- Negative Male for AMI  >=14 - Abnormal Female indicating possible myocardial injury.  >=22 - Abnormal Male indicating possible myocardial injury.   Clinicians  would have to utilize clinical acumen, EKG, Troponin, and serial changes to determine if it is an Acute Myocardial Infarction or myocardial injury due to an underlying chronic condition.         Single High Sensitivity Troponin T [[id number]]  (Abnormal) Collected: 11/27/23 2341    Specimen: Blood Updated: 11/28/23 0009     HS Troponin T 50 ng/L     Narrative:      High Sensitive Troponin T Reference Range:  <14.0 ng/L- Negative Female for AMI  <22.0 ng/L- Negative Male for AMI  >=14 - Abnormal Female indicating possible myocardial injury.  >=22 - Abnormal Male indicating possible myocardial injury.   Clinicians would have to utilize clinical acumen, EKG, Troponin, and serial changes to determine if it is an Acute Myocardial Infarction or myocardial injury due to an underlying chronic condition.         CBC & Differential [[id number]]  (Abnormal) Collected: 11/28/23 1446    Specimen: Blood Updated: 11/28/23 1504    Narrative:      The following orders were created for panel order CBC & Differential.  Procedure                               Abnormality         Status                     ---------                               -----------         ------                     CBC Auto Differential[[id number]]        Abnormal            Final result                 Please view results for these tests on the individual orders.    Comprehensive Metabolic Panel [[id number]]  (Abnormal) Collected: 11/28/23 1446    Specimen: Blood Updated: 11/28/23 1538     Glucose 173 mg/dL      BUN 18 mg/dL      Creatinine 0.90 mg/dL      Sodium 138 mmol/L      Potassium 4.2 mmol/L      Chloride 98 mmol/L      CO2 28.5 mmol/L      Calcium 10.0 mg/dL      Total Protein 7.5 g/dL      Albumin 4.3 g/dL      ALT (SGPT) 20 U/L      AST (SGOT) 19 U/L      Alkaline Phosphatase 73 U/L      Total Bilirubin 0.4 mg/dL      Globulin 3.2 gm/dL      A/G Ratio 1.3 g/dL      BUN/Creatinine Ratio 20.0     Anion Gap 11.5 mmol/L      eGFR 91.3 mL/min/1.73      Narrative:      GFR Normal >60  Chronic Kidney Disease <60  Kidney Failure <15    The GFR formula is only valid for adults with stable renal function between ages 18 and 70.    Lipase [[id number]]  (Normal) Collected: 11/28/23 1446    Specimen: Blood Updated: 11/28/23 1538     Lipase 39 U/L     Ammonia [[id number]]  (Abnormal) Collected: 11/28/23 1446    Specimen: Blood Updated: 11/28/23 1531     Ammonia <10 umol/L     CBC Auto Differential [[id number]]  (Abnormal) Collected: 11/28/23 1446    Specimen: Blood Updated: 11/28/23 1504     WBC 7.16 10*3/mm3      RBC 4.26 10*6/mm3      Hemoglobin 13.6 g/dL      Hematocrit 41.0 %      MCV 96.2 fL      MCH 31.9 pg      MCHC 33.2 g/dL      RDW 15.8 %      RDW-SD 55.7 fl      MPV 9.7 fL      Platelets 233 10*3/mm3      Neutrophil % 75.1 %      Lymphocyte % 15.2 %      Monocyte % 6.8 %      Eosinophil % 2.0 %      Basophil % 0.6 %      Immature Grans % 0.3 %      Neutrophils, Absolute 5.38 10*3/mm3      Lymphocytes, Absolute 1.09 10*3/mm3      Monocytes, Absolute 0.49 10*3/mm3      Eosinophils, Absolute 0.14 10*3/mm3      Basophils, Absolute 0.04 10*3/mm3      Immature Grans, Absolute 0.02 10*3/mm3      nRBC 0.0 /100 WBC              CT Head Without Contrast    Result Date: 11/27/2023  FINAL REPORT TECHNIQUE: Multiple axial CT images were performed from the foramen magnum to the vertex. This study was performed with techniques to keep radiation doses as low as reasonably achievable (ALARA). Individualized dose reduction techniques using automated exposure control or adjustment of mA and/or kV according to the patient's size were employed. CLINICAL HISTORY: ams/paranoia FINDINGS: There is mild generalized cerebral atrophy.  There is decreased attenuation in the white matter, consistent with mild small vessel chronic ischemic change.  The ventricles are enlarged. There is no evidence of edema or hemorrhage.  No masses are identified.  No extra-axial fluid is seen.  The paranasal  sinuses are unremarkable.     Impression: Atrophy and chronic changes without acute process. Authenticated and Electronically Signed by Dakotah Juarez MD on 11/27/2023 09:53:17 PM      Medical Decision Making  73-year-old male patient comes into the ED today via EMS after son has called EMS stating that his father is altered.    DDX: includes but is not limited to: Urinary tract infection, hyper ammonium, head injury, other    Problems Addressed:  Confusion and disorientation: complicated acute illness or injury    Amount and/or Complexity of Data Reviewed  External Data Reviewed: labs, radiology, ECG and notes.     Details: I have personally reviewed labs, radiology EKG and notes from patient's chart  Labs: ordered. Decision-making details documented in ED Course.     Details: I have personally reviewed and documented all results  Radiology: ordered. Decision-making details documented in ED Course.     Details: I have personally reviewed and documented all results  Discussion of management or test interpretation with external provider(s): Discussed assessment, treatment and plan with ER attending    Risk  Prescription drug management.  Risk Details: I have discussed with patient the finding of the test preformed today. Patient has been diagnosed with confusion and disorientation and will be discharged home.  Patient requested to follow-up with primary care provider within the next 7 days for reevaluation. Strict return precautions have been given and patient verbalizes understanding                  Final diagnoses:   Confusion and disorientation          Justin Velásquez APRN  11/28/23 1552      Electronically signed by Justin Velásquez APRN at 11/28/23 1552       Justin Velásquez APRN at 11/28/23 1550    Electronically signed by Justin Velásquez APRN at 11/28/23 1550       Kyler Leavitt, PCT at 11/28/23 1514       Summary:MONITOR NOTE                 Transferred Son, POA, to RN at this  time.    Electronically signed by Kyler Leavitt PCT at 11/28/23 1515          Emergency Department Notes        Justin Velásquez APRN at 11/28/23 1550          Subjective  History of Present Illness:    Chief Complaint: Altered mental status  History of Present Illness: 73-year-old male patient comes into the ED today via EMS after son has called EMS stating that his father is altered.      Nurses Notes reviewed and agree, including vitals, allergies, social history and prior medical history.       Allergies:    Penicillins      Past Surgical History:   Procedure Laterality Date    CARDIAC CATHETERIZATION      CARDIAC CATHETERIZATION N/A 9/30/2023    Procedure: Coronary angiography;  Surgeon: Vince Redding MD;  Location: [facility] CATH INVASIVE LOCATION;  Service: Cardiology;  Laterality: N/A;    CORONARY ARTERY BYPASS GRAFT N/A 10/6/2023    Procedure: MEDIAN STERNOTOMY, CORONARY ARTERY BYPASS GRAFTING X 2 UTILIZING THE LEFT AND RIGHT INTERNAL MAMMARY ARTERIES, ENDOSCOPIC VEIN HARVESTING OF THE RIGHT GREATER SAPHENOUS VEIN, EXPLORATION OF THE LEFT LEG, PULMONARY VEIN ISOLATION, POSTERIOR LEFT ATRIAL WALL ABLATION (ROOF AND FLOOR), LEFT ATRIAL APPENDAGE LIGATION, TRANSESOPHAGEAL ECHOCARDIOGRAM PER ANESTHESIA;  Surgeon: Guero Mora M         Social History     Socioeconomic History    Marital status:    Tobacco Use    Smoking status: Never    Smokeless tobacco: Never   Vaping Use    Vaping Use: Never used   Substance and Sexual Activity    Alcohol use: Never    Drug use: Never    Sexual activity: Defer         Family History   Problem Relation Age of Onset    Heart disease Mother     Stroke Father     Stroke Sister     Stroke Sister     Stroke Brother     Stroke Brother     Stroke Brother     Stroke Brother     Stroke Brother        REVIEW OF SYSTEMS: All systems reviewed and not pertinent unless noted.    Review of Systems    Objective    Physical Exam      Procedures    ED Course:         Lab  Results (last 24 hours)       Procedure Component Value Units Date/Time    Urine Drug Screen - Urine, Clean Catch [[id number]]  (Abnormal) Collected: 11/27/23 2045    Specimen: Urine, Clean Catch Updated: 11/27/23 2124     THC, Screen, Urine Negative     Phencyclidine (PCP), Urine Negative     Cocaine Screen, Urine Negative     Methamphetamine, Ur Negative     Opiate Screen Negative     Amphetamine Screen, Urine Negative     Benzodiazepine Screen, Urine Positive     Tricyclic Antidepressants Screen Negative     Methadone Screen, Urine Negative     Barbiturates Screen, Urine Negative     Oxycodone Screen, Urine Negative     Buprenorphine, Screen, Urine Negative    Narrative:      Cutoff For Drugs Screened:    Amphetamines               500 ng/ml  Barbiturates               200 ng/ml  Benzodiazepines            150 ng/ml  Cocaine                    150 ng/ml  Methadone                  200 ng/ml  Opiates                    100 ng/ml  Phencyclidine               25 ng/ml  THC                         50 ng/ml  Methamphetamine            500 ng/ml  Tricyclic Antidepressants  300 ng/ml  Oxycodone                  100 ng/ml  Buprenorphine               10 ng/ml    The normal value for all drugs tested is negative. This report includes unconfirmed screening results, with the cutoff values listed, to be used for medical treatment purposes only.  Unconfirmed results must not be used for non-medical purposes such as employment or legal testing.  Clinical consideration should be applied to any drug of abuse test, particularly when unconfirmed results are used.      Urinalysis With Microscopic If Indicated (No Culture) - Urine, Clean Catch [[id number]]  (Abnormal) Collected: 11/27/23 2045    Specimen: Urine, Clean Catch Updated: 11/27/23 2100     Color, UA Yellow     Appearance, UA Clear     pH, UA <=5.0     Specific Gravity, UA >=1.030     Glucose, UA >=1000 mg/dL (3+)     Ketones, UA Trace     Bilirubin, UA Negative     Blood,  UA Negative     Protein, UA 30 mg/dL (1+)     Leuk Esterase, UA Negative     Nitrite, UA Negative     Urobilinogen, UA 0.2 E.U./dL    Fentanyl, Urine - Urine, Clean Catch [[id number]]  (Normal) Collected: 11/27/23 2045    Specimen: Urine, Clean Catch Updated: 11/27/23 2110     Fentanyl, Urine Negative    Narrative:      Negative Threshold:      Fentanyl 5 ng/mL     The normal value for the drug tested is negative. This report includes final unconfirmed screening results to be used for medical treatment purposes only. Unconfirmed results must not be used for non-medical purposes such as employment or legal testing. Clinical consideration should be applied to any drug of abuse test, particularly when unconfirmed results are used.           Urinalysis, Microscopic Only - Urine, Clean Catch [[id number]] Collected: 11/27/23 2045    Specimen: Urine, Clean Catch Updated: 11/27/23 2124     RBC, UA None Seen /HPF      WBC, UA 0-2 /HPF      Bacteria, UA None Seen /HPF      Squamous Epithelial Cells, UA None Seen /HPF      Hyaline Casts, UA None Seen /LPF      Methodology Manual Light Microscopy    Ethanol [[id number]] Collected: 11/27/23 2108    Specimen: Blood Updated: 11/27/23 2138     Ethanol <10 mg/dL      Ethanol % <0.010 %     Narrative:      This result is for medical use only and should not be used for forensic purposes.    CBC Auto Differential [[id number]]  (Abnormal) Collected: 11/27/23 2108    Specimen: Blood Updated: 11/27/23 2122     WBC 6.35 10*3/mm3      RBC 4.17 10*6/mm3      Hemoglobin 13.3 g/dL      Hematocrit 40.7 %      MCV 97.6 fL      MCH 31.9 pg      MCHC 32.7 g/dL      RDW 15.6 %      RDW-SD 55.7 fl      MPV 10.1 fL      Platelets 223 10*3/mm3      Neutrophil % 65.3 %      Lymphocyte % 22.2 %      Monocyte % 8.8 %      Eosinophil % 3.0 %      Basophil % 0.5 %      Immature Grans % 0.2 %      Neutrophils, Absolute 4.15 10*3/mm3      Lymphocytes, Absolute 1.41 10*3/mm3      Monocytes, Absolute 0.56 10*3/mm3       Eosinophils, Absolute 0.19 10*3/mm3      Basophils, Absolute 0.03 10*3/mm3      Immature Grans, Absolute 0.01 10*3/mm3      nRBC 0.0 /100 WBC     Comprehensive Metabolic Panel [[id number]]  (Abnormal) Collected: 11/27/23 2108    Specimen: Blood Updated: 11/27/23 2138     Glucose 120 mg/dL      BUN 19 mg/dL      Creatinine 0.90 mg/dL      Sodium 138 mmol/L      Potassium 4.4 mmol/L      Comment: Slight hemolysis detected by analyzer. Result may be falsely elevated.        Chloride 100 mmol/L      CO2 24.6 mmol/L      Calcium 9.9 mg/dL      Total Protein 7.2 g/dL      Albumin 4.1 g/dL      ALT (SGPT) 16 U/L      AST (SGOT) 20 U/L      Comment: Slight hemolysis detected by analyzer. Result may be falsely elevated.        Alkaline Phosphatase 67 U/L      Total Bilirubin 0.4 mg/dL      Globulin 3.1 gm/dL      A/G Ratio 1.3 g/dL      BUN/Creatinine Ratio 21.1     Anion Gap 13.4 mmol/L      eGFR 91.3 mL/min/1.73     Narrative:      GFR Normal >60  Chronic Kidney Disease <60  Kidney Failure <15    The GFR formula is only valid for adults with stable renal function between ages 18 and 70.    Magnesium [[id number]]  (Abnormal) Collected: 11/27/23 2108    Specimen: Blood Updated: 11/27/23 2138     Magnesium 1.5 mg/dL     Salicylate Level [[id number]]  (Normal) Collected: 11/27/23 2108    Specimen: Blood Updated: 11/27/23 2138     Salicylate <0.3 mg/dL     Acetaminophen Level [[id number]]  (Normal) Collected: 11/27/23 2108    Specimen: Blood Updated: 11/27/23 2138     Acetaminophen <5.0 mcg/mL     Narrative:      Toxic = Greater than 150 mcg/mL    Single High Sensitivity Troponin T [[id number]]  (Abnormal) Collected: 11/27/23 2108    Specimen: Blood Updated: 11/27/23 2150     HS Troponin T 50 ng/L     Narrative:      High Sensitive Troponin T Reference Range:  <14.0 ng/L- Negative Female for AMI  <22.0 ng/L- Negative Male for AMI  >=14 - Abnormal Female indicating possible myocardial injury.  >=22 - Abnormal Male indicating  possible myocardial injury.   Clinicians would have to utilize clinical acumen, EKG, Troponin, and serial changes to determine if it is an Acute Myocardial Infarction or myocardial injury due to an underlying chronic condition.         Single High Sensitivity Troponin T [[id number]]  (Abnormal) Collected: 11/27/23 2341    Specimen: Blood Updated: 11/28/23 0009     HS Troponin T 50 ng/L     Narrative:      High Sensitive Troponin T Reference Range:  <14.0 ng/L- Negative Female for AMI  <22.0 ng/L- Negative Male for AMI  >=14 - Abnormal Female indicating possible myocardial injury.  >=22 - Abnormal Male indicating possible myocardial injury.   Clinicians would have to utilize clinical acumen, EKG, Troponin, and serial changes to determine if it is an Acute Myocardial Infarction or myocardial injury due to an underlying chronic condition.         CBC & Differential [[id number]]  (Abnormal) Collected: 11/28/23 1446    Specimen: Blood Updated: 11/28/23 1504    Narrative:      The following orders were created for panel order CBC & Differential.  Procedure                               Abnormality         Status                     ---------                               -----------         ------                     CBC Auto Differential[[id number]]        Abnormal            Final result                 Please view results for these tests on the individual orders.    Comprehensive Metabolic Panel [[id number]]  (Abnormal) Collected: 11/28/23 1446    Specimen: Blood Updated: 11/28/23 1538     Glucose 173 mg/dL      BUN 18 mg/dL      Creatinine 0.90 mg/dL      Sodium 138 mmol/L      Potassium 4.2 mmol/L      Chloride 98 mmol/L      CO2 28.5 mmol/L      Calcium 10.0 mg/dL      Total Protein 7.5 g/dL      Albumin 4.3 g/dL      ALT (SGPT) 20 U/L      AST (SGOT) 19 U/L      Alkaline Phosphatase 73 U/L      Total Bilirubin 0.4 mg/dL      Globulin 3.2 gm/dL      A/G Ratio 1.3 g/dL      BUN/Creatinine Ratio 20.0     Anion Gap 11.5  mmol/L      eGFR 91.3 mL/min/1.73     Narrative:      GFR Normal >60  Chronic Kidney Disease <60  Kidney Failure <15    The GFR formula is only valid for adults with stable renal function between ages 18 and 70.    Lipase [[id number]]  (Normal) Collected: 11/28/23 1446    Specimen: Blood Updated: 11/28/23 1538     Lipase 39 U/L     Ammonia [[id number]]  (Abnormal) Collected: 11/28/23 1446    Specimen: Blood Updated: 11/28/23 1531     Ammonia <10 umol/L     CBC Auto Differential [[id number]]  (Abnormal) Collected: 11/28/23 1446    Specimen: Blood Updated: 11/28/23 1504     WBC 7.16 10*3/mm3      RBC 4.26 10*6/mm3      Hemoglobin 13.6 g/dL      Hematocrit 41.0 %      MCV 96.2 fL      MCH 31.9 pg      MCHC 33.2 g/dL      RDW 15.8 %      RDW-SD 55.7 fl      MPV 9.7 fL      Platelets 233 10*3/mm3      Neutrophil % 75.1 %      Lymphocyte % 15.2 %      Monocyte % 6.8 %      Eosinophil % 2.0 %      Basophil % 0.6 %      Immature Grans % 0.3 %      Neutrophils, Absolute 5.38 10*3/mm3      Lymphocytes, Absolute 1.09 10*3/mm3      Monocytes, Absolute 0.49 10*3/mm3      Eosinophils, Absolute 0.14 10*3/mm3      Basophils, Absolute 0.04 10*3/mm3      Immature Grans, Absolute 0.02 10*3/mm3      nRBC 0.0 /100 WBC              CT Head Without Contrast    Result Date: 11/27/2023  FINAL REPORT TECHNIQUE: Multiple axial CT images were performed from the foramen magnum to the vertex. This study was performed with techniques to keep radiation doses as low as reasonably achievable (ALARA). Individualized dose reduction techniques using automated exposure control or adjustment of mA and/or kV according to the patient's size were employed. CLINICAL HISTORY: ams/paranoia FINDINGS: There is mild generalized cerebral atrophy.  There is decreased attenuation in the white matter, consistent with mild small vessel chronic ischemic change.  The ventricles are enlarged. There is no evidence of edema or hemorrhage.  No masses are identified.  No  extra-axial fluid is seen.  The paranasal sinuses are unremarkable.     Impression: Atrophy and chronic changes without acute process. Authenticated and Electronically Signed by Dakotah Juarez MD on 11/27/2023 09:53:17 PM      Medical Decision Making  73-year-old male patient comes into the ED today via EMS after son has called EMS stating that his father is altered.    DDX: includes but is not limited to: Urinary tract infection, hyper ammonium, head injury, other    Problems Addressed:  Confusion and disorientation: complicated acute illness or injury    Amount and/or Complexity of Data Reviewed  External Data Reviewed: labs, radiology, ECG and notes.     Details: I have personally reviewed labs, radiology EKG and notes from patient's chart  Labs: ordered. Decision-making details documented in ED Course.     Details: I have personally reviewed and documented all results  Radiology: ordered. Decision-making details documented in ED Course.     Details: I have personally reviewed and documented all results  Discussion of management or test interpretation with external provider(s): Discussed assessment, treatment and plan with ER attending    Risk  Prescription drug management.  Risk Details: I have discussed with patient the finding of the test preformed today. Patient has been diagnosed with confusion and disorientation and will be discharged home.  Patient requested to follow-up with primary care provider within the next 7 days for reevaluation. Strict return precautions have been given and patient verbalizes understanding                  Final diagnoses:   Confusion and disorientation          Justin Velásquez APRN  11/28/23 1552      Electronically signed by Justin Velásquez APRN at 11/28/23 1552       Kyler Leavitt PCT at 11/28/23 1516       Summary:MONITOR NOTE                 Transferred Son, POA, to RN at this time.    Electronically signed by Kyler Leavitt PCT at 11/28/23 1515       Vital  Signs (last day)       Date/Time Temp Temp src Pulse Resp BP Patient Position SpO2    11/28/23 1407 -- -- -- -- -- -- 98    11/28/23 1405 97.6 (36.4) Oral 96 18 160/97 Sitting --          Current Facility-Administered Medications   Medication Dose Route Frequency Provider Last Rate Last Admin    sodium chloride 0.9 % flush 10 mL  10 mL Intravenous PRN Justin Velásquez APRN         Current Outpatient Medications   Medication Sig Dispense Refill    aspirin 81 MG EC tablet Take 2 tablets by mouth Daily. 60 tablet 4    atorvastatin (LIPITOR) 40 MG tablet Take 1 tablet by mouth Daily.      empagliflozin (Jardiance) 10 MG tablet tablet Take 1 tablet by mouth Daily.      ezetimibe (ZETIA) 10 MG tablet Take 1 tablet by mouth Daily.      HYDROcodone-acetaminophen (NORCO) 7.5-325 MG per tablet Take 1 tablet by mouth Every 6 (Six) Hours As Needed for Moderate Pain. 20 tablet 0    metFORMIN (GLUCOPHAGE) 500 MG tablet Take 2 tablets by mouth 2 (Two) Times a Day With Meals.      metoprolol tartrate (LOPRESSOR) 25 MG tablet Take 0.5 tablets by mouth 2 (Two) Times a Day.      omeprazole (priLOSEC) 20 MG capsule Take 1 capsule by mouth Daily As Needed.      Tirzepatide (Mounjaro) 2.5 MG/0.5ML solution pen-injector Inject 0.5 mL under the skin into the appropriate area as directed 1 (One) Time Per Week.       Consult Notes (last 48 hours)  Notes from 11/26/23 1630 through 11/28/23 1630   No notes of this type exist for this encounter.       Skin Assessments (last day)       None          [unfilled]  Discharge Summary    No notes of this type exist for this encounter.       Discharge Order (From admission, onward)      None          Case Management/Social Work     Patient Name:  Dann Metz  YOB: 1950  MRN: [id number]  Admit Date:  11/28/2023        Case Management consulted due to possible LTC placement.  The patient does have some confusion with noted short term memory impairment.  He is able to recall  long past.  The patient's son is his POA and he does not feel his father is able to care for himself at home any longer.  He does have some paranoia while I am in room talking with him.  During our encounter he is alert but says he is afraid.  I asked what he is afraid of, and he says that he is afraid he is going to die.  I reassured him that the assessment from the MD proved that he is medically stable.  This was able to calm the patient a little.  He requested something to eat; he was given some peanut butter crackers after I cleared with his nurse.  In speaking further with the son he gives consent for referrals to be sent to [facility] and [facility].  The son states that the patient has siblings that are current residents at the facility.  Will send referral to [facility] and [facility].  Son is aware of DC orders and has been given instructions about the LTC admission process.  He has already been in communication with staff at [facility] and [facility]        Electronically signed by:  Divya Nash  11/28/23 16:28 EST

## 2023-11-28 NOTE — CONSULTS
"Dann Metz  1950    Preferred Pronouns: He/Him  Race/Ethnicity: White or   Martial Status: Single  Guardian Name/Contact/etc: Patient's son, Dann Metz has POA ([phone])  Pt Lives With:  Patient stated that he lives with his girlfriend.   Occupation: retired  Appearance: clean and casually dressed, appropriate     Time Called for Assessment: 2300  Assessment Start and End: 2333-0047      DATA:   Clinician received a call from [facility] staff for a behavioral health consult.  The patient is agreeable to speak with the behavioral health team.  Met with patient at bedside. Patient is under 1:1 security monitoring.  The attending treatment team is YANICK Dougherty and ELSY Barbosa.  Patient presents today with chief compliant of psychiatric evaluation. Clinician completed assessment with patient and observations are documented as follows.    ASSESSMENT:    Clinician consulted with patient for mental status exam and assessment.  Clinical descriptors are documented as follows.  Clinician completed CSSRS with patient for suicide risk assessment.  The results of patient’s CSSRS documented as follows.    Presenting Problems: Patient was brought to the emergency department by his son for a psychiatric evaluation due to paranoia, depression and confusion. Patient's son reported that patient has been refusing to take his medications or eat. Patient's girlfriend has been providing care to him. Patient had a CVA about 3 months ago and underwent a CABG surgery about 6 months ago. Patient informed clinician that he was brought to the emergency department \"to be killed\".  Patient stated, \"I know what they are going to do to me, they are going to kill me.\" Patient expressed concerns regarding his son arranging to have him killed by hospital staff. Patient reported that he has been feeling like \"they\" are trying to kill him for the past 3 weeks. Patient stated, \"I want to live, but I'm not going to live\".  " "Patient denied suicidal ideations, plan, or intent to kill himself. Patient would often get tearful stating that he doesn't know what his girlfriend will do when he dies. Patient denied HI.     Current Stressors: medical diagnosis/condition and mental health condition     Established Therapy, Medication Management or Other Mental Health Services: Patient is not currently engaged in therapy.     Current Psychiatric Medications: Per patient's son, patient does not sees a psychiatrist. Patient's son stated that patient has been refusing to take his medication however, patient stated that he has been taking them.     aspirin 81 MG EC tablet   Take 2 tablets by mouth Daily.   atorvastatin (LIPITOR) 40 MG tablet   empagliflozin (Jardiance) 10 MG tablet tablet   ezetimibe (ZETIA) 10 MG tablet   HYDROcodone-acetaminophen (NORCO) 7.5-325 MG per tablet   Take 1 tablet by mouth Every 6 (Six) Hours As Needed for Moderate Pain.   metFORMIN (GLUCOPHAGE) 500 MG tablet   metoprolol tartrate (LOPRESSOR) 25 MG tablet   omeprazole (priLOSEC) 20 MG capsule   Tirzepatide (Mounjaro) 2.5 MG/0.5ML solution pen-injector       Mental Status Exam:  Behavior: Anxious  Psychomotor Movement: Appropriate  Attention and Cooperation: Normal and Cooperative  Mood: anxious and scared and Affect: appropriate, tearful at times  Orientation: alert, oriented to person, and oriented to place; when asked about year, patient stated \"2024\".  Thought Process: linear  Thought Content: paranoid  Delusions: persecutory; patient stated that he was brought to ED because his son is having him murdered. Patient asked, \"does the machine in the back hurt when you use it to kill me?\".   Hallucinations: None and Not demonstrated today   Concentration: Normal  Suicidal Ideation: Absent  Homicidal Ideation: Absent  Hopelessness: no  Speech: Normal  Eye Contact: Good  Insight: Poor  Judgement: Poor    Depression: Patient reported his depression as 8. Patient stated that he " "wasn't feeling depressed at all until he started feeling like someone was going to kill him.   Anxiety: 10  Sleep: Good   Appetite: Good     Hx of Psychiatric or Detox Hospitalizations:  Yes, describe: PIERRE possibly in his 30s after a \"mental breakdown\" according to his son.    Suicidal Ideation Assessment:    [place]IA-SUICIDE SEVERITY RATING SCALE  Psychiatric Inpatient Setting - Discharge Screener    Ask questions that are bold and underlined Discharge   Ask Questions 1 and 2 YES NO   Wish to be Dead:   Person endorses thoughts about a wish to be dead or not alive anymore, or wish to fall asleep and not wake up.  While you were here in the hospital, have you wished you were dead or wished you could go to sleep and not wake up?  X   Suicidal Thoughts:   General non-specific thoughts of wanting to end one's life/die by suicide, “I've thought about killing myself” without general thoughts of ways to kill oneself/associated methods, intent, or plan.   While you were here in the hospital, have you actually had thoughts about killing yourself?   X   If YES to 2, ask questions 3, 4, 5, and 6.  If NO to 2, go directly to question 6   3) Suicidal Thoughts with Method (without Specific Plan or Intent to Act):   Person endorses thoughts of suicide and has thought of a least one method during the assessment period. This is different than a specific plan with time, place or method details worked out. “I thought about taking an overdose but I never made a specific plan as to when where or how I would actually do it….and I would never go through with it.”   Have you been thinking about how you might kill yourself?      4) Suicidal Intent (without Specific Plan):   Active suicidal thoughts of killing oneself and patient reports having some intent to act on such thoughts, as opposed to “I have the thoughts but I definitely will not do anything about them.”   Have you had these thoughts and had some intention of acting on them or do " "you have some intention of acting on them after you leave the hospital?      5) Suicide Intent with Specific Plan:   Thoughts of killing oneself with details of plan fully or partially worked out and person has some intent to carry it out.   Have you started to work out or worked out the details of how to kill yourself either for while you were here in the hospital or for after you leave the hospital? Do you intend to carry out this plan?        6) Suicide Behavior    While you were here in the hospital, have you done anything, started to do anything, or prepared to do anything to end your life?    Examples: Took pills, cut yourself, tried to hang yourself, took out pills but didn't swallow any because you changed your mind or someone took them from you, collected pills, secured a means of obtaining a gun, gave away valuables, wrote a will or suicide note, etc.  X     Suicidal: Absent  Previous Attempts: None  Most Recent Attempt: N/A    Psychosocial History    Highest Level of Education: Unknown to clinician  Family Hx of Mental Health/Substance Abuse: Yes, describe: schizophrenia  Patient Trauma/Abuse History: Patient denied a history of trauma and abuse.  Does this require reporting: N/A  Patient Identified Support System (List family members, loved ones, guardians, friends, etc): Patient identified his girlfriend as a support.     Legal History / History of Violence: Patient denied being violence towards anyone. Per patient's son, patient hasn't been violent towards anyone but he did get aggravated with home health staff today and \"raised his fist\" at them.   History of Inappropriate Sexual Behavior: No  Current Medical Conditions or Biomedical Complications: Yes; stroke, coronary artery disease, diabetes, and hypertension.     Social Determinants of Health  Housing Instability and/or Utility Needs: No  Food Insecurity: No  Transportation Needs: No    Substance Use History  Active Use: No; patient's UDS positive " for Benzodiazepines.    PLAN:  At this time, patient's son is requesting inpatient psychiatric referrals. Clinician collaborated with the treatment team who agree to adopt the recommendations.  Clinician discussed recommendations with patient and/or patient support systems, and patient is agreeable to the plan.  Patient is agreeable for referrals to be sent to facilities and agencies for treatment.    Have the levels of care been discussed with the patient? Yes  Level of care recommendation: Outpatient follow up with neurologist on 12/11/2023.  Is patient agreeable to treatment? Yes    Care Coordination Timeline:  0025: Clinician staffed case with Dr. Gomez at [facility] who stated that patient's symptoms could be the result of the two major medical events that have occurred within the last 3 months. Dr. Gomez stated that patient does not appear to be experiencing any acute symptoms requiring inpatient psychiatric treatment. Patient has a neurology appointment with JANNY Ruiz on 12/11/2023. Dr. Gomez recommended that patient follow up with neurology appointment.     0030-0047: Clinician updated ELSY Barbosa, patient, and patient's son. Clinician and ELSY Barbosa spoke with patient's son regarding recommendations. We discussed possible social admission to [facility] to explore nursing home placement. Patient's son stated that he doesn't want to pursue nursing home placement at this time. Patient's son stated patient's girlfriend does a good job at providing care to him. Patient's son is concerned that patient's girlfriend might get aggravated with taking care of patient however, he does not believe a nursing home is the best placement option at this time. Patient's son is agreeable to bringing patient back to the ED behaviors increase or get worse.    Safety Planning:  Does the patient have access to weapons or firearms? No  Did clinician discuss securing weapons, firearms, sharps and/or medications with the patient?  Yes  Safety Plan: Clinician verbally engaged in safety planning by assisting the patient in identifying risk factors that would indicate the need for higher level of care, such as thoughts to harm self or others and/or self-harming behavior(s). Safety plan of report to nearest hospital, calling local police or 911 if feeling unsafe, if having suicidal or homicidal thoughts, or if in emergent need of medications verbally reviewed with patient during assessment and suicide prevention/crisis hotlines verbally reviewed with patient during assessment. Patient during assessment verbally agreed to safety plan. Reviewed resources of crisis hotlines or presenting to the nearest emergency department should symptoms worsen, or in any crisis/emergency. Patient agreeable and voiced understanding.      SIGNATURE  ORALIA Carlson  11/27/2023

## 2023-11-28 NOTE — ED NOTES
"Pt appears paranoid, stating \"I dont know why you all are going through all the fuss, because today is my last day. Im going to die today.\" Pt also expressed that \"you all are going to kill me\" Re-directed pt and attempted educate him that we are here to help. Pt states \"I dont believe it, I know I am going to die today\" Provider aware, BH aware.   "

## 2023-11-28 NOTE — ED PROVIDER NOTES
Subjective  History of Present Illness:    Chief Complaint: Altered mental status  History of Present Illness: 73-year-old male patient comes into the ED today via EMS after son has called EMS stating that his father is altered.      Nurses Notes reviewed and agree, including vitals, allergies, social history and prior medical history.       Allergies:    Penicillins      Past Surgical History:   Procedure Laterality Date    CARDIAC CATHETERIZATION      CARDIAC CATHETERIZATION N/A 9/30/2023    Procedure: Coronary angiography;  Surgeon: Vince Redding MD;  Location: [facility] CATH INVASIVE LOCATION;  Service: Cardiology;  Laterality: N/A;    CORONARY ARTERY BYPASS GRAFT N/A 10/6/2023    Procedure: MEDIAN STERNOTOMY, CORONARY ARTERY BYPASS GRAFTING X 2 UTILIZING THE LEFT AND RIGHT INTERNAL MAMMARY ARTERIES, ENDOSCOPIC VEIN HARVESTING OF THE RIGHT GREATER SAPHENOUS VEIN, EXPLORATION OF THE LEFT LEG, PULMONARY VEIN ISOLATION, POSTERIOR LEFT ATRIAL WALL ABLATION (ROOF AND FLOOR), LEFT ATRIAL APPENDAGE LIGATION, TRANSESOPHAGEAL ECHOCARDIOGRAM PER ANESTHESIA;  Surgeon: Guero Mora M         Social History     Socioeconomic History    Marital status:    Tobacco Use    Smoking status: Never    Smokeless tobacco: Never   Vaping Use    Vaping Use: Never used   Substance and Sexual Activity    Alcohol use: Never    Drug use: Never    Sexual activity: Defer         Family History   Problem Relation Age of Onset    Heart disease Mother     Stroke Father     Stroke Sister     Stroke Sister     Stroke Brother     Stroke Brother     Stroke Brother     Stroke Brother     Stroke Brother        REVIEW OF SYSTEMS: All systems reviewed and not pertinent unless noted.    Review of Systems    Objective    Physical Exam      Procedures    ED Course:         Lab Results (last 24 hours)       Procedure Component Value Units Date/Time    Urine Drug Screen - Urine, Clean Catch [[id number]]  (Abnormal) Collected: 11/27/23 2045     Specimen: Urine, Clean Catch Updated: 11/27/23 2124     THC, Screen, Urine Negative     Phencyclidine (PCP), Urine Negative     Cocaine Screen, Urine Negative     Methamphetamine, Ur Negative     Opiate Screen Negative     Amphetamine Screen, Urine Negative     Benzodiazepine Screen, Urine Positive     Tricyclic Antidepressants Screen Negative     Methadone Screen, Urine Negative     Barbiturates Screen, Urine Negative     Oxycodone Screen, Urine Negative     Buprenorphine, Screen, Urine Negative    Narrative:      Cutoff For Drugs Screened:    Amphetamines               500 ng/ml  Barbiturates               200 ng/ml  Benzodiazepines            150 ng/ml  Cocaine                    150 ng/ml  Methadone                  200 ng/ml  Opiates                    100 ng/ml  Phencyclidine               25 ng/ml  THC                         50 ng/ml  Methamphetamine            500 ng/ml  Tricyclic Antidepressants  300 ng/ml  Oxycodone                  100 ng/ml  Buprenorphine               10 ng/ml    The normal value for all drugs tested is negative. This report includes unconfirmed screening results, with the cutoff values listed, to be used for medical treatment purposes only.  Unconfirmed results must not be used for non-medical purposes such as employment or legal testing.  Clinical consideration should be applied to any drug of abuse test, particularly when unconfirmed results are used.      Urinalysis With Microscopic If Indicated (No Culture) - Urine, Clean Catch [[id number]]  (Abnormal) Collected: 11/27/23 2045    Specimen: Urine, Clean Catch Updated: 11/27/23 2100     Color, UA Yellow     Appearance, UA Clear     pH, UA <=5.0     Specific Gravity, UA >=1.030     Glucose, UA >=1000 mg/dL (3+)     Ketones, UA Trace     Bilirubin, UA Negative     Blood, UA Negative     Protein, UA 30 mg/dL (1+)     Leuk Esterase, UA Negative     Nitrite, UA Negative     Urobilinogen, UA 0.2 E.U./dL    Fentanyl, Urine - Urine, Clean  Catch [[id number]]  (Normal) Collected: 11/27/23 2045    Specimen: Urine, Clean Catch Updated: 11/27/23 2110     Fentanyl, Urine Negative    Narrative:      Negative Threshold:      Fentanyl 5 ng/mL     The normal value for the drug tested is negative. This report includes final unconfirmed screening results to be used for medical treatment purposes only. Unconfirmed results must not be used for non-medical purposes such as employment or legal testing. Clinical consideration should be applied to any drug of abuse test, particularly when unconfirmed results are used.           Urinalysis, Microscopic Only - Urine, Clean Catch [[id number]] Collected: 11/27/23 2045    Specimen: Urine, Clean Catch Updated: 11/27/23 2124     RBC, UA None Seen /HPF      WBC, UA 0-2 /HPF      Bacteria, UA None Seen /HPF      Squamous Epithelial Cells, UA None Seen /HPF      Hyaline Casts, UA None Seen /LPF      Methodology Manual Light Microscopy    Ethanol [[id number]] Collected: 11/27/23 2108    Specimen: Blood Updated: 11/27/23 2138     Ethanol <10 mg/dL      Ethanol % <0.010 %     Narrative:      This result is for medical use only and should not be used for forensic purposes.    CBC Auto Differential [[id number]]  (Abnormal) Collected: 11/27/23 2108    Specimen: Blood Updated: 11/27/23 2122     WBC 6.35 10*3/mm3      RBC 4.17 10*6/mm3      Hemoglobin 13.3 g/dL      Hematocrit 40.7 %      MCV 97.6 fL      MCH 31.9 pg      MCHC 32.7 g/dL      RDW 15.6 %      RDW-SD 55.7 fl      MPV 10.1 fL      Platelets 223 10*3/mm3      Neutrophil % 65.3 %      Lymphocyte % 22.2 %      Monocyte % 8.8 %      Eosinophil % 3.0 %      Basophil % 0.5 %      Immature Grans % 0.2 %      Neutrophils, Absolute 4.15 10*3/mm3      Lymphocytes, Absolute 1.41 10*3/mm3      Monocytes, Absolute 0.56 10*3/mm3      Eosinophils, Absolute 0.19 10*3/mm3      Basophils, Absolute 0.03 10*3/mm3      Immature Grans, Absolute 0.01 10*3/mm3      nRBC 0.0 /100 WBC     Comprehensive  Metabolic Panel [[id number]]  (Abnormal) Collected: 11/27/23 2108    Specimen: Blood Updated: 11/27/23 2138     Glucose 120 mg/dL      BUN 19 mg/dL      Creatinine 0.90 mg/dL      Sodium 138 mmol/L      Potassium 4.4 mmol/L      Comment: Slight hemolysis detected by analyzer. Result may be falsely elevated.        Chloride 100 mmol/L      CO2 24.6 mmol/L      Calcium 9.9 mg/dL      Total Protein 7.2 g/dL      Albumin 4.1 g/dL      ALT (SGPT) 16 U/L      AST (SGOT) 20 U/L      Comment: Slight hemolysis detected by analyzer. Result may be falsely elevated.        Alkaline Phosphatase 67 U/L      Total Bilirubin 0.4 mg/dL      Globulin 3.1 gm/dL      A/G Ratio 1.3 g/dL      BUN/Creatinine Ratio 21.1     Anion Gap 13.4 mmol/L      eGFR 91.3 mL/min/1.73     Narrative:      GFR Normal >60  Chronic Kidney Disease <60  Kidney Failure <15    The GFR formula is only valid for adults with stable renal function between ages 18 and 70.    Magnesium [[id number]]  (Abnormal) Collected: 11/27/23 2108    Specimen: Blood Updated: 11/27/23 2138     Magnesium 1.5 mg/dL     Salicylate Level [[id number]]  (Normal) Collected: 11/27/23 2108    Specimen: Blood Updated: 11/27/23 2138     Salicylate <0.3 mg/dL     Acetaminophen Level [[id number]]  (Normal) Collected: 11/27/23 2108    Specimen: Blood Updated: 11/27/23 2138     Acetaminophen <5.0 mcg/mL     Narrative:      Toxic = Greater than 150 mcg/mL    Single High Sensitivity Troponin T [[id number]]  (Abnormal) Collected: 11/27/23 2108    Specimen: Blood Updated: 11/27/23 2150     HS Troponin T 50 ng/L     Narrative:      High Sensitive Troponin T Reference Range:  <14.0 ng/L- Negative Female for AMI  <22.0 ng/L- Negative Male for AMI  >=14 - Abnormal Female indicating possible myocardial injury.  >=22 - Abnormal Male indicating possible myocardial injury.   Clinicians would have to utilize clinical acumen, EKG, Troponin, and serial changes to determine if it is an Acute Myocardial Infarction  or myocardial injury due to an underlying chronic condition.         Single High Sensitivity Troponin T [[id number]]  (Abnormal) Collected: 11/27/23 2341    Specimen: Blood Updated: 11/28/23 0009     HS Troponin T 50 ng/L     Narrative:      High Sensitive Troponin T Reference Range:  <14.0 ng/L- Negative Female for AMI  <22.0 ng/L- Negative Male for AMI  >=14 - Abnormal Female indicating possible myocardial injury.  >=22 - Abnormal Male indicating possible myocardial injury.   Clinicians would have to utilize clinical acumen, EKG, Troponin, and serial changes to determine if it is an Acute Myocardial Infarction or myocardial injury due to an underlying chronic condition.         CBC & Differential [[id number]]  (Abnormal) Collected: 11/28/23 1446    Specimen: Blood Updated: 11/28/23 1504    Narrative:      The following orders were created for panel order CBC & Differential.  Procedure                               Abnormality         Status                     ---------                               -----------         ------                     CBC Auto Differential[[id number]]        Abnormal            Final result                 Please view results for these tests on the individual orders.    Comprehensive Metabolic Panel [[id number]]  (Abnormal) Collected: 11/28/23 1446    Specimen: Blood Updated: 11/28/23 1538     Glucose 173 mg/dL      BUN 18 mg/dL      Creatinine 0.90 mg/dL      Sodium 138 mmol/L      Potassium 4.2 mmol/L      Chloride 98 mmol/L      CO2 28.5 mmol/L      Calcium 10.0 mg/dL      Total Protein 7.5 g/dL      Albumin 4.3 g/dL      ALT (SGPT) 20 U/L      AST (SGOT) 19 U/L      Alkaline Phosphatase 73 U/L      Total Bilirubin 0.4 mg/dL      Globulin 3.2 gm/dL      A/G Ratio 1.3 g/dL      BUN/Creatinine Ratio 20.0     Anion Gap 11.5 mmol/L      eGFR 91.3 mL/min/1.73     Narrative:      GFR Normal >60  Chronic Kidney Disease <60  Kidney Failure <15    The GFR formula is only valid for adults with  stable renal function between ages 18 and 70.    Lipase [[id number]]  (Normal) Collected: 11/28/23 1446    Specimen: Blood Updated: 11/28/23 1538     Lipase 39 U/L     Ammonia [[id number]]  (Abnormal) Collected: 11/28/23 1446    Specimen: Blood Updated: 11/28/23 1531     Ammonia <10 umol/L     CBC Auto Differential [[id number]]  (Abnormal) Collected: 11/28/23 1446    Specimen: Blood Updated: 11/28/23 1504     WBC 7.16 10*3/mm3      RBC 4.26 10*6/mm3      Hemoglobin 13.6 g/dL      Hematocrit 41.0 %      MCV 96.2 fL      MCH 31.9 pg      MCHC 33.2 g/dL      RDW 15.8 %      RDW-SD 55.7 fl      MPV 9.7 fL      Platelets 233 10*3/mm3      Neutrophil % 75.1 %      Lymphocyte % 15.2 %      Monocyte % 6.8 %      Eosinophil % 2.0 %      Basophil % 0.6 %      Immature Grans % 0.3 %      Neutrophils, Absolute 5.38 10*3/mm3      Lymphocytes, Absolute 1.09 10*3/mm3      Monocytes, Absolute 0.49 10*3/mm3      Eosinophils, Absolute 0.14 10*3/mm3      Basophils, Absolute 0.04 10*3/mm3      Immature Grans, Absolute 0.02 10*3/mm3      nRBC 0.0 /100 WBC              CT Head Without Contrast    Result Date: 11/27/2023  FINAL REPORT TECHNIQUE: Multiple axial CT images were performed from the foramen magnum to the vertex. This study was performed with techniques to keep radiation doses as low as reasonably achievable (ALARA). Individualized dose reduction techniques using automated exposure control or adjustment of mA and/or kV according to the patient's size were employed. CLINICAL HISTORY: ams/paranoia FINDINGS: There is mild generalized cerebral atrophy.  There is decreased attenuation in the white matter, consistent with mild small vessel chronic ischemic change.  The ventricles are enlarged. There is no evidence of edema or hemorrhage.  No masses are identified.  No extra-axial fluid is seen.  The paranasal sinuses are unremarkable.     Impression: Atrophy and chronic changes without acute process. Authenticated and Electronically  Signed by Dakotah Juarez MD on 11/27/2023 09:53:17 PM      Medical Decision Making  73-year-old male patient comes into the ED today via EMS after son has called EMS stating that his father is altered.    DDX: includes but is not limited to: Urinary tract infection, hyper ammonium, head injury, other    Problems Addressed:  Confusion and disorientation: complicated acute illness or injury    Amount and/or Complexity of Data Reviewed  External Data Reviewed: labs, radiology, ECG and notes.     Details: I have personally reviewed labs, radiology EKG and notes from patient's chart  Labs: ordered. Decision-making details documented in ED Course.     Details: I have personally reviewed and documented all results  Radiology: ordered. Decision-making details documented in ED Course.     Details: I have personally reviewed and documented all results  Discussion of management or test interpretation with external provider(s): Discussed assessment, treatment and plan with ER attending    Risk  Prescription drug management.  Risk Details: I have discussed with patient the finding of the test preformed today. Patient has been diagnosed with confusion and disorientation and will be discharged home.  Patient requested to follow-up with primary care provider within the next 7 days for reevaluation. Strict return precautions have been given and patient verbalizes understanding                  Final diagnoses:   Confusion and disorientation          Justin Velásquez, APRN  11/28/23 3242

## 2023-11-28 NOTE — ED PROVIDER NOTES
Subjective  History of Present Illness:    Chief Complaint: Depression, paranoia  History of Present Illness: 73-year-old male presents to the emergency department with his son who states he has had paranoia, depression, fatigue, weakness, and is now refusing to take his medications or eat, he lives with his girlfriend helps take care of that it has been too difficult lately to take care of him due to the paranoia, and depression significant past medical history for coronary disease, diabetes, hypertension, stroke  Onset: Gradual onset  Duration: Symptoms have been worsening for weeks  Exacerbating / Alleviating factors: Recent CABG  Associated symptoms: Refusing to eat or take medicines, paranoia      Nurses Notes reviewed and agree, including vitals, allergies, social history and prior medical history.     REVIEW OF SYSTEMS: All systems reviewed and not pertinent unless noted.    Review of Systems   Constitutional:  Positive for fatigue.   Neurological:  Positive for weakness.   Psychiatric/Behavioral:  Positive for confusion.         Paranoia, depression   All other systems reviewed and are negative.      Past Medical History:   Diagnosis Date    Coronary artery disease     Diabetes mellitus     Hypertension     Stroke        Allergies:    Penicillins      Past Surgical History:   Procedure Laterality Date    CARDIAC CATHETERIZATION      CARDIAC CATHETERIZATION N/A 9/30/2023    Procedure: Coronary angiography;  Surgeon: Vince Redding MD;  Location: [facility] CATH INVASIVE LOCATION;  Service: Cardiology;  Laterality: N/A;    CORONARY ARTERY BYPASS GRAFT N/A 10/6/2023    Procedure: MEDIAN STERNOTOMY, CORONARY ARTERY BYPASS GRAFTING X 2 UTILIZING THE LEFT AND RIGHT INTERNAL MAMMARY ARTERIES, ENDOSCOPIC VEIN HARVESTING OF THE RIGHT GREATER SAPHENOUS VEIN, EXPLORATION OF THE LEFT LEG, PULMONARY VEIN ISOLATION, POSTERIOR LEFT ATRIAL WALL ABLATION (ROOF AND FLOOR), LEFT ATRIAL APPENDAGE LIGATION, TRANSESOPHAGEAL  "ECHOCARDIOGRAM PER ANESTHESIA;  Surgeon: Guero Mora M         Social History     Socioeconomic History    Marital status:    Tobacco Use    Smoking status: Never    Smokeless tobacco: Never   Vaping Use    Vaping Use: Never used   Substance and Sexual Activity    Alcohol use: Never    Drug use: Never    Sexual activity: Defer         Family History   Problem Relation Age of Onset    Heart disease Mother     Stroke Father     Stroke Sister     Stroke Sister     Stroke Brother     Stroke Brother     Stroke Brother     Stroke Brother     Stroke Brother        Objective  Physical Exam:  /92   Pulse 87   Temp 98 °F (36.7 °C) (Oral)   Resp 18   Ht 160 cm (63\")   Wt 58 kg (127 lb 12.8 oz)   SpO2 98%   BMI 22.64 kg/m²      Physical Exam  Vitals and nursing note reviewed.   Constitutional:       Appearance: He is well-developed.   HENT:      Head: Normocephalic and atraumatic.      Mouth/Throat:      Mouth: Mucous membranes are moist.   Eyes:      Extraocular Movements: Extraocular movements intact.   Cardiovascular:      Rate and Rhythm: Normal rate and regular rhythm.   Pulmonary:      Effort: Pulmonary effort is normal.      Breath sounds: Normal breath sounds.   Abdominal:      Palpations: Abdomen is soft.   Musculoskeletal:         General: Normal range of motion.      Cervical back: Normal range of motion.   Skin:     General: Skin is warm and dry.   Neurological:      Mental Status: He is alert and oriented to person, place, and time.   Psychiatric:         Thought Content: Thought content normal.         Judgment: Judgment normal.      Comments: Flat affect, poor eye contact           Procedures    ED Course:    ED Course as of 11/28/23 0052   Mon Nov 27, 2023   2131 EKG interpreted by me reveals sinus rhythm 88 nonspecific T wave changes no ectopy no ischemic changes [PF]   Tue Nov 28, 2023   0048 Patient was evaluated by psych, who presented to the on-call psychiatrist, he did not meet " criteria for inpatient stay, I had a long discussion with the patient and family at the bedside about a medical admission, and whether was safe for him to be at home and whether family was able to care, the son states that they would like to take him home and continue care at home and follow-up with neurology, I expressed that if it became too difficult to care for him to reach out to primary care physician or may need to return to the emergency department for placement or home care options, patient's son understands and agrees, he is stable for discharge at this time. [CS]      ED Course User Index  [CS] Familia Cote Jr., PA-C  [PF] Romain Mills,        Lab Results (last 24 hours)       Procedure Component Value Units Date/Time    Urine Drug Screen - Urine, Clean Catch [[id number]]  (Abnormal) Collected: 11/27/23 2045    Specimen: Urine, Clean Catch Updated: 11/27/23 2124     THC, Screen, Urine Negative     Phencyclidine (PCP), Urine Negative     Cocaine Screen, Urine Negative     Methamphetamine, Ur Negative     Opiate Screen Negative     Amphetamine Screen, Urine Negative     Benzodiazepine Screen, Urine Positive     Tricyclic Antidepressants Screen Negative     Methadone Screen, Urine Negative     Barbiturates Screen, Urine Negative     Oxycodone Screen, Urine Negative     Buprenorphine, Screen, Urine Negative    Narrative:      Cutoff For Drugs Screened:    Amphetamines               500 ng/ml  Barbiturates               200 ng/ml  Benzodiazepines            150 ng/ml  Cocaine                    150 ng/ml  Methadone                  200 ng/ml  Opiates                    100 ng/ml  Phencyclidine               25 ng/ml  THC                         50 ng/ml  Methamphetamine            500 ng/ml  Tricyclic Antidepressants  300 ng/ml  Oxycodone                  100 ng/ml  Buprenorphine               10 ng/ml    The normal value for all drugs tested is negative. This report includes unconfirmed screening  results, with the cutoff values listed, to be used for medical treatment purposes only.  Unconfirmed results must not be used for non-medical purposes such as employment or legal testing.  Clinical consideration should be applied to any drug of abuse test, particularly when unconfirmed results are used.      Urinalysis With Microscopic If Indicated (No Culture) - Urine, Clean Catch [[id number]]  (Abnormal) Collected: 11/27/23 2045    Specimen: Urine, Clean Catch Updated: 11/27/23 2100     Color, UA Yellow     Appearance, UA Clear     pH, UA <=5.0     Specific Gravity, UA >=1.030     Glucose, UA >=1000 mg/dL (3+)     Ketones, UA Trace     Bilirubin, UA Negative     Blood, UA Negative     Protein, UA 30 mg/dL (1+)     Leuk Esterase, UA Negative     Nitrite, UA Negative     Urobilinogen, UA 0.2 E.U./dL    Fentanyl, Urine - Urine, Clean Catch [[id number]]  (Normal) Collected: 11/27/23 2045    Specimen: Urine, Clean Catch Updated: 11/27/23 2110     Fentanyl, Urine Negative    Narrative:      Negative Threshold:      Fentanyl 5 ng/mL     The normal value for the drug tested is negative. This report includes final unconfirmed screening results to be used for medical treatment purposes only. Unconfirmed results must not be used for non-medical purposes such as employment or legal testing. Clinical consideration should be applied to any drug of abuse test, particularly when unconfirmed results are used.           Urinalysis, Microscopic Only - Urine, Clean Catch [[id number]] Collected: 11/27/23 2045    Specimen: Urine, Clean Catch Updated: 11/27/23 2124     RBC, UA None Seen /HPF      WBC, UA 0-2 /HPF      Bacteria, UA None Seen /HPF      Squamous Epithelial Cells, UA None Seen /HPF      Hyaline Casts, UA None Seen /LPF      Methodology Manual Light Microscopy    Ethanol [[id number]] Collected: 11/27/23 2108    Specimen: Blood Updated: 11/27/23 2138     Ethanol <10 mg/dL      Ethanol % <0.010 %     Narrative:      This result is  for medical use only and should not be used for forensic purposes.    CBC Auto Differential [[id number]]  (Abnormal) Collected: 11/27/23 2108    Specimen: Blood Updated: 11/27/23 2122     WBC 6.35 10*3/mm3      RBC 4.17 10*6/mm3      Hemoglobin 13.3 g/dL      Hematocrit 40.7 %      MCV 97.6 fL      MCH 31.9 pg      MCHC 32.7 g/dL      RDW 15.6 %      RDW-SD 55.7 fl      MPV 10.1 fL      Platelets 223 10*3/mm3      Neutrophil % 65.3 %      Lymphocyte % 22.2 %      Monocyte % 8.8 %      Eosinophil % 3.0 %      Basophil % 0.5 %      Immature Grans % 0.2 %      Neutrophils, Absolute 4.15 10*3/mm3      Lymphocytes, Absolute 1.41 10*3/mm3      Monocytes, Absolute 0.56 10*3/mm3      Eosinophils, Absolute 0.19 10*3/mm3      Basophils, Absolute 0.03 10*3/mm3      Immature Grans, Absolute 0.01 10*3/mm3      nRBC 0.0 /100 WBC     Comprehensive Metabolic Panel [[id number]]  (Abnormal) Collected: 11/27/23 2108    Specimen: Blood Updated: 11/27/23 2138     Glucose 120 mg/dL      BUN 19 mg/dL      Creatinine 0.90 mg/dL      Sodium 138 mmol/L      Potassium 4.4 mmol/L      Comment: Slight hemolysis detected by analyzer. Result may be falsely elevated.        Chloride 100 mmol/L      CO2 24.6 mmol/L      Calcium 9.9 mg/dL      Total Protein 7.2 g/dL      Albumin 4.1 g/dL      ALT (SGPT) 16 U/L      AST (SGOT) 20 U/L      Comment: Slight hemolysis detected by analyzer. Result may be falsely elevated.        Alkaline Phosphatase 67 U/L      Total Bilirubin 0.4 mg/dL      Globulin 3.1 gm/dL      A/G Ratio 1.3 g/dL      BUN/Creatinine Ratio 21.1     Anion Gap 13.4 mmol/L      eGFR 91.3 mL/min/1.73     Narrative:      GFR Normal >60  Chronic Kidney Disease <60  Kidney Failure <15    The GFR formula is only valid for adults with stable renal function between ages 18 and 70.    Magnesium [[id number]]  (Abnormal) Collected: 11/27/23 2108    Specimen: Blood Updated: 11/27/23 2138     Magnesium 1.5 mg/dL     Salicylate Level [[id number]]   (Normal) Collected: 11/27/23 2108    Specimen: Blood Updated: 11/27/23 2138     Salicylate <0.3 mg/dL     Acetaminophen Level [[id number]]  (Normal) Collected: 11/27/23 2108    Specimen: Blood Updated: 11/27/23 2138     Acetaminophen <5.0 mcg/mL     Narrative:      Toxic = Greater than 150 mcg/mL    Single High Sensitivity Troponin T [[id number]]  (Abnormal) Collected: 11/27/23 2108    Specimen: Blood Updated: 11/27/23 2150     HS Troponin T 50 ng/L     Narrative:      High Sensitive Troponin T Reference Range:  <14.0 ng/L- Negative Female for AMI  <22.0 ng/L- Negative Male for AMI  >=14 - Abnormal Female indicating possible myocardial injury.  >=22 - Abnormal Male indicating possible myocardial injury.   Clinicians would have to utilize clinical acumen, EKG, Troponin, and serial changes to determine if it is an Acute Myocardial Infarction or myocardial injury due to an underlying chronic condition.         Single High Sensitivity Troponin T [[id number]]  (Abnormal) Collected: 11/27/23 2341    Specimen: Blood Updated: 11/28/23 0009     HS Troponin T 50 ng/L     Narrative:      High Sensitive Troponin T Reference Range:  <14.0 ng/L- Negative Female for AMI  <22.0 ng/L- Negative Male for AMI  >=14 - Abnormal Female indicating possible myocardial injury.  >=22 - Abnormal Male indicating possible myocardial injury.   Clinicians would have to utilize clinical acumen, EKG, Troponin, and serial changes to determine if it is an Acute Myocardial Infarction or myocardial injury due to an underlying chronic condition.                  CT Head Without Contrast    Result Date: 11/27/2023  FINAL REPORT TECHNIQUE: Multiple axial CT images were performed from the foramen magnum to the vertex. This study was performed with techniques to keep radiation doses as low as reasonably achievable (ALARA). Individualized dose reduction techniques using automated exposure control or adjustment of mA and/or kV according to the patient's size were  employed. CLINICAL HISTORY: ams/paranoia FINDINGS: There is mild generalized cerebral atrophy.  There is decreased attenuation in the white matter, consistent with mild small vessel chronic ischemic change.  The ventricles are enlarged. There is no evidence of edema or hemorrhage.  No masses are identified.  No extra-axial fluid is seen.  The paranasal sinuses are unremarkable.     Impression: Atrophy and chronic changes without acute process. Authenticated and Electronically Signed by Dakotah Juarez MD on 11/27/2023 09:53:17 PM        Medical Decision Making  Problems Addressed:  Delirium: complicated acute illness or injury  Paranoia: complicated acute illness or injury  Physical debility: complicated acute illness or injury    Amount and/or Complexity of Data Reviewed  Labs: ordered.  Radiology: ordered.  ECG/medicine tests: ordered.    Risk  OTC drugs.          Final diagnoses:   Paranoia   Physical debility   Delirium          Familia Cote Jr., PA-C  11/28/23 0052

## 2023-12-01 ENCOUNTER — DOCUMENTATION (OUTPATIENT)
Dept: FAMILY MEDICINE CLINIC | Facility: CLINIC | Age: 73
End: 2023-12-01
Payer: MEDICARE

## 2023-12-01 RX ORDER — NICOTINE POLACRILEX 4 MG
LOZENGE BUCCAL
COMMUNITY

## 2023-12-04 ENCOUNTER — TELEPHONE (OUTPATIENT)
Dept: NEUROLOGY | Facility: CLINIC | Age: 73
End: 2023-12-04
Payer: MEDICARE

## 2023-12-04 RX ORDER — LORAZEPAM 0.5 MG/1
0.5 TABLET ORAL 2 TIMES DAILY
COMMUNITY

## 2023-12-04 NOTE — TELEPHONE ENCOUNTER
Provider: TISHA HORNER    Caller: JC [facility]RE[facility]ER    Relationship to Patient: SON / POA    Phone Number: [phone]    Reason for Call: PATIENT'S SON CALLED REGARDING UPCOMING APPT (12/11/23). PATIENT HAS BEEN RECENTLY MOVED INTO A NURSING HOME. HE HAS ALSO HAD SEVERAL SCANS DONE AT THE ER OVER THE PAST WEEK. PATIENT'S SON WOULD LIKE TO SEE IF HE SHOULD KEEP APPT. WOULD LIKE A CALL FROM PROVIDER TO DISCUSS THIS AND HIS SCANS. PLEASE ADVISE, THANK YOU.

## 2023-12-08 NOTE — TELEPHONE ENCOUNTER
Please let her Isaías son know that  I looked at the CT scan of the head that was done in the emergency department.  Mr. Metz has changes that could be reflective of dementia as he has some volume loss of his brain as well as some changes that we tend to see with dementia.  As far as whether or not to keep the appointment that is up to his son.  They can continue the current medications for stroke at the nursing home or we can do a telehealth visit or bring him here if that is what they would like to do.

## 2023-12-11 ENCOUNTER — OFFICE VISIT (OUTPATIENT)
Dept: NEUROLOGY | Facility: CLINIC | Age: 73
End: 2023-12-11
Payer: MEDICARE

## 2023-12-11 VITALS
HEART RATE: 75 BPM | SYSTOLIC BLOOD PRESSURE: 114 MMHG | DIASTOLIC BLOOD PRESSURE: 68 MMHG | WEIGHT: 140.3 LBS | HEIGHT: 64 IN | OXYGEN SATURATION: 98 % | BODY MASS INDEX: 23.95 KG/M2

## 2023-12-11 DIAGNOSIS — G31.84 MILD COGNITIVE IMPAIRMENT: Primary | ICD-10-CM

## 2023-12-11 DIAGNOSIS — Z86.73 HISTORY OF STROKE: ICD-10-CM

## 2023-12-11 PROCEDURE — 1160F RVW MEDS BY RX/DR IN RCRD: CPT | Performed by: NURSE PRACTITIONER

## 2023-12-11 PROCEDURE — 1159F MED LIST DOCD IN RCRD: CPT | Performed by: NURSE PRACTITIONER

## 2023-12-11 PROCEDURE — 99214 OFFICE O/P EST MOD 30 MIN: CPT | Performed by: NURSE PRACTITIONER

## 2023-12-11 PROCEDURE — 3074F SYST BP LT 130 MM HG: CPT | Performed by: NURSE PRACTITIONER

## 2023-12-11 PROCEDURE — 3078F DIAST BP <80 MM HG: CPT | Performed by: NURSE PRACTITIONER

## 2023-12-11 RX ORDER — DONEPEZIL HYDROCHLORIDE 10 MG/1
10 TABLET, FILM COATED ORAL NIGHTLY
Qty: 30 TABLET | Refills: 2 | Status: SHIPPED | OUTPATIENT
Start: 2023-12-11 | End: 2024-12-10

## 2023-12-11 NOTE — TELEPHONE ENCOUNTER
Let the Pt son Dann know that Provider looked at the CT scan of the head that was done in the emergency department.  Mr. Metz has changes that could be reflective of dementia as he has some volume loss of his brain as well as some changes that we tend to see with dementia.  As far as whether or not to keep the appointment that is up to his son.  They can continue the current medications for stroke at the nursing home or we can do a telehealth visit or bring him here if that is what they would like to do.    Pt verbalized understanding.    Pt son states he is going to just keep the Pt appt on 12/11/23.

## 2023-12-11 NOTE — PROGRESS NOTES
"   Neuro Office Visit      Encounter Date: 2023   Patient Name: Dann Metz  : 1950   MRN: [id number]   PCP:  Ariana Lucas APRN     Chief Complaint:    Chief Complaint   Patient presents with    Cerebrovascular Accident     F/U       History of Present Illness: Dann Metz is a 73 y.o. male who is here today in Neurology for stroke follow-up.    Last visit with me on 2023, continued ASA, statin, PT/OT/ST.    Accompanied by his son who provides history.     Underwent CABG  on  and was hospitalized for 2 weeks.      Since CABG mental status has been \"off\".     ED visits on 2023 and 2023 for paranoia, depression, fatigue, weakness.  At that time he was refusing to take medication or eat.  He was exhibiting paranoid behavior and saying that he has been brought to the emergency department \"to be killed\".  Was evaluated by telehealth psych.    Eventually he was placed in nursing home as his girlfriend was not able to care for him at home. He was being aggressive and agitated.     He has been at the nursing home for a couple of weeks and seems to be doing very well there. Son would like for him to return home if possible. Dann has two brothers and a sister who live in the same nursing home.     Short term memory has been worse.       MMSE 21/30.    Reviewed CT of the head with patient and son.     CT Head Without Contrast (2023 21:37)     Subjective     social work  Past Medical History:   Diagnosis Date    Anxiety     Coronary artery disease     Diabetes mellitus     GERD (gastroesophageal reflux disease)     Hyperlipidemia     Hypertension     Impairment of short-term memory     Myocardial infarction     Stroke     Vitamin D deficiency     Weakness of right upper extremity        Past Surgical History:   Past Surgical History:   Procedure Laterality Date    CARDIAC CATHETERIZATION      CARDIAC CATHETERIZATION N/A 2023    Procedure: Coronary angiography;  Surgeon: " Miladis, Vince RED MD;  Location: [facility] CATH INVASIVE LOCATION;  Service: Cardiology;  Laterality: N/A;    CORONARY ARTERY BYPASS GRAFT N/A 10/06/2023    Procedure: MEDIAN STERNOTOMY, CORONARY ARTERY BYPASS GRAFTING X 2 UTILIZING THE LEFT AND RIGHT INTERNAL MAMMARY ARTERIES, ENDOSCOPIC VEIN HARVESTING OF THE RIGHT GREATER SAPHENOUS VEIN, EXPLORATION OF THE LEFT LEG, PULMONARY VEIN ISOLATION, POSTERIOR LEFT ATRIAL WALL ABLATION (ROOF AND FLOOR), LEFT ATRIAL APPENDAGE LIGATION, TRANSESOPHAGEAL ECHOCARDIOGRAM PER ANESTHESIA;  Surgeon: Guero Mora M    PULMONARY VEIN ISOLATION      SAPHENOUS VEIN GRAFT RESECTION Right     HARVESTING OF THE RIGHT GREATER SAPHENVOUS VEIN       Family History:   Family History   Problem Relation Age of Onset    Heart disease Mother     Stroke Father     Stroke Sister     Stroke Sister     Stroke Brother     Stroke Brother     Stroke Brother     Stroke Brother     Stroke Brother        Social History:   Social History     Socioeconomic History    Marital status:    Tobacco Use    Smoking status: Never    Smokeless tobacco: Never   Vaping Use    Vaping Use: Never used   Substance and Sexual Activity    Alcohol use: Never    Drug use: Never    Sexual activity: Defer       Medications:     Current Outpatient Medications:     aspirin 81 MG EC tablet, Take 2 tablets by mouth Daily., Disp: 60 tablet, Rfl: 4    atorvastatin (LIPITOR) 40 MG tablet, Take 1 tablet by mouth Daily., Disp: , Rfl:     dextrose (GLUTOSE) 40 % gel, Take  by mouth Every 1 (One) Hour As Needed for Low Blood Sugar., Disp: , Rfl:     empagliflozin (Jardiance) 10 MG tablet tablet, Take 1 tablet by mouth Daily., Disp: , Rfl:     ezetimibe (ZETIA) 10 MG tablet, Take 1 tablet by mouth Daily., Disp: , Rfl:     Glucagon HCl 1 MG reconstituted solution, Inject  as directed., Disp: , Rfl:     HYDROcodone-acetaminophen (NORCO) 7.5-325 MG per tablet, Take 1 tablet by mouth Every 6 (Six) Hours As Needed for Moderate Pain.,  Disp: 20 tablet, Rfl: 0    LORazepam (ATIVAN) 0.5 MG tablet, Take 1 tablet by mouth 2 (Two) Times a Day., Disp: , Rfl:     metFORMIN (GLUCOPHAGE) 500 MG tablet, Take 2 tablets by mouth 2 (Two) Times a Day With Meals., Disp: , Rfl:     metoprolol tartrate (LOPRESSOR) 25 MG tablet, Take 0.5 tablets by mouth 2 (Two) Times a Day., Disp: , Rfl:     omeprazole (priLOSEC) 20 MG capsule, Take 1 capsule by mouth Daily As Needed., Disp: , Rfl:     Tirzepatide (Mounjaro) 2.5 MG/0.5ML solution pen-injector, Inject 0.5 mL under the skin into the appropriate area as directed 1 (One) Time Per Week., Disp: , Rfl:     donepezil (Aricept) 10 MG tablet, Take 1 tablet by mouth Every Night., Disp: 30 tablet, Rfl: 2    Allergies:   Allergies   Allergen Reactions    Penicillins Rash       PHQ-9 Total Score:     STEADI Fall Risk Assessment was completed, and patient is at HIGH risk for falls. Assessment completed on:2023    Objective     Physical Exam:   Physical Exam  Vitals reviewed.   Neurological:      Cranial Nerves: Cranial nerves 2-12 are intact.         Neurologic Exam     Mental Status   Disoriented to city, area, street and number.   Disoriented to date.   Level of consciousness: alert    Cranial Nerves   Cranial nerves II through XII intact.     Motor Exam     Strength   Right neck flexion: 4/5  Left neck flexion: 4/5  Right neck extension: 4/5  Left neck extension: 4/5  Right deltoid: 4/5  Left deltoid: 4/5  Right biceps: 4/5  Left biceps: 4/5  Right triceps: 4/5  Left triceps: 4/5  Right wrist flexion: 4/5  Left wrist flexion: 4/5  Right wrist extension: 4/5  Left wrist extension: 4/5  Right interossei: 4/5  Left interossei: 4/5  Right abdominals: 4/5  Left abdominals: 4/5  Right iliopsoas: 4/5  Left iliopsoas: 4/5  Right quadriceps: 4/5  Left quadriceps: 4/5  Right hamstrin/5  Left hamstrin/5  Right glutei: 4/5  Left glutei: 4/5  Right anterior tibial: 4/5  Left anterior tibial: 4/5  Right posterior tibial:  "4/5  Left posterior tibial: 4/5  Right peroneal: 4/5  Left peroneal: 4/5  Right gastroc: 4/5  Left gastroc: 4/5    Sensory Exam   Light touch normal.     Gait, Coordination, and Reflexes     Gait  Gait: shuffling       Vital Signs:   Vitals:    12/11/23 1044   BP: 114/68   Pulse: 75   SpO2: 98%   Weight: 63.6 kg (140 lb 4.8 oz)   Height: 162.6 cm (64\")     Body mass index is 24.08 kg/m².     Results:   Imaging:   CT Head Without Contrast    Result Date: 11/27/2023  Atrophy and chronic changes without acute process. Authenticated and Electronically Signed by Dakotah Juarez MD on 11/27/2023 09:53:17 PM    XR CHEST PORTABLE    Result Date: 11/21/2023  No acute pulmonary disease.    CT Head Without Contrast    Result Date: 11/21/2023  1.  No acute intracranial process. 2.  Small chronic right frontal lobe infarct.    XR Chest 2 View    Result Date: 11/9/2023  Impression: No acute cardiopulmonary process. Electronically Signed: Cheri Parker MD  11/9/2023 2:41 PM EST  Workstation ID: [id number]    XR Chest 1 View    Result Date: 10/8/2023  Impression: Mild remaining bibasilar atelectasis. No pneumothorax or other new chest disease is seen. Questionable ileus. Electronically Signed: Robson Monteiro MD  10/8/2023 8:08 AM EDT  Workstation ID: [id number]    XR Chest 1 View    Result Date: 10/7/2023  Impression: Interval extubation. Remaining support hardware projects unchanged. There is no significant pleural effusion or distinct pneumothorax. Scattered atelectasis persists without new focal airspace disease. Unchanged heart and mediastinal contours. Electronically Signed: Jason Zimmer MD  10/7/2023 8:28 AM EDT  Workstation ID: [id number]    XR Chest 1 View    Result Date: 10/6/2023  Impression: 1. Postop changes of median sternotomy, with ET tube NG tube and right IJ catheter in satisfactory position. Mild pulmonary vascular congestion and mild left basilar discoid atelectasis. No evidence of pneumothorax. 2. Mild gaseous distention " "of the included upper bowel loops, suggesting mild ileus. Electronically Signed: Robson Monteiro MD  10/6/2023 4:43 PM EDT  Workstation ID: [id number]    XR Lost Needle / Instrument    Result Date: 10/6/2023  Impression: 1.Postoperative changes are noted. There is a right chest tube and multiple mediastinal drains. 2.The endotracheal tube distal tip seen approximately 4 to 5 cm above the abhi. 3.A right IJ venous sheath is observed. There is a central line position in the SVC. 4.There is evidence for a scope along the expected distribution of the esophagus or potentially overlying the chest slightly to the left of midline. A density external to the patient could also this appearance. 5.There is also an obliquely positioned linear density overlying the lower left mediastinum which may be related to postoperative changes or potentially findings external to the patient. 6.No definitive additional surgical material or instrumentation is seen throughout the field-of-view. Electronically Signed: Martin Ta MD  10/6/2023 3:57 PM EDT  Workstation ID: [id number]    XR Chest 1 View    Result Date: 10/5/2023  Impression: No acute pulmonary disease. Electronically Signed: David Truong MD  10/5/2023 7:00 PM EDT  Workstation ID: [id number]    XR CHEST PORTABLE    Result Date: 9/29/2023  Unchanged diffuse bilateral interstitial opacities representing pulmonary edema versus chronic interstitial fibrosis.       Labs:    No results found for: \"CMP\", \"PROTEIN\", \"ANTIMOGAB\", \"GGUFUC8BPFS\", \"JCVRESULT\", \"QUANTTBGOLD\", \"CBCDIF\", \"IGGALBSER\"     Assessment / Plan      Assessment/Plan:   Diagnoses and all orders for this visit:    1. Mild cognitive impairment (Primary)  Comments:  Start Aricept    Other orders  -     donepezil (Aricept) 10 MG tablet; Take 1 tablet by mouth Every Night.  Dispense: 30 tablet; Refill: 2           Patient Education:     Reviewed medications, potential side effects and signs and symptoms to report. Discussed " risk versus benefits of treatment plan with patient and/or family-including medications, labs and radiology that may be ordered. Addressed questions and concerns during visit. Patient and/or family verbalized understanding and agree with plan. Instructed to call the office with any questions and report to ER with any life-threatening symptoms.     Follow Up:   Return in about 3 months (around 3/11/2024).    I spent 30  minutes face to face with the patient and family. I personally spent 50 percent of this time counseling and discussing diagnosis, diagnostic testing, driving, treatment options, and management .       During this visit the following were done:  Labs Reviewed [x]    Labs Ordered []    Radiology Reports Reviewed [x]    Radiology Ordered []    PCP Records Reviewed []    Referring Provider Records Reviewed []    ER Records Reviewed [x]    Hospital Records Reviewed [x]    History Obtained From Family []    Radiology Images Reviewed [x]    Other Reviewed []    Records Requested []      JANNY Ruiz  [facility] NEUROLOGY  [address]  [facility] [address] [phone]

## 2024-01-18 ENCOUNTER — TELEPHONE (OUTPATIENT)
Dept: CARDIOLOGY | Facility: CLINIC | Age: 74
End: 2024-01-18
Payer: MEDICARE

## 2024-01-18 NOTE — TELEPHONE ENCOUNTER
Caller: JC STREETER     Relationship: Emergency Contact     Best call back number: [phone]     What is the best time to reach you: ANYTIME     Who are you requesting to speak with (clinical staff, provider,  specific staff member): CLINICAL     What was the call regarding: PTS POA IS REQUESTING A CALL BACK TO DISCUSS THIS PTS CURRENT MENTAL STATE/ MENTAL HEALTH AND IF IT HAS A CONNECTION TO THIS PTS OPEN HEART SURG. STATES HIS FATHER ISN'T THE SAME SINCE HE WOKE UP FROM THE SURG.     Is it okay if the provider responds through MyChart: CALL             PT HAD A FOLLOW APPT ON 1/23/24 AND DUE TO UNFORSEEN CIRCUMSTANCES, THEY HAD TO RESCHEDULE. PER PT PREFERENCE, WE GOT THEM SCHEDULED FOR 3/5/24. JUST WANTED TO MAKE OFFICE AWARE.

## 2024-01-18 NOTE — TELEPHONE ENCOUNTER
Pts son was advised calling Dr Houston office since he was the one that done the CABG. Pt was asking if the Pt still needs to go to cardiac rehab since it has been son long

## 2024-02-23 NOTE — PROGRESS NOTES
[facility] Cardiology Office Follow Up Note    Dann Metz  [id number]  2024    Primary Care Provider: Ariana Lucas APRN   Referring Provider: No ref. provider found    Chief Complaint: Follow-up after CABG    History of Present Illness:   Mr. Dann Metz is a 73 y.o. male who presents to the Cardiology Clinic for follow up of for Zeke artery disease.  The patient has a past medical history of coronary artery disease (10/23 s/p CABG x [id number], LIMA-GSV-OM2), hypertension, hyperlipidemia, CVA, type 2 diabetes mellitus, and mild cognitive impairment.  He presents today for follow-up after CABG.  The patient reports feeling well from a cardiac standpoint.  He specifically denies chest pain and dyspnea.  He denies palpitations, dizziness, syncope.  He denies orthopnea, PND, and lower extremity edema.  He offers no other complaints or concerns at this time.    Past Cardiac Testin. Last Coronary Angio: 2023   Severe three-vessel coronary artery disease   2. Prior Stress Testing: None  3. Last Echo: 10/2/2023    Left ventricular systolic function is low normal. Calculated left ventricular EF = 50.7% Normal left ventricular cavity size noted. Left ventricular wall thickness is consistent with mild concentric hypertrophy. Left ventricular diastolic function is consistent with (grade I) impaired relaxation.    Normal right ventricular cavity size, wall thickness, systolic function and septal motion noted.    Left atrial volume is mildly increased    The aortic valve appears trileaflet. Trace aortic valve regurgitation is present. No hemodynamically significant aortic valve stenosis is present.    Mitral annular calcification is present. Mild mitral valve regurgitation is present. No significant mitral valve stenosis is present.    The tricuspid valve is grossly normal in structure. Mild to moderate tricuspid valve regurgitation is present. Estimated right ventricular  "systolic pressure from tricuspid regurgitation is mildly elevated (35-45 mmHg).    Aortic root = 3.8 cm  4. Prior Holter Monitor: None    Review of Systems:   Review of Systems  As Noted in HPI.   I have reviewed and confirmed the accuracy of the ROS as documented by the MA/LPN/RN JANNY Padgett    I have reviewed and/or updated the patient's past medical, past surgical, family, social history, problem list and allergies as appropriate.     Medications:     Current Outpatient Medications:     aspirin 81 MG EC tablet, Take 2 tablets by mouth Daily., Disp: 60 tablet, Rfl: 4    atorvastatin (LIPITOR) 40 MG tablet, Take 1 tablet by mouth Daily., Disp: , Rfl:     donepezil (Aricept) 10 MG tablet, Take 1 tablet by mouth Every Night., Disp: 30 tablet, Rfl: 2    empagliflozin (Jardiance) 10 MG tablet tablet, Take 1 tablet by mouth Daily., Disp: , Rfl:     LORazepam (ATIVAN) 0.5 MG tablet, Take 1 tablet by mouth 2 (Two) Times a Day., Disp: , Rfl:     metFORMIN (GLUCOPHAGE) 500 MG tablet, Take 2 tablets by mouth 2 (Two) Times a Day With Meals., Disp: , Rfl:     metoprolol tartrate (LOPRESSOR) 25 MG tablet, Take 0.5 tablets by mouth 2 (Two) Times a Day., Disp: , Rfl:     omeprazole (priLOSEC) 20 MG capsule, Take 1 capsule by mouth Daily As Needed., Disp: , Rfl:     Tirzepatide (Mounjaro) 2.5 MG/0.5ML solution pen-injector, Inject 0.5 mL under the skin into the appropriate area as directed 1 (One) Time Per Week., Disp: , Rfl:     Physical Exam:  Vital Signs:   Vitals:    03/05/24 1451   BP: 122/78   BP Location: Left arm   Patient Position: Sitting   Pulse: 84   SpO2: 99%   Weight: 60.3 kg (133 lb)   Height: 162.6 cm (64.02\")     Body mass index is 22.82 kg/m².    Physical Exam  Vitals and nursing note reviewed.   Constitutional:       General: He is not in acute distress.  HENT:      Head: Normocephalic and atraumatic.   Neck:      Trachea: Trachea normal.   Cardiovascular:      Rate and Rhythm: Normal rate and " regular rhythm.      Pulses: Normal pulses.      Heart sounds: Normal heart sounds. No murmur heard.     No friction rub. No gallop.   Pulmonary:      Effort: Pulmonary effort is normal.      Breath sounds: Normal breath sounds.   Musculoskeletal:      Cervical back: Neck supple.      Right lower leg: No edema.      Left lower leg: No edema.   Skin:     General: Skin is warm and dry.   Neurological:      Mental Status: He is alert and oriented to person, place, and time.   Psychiatric:         Mood and Affect: Mood normal.         Behavior: Behavior normal. Behavior is cooperative.         Thought Content: Thought content does not include suicidal ideation.         Results Review:   I reviewed the patient's new clinical results.    Procedures    Assessment / Plan:   Diagnoses and all orders for this visit:    1. Coronary artery disease involving native coronary artery of native heart without angina pectoris (Primary)  10/23 s/p CABG x [id number], LIMA-GSV-OM2  Stable and angina free  Continue aspirin and statin therapies    2. Essential hypertension  Acceptable blood pressure control    3. Hyperlipidemia  9/23, triglycerides 194, HDL 39, LDL 77  Continue statin therapy      Preventative Cardiology:   Tobacco Cessation: N/A   Advance Care Planning: ACP discussion was declined by the patient. Patient has an advance directive in EMR which is still valid.      Follow Up:   Return in about 6 months (around 9/5/2024) for Follow-up with Dr. Rushing.      Thank you for allowing me to participate in the care of your patient. Please do not hesitate to contact me with additional questions or concerns.     JANNY Nunez

## 2024-03-05 ENCOUNTER — OFFICE VISIT (OUTPATIENT)
Dept: CARDIOLOGY | Facility: CLINIC | Age: 74
End: 2024-03-05
Payer: MEDICARE

## 2024-03-05 VITALS
WEIGHT: 133 LBS | DIASTOLIC BLOOD PRESSURE: 78 MMHG | HEART RATE: 84 BPM | BODY MASS INDEX: 22.71 KG/M2 | HEIGHT: 64 IN | SYSTOLIC BLOOD PRESSURE: 122 MMHG | OXYGEN SATURATION: 99 %

## 2024-03-05 DIAGNOSIS — I25.10 CORONARY ARTERY DISEASE INVOLVING NATIVE CORONARY ARTERY OF NATIVE HEART WITHOUT ANGINA PECTORIS: Primary | ICD-10-CM

## 2024-03-05 DIAGNOSIS — E78.5 HYPERLIPIDEMIA, UNSPECIFIED HYPERLIPIDEMIA TYPE: ICD-10-CM

## 2024-03-05 DIAGNOSIS — I10 ESSENTIAL HYPERTENSION: ICD-10-CM

## 2024-03-05 PROCEDURE — 1159F MED LIST DOCD IN RCRD: CPT | Performed by: NURSE PRACTITIONER

## 2024-03-05 PROCEDURE — 3078F DIAST BP <80 MM HG: CPT | Performed by: NURSE PRACTITIONER

## 2024-03-05 PROCEDURE — 1160F RVW MEDS BY RX/DR IN RCRD: CPT | Performed by: NURSE PRACTITIONER

## 2024-03-05 PROCEDURE — 99214 OFFICE O/P EST MOD 30 MIN: CPT | Performed by: NURSE PRACTITIONER

## 2024-03-05 PROCEDURE — 3074F SYST BP LT 130 MM HG: CPT | Performed by: NURSE PRACTITIONER

## 2024-03-26 RX ORDER — DONEPEZIL HYDROCHLORIDE 10 MG/1
10 TABLET, FILM COATED ORAL NIGHTLY
Qty: 30 TABLET | Refills: 2 | Status: SHIPPED | OUTPATIENT
Start: 2024-03-26 | End: 2025-03-26

## 2024-03-26 NOTE — TELEPHONE ENCOUNTER
Rx Refill Note  Requested Prescriptions     Pending Prescriptions Disp Refills    donepezil (ARICEPT) 10 MG tablet [Pharmacy Med Name: DONEPEZIL HCL 10 MG TABS 10 Tablet] 30 tablet 2     Sig: TAKE 1 TABLET BY MOUTH EVERY NIGHT.      Last filled: 12/11/2023, 30 with 2 Refills.   Last office visit with prescribing clinician: 12/11/2023      Next office visit with prescribing clinician: Visit date not found     Anaid Ngo MA  03/26/24, 14:02 EDT

## 2024-05-06 ENCOUNTER — HOSPITAL ENCOUNTER (OUTPATIENT)
Facility: HOSPITAL | Age: 74
Discharge: HOME | End: 2024-05-06
Payer: MEDICARE

## 2024-05-06 LAB
25(OH)D3 SERPL-MCNC: 19.7 NG/ML (ref 32–100)
ALBUMIN SERPL-MCNC: 4.4 G/DL (ref 3.4–4.8)
ALBUMIN/GLOB SERPL: 2 {RATIO} (ref 0.8–2)
ALP SERPL-CCNC: 78 U/L (ref 25–100)
ALT SERPL-CCNC: 11 U/L (ref 4–36)
ANION GAP SERPL CALCULATED.3IONS-SCNC: 12 MMOL/L (ref 3–16)
AST SERPL-CCNC: 17 U/L (ref 8–33)
BASOPHILS # BLD: 0.1 K/UL (ref 0–0.1)
BASOPHILS NFR BLD: 0.7 %
BILIRUB SERPL-MCNC: 0.6 MG/DL (ref 0.3–1.2)
BUN SERPL-MCNC: 27 MG/DL (ref 6–20)
CALCIUM SERPL-MCNC: 9.3 MG/DL (ref 8.5–10.5)
CHLORIDE SERPL-SCNC: 104 MMOL/L (ref 98–107)
CHOLEST SERPL-MCNC: 184 MG/DL (ref 0–200)
CO2 SERPL-SCNC: 25 MMOL/L (ref 20–30)
CREAT SERPL-MCNC: 1.1 MG/DL (ref 0.4–1.2)
EOSINOPHIL # BLD: 0.1 K/UL (ref 0–0.4)
EOSINOPHIL NFR BLD: 1.8 %
ERYTHROCYTE [DISTWIDTH] IN BLOOD BY AUTOMATED COUNT: 13.6 % (ref 11–16)
FOLATE SERPL-MCNC: 11.52 NG/ML
GFR SERPLBLD CREATININE-BSD FMLA CKD-EPI: 72 ML/MIN/{1.73_M2}
GLOBULIN SER CALC-MCNC: 2.2 G/DL
GLUCOSE SERPL-MCNC: 172 MG/DL (ref 74–106)
HBA1C MFR BLD: 7.1 %
HCT VFR BLD AUTO: 40.2 % (ref 40–54)
HDLC SERPL-MCNC: 48 MG/DL (ref 40–60)
HGB BLD-MCNC: 12.8 G/DL (ref 13–18)
IMM GRANULOCYTES NFR BLD: 0.3 % (ref 0–5)
LDLC SERPL CALC-MCNC: 113 MG/DL
LYMPHOCYTES # BLD: 1.7 K/UL (ref 1.5–4)
LYMPHOCYTES NFR BLD: 21.8 %
MCH RBC QN AUTO: 31.6 PG (ref 27–32)
MCHC RBC AUTO-ENTMCNC: 31.8 G/DL (ref 31–35)
MCV RBC AUTO: 99.3 FL (ref 80–100)
MICROALBUMIN UR DL<=1MG/L-MCNC: 13.6 MG/DL (ref 0–22)
MONOCYTES # BLD: 0.7 K/UL (ref 0.2–0.8)
MONOCYTES NFR BLD: 8.6 %
NEUTROPHILS # BLD: 5.1 K/UL (ref 2–7.5)
NEUTS SEG NFR BLD: 66.8 %
PLATELET # BLD AUTO: 222 K/UL (ref 150–400)
PMV BLD AUTO: 10.5 FL (ref 6–10)
POTASSIUM SERPL-SCNC: 4.3 MMOL/L (ref 3.4–5.1)
PROT SERPL-MCNC: 6.6 G/DL (ref 6.4–8.3)
PSA SERPL DL<=0.01 NG/ML-MCNC: 1.78 NG/ML (ref 0–4)
RBC # BLD AUTO: 4.05 M/UL (ref 4.5–6)
SODIUM SERPL-SCNC: 141 MMOL/L (ref 136–145)
TRIGL SERPL-MCNC: 115 MG/DL (ref 0–249)
TSH SERPL DL<=0.005 MIU/L-ACNC: 1.08 UIU/ML (ref 0.27–4.2)
VIT B12 SERPL-MCNC: 214 PG/ML (ref 211–911)
VLDLC SERPL CALC-MCNC: 23 MG/DL
WBC # BLD AUTO: 7.7 K/UL (ref 4–11)

## 2024-05-06 PROCEDURE — 80053 COMPREHEN METABOLIC PANEL: CPT

## 2024-05-06 PROCEDURE — 80061 LIPID PANEL: CPT

## 2024-05-06 PROCEDURE — 84443 ASSAY THYROID STIM HORMONE: CPT

## 2024-05-06 PROCEDURE — 82306 VITAMIN D 25 HYDROXY: CPT

## 2024-05-06 PROCEDURE — 82607 VITAMIN B-12: CPT

## 2024-05-06 PROCEDURE — 82043 UR ALBUMIN QUANTITATIVE: CPT

## 2024-05-06 PROCEDURE — 36415 COLL VENOUS BLD VENIPUNCTURE: CPT

## 2024-05-06 PROCEDURE — 83036 HEMOGLOBIN GLYCOSYLATED A1C: CPT

## 2024-05-06 PROCEDURE — 85025 COMPLETE CBC W/AUTO DIFF WBC: CPT

## 2024-05-06 PROCEDURE — 82746 ASSAY OF FOLIC ACID SERUM: CPT

## 2024-05-06 PROCEDURE — 84153 ASSAY OF PSA TOTAL: CPT

## 2024-06-25 RX ORDER — DONEPEZIL HYDROCHLORIDE 10 MG/1
10 TABLET, FILM COATED ORAL NIGHTLY
Qty: 30 TABLET | Refills: 2 | Status: SHIPPED | OUTPATIENT
Start: 2024-06-25

## 2024-06-25 NOTE — TELEPHONE ENCOUNTER
Rx Refill Note  Requested Prescriptions     Pending Prescriptions Disp Refills    donepezil (ARICEPT) 10 MG tablet [Pharmacy Med Name: DONEPEZIL HCL 10 MG TABS 10 Tablet] 30 tablet 2     Sig: TAKE 1 TABLET BY MOUTH EVERY NIGHT.      Last filled: 3/26/24 with 2 sent in- needs follow up noted on script   Last office visit with prescribing clinician: 12/11/2023      Next office visit with prescribing clinician: Visit date not found     Alecia Moe MA  06/25/24, 12:20 EDT

## 2024-08-27 ENCOUNTER — TELEPHONE (OUTPATIENT)
Dept: NEUROLOGY | Facility: CLINIC | Age: 74
End: 2024-08-27

## 2024-09-10 ENCOUNTER — OFFICE VISIT (OUTPATIENT)
Dept: NEUROLOGY | Facility: CLINIC | Age: 74
End: 2024-09-10
Payer: MEDICARE

## 2024-09-10 ENCOUNTER — OFFICE VISIT (OUTPATIENT)
Dept: CARDIOLOGY | Facility: CLINIC | Age: 74
End: 2024-09-10
Payer: MEDICARE

## 2024-09-10 VITALS
OXYGEN SATURATION: 99 % | HEIGHT: 63 IN | WEIGHT: 138.4 LBS | BODY MASS INDEX: 24.52 KG/M2 | DIASTOLIC BLOOD PRESSURE: 72 MMHG | SYSTOLIC BLOOD PRESSURE: 124 MMHG | HEART RATE: 81 BPM

## 2024-09-10 VITALS
DIASTOLIC BLOOD PRESSURE: 80 MMHG | WEIGHT: 138.6 LBS | HEART RATE: 86 BPM | HEIGHT: 64 IN | BODY MASS INDEX: 23.66 KG/M2 | SYSTOLIC BLOOD PRESSURE: 128 MMHG | OXYGEN SATURATION: 99 %

## 2024-09-10 DIAGNOSIS — G31.84 MILD COGNITIVE IMPAIRMENT: Primary | ICD-10-CM

## 2024-09-10 DIAGNOSIS — Z86.73 HISTORY OF STROKE: ICD-10-CM

## 2024-09-10 DIAGNOSIS — E11.69 TYPE 2 DIABETES MELLITUS WITH OTHER SPECIFIED COMPLICATION, WITHOUT LONG-TERM CURRENT USE OF INSULIN: ICD-10-CM

## 2024-09-10 DIAGNOSIS — I10 ESSENTIAL HYPERTENSION: ICD-10-CM

## 2024-09-10 DIAGNOSIS — I25.10 CORONARY ARTERY DISEASE INVOLVING NATIVE CORONARY ARTERY OF NATIVE HEART WITHOUT ANGINA PECTORIS: Primary | ICD-10-CM

## 2024-09-10 DIAGNOSIS — F41.9 ANXIETY: ICD-10-CM

## 2024-09-10 DIAGNOSIS — E78.5 HYPERLIPIDEMIA LDL GOAL <70: ICD-10-CM

## 2024-09-10 PROCEDURE — 1159F MED LIST DOCD IN RCRD: CPT | Performed by: NURSE PRACTITIONER

## 2024-09-10 PROCEDURE — 1160F RVW MEDS BY RX/DR IN RCRD: CPT | Performed by: NURSE PRACTITIONER

## 2024-09-10 PROCEDURE — 99214 OFFICE O/P EST MOD 30 MIN: CPT | Performed by: NURSE PRACTITIONER

## 2024-09-10 PROCEDURE — 3074F SYST BP LT 130 MM HG: CPT | Performed by: NURSE PRACTITIONER

## 2024-09-10 PROCEDURE — 3079F DIAST BP 80-89 MM HG: CPT | Performed by: NURSE PRACTITIONER

## 2024-09-10 PROCEDURE — 3078F DIAST BP <80 MM HG: CPT | Performed by: NURSE PRACTITIONER

## 2024-09-10 RX ORDER — ESCITALOPRAM OXALATE 10 MG/1
10 TABLET ORAL DAILY
Qty: 30 TABLET | Refills: 6 | Status: SHIPPED | OUTPATIENT
Start: 2024-09-10 | End: 2025-09-10
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
## 2024-09-10 NOTE — PROGRESS NOTES
[facility] Cardiology    Office Consult     Dann Metz  [id number]  09/10/2024    Referred By: No ref. provider found    Chief Complaint   Patient presents with    Coronary Artery Disease     6mo follow-up       Subjective     History of Present Illness:   Dann Metz is a 73 y.o. male who presents to the Cardiology Clinic for routine annual follow up for coronary artery disease.  The patient has a past medical history of coronary artery disease (10/2023 s/p CABG x 2 LIMA-BRANDON-LAD, LIMA-GSV-OM2), hypertension, hyperlipidemia, CVA, type 2 diabetes mellitus, and mild cognitive impairment. He reports he has been feeling well since his last follow up but has been tired.  He just saw Neurology who started him on an anti-depressant.  He specifically denies chest pain, shortness of breath, palpitations.  He states he has always had mild lower extremity swelling.  He presents with his son.      Past Cardiac Testin. Last Coronary Angio: 2023              Severe three-vessel coronary artery disease   2. Prior Stress Testing: None  3. Last Echo: 10/2/2023    Left ventricular systolic function is low normal. Calculated left ventricular EF = 50.7% Normal left ventricular cavity size noted. Left ventricular wall thickness is consistent with mild concentric hypertrophy. Left ventricular diastolic function is consistent with (grade I) impaired relaxation.    Normal right ventricular cavity size, wall thickness, systolic function and septal motion noted.    Left atrial volume is mildly increased    The aortic valve appears trileaflet. Trace aortic valve regurgitation is present. No hemodynamically significant aortic valve stenosis is present.    Mitral annular calcification is present. Mild mitral valve regurgitation is present. No significant mitral valve stenosis is present.    The tricuspid valve is grossly normal in structure. Mild to moderate tricuspid valve regurgitation is present. Estimated  right ventricular systolic pressure from tricuspid regurgitation is mildly elevated (35-45 mmHg).    Aortic root = 3.8 cm  4. Prior Holter Monitor: None    Review of Systems   Constitutional:  Negative for activity change and fatigue.   Respiratory:  Negative for chest tightness and shortness of breath.    Cardiovascular:  Negative for chest pain, palpitations and leg swelling.   Neurological:  Negative for dizziness.   All other systems reviewed and are negative.       Past Medical History:   Diagnosis Date    Anxiety     Coronary artery disease     Diabetes mellitus     GERD (gastroesophageal reflux disease)     Hyperlipidemia     Hypertension     Impairment of short-term memory     Myocardial infarction     Stroke     Vitamin D deficiency     Weakness of right upper extremity        Past Surgical History:   Procedure Laterality Date    CARDIAC CATHETERIZATION      CARDIAC CATHETERIZATION N/A 09/30/2023    Procedure: Coronary angiography;  Surgeon: Vince Redding MD;  Location: [facility] CATH INVASIVE LOCATION;  Service: Cardiology;  Laterality: N/A;    CORONARY ARTERY BYPASS GRAFT N/A 10/06/2023    Procedure: MEDIAN STERNOTOMY, CORONARY ARTERY BYPASS GRAFTING X 2 UTILIZING THE LEFT AND RIGHT INTERNAL MAMMARY ARTERIES, ENDOSCOPIC VEIN HARVESTING OF THE RIGHT GREATER SAPHENOUS VEIN, EXPLORATION OF THE LEFT LEG, PULMONARY VEIN ISOLATION, POSTERIOR LEFT ATRIAL WALL ABLATION (ROOF AND FLOOR), LEFT ATRIAL APPENDAGE LIGATION, TRANSESOPHAGEAL ECHOCARDIOGRAM PER ANESTHESIA;  Surgeon: Guero Mora M    PULMONARY VEIN ISOLATION      SAPHENOUS VEIN GRAFT RESECTION Right     HARVESTING OF THE RIGHT GREATER SAPHENVOUS VEIN       Family History   Problem Relation Age of Onset    Heart disease Mother     Stroke Father     Stroke Sister     Stroke Sister     Stroke Brother     Stroke Brother     Stroke Brother     Stroke Brother     Stroke Brother        Social History     Socioeconomic History    Marital status:   "  Tobacco Use    Smoking status: Never    Smokeless tobacco: Never   Vaping Use    Vaping status: Never Used   Substance and Sexual Activity    Alcohol use: Never    Drug use: Never    Sexual activity: Defer         Current Outpatient Medications:     aspirin 81 MG EC tablet, Take 2 tablets by mouth Daily., Disp: 60 tablet, Rfl: 4    atorvastatin (LIPITOR) 40 MG tablet, Take 1 tablet by mouth Daily., Disp: , Rfl:     donepezil (ARICEPT) 10 MG tablet, Take 1 tablet by mouth Every Night. Please call office to schedule follow up appointment for further refills. Thanks!, Disp: 30 tablet, Rfl: 2    empagliflozin (Jardiance) 10 MG tablet tablet, Take 1 tablet by mouth Daily., Disp: , Rfl:     escitalopram (Lexapro) 10 MG tablet, Take 1 tablet by mouth Daily., Disp: 30 tablet, Rfl: 6    metFORMIN (GLUCOPHAGE) 1000 MG tablet, Take 1 tablet by mouth 2 (Two) Times a Day With Meals., Disp: , Rfl:     metoprolol tartrate (LOPRESSOR) 25 MG tablet, Take 0.5 tablets by mouth 2 (Two) Times a Day., Disp: , Rfl:     omeprazole (priLOSEC) 20 MG capsule, Take 1 capsule by mouth Daily As Needed., Disp: , Rfl:       Allergies   Allergen Reactions    Penicillins Rash       Objective     Vitals:    09/10/24 1140   BP: 124/72   BP Location: Left arm   Patient Position: Sitting   Cuff Size: Adult   Pulse: 81   SpO2: 99%   Weight: 62.8 kg (138 lb 6.4 oz)   Height: 160.1 cm (63.05\")     Body mass index is 24.48 kg/m².    Physical Exam  Vitals and nursing note reviewed.   Constitutional:       General: He is not in acute distress.     Appearance: Normal appearance. He is not toxic-appearing.   HENT:      Head: Normocephalic.      Mouth/Throat:      Mouth: Mucous membranes are moist.   Eyes:      Pupils: Pupils are equal, round, and reactive to light.   Cardiovascular:      Rate and Rhythm: Normal rate and regular rhythm.      Pulses: Normal pulses.      Heart sounds: Normal heart sounds. No murmur heard.  Pulmonary:      Effort: Pulmonary " effort is normal.      Breath sounds: Normal breath sounds. No wheezing, rhonchi or rales.   Musculoskeletal:      Right lower leg: No edema.      Left lower leg: No edema.   Skin:     General: Skin is warm and dry.      Capillary Refill: Capillary refill takes less than 2 seconds.   Neurological:      Mental Status: He is alert and oriented to person, place, and time. Mental status is at baseline.   Psychiatric:         Mood and Affect: Mood normal.         Behavior: Behavior normal.         Thought Content: Thought content normal.         Results Review:   I reviewed the patient's new clinical results.    Procedures    Assessment & Plan  Coronary artery disease involving native coronary artery of native heart without angina pectoris  -No chest pain or angina symptoms  -Continue ASA and statin therapies  Essential hypertension  -Blood pressure controlled today  -Continue current medications    Type 2 diabetes mellitus with other specified complication, without long-term current use of insulin  -5/2024--A1c continues to improve-7.1  -Managed by PCP  Hyperlipidemia LDL goal <70   5/2024--LDL-113  -Continue statin therapy        Preventative Cardiology:   Tobacco Cessation: N/A   Advance Care Planning: ACP discussion was held with the patient during this visit. Patient has an advance directive in EMR which is still valid.      Follow Up:   Return in about 6 months (around 3/10/2025) for Next scheduled follow up--Dr. Rushing please 3/10/2025.      Thank you for allowing me to participate in the care of your patient. Please to not hesitate to contact me with additional questions or concerns.     JANNY Ambrosio

## 2024-09-23 RX ORDER — DONEPEZIL HYDROCHLORIDE 10 MG/1
10 TABLET, FILM COATED ORAL NIGHTLY
Qty: 30 TABLET | Refills: 2 | Status: SHIPPED | OUTPATIENT
Start: 2024-09-23

## 2025-02-18 RX ORDER — DONEPEZIL HYDROCHLORIDE 10 MG/1
10 TABLET, FILM COATED ORAL NIGHTLY
Qty: 30 TABLET | Refills: 3 | Status: SHIPPED | OUTPATIENT
Start: 2025-02-18

## 2025-02-18 NOTE — TELEPHONE ENCOUNTER
Rx Refill Note  Requested Prescriptions     Pending Prescriptions Disp Refills    donepezil (ARICEPT) 10 MG tablet [Pharmacy Med Name: DONEPEZIL HCL 10 MG TABS 10 Tablet] 30 tablet 3     Sig: TAKE 1 TABLET BY MOUTH EVERY NIGHT. PLEASE CALL OFFICE TO SCHEDULE FOLLOW UP APPOINTMENT FOR FURTHER REFILLS. THANKS!      Last filled: 9/23/24, 30 with 2 refills  Last office visit with prescribing clinician: 9/10/2024      Next office visit with prescribing clinician: 3/10/2025     Anaid Ngo MA  02/18/25, 11:18 EST

## 2025-03-19 RX ORDER — ESCITALOPRAM OXALATE 10 MG/1
10 TABLET ORAL DAILY
Qty: 30 TABLET | Refills: 7 | Status: SHIPPED | OUTPATIENT
Start: 2025-03-19 | End: 2026-03-19

## 2025-03-19 NOTE — TELEPHONE ENCOUNTER
Rx Refill Note  Requested Prescriptions     Pending Prescriptions Disp Refills    escitalopram (LEXAPRO) 10 MG tablet [Pharmacy Med Name: ESCITALOPRAM OXALATE 10 MG 10 Tablet] 30 tablet 7     Sig: TAKE 1 TABLET BY MOUTH DAILY.      Last filled:9.10.24 with 6 RF sent in  Last office visit with prescribing clinician: 9/10/2024      Next office visit with prescribing clinician: 5/5/2025     Alecia Moe MA  03/19/25, 11:40 EDT

## 2025-06-18 RX ORDER — DONEPEZIL HYDROCHLORIDE 10 MG/1
10 TABLET, FILM COATED ORAL NIGHTLY
Qty: 30 TABLET | Refills: 0 | Status: SHIPPED | OUTPATIENT
Start: 2025-06-18

## 2025-06-18 NOTE — TELEPHONE ENCOUNTER
Rx Refill Note  Requested Prescriptions     Pending Prescriptions Disp Refills    donepezil (ARICEPT) 10 MG tablet [Pharmacy Med Name: DONEPEZIL HCL 10 MG TABS 10 Tablet] 30 tablet 4     Sig: TAKE 1 TABLET BY MOUTH EVERY NIGHT. PLEASE CALL OFFICE TO SCHEDULE FOLLOW UP APPOINTMENT FOR FURTHER REFILLS. THANKS!      Last filled:2/18/25, 30 with 3 refills  Last office visit with prescribing clinician: 9/10/2024      Next office visit with prescribing clinician: Visit date not found     Anaid Ngo MA  06/18/25, 10:26 EDT

## 2025-08-14 ENCOUNTER — HOSPITAL ENCOUNTER (OUTPATIENT)
Facility: HOSPITAL | Age: 75
Discharge: HOME | End: 2025-08-14
Payer: MEDICARE

## 2025-08-14 LAB
25(OH)D3 SERPL-MCNC: 25 NG/ML (ref 32–100)
ALBUMIN SERPL-MCNC: 4.3 G/DL (ref 3.4–4.8)
ALBUMIN/GLOB SERPL: 1.4 {RATIO} (ref 0.8–2)
ALP SERPL-CCNC: 111 U/L (ref 25–100)
ALT SERPL-CCNC: 18 U/L (ref 4–36)
ANION GAP SERPL CALCULATED.3IONS-SCNC: 16 MMOL/L (ref 3–16)
AST SERPL-CCNC: 23 U/L (ref 8–33)
BASOPHILS # BLD: 0.1 K/UL (ref 0–0.1)
BASOPHILS NFR BLD: 0.6 %
BILIRUB SERPL-MCNC: 0.7 MG/DL (ref 0.3–1.2)
BUN SERPL-MCNC: 33 MG/DL (ref 6–20)
CALCIUM SERPL-MCNC: 9.7 MG/DL (ref 8.3–10.6)
CHLORIDE SERPL-SCNC: 96 MMOL/L (ref 98–107)
CHOLEST SERPL-MCNC: 211 MG/DL (ref 0–200)
CO2 SERPL-SCNC: 23 MMOL/L (ref 20–30)
CREAT SERPL-MCNC: 1.6 MG/DL (ref 0.8–1.3)
CREAT UR-MCNC: 58.7 MG/DL (ref 39–259)
EOSINOPHIL # BLD: 0.3 K/UL (ref 0–0.4)
EOSINOPHIL NFR BLD: 3.3 %
ERYTHROCYTE [DISTWIDTH] IN BLOOD BY AUTOMATED COUNT: 12.6 % (ref 11–16)
FOLATE SERPL-MCNC: 15.7 NG/ML
GFR SERPLBLD CREATININE-BSD FMLA CKD-EPI: 45 ML/MIN/{1.73_M2}
GLOBULIN SER CALC-MCNC: 3.1 G/DL
GLUCOSE SERPL-MCNC: 294 MG/DL (ref 74–106)
HBA1C MFR BLD: 9 %
HCT VFR BLD AUTO: 44 % (ref 40–54)
HDLC SERPL-MCNC: 48 MG/DL (ref 40–60)
HGB BLD-MCNC: 14.7 G/DL (ref 13–18)
IMM GRANULOCYTES # BLD: 0 K/UL
IMM GRANULOCYTES NFR BLD: 0.5 % (ref 0–5)
LDLC SERPL CALC-MCNC: 132 MG/DL
LYMPHOCYTES # BLD: 1.8 K/UL (ref 1.5–4)
LYMPHOCYTES NFR BLD: 21 %
MCH RBC QN AUTO: 32 PG (ref 27–32)
MCHC RBC AUTO-ENTMCNC: 33.4 G/DL (ref 31–35)
MCV RBC AUTO: 95.7 FL (ref 80–100)
MICROALBUMIN UR DL<=1MG/L-MCNC: 9.37 MG/DL (ref 0–22)
MICROALBUMIN/CREAT UR: 159.6 MG/G (ref 0–30)
MONOCYTES # BLD: 0.7 K/UL (ref 0.2–0.8)
MONOCYTES NFR BLD: 8.4 %
NEUTROPHILS # BLD: 5.7 K/UL (ref 2–7.5)
NEUTS SEG NFR BLD: 66.2 %
PLATELET # BLD AUTO: 207 K/UL (ref 150–400)
PMV BLD AUTO: 10.1 FL (ref 6–10)
POTASSIUM SERPL-SCNC: 5.2 MMOL/L (ref 3.4–5.1)
PROT SERPL-MCNC: 7.4 G/DL (ref 6.4–8.3)
RBC # BLD AUTO: 4.6 M/UL (ref 4.5–6)
SODIUM SERPL-SCNC: 135 MMOL/L (ref 136–145)
TRIGL SERPL-MCNC: 157 MG/DL (ref 0–249)
TSH SERPL DL<=0.005 MIU/L-ACNC: 1.9 UIU/ML (ref 0.27–4.2)
VIT B12 SERPL-MCNC: 483 PG/ML (ref 211–911)
VLDLC SERPL CALC-MCNC: 31 MG/DL
WBC # BLD AUTO: 8.7 K/UL (ref 4–11)

## 2025-08-14 PROCEDURE — 84443 ASSAY THYROID STIM HORMONE: CPT

## 2025-08-14 PROCEDURE — 85025 COMPLETE CBC W/AUTO DIFF WBC: CPT

## 2025-08-14 PROCEDURE — 80053 COMPREHEN METABOLIC PANEL: CPT

## 2025-08-14 PROCEDURE — 82570 ASSAY OF URINE CREATININE: CPT

## 2025-08-14 PROCEDURE — 82607 VITAMIN B-12: CPT

## 2025-08-14 PROCEDURE — 36415 COLL VENOUS BLD VENIPUNCTURE: CPT

## 2025-08-14 PROCEDURE — 80061 LIPID PANEL: CPT

## 2025-08-14 PROCEDURE — 82043 UR ALBUMIN QUANTITATIVE: CPT

## 2025-08-14 PROCEDURE — 82746 ASSAY OF FOLIC ACID SERUM: CPT

## 2025-08-14 PROCEDURE — 83036 HEMOGLOBIN GLYCOSYLATED A1C: CPT

## 2025-08-14 PROCEDURE — 82306 VITAMIN D 25 HYDROXY: CPT

## 2025-08-18 RX ORDER — DONEPEZIL HYDROCHLORIDE 10 MG/1
10 TABLET, FILM COATED ORAL NIGHTLY
Qty: 30 TABLET | Refills: 1 | OUTPATIENT
Start: 2025-08-18

## (undated) DEVICE — FOGARTY SPRING CLIPS 6MM: Brand: FOGARTY SOFTJAW

## (undated) DEVICE — SUT SILK 2 SUTUPAK TIE 60IN SA8H 2STRAND

## (undated) DEVICE — DRN WND CH RND FUL/FLUT NO/TROC 3/8IN 28F

## (undated) DEVICE — 32 FR RIGHT ANGLE – SOFT PVC CATHETER: Brand: PVC THORACIC CATHETERS

## (undated) DEVICE — SUT PROLN 4/0 SH D/A 36IN 8521H

## (undated) DEVICE — TRAP FLD MINIVAC MEGADYNE 100ML

## (undated) DEVICE — DRP SLUSH MACH

## (undated) DEVICE — GW EMR FIX EXCHG J STD .035 3MM 260CM

## (undated) DEVICE — BLD SCLPL BEAVR MINI STR 2BVL 180D LF

## (undated) DEVICE — PATIENT RETURN ELECTRODE, SINGLE-USE, CONTACT QUALITY MONITORING, ADULT, WITH 9FT CORD, FOR PATIENTS WEIGING OVER 33LBS. (15KG): Brand: MEGADYNE

## (undated) DEVICE — TB SXN DLP RIGD MACROSUCKER 6F 3IN

## (undated) DEVICE — ANTIBACTERIAL UNDYED BRAIDED (POLYGLACTIN 910), SYNTHETIC ABSORBABLE SUTURE: Brand: COATED VICRYL

## (undated) DEVICE — ADHS SKIN PREMIERPRO EXOFIN TOPICAL HI/VISC .5ML

## (undated) DEVICE — ELECTRD BLD EZ CLN MOD XLNG 2.75IN

## (undated) DEVICE — CLMP ABLAT ISOLATORSYNERGY TRNSPOLAR

## (undated) DEVICE — CONN REDUCING CANN/PUMP 3/8X3/8X3/8

## (undated) DEVICE — SUT PROLN 7/0 BV1 D/A 24IN 8702H

## (undated) DEVICE — EZ GLIDE AORTIC CANNULA: Brand: EDWARDS LIFESCIENCES EZ GLIDE AORTIC CANNULA

## (undated) DEVICE — TBG PENCL TELESCP MEGADYNE SMOKE EVAC 10FT

## (undated) DEVICE — CATH F6 ST JR 4 100CM: Brand: SUPERTORQUE

## (undated) DEVICE — GLIDESHEATH SLENDER STAINLESS STEEL KIT: Brand: GLIDESHEATH SLENDER

## (undated) DEVICE — 3M™ MEDIPORE™ H SOFT CLOTH SURGICAL TAPE, 2863, 3 IN X 10 YD, 12/CASE: Brand: 3M™ MEDIPORE™

## (undated) DEVICE — LEVEL SENSORS PADS ARE USED TO ATTACH THE LEVEL SENSORS TO A HARD SHELL RESERVOIR. INCLUDES COUPLING GEL.: Brand: TERUMO® ADVANCED PERFUSION SYSTEM 1

## (undated) DEVICE — AVID DUAL STAGE VENOUS DRAINAGE CANNULA: Brand: AVID DUAL STAGE VENOUS DRAINAGE CANNULA

## (undated) DEVICE — RADIFOCUS OPTITORQUE ANGIOGRAPHIC CATHETER: Brand: OPTITORQUE

## (undated) DEVICE — CANN VESL DLP 1WY BLNT/TP 3MM

## (undated) DEVICE — SPONGE,LAP,4"X18",XR,ST,5/PK,40PK/CS: Brand: MEDLINE INDUSTRIES, INC.

## (undated) DEVICE — PK PERFUS CUST W/CARDIOPLEGIA

## (undated) DEVICE — SUT PROLN 6/0 C1 D/A 30IN 8706H

## (undated) DEVICE — CONNECT Y INTERSEPT W/LL 3/8 X 3/8 X 3/8IN

## (undated) DEVICE — PK ATS CUST W CARDIOTOMY RESEVOIR

## (undated) DEVICE — GW INQW FIX/CORE PTFE J/3MM .035 260CM

## (undated) DEVICE — SYS VASOVIEW HEMOPRO ENDOSCOPIC HARVST VESL

## (undated) DEVICE — SUT ETHIB 1 CTX CR8 18IN CX30D

## (undated) DEVICE — SHEET,DRAPE,53X77,STERILE: Brand: MEDLINE

## (undated) DEVICE — PK HEART OPN 10

## (undated) DEVICE — SUT SILK 0/0 CT2 18IN C027D

## (undated) DEVICE — SUT PROLN 7/0 8747H

## (undated) DEVICE — TB SXN SURG DLP MALL/CARD SFT/TP 6F 6IN

## (undated) DEVICE — CLTH CLENS READYCLEANSE PERI CARE PK/5

## (undated) DEVICE — CATHETER,URETHRAL,REDRUBBER,STERILE,22FR: Brand: MEDLINE

## (undated) DEVICE — PEN ABLAT ISOL TRNSPOLAR 19CM

## (undated) DEVICE — DRAPE,SPLIT,CV,CLR ANES,STERILE: Brand: MEDLINE

## (undated) DEVICE — CANN AORT/ROOT DLP W/VNT/LN 14G 7F 14.6CM

## (undated) DEVICE — Device

## (undated) DEVICE — ELECTRD BLD EZ CLN STD 2.5IN

## (undated) DEVICE — CVR PROB ULTRASND/TRANSD W/GEL LNG 18X250CM STRL

## (undated) DEVICE — BLAKE CARDIO CONNECTOR 1:1: Brand: J-VAC

## (undated) DEVICE — DR ROGERS OH: Brand: MEDLINE INDUSTRIES, INC.

## (undated) DEVICE — SUT ETHIB 2/0 SH SH 36IN X523H

## (undated) DEVICE — ADAPT/Y PERFUS DLP FML/LUER COLR/CODE/CLMP 8.9AND25.4CM

## (undated) DEVICE — OASIS DRAIN, SINGLE, INLINE & ATS COMPATIBLE: Brand: OASIS

## (undated) DEVICE — FLTR RESERV PERFUS INTERSEPT 02 STRL

## (undated) DEVICE — SUT SILK B CARDIO BB 5/0 30IN K880H BX/36

## (undated) DEVICE — GUIDE SELECT ATRICLIP

## (undated) DEVICE — SUT MONOCRYL PLS ANTIB UND 3/0  PS1 27IN

## (undated) DEVICE — PAD ARMBRD SURG CONVOL 7.5X20X2IN

## (undated) DEVICE — TR BAND RADIAL ARTERY COMPRESSION DEVICE: Brand: TR BAND

## (undated) DEVICE — SUT SILK 2/0 CT1 CR8 18IN C022D

## (undated) DEVICE — GLV SURG GRN DERMASSURE LF PF 7.5

## (undated) DEVICE — SENSR CERBRL O2 PK/2